# Patient Record
Sex: FEMALE | Race: WHITE | Employment: OTHER
[De-identification: names, ages, dates, MRNs, and addresses within clinical notes are randomized per-mention and may not be internally consistent; named-entity substitution may affect disease eponyms.]

---

## 2017-01-05 ENCOUNTER — OFFICE VISIT (OUTPATIENT)
Dept: FAMILY MEDICINE CLINIC | Facility: CLINIC | Age: 82
End: 2017-01-05

## 2017-01-05 VITALS
SYSTOLIC BLOOD PRESSURE: 133 MMHG | OXYGEN SATURATION: 99 % | DIASTOLIC BLOOD PRESSURE: 86 MMHG | HEART RATE: 74 BPM | HEIGHT: 63 IN | TEMPERATURE: 98.2 F

## 2017-01-05 DIAGNOSIS — K21.9 GASTROESOPHAGEAL REFLUX DISEASE WITHOUT ESOPHAGITIS: ICD-10-CM

## 2017-01-05 DIAGNOSIS — I48.0 PAROXYSMAL ATRIAL FIBRILLATION (HCC): Primary | ICD-10-CM

## 2017-01-05 PROCEDURE — 99213 OFFICE O/P EST LOW 20 MIN: CPT | Performed by: FAMILY MEDICINE

## 2017-01-05 RX ORDER — PANTOPRAZOLE SODIUM 40 MG/1
TABLET, DELAYED RELEASE ORAL
COMMUNITY
Start: 2016-12-07 | End: 2017-12-26 | Stop reason: SDUPTHER

## 2017-01-05 ASSESSMENT — ENCOUNTER SYMPTOMS
APNEA: 0
SHORTNESS OF BREATH: 0
CHEST TIGHTNESS: 0

## 2017-02-14 RX ORDER — FUROSEMIDE 20 MG/1
20 TABLET ORAL DAILY
Qty: 90 TABLET | Refills: 2 | Status: SHIPPED | OUTPATIENT
Start: 2017-02-14 | End: 2018-02-06 | Stop reason: SDUPTHER

## 2017-02-22 ENCOUNTER — TELEPHONE (OUTPATIENT)
Dept: FAMILY MEDICINE CLINIC | Facility: CLINIC | Age: 82
End: 2017-02-22

## 2017-03-03 ENCOUNTER — CARE COORDINATOR VISIT (OUTPATIENT)
Dept: CARE COORDINATION | Facility: CLINIC | Age: 82
End: 2017-03-03

## 2017-03-03 ENCOUNTER — TELEPHONE (OUTPATIENT)
Dept: FAMILY MEDICINE CLINIC | Facility: CLINIC | Age: 82
End: 2017-03-03

## 2017-03-03 ENCOUNTER — OFFICE VISIT (OUTPATIENT)
Dept: FAMILY MEDICINE CLINIC | Facility: CLINIC | Age: 82
End: 2017-03-03

## 2017-03-03 VITALS
HEIGHT: 62 IN | OXYGEN SATURATION: 96 % | SYSTOLIC BLOOD PRESSURE: 132 MMHG | HEART RATE: 58 BPM | DIASTOLIC BLOOD PRESSURE: 88 MMHG

## 2017-03-03 DIAGNOSIS — K21.9 GASTROESOPHAGEAL REFLUX DISEASE WITHOUT ESOPHAGITIS: Primary | ICD-10-CM

## 2017-03-03 DIAGNOSIS — J44.9 CHRONIC OBSTRUCTIVE PULMONARY DISEASE, UNSPECIFIED COPD TYPE (HCC): ICD-10-CM

## 2017-03-03 DIAGNOSIS — N89.8 VAGINAL ITCHING: ICD-10-CM

## 2017-03-03 PROCEDURE — 1090F PRES/ABSN URINE INCON ASSESS: CPT | Performed by: PHYSICIAN ASSISTANT

## 2017-03-03 PROCEDURE — G8427 DOCREV CUR MEDS BY ELIG CLIN: HCPCS | Performed by: PHYSICIAN ASSISTANT

## 2017-03-03 PROCEDURE — G8420 CALC BMI NORM PARAMETERS: HCPCS | Performed by: PHYSICIAN ASSISTANT

## 2017-03-03 PROCEDURE — G8484 FLU IMMUNIZE NO ADMIN: HCPCS | Performed by: PHYSICIAN ASSISTANT

## 2017-03-03 PROCEDURE — 1036F TOBACCO NON-USER: CPT | Performed by: PHYSICIAN ASSISTANT

## 2017-03-03 PROCEDURE — G8926 SPIRO NO PERF OR DOC: HCPCS | Performed by: PHYSICIAN ASSISTANT

## 2017-03-03 PROCEDURE — 99214 OFFICE O/P EST MOD 30 MIN: CPT | Performed by: PHYSICIAN ASSISTANT

## 2017-03-03 PROCEDURE — 3023F SPIROM DOC REV: CPT | Performed by: PHYSICIAN ASSISTANT

## 2017-03-03 PROCEDURE — 4040F PNEUMOC VAC/ADMIN/RCVD: CPT | Performed by: PHYSICIAN ASSISTANT

## 2017-03-03 PROCEDURE — 1123F ACP DISCUSS/DSCN MKR DOCD: CPT | Performed by: PHYSICIAN ASSISTANT

## 2017-03-03 RX ORDER — RANITIDINE 150 MG/1
150 TABLET ORAL 2 TIMES DAILY
Qty: 60 TABLET | Refills: 1 | Status: SHIPPED | OUTPATIENT
Start: 2017-03-03 | End: 2017-03-28 | Stop reason: ALTCHOICE

## 2017-03-03 ASSESSMENT — ENCOUNTER SYMPTOMS
DIARRHEA: 0
BLOOD IN STOOL: 0
NAUSEA: 0
COUGH: 0
SORE THROAT: 0
HOARSE VOICE: 0
SHORTNESS OF BREATH: 0
HEARTBURN: 1
CONSTIPATION: 0
BELCHING: 1
ABDOMINAL PAIN: 1
COLOR CHANGE: 0
WHEEZING: 0
VOMITING: 0

## 2017-03-14 RX ORDER — TRAMADOL HYDROCHLORIDE 50 MG/1
TABLET ORAL
Qty: 60 TABLET | Refills: 1 | OUTPATIENT
Start: 2017-03-14 | End: 2017-09-26 | Stop reason: SDUPTHER

## 2017-03-27 ENCOUNTER — CARE COORDINATION (OUTPATIENT)
Dept: CARE COORDINATION | Age: 82
End: 2017-03-27

## 2017-03-28 ENCOUNTER — OFFICE VISIT (OUTPATIENT)
Dept: FAMILY MEDICINE CLINIC | Age: 82
End: 2017-03-28
Payer: MEDICARE

## 2017-03-28 VITALS — HEART RATE: 82 BPM | HEIGHT: 62 IN | TEMPERATURE: 98.9 F | OXYGEN SATURATION: 92 %

## 2017-03-28 DIAGNOSIS — K58.0 IRRITABLE BOWEL SYNDROME WITH DIARRHEA: ICD-10-CM

## 2017-03-28 DIAGNOSIS — L30.9 DERMATITIS: Primary | ICD-10-CM

## 2017-03-28 DIAGNOSIS — E11.9 TYPE 2 DIABETES MELLITUS WITHOUT COMPLICATION, WITHOUT LONG-TERM CURRENT USE OF INSULIN (HCC): ICD-10-CM

## 2017-03-28 PROCEDURE — G8484 FLU IMMUNIZE NO ADMIN: HCPCS | Performed by: NURSE PRACTITIONER

## 2017-03-28 PROCEDURE — G8420 CALC BMI NORM PARAMETERS: HCPCS | Performed by: NURSE PRACTITIONER

## 2017-03-28 PROCEDURE — 1036F TOBACCO NON-USER: CPT | Performed by: NURSE PRACTITIONER

## 2017-03-28 PROCEDURE — 1090F PRES/ABSN URINE INCON ASSESS: CPT | Performed by: NURSE PRACTITIONER

## 2017-03-28 PROCEDURE — G8427 DOCREV CUR MEDS BY ELIG CLIN: HCPCS | Performed by: NURSE PRACTITIONER

## 2017-03-28 PROCEDURE — 1123F ACP DISCUSS/DSCN MKR DOCD: CPT | Performed by: NURSE PRACTITIONER

## 2017-03-28 PROCEDURE — 4040F PNEUMOC VAC/ADMIN/RCVD: CPT | Performed by: NURSE PRACTITIONER

## 2017-03-28 PROCEDURE — 99213 OFFICE O/P EST LOW 20 MIN: CPT | Performed by: NURSE PRACTITIONER

## 2017-03-28 RX ORDER — CLOTRIMAZOLE AND BETAMETHASONE DIPROPIONATE 10; .64 MG/G; MG/G
CREAM TOPICAL
Qty: 45 G | Refills: 1 | Status: SHIPPED | OUTPATIENT
Start: 2017-03-28 | End: 2017-05-19

## 2017-03-28 ASSESSMENT — ENCOUNTER SYMPTOMS
COLOR CHANGE: 0
EYE PAIN: 0
DIARRHEA: 0
NAUSEA: 0
SHORTNESS OF BREATH: 0
COUGH: 0
CHEST TIGHTNESS: 0
VOMITING: 0

## 2017-03-30 RX ORDER — DICYCLOMINE HYDROCHLORIDE 10 MG/1
10 CAPSULE ORAL
Qty: 120 CAPSULE | Refills: 5 | Status: SHIPPED | OUTPATIENT
Start: 2017-03-30 | End: 2017-05-19

## 2017-03-31 RX ORDER — LEVOTHYROXINE SODIUM 0.12 MG/1
125 TABLET ORAL DAILY
Qty: 90 TABLET | Refills: 1 | Status: SHIPPED | OUTPATIENT
Start: 2017-03-31 | End: 2017-11-08 | Stop reason: ALTCHOICE

## 2017-04-14 ENCOUNTER — OFFICE VISIT (OUTPATIENT)
Dept: FAMILY MEDICINE CLINIC | Age: 82
End: 2017-04-14
Payer: MEDICARE

## 2017-04-14 VITALS — TEMPERATURE: 97.9 F | HEART RATE: 78 BPM | SYSTOLIC BLOOD PRESSURE: 110 MMHG | DIASTOLIC BLOOD PRESSURE: 73 MMHG

## 2017-04-14 DIAGNOSIS — J01.10 ACUTE NON-RECURRENT FRONTAL SINUSITIS: Primary | ICD-10-CM

## 2017-04-14 PROCEDURE — 1036F TOBACCO NON-USER: CPT | Performed by: FAMILY MEDICINE

## 2017-04-14 PROCEDURE — G8420 CALC BMI NORM PARAMETERS: HCPCS | Performed by: FAMILY MEDICINE

## 2017-04-14 PROCEDURE — 4040F PNEUMOC VAC/ADMIN/RCVD: CPT | Performed by: FAMILY MEDICINE

## 2017-04-14 PROCEDURE — G8427 DOCREV CUR MEDS BY ELIG CLIN: HCPCS | Performed by: FAMILY MEDICINE

## 2017-04-14 PROCEDURE — 1090F PRES/ABSN URINE INCON ASSESS: CPT | Performed by: FAMILY MEDICINE

## 2017-04-14 PROCEDURE — 1123F ACP DISCUSS/DSCN MKR DOCD: CPT | Performed by: FAMILY MEDICINE

## 2017-04-14 PROCEDURE — 99214 OFFICE O/P EST MOD 30 MIN: CPT | Performed by: FAMILY MEDICINE

## 2017-04-14 RX ORDER — DOXYCYCLINE HYCLATE 100 MG/1
100 CAPSULE ORAL 2 TIMES DAILY
Qty: 20 CAPSULE | Refills: 0 | Status: SHIPPED | OUTPATIENT
Start: 2017-04-14 | End: 2017-04-24

## 2017-04-14 RX ORDER — FLUTICASONE PROPIONATE 50 MCG
1 SPRAY, SUSPENSION (ML) NASAL 2 TIMES DAILY
Qty: 1 BOTTLE | Refills: 2 | Status: SHIPPED | OUTPATIENT
Start: 2017-04-14 | End: 2017-05-19

## 2017-04-14 ASSESSMENT — ENCOUNTER SYMPTOMS
GASTROINTESTINAL NEGATIVE: 1
EYES NEGATIVE: 1
SINUS PRESSURE: 1
RESPIRATORY NEGATIVE: 1
ALLERGIC/IMMUNOLOGIC NEGATIVE: 1

## 2017-04-19 ENCOUNTER — CARE COORDINATION (OUTPATIENT)
Dept: CARE COORDINATION | Age: 82
End: 2017-04-19

## 2017-04-24 ENCOUNTER — HOSPITAL ENCOUNTER (OUTPATIENT)
Age: 82
Setting detail: SPECIMEN
Discharge: HOME | End: 2017-04-24
Payer: MEDICARE

## 2017-04-24 ENCOUNTER — OFFICE VISIT (OUTPATIENT)
Dept: OBGYN CLINIC | Age: 82
End: 2017-04-24
Payer: MEDICARE

## 2017-04-24 VITALS
WEIGHT: 164.6 LBS | HEIGHT: 62 IN | SYSTOLIC BLOOD PRESSURE: 126 MMHG | BODY MASS INDEX: 30.29 KG/M2 | DIASTOLIC BLOOD PRESSURE: 80 MMHG

## 2017-04-24 DIAGNOSIS — N76.4 LABIAL ABSCESS: Primary | ICD-10-CM

## 2017-04-24 DIAGNOSIS — R10.2 VAGINAL PAIN: ICD-10-CM

## 2017-04-24 DIAGNOSIS — Z76.89 ENCOUNTER FOR INCISION AND DRAINAGE PROCEDURE: ICD-10-CM

## 2017-04-24 DIAGNOSIS — N89.8 VAGINAL LESION: ICD-10-CM

## 2017-04-24 LAB
-: NORMAL
AMORPHOUS: NORMAL
BACTERIA: NORMAL
BILIRUBIN URINE: NEGATIVE
CASTS UA: NORMAL /LPF (ref 0–8)
COLOR: YELLOW
COMMENT UA: ABNORMAL
CRYSTALS, UA: NORMAL /HPF
DIRECT EXAM: NORMAL
EPITHELIAL CELLS UA: NORMAL /HPF (ref 0–5)
GLUCOSE URINE: NEGATIVE
KETONES, URINE: NEGATIVE
LEUKOCYTE ESTERASE, URINE: ABNORMAL
Lab: NORMAL
MUCUS: NORMAL
NITRITE, URINE: NEGATIVE
OTHER OBSERVATIONS UA: NORMAL
PH UA: 5.5 (ref 5–8)
PROTEIN UA: ABNORMAL
RBC UA: NORMAL /HPF (ref 0–4)
RENAL EPITHELIAL, UA: NORMAL /HPF
SPECIFIC GRAVITY UA: 1.02 (ref 1–1.03)
SPECIMEN DESCRIPTION: NORMAL
STATUS: NORMAL
TRICHOMONAS: NORMAL
TURBIDITY: ABNORMAL
URINE HGB: ABNORMAL
UROBILINOGEN, URINE: NORMAL
WBC UA: NORMAL /HPF (ref 0–5)
YEAST: NORMAL

## 2017-04-24 PROCEDURE — 1036F TOBACCO NON-USER: CPT | Performed by: NURSE PRACTITIONER

## 2017-04-24 PROCEDURE — 56405 I&D VULVA/PERINEAL ABSCESS: CPT | Performed by: NURSE PRACTITIONER

## 2017-04-24 RX ORDER — NITROFURANTOIN 25; 75 MG/1; MG/1
100 CAPSULE ORAL 2 TIMES DAILY
Qty: 20 CAPSULE | Refills: 0 | Status: SHIPPED | OUTPATIENT
Start: 2017-04-24 | End: 2017-05-04

## 2017-04-24 RX ORDER — RANITIDINE 150 MG/1
150 TABLET ORAL 2 TIMES DAILY
COMMUNITY
End: 2017-05-05 | Stop reason: SDUPTHER

## 2017-04-24 RX ORDER — NYSTATIN 100000 [USP'U]/G
POWDER TOPICAL
Qty: 1 BOTTLE | Refills: 1 | Status: SHIPPED | OUTPATIENT
Start: 2017-04-24 | End: 2017-05-19

## 2017-04-25 LAB
CULTURE: NORMAL
CULTURE: NORMAL
Lab: NORMAL
SPECIMEN DESCRIPTION: NORMAL
STATUS: NORMAL

## 2017-05-05 RX ORDER — RANITIDINE 150 MG/1
TABLET ORAL
Qty: 60 TABLET | Refills: 0 | Status: SHIPPED | OUTPATIENT
Start: 2017-05-05 | End: 2017-05-19 | Stop reason: ALTCHOICE

## 2017-05-12 ENCOUNTER — HOSPITAL ENCOUNTER (OUTPATIENT)
Age: 82
Setting detail: SPECIMEN
Discharge: HOME | End: 2017-05-12
Payer: MEDICARE

## 2017-05-12 ENCOUNTER — CARE COORDINATION (OUTPATIENT)
Dept: CARE COORDINATION | Age: 82
End: 2017-05-12

## 2017-05-12 ENCOUNTER — OFFICE VISIT (OUTPATIENT)
Dept: FAMILY MEDICINE CLINIC | Age: 82
End: 2017-05-12
Payer: MEDICARE

## 2017-05-12 VITALS — HEART RATE: 78 BPM | SYSTOLIC BLOOD PRESSURE: 112 MMHG | TEMPERATURE: 97.5 F | DIASTOLIC BLOOD PRESSURE: 55 MMHG

## 2017-05-12 DIAGNOSIS — R06.00 PND (PAROXYSMAL NOCTURNAL DYSPNEA): ICD-10-CM

## 2017-05-12 DIAGNOSIS — M25.571 ACUTE RIGHT ANKLE PAIN: ICD-10-CM

## 2017-05-12 DIAGNOSIS — I50.9 CONGESTIVE HEART FAILURE, UNSPECIFIED CONGESTIVE HEART FAILURE CHRONICITY, UNSPECIFIED CONGESTIVE HEART FAILURE TYPE: Primary | ICD-10-CM

## 2017-05-12 PROBLEM — R07.9 CHEST PAIN: Status: ACTIVE | Noted: 2017-05-12

## 2017-05-12 PROBLEM — R60.9 EDEMA: Status: ACTIVE | Noted: 2017-05-12

## 2017-05-12 LAB
ABSOLUTE EOS #: 0.06 K/UL (ref 0–0.4)
ABSOLUTE LYMPH #: 1.16 K/UL (ref 1–4.8)
ABSOLUTE MONO #: 0.37 K/UL (ref 0.1–0.8)
ALBUMIN SERPL-MCNC: 4 G/DL (ref 3.5–5.2)
ALBUMIN/GLOBULIN RATIO: 1.4 (ref 1–2.5)
ALP BLD-CCNC: 60 U/L (ref 35–104)
ALT SERPL-CCNC: 7 U/L (ref 5–33)
ANION GAP SERPL CALCULATED.3IONS-SCNC: 15 MMOL/L (ref 9–17)
AST SERPL-CCNC: 11 U/L
BASOPHILS # BLD: 0 %
BASOPHILS ABSOLUTE: 0 K/UL (ref 0–0.2)
BILIRUB SERPL-MCNC: 0.43 MG/DL (ref 0.3–1.2)
BNP INTERPRETATION: NORMAL
BUN BLDV-MCNC: 19 MG/DL (ref 8–23)
BUN/CREAT BLD: ABNORMAL (ref 9–20)
CALCIUM SERPL-MCNC: 8.7 MG/DL (ref 8.6–10.4)
CHLORIDE BLD-SCNC: 104 MMOL/L (ref 98–107)
CO2: 22 MMOL/L (ref 20–31)
CREAT SERPL-MCNC: 1.01 MG/DL (ref 0.5–0.9)
D-DIMER QUANTITATIVE: 0.5 MG/L FEU
DIFFERENTIAL TYPE: ABNORMAL
EOSINOPHILS RELATIVE PERCENT: 1 %
GFR AFRICAN AMERICAN: >60 ML/MIN
GFR NON-AFRICAN AMERICAN: 51 ML/MIN
GFR SERPL CREATININE-BSD FRML MDRD: ABNORMAL ML/MIN/{1.73_M2}
GFR SERPL CREATININE-BSD FRML MDRD: ABNORMAL ML/MIN/{1.73_M2}
GLUCOSE BLD-MCNC: 127 MG/DL (ref 70–99)
HCT VFR BLD CALC: 25.4 % (ref 36–46)
HEMOGLOBIN: 7.9 G/DL (ref 12–16)
LYMPHOCYTES # BLD: 19 %
MCH RBC QN AUTO: 22.4 PG (ref 26–34)
MCHC RBC AUTO-ENTMCNC: 31 G/DL (ref 31–37)
MCV RBC AUTO: 72.1 FL (ref 80–100)
MONOCYTES # BLD: 6 %
MORPHOLOGY: ABNORMAL
PDW BLD-RTO: 19.1 % (ref 12.5–15.4)
PLATELET # BLD: 265 K/UL (ref 140–450)
PLATELET ESTIMATE: ABNORMAL
PMV BLD AUTO: 7.9 FL (ref 6–12)
POTASSIUM SERPL-SCNC: 3.9 MMOL/L (ref 3.7–5.3)
PRO-BNP: 155 PG/ML
RBC # BLD: 3.53 M/UL (ref 4–5.2)
RBC # BLD: ABNORMAL 10*6/UL
SEG NEUTROPHILS: 74 %
SEGMENTED NEUTROPHILS ABSOLUTE COUNT: 4.51 K/UL (ref 1.8–7.7)
SODIUM BLD-SCNC: 141 MMOL/L (ref 135–144)
TOTAL PROTEIN: 6.9 G/DL (ref 6.4–8.3)
TROPONIN INTERP: NORMAL
TROPONIN T: <0.03 NG/ML
WBC # BLD: 6.1 K/UL (ref 3.5–11)
WBC # BLD: ABNORMAL 10*3/UL

## 2017-05-12 PROCEDURE — 1036F TOBACCO NON-USER: CPT | Performed by: PHYSICIAN ASSISTANT

## 2017-05-12 PROCEDURE — 99212 OFFICE O/P EST SF 10 MIN: CPT | Performed by: PHYSICIAN ASSISTANT

## 2017-05-12 PROCEDURE — 4040F PNEUMOC VAC/ADMIN/RCVD: CPT | Performed by: PHYSICIAN ASSISTANT

## 2017-05-12 PROCEDURE — 1123F ACP DISCUSS/DSCN MKR DOCD: CPT | Performed by: PHYSICIAN ASSISTANT

## 2017-05-12 PROCEDURE — G8417 CALC BMI ABV UP PARAM F/U: HCPCS | Performed by: PHYSICIAN ASSISTANT

## 2017-05-12 PROCEDURE — 1090F PRES/ABSN URINE INCON ASSESS: CPT | Performed by: PHYSICIAN ASSISTANT

## 2017-05-12 PROCEDURE — G8427 DOCREV CUR MEDS BY ELIG CLIN: HCPCS | Performed by: PHYSICIAN ASSISTANT

## 2017-05-12 PROCEDURE — 93000 ELECTROCARDIOGRAM COMPLETE: CPT | Performed by: PHYSICIAN ASSISTANT

## 2017-05-15 DIAGNOSIS — D64.9 ANEMIA, UNSPECIFIED TYPE: Primary | ICD-10-CM

## 2017-05-16 ENCOUNTER — TELEPHONE (OUTPATIENT)
Dept: FAMILY MEDICINE CLINIC | Age: 82
End: 2017-05-16

## 2017-05-16 ENCOUNTER — OFFICE VISIT (OUTPATIENT)
Dept: GASTROENTEROLOGY | Age: 82
End: 2017-05-16
Payer: MEDICARE

## 2017-05-16 VITALS
OXYGEN SATURATION: 94 % | RESPIRATION RATE: 14 BRPM | DIASTOLIC BLOOD PRESSURE: 69 MMHG | HEART RATE: 74 BPM | HEIGHT: 63 IN | BODY MASS INDEX: 31.89 KG/M2 | WEIGHT: 180 LBS | SYSTOLIC BLOOD PRESSURE: 123 MMHG | TEMPERATURE: 98 F

## 2017-05-16 DIAGNOSIS — K21.9 GASTROESOPHAGEAL REFLUX DISEASE WITHOUT ESOPHAGITIS: ICD-10-CM

## 2017-05-16 DIAGNOSIS — R10.13 ABDOMINAL PAIN, EPIGASTRIC: Primary | ICD-10-CM

## 2017-05-16 DIAGNOSIS — R10.32 LEFT LOWER QUADRANT PAIN: ICD-10-CM

## 2017-05-16 DIAGNOSIS — I71.40 ABDOMINAL AORTIC ANEURYSM (AAA) WITHOUT RUPTURE: ICD-10-CM

## 2017-05-16 DIAGNOSIS — K59.00 CONSTIPATION, UNSPECIFIED CONSTIPATION TYPE: ICD-10-CM

## 2017-05-16 DIAGNOSIS — D64.9 ANEMIA, UNSPECIFIED TYPE: ICD-10-CM

## 2017-05-16 PROCEDURE — G8417 CALC BMI ABV UP PARAM F/U: HCPCS | Performed by: INTERNAL MEDICINE

## 2017-05-16 PROCEDURE — 1036F TOBACCO NON-USER: CPT | Performed by: INTERNAL MEDICINE

## 2017-05-16 PROCEDURE — 99214 OFFICE O/P EST MOD 30 MIN: CPT | Performed by: INTERNAL MEDICINE

## 2017-05-16 PROCEDURE — 1090F PRES/ABSN URINE INCON ASSESS: CPT | Performed by: INTERNAL MEDICINE

## 2017-05-16 PROCEDURE — G8427 DOCREV CUR MEDS BY ELIG CLIN: HCPCS | Performed by: INTERNAL MEDICINE

## 2017-05-16 PROCEDURE — 4040F PNEUMOC VAC/ADMIN/RCVD: CPT | Performed by: INTERNAL MEDICINE

## 2017-05-16 PROCEDURE — 1123F ACP DISCUSS/DSCN MKR DOCD: CPT | Performed by: INTERNAL MEDICINE

## 2017-05-16 RX ORDER — DOCUSATE SODIUM 100 MG/1
100 CAPSULE, LIQUID FILLED ORAL DAILY
Qty: 30 CAPSULE | Refills: 5 | Status: SHIPPED | OUTPATIENT
Start: 2017-05-16 | End: 2017-05-19 | Stop reason: ALTCHOICE

## 2017-05-16 ASSESSMENT — ENCOUNTER SYMPTOMS
DIARRHEA: 0
ABDOMINAL PAIN: 1
ABDOMINAL DISTENTION: 1
BACK PAIN: 1
SHORTNESS OF BREATH: 1
VOMITING: 0
NAUSEA: 1
RECTAL PAIN: 0
BLOOD IN STOOL: 0
CONSTIPATION: 0
ANAL BLEEDING: 0

## 2017-05-17 ENCOUNTER — TELEPHONE (OUTPATIENT)
Dept: FAMILY MEDICINE CLINIC | Age: 82
End: 2017-05-17

## 2017-05-19 ENCOUNTER — OFFICE VISIT (OUTPATIENT)
Dept: FAMILY MEDICINE CLINIC | Age: 82
End: 2017-05-19
Payer: MEDICARE

## 2017-05-19 ENCOUNTER — HOSPITAL ENCOUNTER (OUTPATIENT)
Age: 82
Setting detail: SPECIMEN
Discharge: HOME | End: 2017-05-19
Payer: MEDICARE

## 2017-05-19 ENCOUNTER — TELEPHONE (OUTPATIENT)
Dept: FAMILY MEDICINE CLINIC | Age: 82
End: 2017-05-19

## 2017-05-19 VITALS
SYSTOLIC BLOOD PRESSURE: 124 MMHG | OXYGEN SATURATION: 98 % | DIASTOLIC BLOOD PRESSURE: 70 MMHG | HEIGHT: 63 IN | WEIGHT: 179.9 LBS | BODY MASS INDEX: 31.88 KG/M2 | HEART RATE: 58 BPM

## 2017-05-19 DIAGNOSIS — D64.9 ANEMIA, UNSPECIFIED TYPE: ICD-10-CM

## 2017-05-19 DIAGNOSIS — I71.40 ABDOMINAL AORTIC ANEURYSM (AAA) WITHOUT RUPTURE: Primary | ICD-10-CM

## 2017-05-19 LAB
FERRITIN: 6 UG/L (ref 13–150)
IRON SATURATION: 5 % (ref 20–55)
IRON: 19 UG/DL (ref 37–145)
TOTAL IRON BINDING CAPACITY: 383 UG/DL (ref 250–450)
UNSATURATED IRON BINDING CAPACITY: 364 UG/DL (ref 112–347)
VITAMIN B-12: 264 PG/ML (ref 211–946)

## 2017-05-19 PROCEDURE — 1090F PRES/ABSN URINE INCON ASSESS: CPT | Performed by: PHYSICIAN ASSISTANT

## 2017-05-19 PROCEDURE — G8417 CALC BMI ABV UP PARAM F/U: HCPCS | Performed by: PHYSICIAN ASSISTANT

## 2017-05-19 PROCEDURE — 1123F ACP DISCUSS/DSCN MKR DOCD: CPT | Performed by: PHYSICIAN ASSISTANT

## 2017-05-19 PROCEDURE — G8427 DOCREV CUR MEDS BY ELIG CLIN: HCPCS | Performed by: PHYSICIAN ASSISTANT

## 2017-05-19 PROCEDURE — 4040F PNEUMOC VAC/ADMIN/RCVD: CPT | Performed by: PHYSICIAN ASSISTANT

## 2017-05-19 PROCEDURE — 1036F TOBACCO NON-USER: CPT | Performed by: PHYSICIAN ASSISTANT

## 2017-05-19 PROCEDURE — 99213 OFFICE O/P EST LOW 20 MIN: CPT | Performed by: PHYSICIAN ASSISTANT

## 2017-05-19 RX ORDER — OMEPRAZOLE 20 MG/1
20 CAPSULE, DELAYED RELEASE ORAL DAILY
COMMUNITY
End: 2017-05-19 | Stop reason: SDUPTHER

## 2017-05-19 RX ORDER — RANITIDINE 150 MG/1
150 CAPSULE ORAL 2 TIMES DAILY
COMMUNITY
End: 2017-06-28 | Stop reason: SDUPTHER

## 2017-05-19 RX ORDER — OMEPRAZOLE 20 MG/1
20 CAPSULE, DELAYED RELEASE ORAL DAILY
Qty: 30 CAPSULE | Refills: 3 | Status: SHIPPED | OUTPATIENT
Start: 2017-05-19 | End: 2017-05-19 | Stop reason: CLARIF

## 2017-05-19 ASSESSMENT — ENCOUNTER SYMPTOMS
BLOOD IN STOOL: 0
WHEEZING: 0
COLOR CHANGE: 0
NAUSEA: 0
SHORTNESS OF BREATH: 0
ABDOMINAL PAIN: 0
DIARRHEA: 0
CHANGE IN BOWEL HABIT: 0
VOMITING: 0
CONSTIPATION: 0
COUGH: 0
ANAL BLEEDING: 0

## 2017-05-23 RX ORDER — LANOLIN ALCOHOL/MO/W.PET/CERES
325 CREAM (GRAM) TOPICAL
Qty: 30 TABLET | Refills: 3 | Status: SHIPPED | OUTPATIENT
Start: 2017-05-23 | End: 2019-11-26

## 2017-05-25 DIAGNOSIS — D64.9 ANEMIA, UNSPECIFIED TYPE: ICD-10-CM

## 2017-05-25 LAB
CONTROL: POSITIVE
HEMOCCULT STL QL: POSITIVE

## 2017-05-25 PROCEDURE — 82274 ASSAY TEST FOR BLOOD FECAL: CPT | Performed by: PHYSICIAN ASSISTANT

## 2017-06-01 RX ORDER — POTASSIUM CHLORIDE 750 MG/1
TABLET, FILM COATED, EXTENDED RELEASE ORAL
Qty: 60 TABLET | Refills: 3 | Status: SHIPPED | OUTPATIENT
Start: 2017-06-01 | End: 2017-06-12 | Stop reason: SDUPTHER

## 2017-06-02 ENCOUNTER — OFFICE VISIT (OUTPATIENT)
Dept: FAMILY MEDICINE CLINIC | Age: 82
End: 2017-06-02
Payer: MEDICARE

## 2017-06-02 ENCOUNTER — HOSPITAL ENCOUNTER (OUTPATIENT)
Age: 82
Setting detail: SPECIMEN
Discharge: HOME | End: 2017-06-02
Payer: MEDICARE

## 2017-06-02 VITALS
HEIGHT: 63 IN | HEART RATE: 78 BPM | BODY MASS INDEX: 31.88 KG/M2 | WEIGHT: 179.9 LBS | DIASTOLIC BLOOD PRESSURE: 72 MMHG | SYSTOLIC BLOOD PRESSURE: 110 MMHG | OXYGEN SATURATION: 96 %

## 2017-06-02 DIAGNOSIS — R19.5 GUAIAC POSITIVE STOOLS: ICD-10-CM

## 2017-06-02 DIAGNOSIS — D50.9 IRON DEFICIENCY ANEMIA, UNSPECIFIED IRON DEFICIENCY ANEMIA TYPE: Primary | ICD-10-CM

## 2017-06-02 LAB
ALBUMIN SERPL-MCNC: 3.8 G/DL (ref 3.5–5.2)
ALBUMIN/GLOBULIN RATIO: 1.4 (ref 1–2.5)
ALP BLD-CCNC: 52 U/L (ref 35–104)
ALT SERPL-CCNC: 7 U/L (ref 5–33)
ANION GAP SERPL CALCULATED.3IONS-SCNC: 16 MMOL/L (ref 9–17)
AST SERPL-CCNC: 13 U/L
BILIRUB SERPL-MCNC: 0.63 MG/DL (ref 0.3–1.2)
BNP INTERPRETATION: NORMAL
BUN BLDV-MCNC: 15 MG/DL (ref 8–23)
BUN/CREAT BLD: ABNORMAL (ref 9–20)
CALCIUM SERPL-MCNC: 8.6 MG/DL (ref 8.6–10.4)
CHLORIDE BLD-SCNC: 105 MMOL/L (ref 98–107)
CO2: 19 MMOL/L (ref 20–31)
CREAT SERPL-MCNC: 0.9 MG/DL (ref 0.5–0.9)
GFR AFRICAN AMERICAN: >60 ML/MIN
GFR NON-AFRICAN AMERICAN: 59 ML/MIN
GFR SERPL CREATININE-BSD FRML MDRD: ABNORMAL ML/MIN/{1.73_M2}
GFR SERPL CREATININE-BSD FRML MDRD: ABNORMAL ML/MIN/{1.73_M2}
GLUCOSE BLD-MCNC: 117 MG/DL (ref 70–99)
HCT VFR BLD CALC: 27.4 % (ref 36–46)
HEMOGLOBIN: 8.1 G/DL (ref 12–16)
MCH RBC QN AUTO: 22.2 PG (ref 26–34)
MCHC RBC AUTO-ENTMCNC: 29.7 G/DL (ref 31–37)
MCV RBC AUTO: 74.9 FL (ref 80–100)
PDW BLD-RTO: 25.3 % (ref 12.5–15.4)
PLATELET # BLD: 235 K/UL (ref 140–450)
PMV BLD AUTO: 7.9 FL (ref 6–12)
POTASSIUM SERPL-SCNC: 4.3 MMOL/L (ref 3.7–5.3)
PRO-BNP: 150 PG/ML
RBC # BLD: 3.66 M/UL (ref 4–5.2)
SODIUM BLD-SCNC: 140 MMOL/L (ref 135–144)
TOTAL PROTEIN: 6.5 G/DL (ref 6.4–8.3)
WBC # BLD: 5.9 K/UL (ref 3.5–11)

## 2017-06-02 PROCEDURE — G8417 CALC BMI ABV UP PARAM F/U: HCPCS | Performed by: PHYSICIAN ASSISTANT

## 2017-06-02 PROCEDURE — 1123F ACP DISCUSS/DSCN MKR DOCD: CPT | Performed by: PHYSICIAN ASSISTANT

## 2017-06-02 PROCEDURE — 1036F TOBACCO NON-USER: CPT | Performed by: PHYSICIAN ASSISTANT

## 2017-06-02 PROCEDURE — 1090F PRES/ABSN URINE INCON ASSESS: CPT | Performed by: PHYSICIAN ASSISTANT

## 2017-06-02 PROCEDURE — G8427 DOCREV CUR MEDS BY ELIG CLIN: HCPCS | Performed by: PHYSICIAN ASSISTANT

## 2017-06-02 PROCEDURE — 99213 OFFICE O/P EST LOW 20 MIN: CPT | Performed by: PHYSICIAN ASSISTANT

## 2017-06-02 PROCEDURE — 4040F PNEUMOC VAC/ADMIN/RCVD: CPT | Performed by: PHYSICIAN ASSISTANT

## 2017-06-02 ASSESSMENT — ENCOUNTER SYMPTOMS
ABDOMINAL PAIN: 0
BLOOD IN STOOL: 0
NAUSEA: 0
WHEEZING: 0
VOMITING: 0
COLOR CHANGE: 0
SHORTNESS OF BREATH: 0
COUGH: 0
CONSTIPATION: 0
DIARRHEA: 0

## 2017-06-12 RX ORDER — POTASSIUM CHLORIDE 750 MG/1
TABLET, FILM COATED, EXTENDED RELEASE ORAL
Qty: 60 TABLET | Refills: 3 | Status: SHIPPED | OUTPATIENT
Start: 2017-06-12 | End: 2017-10-05 | Stop reason: SDUPTHER

## 2017-06-20 ENCOUNTER — HOSPITAL ENCOUNTER (OUTPATIENT)
Age: 82
Setting detail: OUTPATIENT SURGERY
Discharge: HOME | End: 2017-06-20
Attending: INTERNAL MEDICINE | Admitting: INTERNAL MEDICINE
Payer: MEDICARE

## 2017-06-20 VITALS
WEIGHT: 174 LBS | RESPIRATION RATE: 16 BRPM | OXYGEN SATURATION: 98 % | DIASTOLIC BLOOD PRESSURE: 58 MMHG | BODY MASS INDEX: 30.83 KG/M2 | SYSTOLIC BLOOD PRESSURE: 137 MMHG | HEIGHT: 63 IN | HEART RATE: 55 BPM | TEMPERATURE: 97.9 F

## 2017-06-20 PROCEDURE — 7100000011 HC PHASE II RECOVERY - ADDTL 15 MIN: Performed by: INTERNAL MEDICINE

## 2017-06-20 PROCEDURE — 2580000003 HC RX 258: Performed by: INTERNAL MEDICINE

## 2017-06-20 PROCEDURE — 6360000002 HC RX W HCPCS: Performed by: INTERNAL MEDICINE

## 2017-06-20 PROCEDURE — 99152 MOD SED SAME PHYS/QHP 5/>YRS: CPT | Performed by: INTERNAL MEDICINE

## 2017-06-20 PROCEDURE — 6370000000 HC RX 637 (ALT 250 FOR IP): Performed by: INTERNAL MEDICINE

## 2017-06-20 PROCEDURE — 43239 EGD BIOPSY SINGLE/MULTIPLE: CPT | Performed by: INTERNAL MEDICINE

## 2017-06-20 PROCEDURE — 7100000010 HC PHASE II RECOVERY - FIRST 15 MIN: Performed by: INTERNAL MEDICINE

## 2017-06-20 PROCEDURE — 3609012400 HC EGD TRANSORAL BIOPSY SINGLE/MULTIPLE: Performed by: INTERNAL MEDICINE

## 2017-06-20 PROCEDURE — 88305 TISSUE EXAM BY PATHOLOGIST: CPT

## 2017-06-20 RX ORDER — MIDAZOLAM HYDROCHLORIDE 1 MG/ML
INJECTION INTRAMUSCULAR; INTRAVENOUS PRN
Status: DISCONTINUED | OUTPATIENT
Start: 2017-06-20 | End: 2017-06-20 | Stop reason: HOSPADM

## 2017-06-20 RX ORDER — MEPERIDINE HYDROCHLORIDE 50 MG/ML
INJECTION INTRAMUSCULAR; INTRAVENOUS; SUBCUTANEOUS PRN
Status: DISCONTINUED | OUTPATIENT
Start: 2017-06-20 | End: 2017-06-20 | Stop reason: HOSPADM

## 2017-06-20 RX ORDER — SODIUM CHLORIDE, SODIUM LACTATE, POTASSIUM CHLORIDE, CALCIUM CHLORIDE 600; 310; 30; 20 MG/100ML; MG/100ML; MG/100ML; MG/100ML
INJECTION, SOLUTION INTRAVENOUS CONTINUOUS
Status: DISCONTINUED | OUTPATIENT
Start: 2017-06-20 | End: 2017-06-20 | Stop reason: HOSPADM

## 2017-06-20 RX ADMIN — SODIUM CHLORIDE, POTASSIUM CHLORIDE, SODIUM LACTATE AND CALCIUM CHLORIDE: 600; 310; 30; 20 INJECTION, SOLUTION INTRAVENOUS at 09:48

## 2017-06-20 ASSESSMENT — PAIN - FUNCTIONAL ASSESSMENT: PAIN_FUNCTIONAL_ASSESSMENT: 0-10

## 2017-06-20 ASSESSMENT — PAIN SCALES - GENERAL
PAINLEVEL_OUTOF10: 0

## 2017-06-21 LAB — SURGICAL PATHOLOGY REPORT: NORMAL

## 2017-06-23 RX ORDER — LISINOPRIL 20 MG/1
20 TABLET ORAL DAILY
Qty: 90 TABLET | Refills: 2 | Status: SHIPPED | OUTPATIENT
Start: 2017-06-23 | End: 2021-03-08

## 2017-06-27 ENCOUNTER — OFFICE VISIT (OUTPATIENT)
Dept: GASTROENTEROLOGY | Age: 82
End: 2017-06-27
Payer: MEDICARE

## 2017-06-27 VITALS
TEMPERATURE: 97.8 F | RESPIRATION RATE: 14 BRPM | SYSTOLIC BLOOD PRESSURE: 125 MMHG | DIASTOLIC BLOOD PRESSURE: 58 MMHG | OXYGEN SATURATION: 95 % | BODY MASS INDEX: 31.54 KG/M2 | HEIGHT: 63 IN | WEIGHT: 178 LBS | HEART RATE: 72 BPM

## 2017-06-27 DIAGNOSIS — R10.13 ABDOMINAL PAIN, EPIGASTRIC: Primary | ICD-10-CM

## 2017-06-27 DIAGNOSIS — K21.9 GASTROESOPHAGEAL REFLUX DISEASE WITHOUT ESOPHAGITIS: ICD-10-CM

## 2017-06-27 DIAGNOSIS — D64.9 ANEMIA, UNSPECIFIED TYPE: ICD-10-CM

## 2017-06-27 PROCEDURE — 99214 OFFICE O/P EST MOD 30 MIN: CPT | Performed by: INTERNAL MEDICINE

## 2017-06-27 PROCEDURE — 4040F PNEUMOC VAC/ADMIN/RCVD: CPT | Performed by: INTERNAL MEDICINE

## 2017-06-27 PROCEDURE — 1036F TOBACCO NON-USER: CPT | Performed by: INTERNAL MEDICINE

## 2017-06-27 PROCEDURE — 1090F PRES/ABSN URINE INCON ASSESS: CPT | Performed by: INTERNAL MEDICINE

## 2017-06-27 PROCEDURE — G8417 CALC BMI ABV UP PARAM F/U: HCPCS | Performed by: INTERNAL MEDICINE

## 2017-06-27 PROCEDURE — G8428 CUR MEDS NOT DOCUMENT: HCPCS | Performed by: INTERNAL MEDICINE

## 2017-06-27 PROCEDURE — 1123F ACP DISCUSS/DSCN MKR DOCD: CPT | Performed by: INTERNAL MEDICINE

## 2017-06-27 ASSESSMENT — ENCOUNTER SYMPTOMS
SHORTNESS OF BREATH: 1
BLOOD IN STOOL: 0
ABDOMINAL DISTENTION: 1
RECTAL PAIN: 0
DIARRHEA: 0
ANAL BLEEDING: 0
VOMITING: 0
ABDOMINAL PAIN: 1
NAUSEA: 0
BACK PAIN: 1
CONSTIPATION: 0

## 2017-06-28 RX ORDER — RANITIDINE 150 MG/1
TABLET ORAL
Qty: 60 TABLET | Refills: 0 | Status: SHIPPED | OUTPATIENT
Start: 2017-06-28 | End: 2017-06-29 | Stop reason: SDUPTHER

## 2017-06-29 ENCOUNTER — HOSPITAL ENCOUNTER (OUTPATIENT)
Age: 82
Setting detail: SPECIMEN
Discharge: HOME | End: 2017-06-29
Payer: MEDICARE

## 2017-06-29 ENCOUNTER — OFFICE VISIT (OUTPATIENT)
Dept: FAMILY MEDICINE CLINIC | Age: 82
End: 2017-06-29
Payer: MEDICARE

## 2017-06-29 VITALS
BODY MASS INDEX: 31.52 KG/M2 | HEART RATE: 67 BPM | OXYGEN SATURATION: 90 % | WEIGHT: 177.91 LBS | SYSTOLIC BLOOD PRESSURE: 130 MMHG | DIASTOLIC BLOOD PRESSURE: 74 MMHG | HEIGHT: 63 IN

## 2017-06-29 DIAGNOSIS — D50.9 IRON DEFICIENCY ANEMIA, UNSPECIFIED IRON DEFICIENCY ANEMIA TYPE: ICD-10-CM

## 2017-06-29 DIAGNOSIS — K21.9 GASTROESOPHAGEAL REFLUX DISEASE WITHOUT ESOPHAGITIS: ICD-10-CM

## 2017-06-29 DIAGNOSIS — Z23 NEED FOR PNEUMOCOCCAL VACCINATION: ICD-10-CM

## 2017-06-29 DIAGNOSIS — D50.9 IRON DEFICIENCY ANEMIA, UNSPECIFIED IRON DEFICIENCY ANEMIA TYPE: Primary | ICD-10-CM

## 2017-06-29 DIAGNOSIS — D64.9 ANEMIA, UNSPECIFIED TYPE: ICD-10-CM

## 2017-06-29 DIAGNOSIS — R10.13 ABDOMINAL PAIN, EPIGASTRIC: ICD-10-CM

## 2017-06-29 LAB
ABSOLUTE EOS #: 0.08 K/UL (ref 0–0.4)
ABSOLUTE LYMPH #: 1.82 K/UL (ref 1–4.8)
ABSOLUTE MONO #: 0.08 K/UL (ref 0.1–0.8)
BASOPHILS # BLD: 0 %
BASOPHILS ABSOLUTE: 0 K/UL (ref 0–0.2)
DIFFERENTIAL TYPE: ABNORMAL
EOSINOPHILS RELATIVE PERCENT: 1 %
HCT VFR BLD CALC: 30.3 % (ref 36–46)
HEMOGLOBIN: 9 G/DL (ref 12–16)
LYMPHOCYTES # BLD: 23 %
MCH RBC QN AUTO: 22.1 PG (ref 26–34)
MCHC RBC AUTO-ENTMCNC: 29.8 G/DL (ref 31–37)
MCV RBC AUTO: 74.1 FL (ref 80–100)
MONOCYTES # BLD: 1 %
MORPHOLOGY: ABNORMAL
PDW BLD-RTO: 25.7 % (ref 12.5–15.4)
PLATELET # BLD: 238 K/UL (ref 140–450)
PLATELET ESTIMATE: ABNORMAL
PMV BLD AUTO: 7.9 FL (ref 6–12)
RBC # BLD: 4.08 M/UL (ref 4–5.2)
RBC # BLD: ABNORMAL 10*6/UL
SEG NEUTROPHILS: 75 %
SEGMENTED NEUTROPHILS ABSOLUTE COUNT: 5.92 K/UL (ref 1.8–7.7)
WBC # BLD: 7.9 K/UL (ref 3.5–11)
WBC # BLD: ABNORMAL 10*3/UL

## 2017-06-29 PROCEDURE — 1123F ACP DISCUSS/DSCN MKR DOCD: CPT | Performed by: PHYSICIAN ASSISTANT

## 2017-06-29 PROCEDURE — G8427 DOCREV CUR MEDS BY ELIG CLIN: HCPCS | Performed by: PHYSICIAN ASSISTANT

## 2017-06-29 PROCEDURE — 99213 OFFICE O/P EST LOW 20 MIN: CPT | Performed by: PHYSICIAN ASSISTANT

## 2017-06-29 PROCEDURE — 4040F PNEUMOC VAC/ADMIN/RCVD: CPT | Performed by: PHYSICIAN ASSISTANT

## 2017-06-29 PROCEDURE — 90670 PCV13 VACCINE IM: CPT | Performed by: PHYSICIAN ASSISTANT

## 2017-06-29 PROCEDURE — G8417 CALC BMI ABV UP PARAM F/U: HCPCS | Performed by: PHYSICIAN ASSISTANT

## 2017-06-29 PROCEDURE — 1090F PRES/ABSN URINE INCON ASSESS: CPT | Performed by: PHYSICIAN ASSISTANT

## 2017-06-29 PROCEDURE — G0009 ADMIN PNEUMOCOCCAL VACCINE: HCPCS | Performed by: PHYSICIAN ASSISTANT

## 2017-06-29 PROCEDURE — 1036F TOBACCO NON-USER: CPT | Performed by: PHYSICIAN ASSISTANT

## 2017-06-29 RX ORDER — POTASSIUM CHLORIDE 750 MG/1
TABLET, EXTENDED RELEASE ORAL
COMMUNITY
Start: 2017-06-01 | End: 2017-09-12

## 2017-06-29 RX ORDER — RANITIDINE 150 MG/1
150 TABLET ORAL 2 TIMES DAILY
Qty: 60 TABLET | Refills: 6 | Status: SHIPPED | OUTPATIENT
Start: 2017-06-29 | End: 2018-07-02 | Stop reason: SDUPTHER

## 2017-06-29 RX ORDER — RANITIDINE 150 MG/1
TABLET ORAL
Qty: 60 TABLET | Refills: 6 | Status: SHIPPED | OUTPATIENT
Start: 2017-06-29 | End: 2017-06-29 | Stop reason: CLARIF

## 2017-06-29 ASSESSMENT — ENCOUNTER SYMPTOMS
COLOR CHANGE: 0
COUGH: 0
NAUSEA: 0
CONSTIPATION: 0
VOMITING: 0
SHORTNESS OF BREATH: 0
ABDOMINAL PAIN: 1
DIARRHEA: 0
WHEEZING: 0
BELCHING: 0

## 2017-06-30 LAB
GLIADIN DEAMINIDATED PEPTIDE AB IGA: 1 U/ML
GLIADIN DEAMINIDATED PEPTIDE AB IGG: <0.4 U/ML
IGA: 370 MG/DL (ref 70–400)
TISSUE TRANSGLUTAMINASE IGA: 0.8 U/ML

## 2017-07-21 ENCOUNTER — HOSPITAL ENCOUNTER (EMERGENCY)
Age: 82
Discharge: HOME | End: 2017-07-21
Attending: EMERGENCY MEDICINE
Payer: MEDICARE

## 2017-07-21 ENCOUNTER — APPOINTMENT (OUTPATIENT)
Dept: GENERAL RADIOLOGY | Age: 82
End: 2017-07-21
Payer: MEDICARE

## 2017-07-21 VITALS
RESPIRATION RATE: 20 BRPM | WEIGHT: 174 LBS | HEIGHT: 63 IN | BODY MASS INDEX: 30.83 KG/M2 | HEART RATE: 60 BPM | OXYGEN SATURATION: 100 % | TEMPERATURE: 98.2 F | DIASTOLIC BLOOD PRESSURE: 54 MMHG | SYSTOLIC BLOOD PRESSURE: 132 MMHG

## 2017-07-21 DIAGNOSIS — R07.9 CHEST PAIN, UNSPECIFIED TYPE: Primary | ICD-10-CM

## 2017-07-21 DIAGNOSIS — N39.0 URINARY TRACT INFECTION WITHOUT HEMATURIA, SITE UNSPECIFIED: ICD-10-CM

## 2017-07-21 LAB
-: ABNORMAL
ABSOLUTE EOS #: 0 K/UL (ref 0–0.4)
ABSOLUTE LYMPH #: 1.74 K/UL (ref 1–4.8)
ABSOLUTE MONO #: 0.11 K/UL (ref 0.2–0.8)
AMORPHOUS: ABNORMAL
ANION GAP SERPL CALCULATED.3IONS-SCNC: 14 MMOL/L (ref 9–17)
BACTERIA: ABNORMAL
BASOPHILS # BLD: 0 %
BASOPHILS ABSOLUTE: 0 K/UL (ref 0–0.2)
BILIRUBIN URINE: NEGATIVE
BNP INTERPRETATION: NORMAL
BUN BLDV-MCNC: 17 MG/DL (ref 8–23)
BUN/CREAT BLD: 20 (ref 9–20)
CALCIUM SERPL-MCNC: 9 MG/DL (ref 8.6–10.4)
CASTS UA: ABNORMAL /LPF
CHLORIDE BLD-SCNC: 106 MMOL/L (ref 98–107)
CO2: 21 MMOL/L (ref 20–31)
COLOR: YELLOW
COMMENT UA: ABNORMAL
CREAT SERPL-MCNC: 0.84 MG/DL (ref 0.5–0.9)
CRYSTALS, UA: ABNORMAL /HPF
DIFFERENTIAL TYPE: ABNORMAL
EOSINOPHILS RELATIVE PERCENT: 0 %
EPITHELIAL CELLS UA: ABNORMAL /HPF
GFR AFRICAN AMERICAN: >60 ML/MIN
GFR NON-AFRICAN AMERICAN: >60 ML/MIN
GFR SERPL CREATININE-BSD FRML MDRD: ABNORMAL ML/MIN/{1.73_M2}
GFR SERPL CREATININE-BSD FRML MDRD: ABNORMAL ML/MIN/{1.73_M2}
GLUCOSE BLD-MCNC: 146 MG/DL (ref 70–99)
GLUCOSE URINE: NEGATIVE
HCT VFR BLD CALC: 30.4 % (ref 36–46)
HEMOGLOBIN: 9.6 G/DL (ref 12–16)
KETONES, URINE: NEGATIVE
LEUKOCYTE ESTERASE, URINE: ABNORMAL
LYMPHOCYTES # BLD: 31 %
MCH RBC QN AUTO: 23.2 PG (ref 26–34)
MCHC RBC AUTO-ENTMCNC: 31.4 G/DL (ref 31–37)
MCV RBC AUTO: 73.8 FL (ref 80–100)
MONOCYTES # BLD: 2 %
MORPHOLOGY: ABNORMAL
MUCUS: ABNORMAL
MYOGLOBIN: 46 NG/ML (ref 25–58)
NITRITE, URINE: POSITIVE
OTHER OBSERVATIONS UA: ABNORMAL
PDW BLD-RTO: 25.1 % (ref 11.5–14.5)
PH UA: 5.5 (ref 5–8)
PLATELET # BLD: 210 K/UL (ref 130–400)
PLATELET ESTIMATE: ABNORMAL
PMV BLD AUTO: 7.5 FL (ref 6–12)
POTASSIUM SERPL-SCNC: 3.7 MMOL/L (ref 3.7–5.3)
PRO-BNP: 86 PG/ML
PROTEIN UA: NEGATIVE
RBC # BLD: 4.12 M/UL (ref 4–5.2)
RBC # BLD: ABNORMAL 10*6/UL
RBC UA: ABNORMAL /HPF (ref 0–2)
RENAL EPITHELIAL, UA: ABNORMAL /HPF
SEG NEUTROPHILS: 67 %
SEGMENTED NEUTROPHILS ABSOLUTE COUNT: 3.75 K/UL (ref 1.8–7.7)
SODIUM BLD-SCNC: 141 MMOL/L (ref 135–144)
SPECIFIC GRAVITY UA: 1.02 (ref 1–1.03)
TRICHOMONAS: ABNORMAL
TROPONIN INTERP: NORMAL
TROPONIN T: <0.03 NG/ML
TURBIDITY: ABNORMAL
URINE HGB: ABNORMAL
UROBILINOGEN, URINE: NORMAL
WBC # BLD: 5.6 K/UL (ref 3.5–11)
WBC # BLD: ABNORMAL 10*3/UL
WBC UA: ABNORMAL /HPF (ref 0–5)
YEAST: ABNORMAL

## 2017-07-21 PROCEDURE — 71010 XR CHEST PORTABLE: CPT

## 2017-07-21 PROCEDURE — 83874 ASSAY OF MYOGLOBIN: CPT

## 2017-07-21 PROCEDURE — 81001 URINALYSIS AUTO W/SCOPE: CPT

## 2017-07-21 PROCEDURE — 87088 URINE BACTERIA CULTURE: CPT

## 2017-07-21 PROCEDURE — 84484 ASSAY OF TROPONIN QUANT: CPT

## 2017-07-21 PROCEDURE — 99285 EMERGENCY DEPT VISIT HI MDM: CPT

## 2017-07-21 PROCEDURE — 87086 URINE CULTURE/COLONY COUNT: CPT

## 2017-07-21 PROCEDURE — 85025 COMPLETE CBC W/AUTO DIFF WBC: CPT

## 2017-07-21 PROCEDURE — 87186 SC STD MICRODIL/AGAR DIL: CPT

## 2017-07-21 PROCEDURE — 83880 ASSAY OF NATRIURETIC PEPTIDE: CPT

## 2017-07-21 PROCEDURE — 80048 BASIC METABOLIC PNL TOTAL CA: CPT

## 2017-07-21 PROCEDURE — 93005 ELECTROCARDIOGRAM TRACING: CPT

## 2017-07-21 RX ORDER — CEPHALEXIN 500 MG/1
500 CAPSULE ORAL 2 TIMES DAILY
Qty: 20 CAPSULE | Refills: 0 | Status: SHIPPED | OUTPATIENT
Start: 2017-07-21 | End: 2017-08-16

## 2017-07-21 ASSESSMENT — PAIN SCALES - GENERAL: PAINLEVEL_OUTOF10: 5

## 2017-07-21 ASSESSMENT — PAIN DESCRIPTION - LOCATION: LOCATION: CHEST

## 2017-07-22 ASSESSMENT — ENCOUNTER SYMPTOMS
COLOR CHANGE: 0
SHORTNESS OF BREATH: 0
DIARRHEA: 0
SINUS PRESSURE: 0
ABDOMINAL PAIN: 0
VOMITING: 0
SORE THROAT: 0
COUGH: 0
CONSTIPATION: 0
NAUSEA: 0
RHINORRHEA: 0
WHEEZING: 0

## 2017-07-23 LAB
CULTURE: ABNORMAL
CULTURE: ABNORMAL
Lab: ABNORMAL
ORGANISM: ABNORMAL
SPECIMEN DESCRIPTION: ABNORMAL
SPECIMEN DESCRIPTION: ABNORMAL
STATUS: ABNORMAL

## 2017-07-24 ENCOUNTER — TELEPHONE (OUTPATIENT)
Dept: FAMILY MEDICINE CLINIC | Age: 82
End: 2017-07-24

## 2017-07-24 LAB
EKG ATRIAL RATE: 84 BPM
EKG P AXIS: 71 DEGREES
EKG P-R INTERVAL: 234 MS
EKG Q-T INTERVAL: 418 MS
EKG QRS DURATION: 74 MS
EKG QTC CALCULATION (BAZETT): 493 MS
EKG R AXIS: -34 DEGREES
EKG T AXIS: 67 DEGREES
EKG VENTRICULAR RATE: 84 BPM

## 2017-07-25 ENCOUNTER — CARE COORDINATION (OUTPATIENT)
Dept: FAMILY MEDICINE CLINIC | Age: 82
End: 2017-07-25

## 2017-07-26 ENCOUNTER — HOSPITAL ENCOUNTER (OUTPATIENT)
Facility: MEDICAL CENTER | Age: 82
End: 2017-07-26
Payer: MEDICARE

## 2017-07-27 ENCOUNTER — HOSPITAL ENCOUNTER (OUTPATIENT)
Facility: MEDICAL CENTER | Age: 82
Discharge: HOME | End: 2017-07-27
Payer: MEDICARE

## 2017-07-27 ENCOUNTER — TELEPHONE (OUTPATIENT)
Dept: ONCOLOGY | Age: 82
End: 2017-07-27

## 2017-07-27 ENCOUNTER — INITIAL CONSULT (OUTPATIENT)
Dept: ONCOLOGY | Age: 82
End: 2017-07-27
Payer: MEDICARE

## 2017-07-27 VITALS
BODY MASS INDEX: 30.05 KG/M2 | SYSTOLIC BLOOD PRESSURE: 149 MMHG | RESPIRATION RATE: 20 BRPM | TEMPERATURE: 98.4 F | DIASTOLIC BLOOD PRESSURE: 75 MMHG | WEIGHT: 187 LBS | HEIGHT: 66 IN | HEART RATE: 83 BPM

## 2017-07-27 DIAGNOSIS — D50.9 IRON DEFICIENCY ANEMIA, UNSPECIFIED IRON DEFICIENCY ANEMIA TYPE: Primary | ICD-10-CM

## 2017-07-27 PROCEDURE — 1036F TOBACCO NON-USER: CPT | Performed by: INTERNAL MEDICINE

## 2017-07-27 PROCEDURE — 99204 OFFICE O/P NEW MOD 45 MIN: CPT | Performed by: INTERNAL MEDICINE

## 2017-07-27 PROCEDURE — 99212 OFFICE O/P EST SF 10 MIN: CPT

## 2017-07-27 PROCEDURE — 4040F PNEUMOC VAC/ADMIN/RCVD: CPT | Performed by: INTERNAL MEDICINE

## 2017-07-27 PROCEDURE — G8417 CALC BMI ABV UP PARAM F/U: HCPCS | Performed by: INTERNAL MEDICINE

## 2017-07-27 PROCEDURE — G8427 DOCREV CUR MEDS BY ELIG CLIN: HCPCS | Performed by: INTERNAL MEDICINE

## 2017-07-27 PROCEDURE — 1090F PRES/ABSN URINE INCON ASSESS: CPT | Performed by: INTERNAL MEDICINE

## 2017-08-08 RX ORDER — PROMETHAZINE HYDROCHLORIDE 25 MG/1
12.5 TABLET ORAL EVERY 6 HOURS PRN
Qty: 30 TABLET | Refills: 0 | Status: SHIPPED | OUTPATIENT
Start: 2017-08-08 | End: 2017-08-15

## 2017-08-16 ENCOUNTER — OFFICE VISIT (OUTPATIENT)
Dept: FAMILY MEDICINE CLINIC | Age: 82
End: 2017-08-16
Payer: MEDICARE

## 2017-08-16 ENCOUNTER — HOSPITAL ENCOUNTER (OUTPATIENT)
Age: 82
Setting detail: SPECIMEN
Discharge: HOME | End: 2017-08-16
Payer: MEDICARE

## 2017-08-16 VITALS
HEIGHT: 66 IN | RESPIRATION RATE: 17 BRPM | DIASTOLIC BLOOD PRESSURE: 62 MMHG | WEIGHT: 186.95 LBS | SYSTOLIC BLOOD PRESSURE: 120 MMHG | BODY MASS INDEX: 30.05 KG/M2 | OXYGEN SATURATION: 96 % | HEART RATE: 70 BPM | TEMPERATURE: 97.9 F

## 2017-08-16 DIAGNOSIS — R30.0 DYSURIA: Primary | ICD-10-CM

## 2017-08-16 DIAGNOSIS — K21.9 GASTROESOPHAGEAL REFLUX DISEASE WITHOUT ESOPHAGITIS: ICD-10-CM

## 2017-08-16 DIAGNOSIS — N39.0 URINARY TRACT INFECTION WITHOUT HEMATURIA, SITE UNSPECIFIED: ICD-10-CM

## 2017-08-16 DIAGNOSIS — D50.9 IRON DEFICIENCY ANEMIA, UNSPECIFIED IRON DEFICIENCY ANEMIA TYPE: ICD-10-CM

## 2017-08-16 LAB
BILIRUBIN, POC: ABNORMAL
BLOOD URINE, POC: ABNORMAL
CLARITY, POC: ABNORMAL
COLOR, POC: YELLOW
GLUCOSE URINE, POC: ABNORMAL
KETONES, POC: ABNORMAL
LEUKOCYTE EST, POC: ABNORMAL
NITRITE, POC: POSITIVE
PH, POC: 6
PROTEIN, POC: ABNORMAL
SPECIFIC GRAVITY, POC: 1.01
UROBILINOGEN, POC: ABNORMAL

## 2017-08-16 PROCEDURE — 1123F ACP DISCUSS/DSCN MKR DOCD: CPT | Performed by: PHYSICIAN ASSISTANT

## 2017-08-16 PROCEDURE — G8427 DOCREV CUR MEDS BY ELIG CLIN: HCPCS | Performed by: PHYSICIAN ASSISTANT

## 2017-08-16 PROCEDURE — G8417 CALC BMI ABV UP PARAM F/U: HCPCS | Performed by: PHYSICIAN ASSISTANT

## 2017-08-16 PROCEDURE — 1036F TOBACCO NON-USER: CPT | Performed by: PHYSICIAN ASSISTANT

## 2017-08-16 PROCEDURE — 4040F PNEUMOC VAC/ADMIN/RCVD: CPT | Performed by: PHYSICIAN ASSISTANT

## 2017-08-16 PROCEDURE — 1090F PRES/ABSN URINE INCON ASSESS: CPT | Performed by: PHYSICIAN ASSISTANT

## 2017-08-16 PROCEDURE — 99213 OFFICE O/P EST LOW 20 MIN: CPT | Performed by: PHYSICIAN ASSISTANT

## 2017-08-16 PROCEDURE — 81003 URINALYSIS AUTO W/O SCOPE: CPT | Performed by: PHYSICIAN ASSISTANT

## 2017-08-16 RX ORDER — NITROFURANTOIN 25; 75 MG/1; MG/1
100 CAPSULE ORAL 2 TIMES DAILY
Qty: 14 CAPSULE | Refills: 0 | Status: SHIPPED | OUTPATIENT
Start: 2017-08-16 | End: 2017-08-23

## 2017-08-16 ASSESSMENT — PATIENT HEALTH QUESTIONNAIRE - PHQ9
SUM OF ALL RESPONSES TO PHQ QUESTIONS 1-9: 0
2. FEELING DOWN, DEPRESSED OR HOPELESS: 0
1. LITTLE INTEREST OR PLEASURE IN DOING THINGS: 0
SUM OF ALL RESPONSES TO PHQ9 QUESTIONS 1 & 2: 0

## 2017-08-16 ASSESSMENT — ENCOUNTER SYMPTOMS
DIARRHEA: 0
NAUSEA: 0
WHEEZING: 0
SHORTNESS OF BREATH: 0
COLOR CHANGE: 0
COUGH: 0
CONSTIPATION: 0
ABDOMINAL PAIN: 0
VOMITING: 0

## 2017-08-23 LAB
CULTURE: ABNORMAL
CULTURE: ABNORMAL
Lab: ABNORMAL
ORGANISM: ABNORMAL
SPECIMEN DESCRIPTION: ABNORMAL
STATUS: ABNORMAL

## 2017-08-28 ENCOUNTER — HOSPITAL ENCOUNTER (OUTPATIENT)
Age: 82
Setting detail: SPECIMEN
Discharge: HOME | End: 2017-08-28
Payer: MEDICARE

## 2017-08-28 ENCOUNTER — OFFICE VISIT (OUTPATIENT)
Dept: FAMILY MEDICINE CLINIC | Age: 82
End: 2017-08-28
Payer: MEDICARE

## 2017-08-28 VITALS
OXYGEN SATURATION: 96 % | HEART RATE: 64 BPM | WEIGHT: 186.95 LBS | HEIGHT: 66 IN | SYSTOLIC BLOOD PRESSURE: 130 MMHG | TEMPERATURE: 98.8 F | BODY MASS INDEX: 30.05 KG/M2 | DIASTOLIC BLOOD PRESSURE: 78 MMHG | RESPIRATION RATE: 18 BRPM

## 2017-08-28 DIAGNOSIS — R31.29 HEMATURIA, MICROSCOPIC: Primary | ICD-10-CM

## 2017-08-28 DIAGNOSIS — R31.9 BLOOD IN URINE: ICD-10-CM

## 2017-08-28 LAB
BILIRUBIN URINE: NEGATIVE
BILIRUBIN, POC: NORMAL
BLOOD URINE, POC: NORMAL
CLARITY, POC: CLEAR
COLOR, POC: NORMAL
COLOR: YELLOW
COMMENT UA: NORMAL
GLUCOSE URINE, POC: NORMAL
GLUCOSE URINE: NEGATIVE
KETONES, POC: NORMAL
KETONES, URINE: NEGATIVE
LEUKOCYTE EST, POC: NORMAL
LEUKOCYTE ESTERASE, URINE: NEGATIVE
NITRITE, POC: NORMAL
NITRITE, URINE: NEGATIVE
PH UA: 5.5 (ref 5–8)
PH, POC: 5.5
PROTEIN UA: NEGATIVE
PROTEIN, POC: NORMAL
SPECIFIC GRAVITY UA: 1.02 (ref 1–1.03)
SPECIFIC GRAVITY, POC: 1.02
TURBIDITY: CLEAR
URINE HGB: NEGATIVE
UROBILINOGEN, POC: 0.2
UROBILINOGEN, URINE: NORMAL

## 2017-08-28 PROCEDURE — G8427 DOCREV CUR MEDS BY ELIG CLIN: HCPCS | Performed by: NURSE PRACTITIONER

## 2017-08-28 PROCEDURE — 99213 OFFICE O/P EST LOW 20 MIN: CPT | Performed by: NURSE PRACTITIONER

## 2017-08-28 PROCEDURE — 1036F TOBACCO NON-USER: CPT | Performed by: NURSE PRACTITIONER

## 2017-08-28 PROCEDURE — 4040F PNEUMOC VAC/ADMIN/RCVD: CPT | Performed by: NURSE PRACTITIONER

## 2017-08-28 PROCEDURE — 1123F ACP DISCUSS/DSCN MKR DOCD: CPT | Performed by: NURSE PRACTITIONER

## 2017-08-28 PROCEDURE — G8417 CALC BMI ABV UP PARAM F/U: HCPCS | Performed by: NURSE PRACTITIONER

## 2017-08-28 PROCEDURE — 1090F PRES/ABSN URINE INCON ASSESS: CPT | Performed by: NURSE PRACTITIONER

## 2017-08-28 ASSESSMENT — ENCOUNTER SYMPTOMS
SHORTNESS OF BREATH: 0
WHEEZING: 0
CONSTIPATION: 0
NAUSEA: 0
ABDOMINAL DISTENTION: 0
COUGH: 0
VOMITING: 0
RESPIRATORY NEGATIVE: 1
RECTAL PAIN: 0
ABDOMINAL PAIN: 1
CHEST TIGHTNESS: 0
DIARRHEA: 0

## 2017-08-30 ENCOUNTER — TELEPHONE (OUTPATIENT)
Dept: FAMILY MEDICINE CLINIC | Age: 82
End: 2017-08-30

## 2017-08-30 DIAGNOSIS — N39.0 URINARY TRACT INFECTION WITHOUT HEMATURIA, SITE UNSPECIFIED: Primary | ICD-10-CM

## 2017-08-30 RX ORDER — SULFAMETHOXAZOLE AND TRIMETHOPRIM 800; 160 MG/1; MG/1
1 TABLET ORAL 2 TIMES DAILY
Qty: 20 TABLET | Refills: 0 | Status: CANCELLED | OUTPATIENT
Start: 2017-08-30 | End: 2017-09-09

## 2017-08-30 RX ORDER — SULFAMETHOXAZOLE AND TRIMETHOPRIM 800; 160 MG/1; MG/1
1 TABLET ORAL 2 TIMES DAILY
Qty: 20 TABLET | Refills: 0 | Status: SHIPPED | OUTPATIENT
Start: 2017-08-30 | End: 2017-09-09

## 2017-09-05 ENCOUNTER — TELEPHONE (OUTPATIENT)
Dept: GASTROENTEROLOGY | Age: 82
End: 2017-09-05

## 2017-09-05 ENCOUNTER — OFFICE VISIT (OUTPATIENT)
Dept: FAMILY MEDICINE CLINIC | Age: 82
End: 2017-09-05
Payer: MEDICARE

## 2017-09-05 VITALS
BODY MASS INDEX: 30.05 KG/M2 | DIASTOLIC BLOOD PRESSURE: 70 MMHG | HEIGHT: 66 IN | HEART RATE: 65 BPM | SYSTOLIC BLOOD PRESSURE: 124 MMHG | OXYGEN SATURATION: 96 % | WEIGHT: 186.95 LBS

## 2017-09-05 DIAGNOSIS — N39.0 URINARY TRACT INFECTION WITHOUT HEMATURIA, SITE UNSPECIFIED: Primary | ICD-10-CM

## 2017-09-05 DIAGNOSIS — Z23 FLU VACCINE NEED: ICD-10-CM

## 2017-09-05 DIAGNOSIS — R30.0 DYSURIA: ICD-10-CM

## 2017-09-05 LAB
BILIRUBIN, POC: NEGATIVE
BLOOD URINE, POC: NORMAL
CLARITY, POC: CLEAR
COLOR, POC: YELLOW
GLUCOSE URINE, POC: NEGATIVE
KETONES, POC: NEGATIVE
LEUKOCYTE EST, POC: NEGATIVE
NITRITE, POC: NEGATIVE
PH, POC: 6
PROTEIN, POC: NEGATIVE
SPECIFIC GRAVITY, POC: 1.02
UROBILINOGEN, POC: NORMAL

## 2017-09-05 PROCEDURE — 99213 OFFICE O/P EST LOW 20 MIN: CPT | Performed by: PHYSICIAN ASSISTANT

## 2017-09-05 PROCEDURE — 81003 URINALYSIS AUTO W/O SCOPE: CPT | Performed by: PHYSICIAN ASSISTANT

## 2017-09-05 PROCEDURE — 90662 IIV NO PRSV INCREASED AG IM: CPT | Performed by: PHYSICIAN ASSISTANT

## 2017-09-05 PROCEDURE — 4040F PNEUMOC VAC/ADMIN/RCVD: CPT | Performed by: PHYSICIAN ASSISTANT

## 2017-09-05 PROCEDURE — G0008 ADMIN INFLUENZA VIRUS VAC: HCPCS | Performed by: PHYSICIAN ASSISTANT

## 2017-09-05 PROCEDURE — 1036F TOBACCO NON-USER: CPT | Performed by: PHYSICIAN ASSISTANT

## 2017-09-05 PROCEDURE — G8427 DOCREV CUR MEDS BY ELIG CLIN: HCPCS | Performed by: PHYSICIAN ASSISTANT

## 2017-09-05 PROCEDURE — 1090F PRES/ABSN URINE INCON ASSESS: CPT | Performed by: PHYSICIAN ASSISTANT

## 2017-09-05 PROCEDURE — G8417 CALC BMI ABV UP PARAM F/U: HCPCS | Performed by: PHYSICIAN ASSISTANT

## 2017-09-05 PROCEDURE — 1123F ACP DISCUSS/DSCN MKR DOCD: CPT | Performed by: PHYSICIAN ASSISTANT

## 2017-09-05 ASSESSMENT — ENCOUNTER SYMPTOMS
CONSTIPATION: 0
COLOR CHANGE: 0
VOMITING: 0
NAUSEA: 0
DIARRHEA: 0
COUGH: 0
WHEEZING: 0
ABDOMINAL PAIN: 0
SHORTNESS OF BREATH: 0

## 2017-09-12 ENCOUNTER — OFFICE VISIT (OUTPATIENT)
Dept: GASTROENTEROLOGY | Age: 82
End: 2017-09-12
Payer: MEDICARE

## 2017-09-12 VITALS
SYSTOLIC BLOOD PRESSURE: 138 MMHG | BODY MASS INDEX: 32.07 KG/M2 | HEIGHT: 63 IN | WEIGHT: 181 LBS | DIASTOLIC BLOOD PRESSURE: 78 MMHG | OXYGEN SATURATION: 90 % | RESPIRATION RATE: 14 BRPM | TEMPERATURE: 97.8 F | HEART RATE: 76 BPM

## 2017-09-12 DIAGNOSIS — D50.9 IRON DEFICIENCY ANEMIA, UNSPECIFIED IRON DEFICIENCY ANEMIA TYPE: Primary | ICD-10-CM

## 2017-09-12 DIAGNOSIS — K21.9 GASTROESOPHAGEAL REFLUX DISEASE WITHOUT ESOPHAGITIS: ICD-10-CM

## 2017-09-12 DIAGNOSIS — R10.13 ABDOMINAL PAIN, EPIGASTRIC: ICD-10-CM

## 2017-09-12 PROCEDURE — 1090F PRES/ABSN URINE INCON ASSESS: CPT | Performed by: INTERNAL MEDICINE

## 2017-09-12 PROCEDURE — 99214 OFFICE O/P EST MOD 30 MIN: CPT | Performed by: INTERNAL MEDICINE

## 2017-09-12 PROCEDURE — G8417 CALC BMI ABV UP PARAM F/U: HCPCS | Performed by: INTERNAL MEDICINE

## 2017-09-12 PROCEDURE — 1036F TOBACCO NON-USER: CPT | Performed by: INTERNAL MEDICINE

## 2017-09-12 PROCEDURE — 4040F PNEUMOC VAC/ADMIN/RCVD: CPT | Performed by: INTERNAL MEDICINE

## 2017-09-12 PROCEDURE — G8427 DOCREV CUR MEDS BY ELIG CLIN: HCPCS | Performed by: INTERNAL MEDICINE

## 2017-09-12 PROCEDURE — 1123F ACP DISCUSS/DSCN MKR DOCD: CPT | Performed by: INTERNAL MEDICINE

## 2017-09-12 ASSESSMENT — ENCOUNTER SYMPTOMS
VOMITING: 0
NAUSEA: 0
SHORTNESS OF BREATH: 0
RECTAL PAIN: 0
CONSTIPATION: 0
ABDOMINAL DISTENTION: 1
ABDOMINAL PAIN: 1
RESPIRATORY NEGATIVE: 1
BACK PAIN: 1
DIARRHEA: 0
ANAL BLEEDING: 0
BLOOD IN STOOL: 0

## 2017-09-13 ENCOUNTER — HOSPITAL ENCOUNTER (OUTPATIENT)
Age: 82
Setting detail: SPECIMEN
Discharge: HOME | End: 2017-09-13
Payer: MEDICARE

## 2017-09-13 LAB
ALBUMIN SERPL-MCNC: 4.1 G/DL (ref 3.5–5.2)
ALBUMIN/GLOBULIN RATIO: 1.6 (ref 1–2.5)
ALP BLD-CCNC: 60 U/L (ref 35–104)
ALT SERPL-CCNC: 7 U/L (ref 5–33)
ANION GAP SERPL CALCULATED.3IONS-SCNC: 15 MMOL/L (ref 9–17)
AST SERPL-CCNC: 14 U/L
BILIRUB SERPL-MCNC: 0.72 MG/DL (ref 0.3–1.2)
BNP INTERPRETATION: NORMAL
BUN BLDV-MCNC: 20 MG/DL (ref 8–23)
BUN/CREAT BLD: ABNORMAL (ref 9–20)
CALCIUM SERPL-MCNC: 9.1 MG/DL (ref 8.6–10.4)
CHLORIDE BLD-SCNC: 108 MMOL/L (ref 98–107)
CHOLESTEROL/HDL RATIO: 2.2
CHOLESTEROL: 141 MG/DL
CO2: 21 MMOL/L (ref 20–31)
CREAT SERPL-MCNC: 0.95 MG/DL (ref 0.5–0.9)
GFR AFRICAN AMERICAN: >60 ML/MIN
GFR NON-AFRICAN AMERICAN: 55 ML/MIN
GFR SERPL CREATININE-BSD FRML MDRD: ABNORMAL ML/MIN/{1.73_M2}
GFR SERPL CREATININE-BSD FRML MDRD: ABNORMAL ML/MIN/{1.73_M2}
GLUCOSE BLD-MCNC: 128 MG/DL (ref 70–99)
HCT VFR BLD CALC: 39 % (ref 36–46)
HDLC SERPL-MCNC: 65 MG/DL
HEMOGLOBIN: 12.7 G/DL (ref 12–16)
IRON SATURATION: 16 % (ref 20–55)
IRON: 50 UG/DL (ref 37–145)
LDL CHOLESTEROL: 62 MG/DL (ref 0–130)
MCH RBC QN AUTO: 27.3 PG (ref 26–34)
MCHC RBC AUTO-ENTMCNC: 32.4 G/DL (ref 31–37)
MCV RBC AUTO: 84.2 FL (ref 80–100)
PDW BLD-RTO: 25 % (ref 12.5–15.4)
PLATELET # BLD: 204 K/UL (ref 140–450)
PMV BLD AUTO: 7.9 FL (ref 6–12)
POTASSIUM SERPL-SCNC: 4.5 MMOL/L (ref 3.7–5.3)
PRO-BNP: 56 PG/ML
RBC # BLD: 4.63 M/UL (ref 4–5.2)
SODIUM BLD-SCNC: 144 MMOL/L (ref 135–144)
THYROXINE, FREE: 1.06 NG/DL (ref 0.93–1.7)
TOTAL IRON BINDING CAPACITY: 306 UG/DL (ref 250–450)
TOTAL PROTEIN: 6.7 G/DL (ref 6.4–8.3)
TRIGL SERPL-MCNC: 68 MG/DL
TSH SERPL DL<=0.05 MIU/L-ACNC: 8.18 MIU/L (ref 0.3–5)
UNSATURATED IRON BINDING CAPACITY: 256 UG/DL (ref 112–347)
VITAMIN B-12: <30 PG/ML (ref 211–946)
VITAMIN D 25-HYDROXY: 10.9 NG/ML (ref 30–100)
VLDLC SERPL CALC-MCNC: NORMAL MG/DL (ref 1–30)
WBC # BLD: 5.4 K/UL (ref 3.5–11)

## 2017-09-25 ENCOUNTER — HOSPITAL ENCOUNTER (OUTPATIENT)
Age: 82
Setting detail: SPECIMEN
Discharge: HOME | End: 2017-09-25
Payer: MEDICARE

## 2017-09-25 DIAGNOSIS — D50.9 IRON DEFICIENCY ANEMIA, UNSPECIFIED IRON DEFICIENCY ANEMIA TYPE: ICD-10-CM

## 2017-09-25 LAB
ABSOLUTE EOS #: 0.14 K/UL (ref 0–0.4)
ABSOLUTE LYMPH #: 1.54 K/UL (ref 1–4.8)
ABSOLUTE MONO #: 0.34 K/UL (ref 0.1–0.8)
BASOPHILS # BLD: 0 %
BASOPHILS ABSOLUTE: 0 K/UL (ref 0–0.2)
DIFFERENTIAL TYPE: ABNORMAL
EOSINOPHILS RELATIVE PERCENT: 3 %
HCT VFR BLD CALC: 39.6 % (ref 36–46)
HEMOGLOBIN: 12.6 G/DL (ref 12–16)
LYMPHOCYTES # BLD: 32 %
MCH RBC QN AUTO: 28.3 PG (ref 26–34)
MCHC RBC AUTO-ENTMCNC: 31.9 G/DL (ref 31–37)
MCV RBC AUTO: 88.8 FL (ref 80–100)
MONOCYTES # BLD: 7 %
MORPHOLOGY: ABNORMAL
PDW BLD-RTO: 24.4 % (ref 12.5–15.4)
PLATELET # BLD: 160 K/UL (ref 140–450)
PLATELET ESTIMATE: ABNORMAL
PMV BLD AUTO: 7.7 FL (ref 6–12)
RBC # BLD: 4.47 M/UL (ref 4–5.2)
RBC # BLD: ABNORMAL 10*6/UL
SEG NEUTROPHILS: 58 %
SEGMENTED NEUTROPHILS ABSOLUTE COUNT: 2.78 K/UL (ref 1.8–7.7)
WBC # BLD: 4.8 K/UL (ref 3.5–11)
WBC # BLD: ABNORMAL 10*3/UL

## 2017-09-26 LAB
FERRITIN: 40 UG/L (ref 13–150)
FOLATE: 10.2 NG/ML
IRON SATURATION: 26 % (ref 20–55)
IRON: 74 UG/DL (ref 37–145)
TOTAL IRON BINDING CAPACITY: 287 UG/DL (ref 250–450)
UNSATURATED IRON BINDING CAPACITY: 213 UG/DL (ref 112–347)
VITAMIN B-12: 1540 PG/ML (ref 211–946)

## 2017-09-27 ENCOUNTER — HOSPITAL ENCOUNTER (OUTPATIENT)
Facility: MEDICAL CENTER | Age: 82
End: 2017-09-27
Payer: MEDICARE

## 2017-09-27 RX ORDER — TRAMADOL HYDROCHLORIDE 50 MG/1
TABLET ORAL
Qty: 60 TABLET | Refills: 0 | Status: SHIPPED | OUTPATIENT
Start: 2017-09-27 | End: 2018-01-18 | Stop reason: SDUPTHER

## 2017-09-28 ENCOUNTER — OFFICE VISIT (OUTPATIENT)
Dept: ONCOLOGY | Age: 82
End: 2017-09-28
Payer: MEDICARE

## 2017-09-28 ENCOUNTER — TELEPHONE (OUTPATIENT)
Dept: ONCOLOGY | Age: 82
End: 2017-09-28

## 2017-09-28 VITALS
HEART RATE: 76 BPM | RESPIRATION RATE: 20 BRPM | TEMPERATURE: 98.3 F | BODY MASS INDEX: 32.81 KG/M2 | WEIGHT: 185.2 LBS | DIASTOLIC BLOOD PRESSURE: 89 MMHG | SYSTOLIC BLOOD PRESSURE: 153 MMHG

## 2017-09-28 DIAGNOSIS — D50.9 IRON DEFICIENCY ANEMIA, UNSPECIFIED IRON DEFICIENCY ANEMIA TYPE: Primary | ICD-10-CM

## 2017-09-28 PROCEDURE — 99211 OFF/OP EST MAY X REQ PHY/QHP: CPT

## 2017-09-28 PROCEDURE — 99214 OFFICE O/P EST MOD 30 MIN: CPT | Performed by: INTERNAL MEDICINE

## 2017-09-28 PROCEDURE — 1036F TOBACCO NON-USER: CPT | Performed by: INTERNAL MEDICINE

## 2017-09-28 PROCEDURE — G8427 DOCREV CUR MEDS BY ELIG CLIN: HCPCS | Performed by: INTERNAL MEDICINE

## 2017-09-28 PROCEDURE — 1090F PRES/ABSN URINE INCON ASSESS: CPT | Performed by: INTERNAL MEDICINE

## 2017-09-28 PROCEDURE — G8417 CALC BMI ABV UP PARAM F/U: HCPCS | Performed by: INTERNAL MEDICINE

## 2017-09-28 PROCEDURE — 1123F ACP DISCUSS/DSCN MKR DOCD: CPT | Performed by: INTERNAL MEDICINE

## 2017-09-28 PROCEDURE — 4040F PNEUMOC VAC/ADMIN/RCVD: CPT | Performed by: INTERNAL MEDICINE

## 2017-09-29 ENCOUNTER — TELEPHONE (OUTPATIENT)
Dept: GASTROENTEROLOGY | Age: 82
End: 2017-09-29

## 2017-10-05 RX ORDER — POTASSIUM CHLORIDE 750 MG/1
TABLET, FILM COATED, EXTENDED RELEASE ORAL
Qty: 60 TABLET | Refills: 3 | Status: SHIPPED | OUTPATIENT
Start: 2017-10-05 | End: 2018-01-24 | Stop reason: SDUPTHER

## 2017-10-24 ENCOUNTER — OFFICE VISIT (OUTPATIENT)
Dept: FAMILY MEDICINE CLINIC | Age: 82
End: 2017-10-24
Payer: MEDICARE

## 2017-10-24 ENCOUNTER — HOSPITAL ENCOUNTER (OUTPATIENT)
Age: 82
Setting detail: SPECIMEN
Discharge: HOME | End: 2017-10-24
Payer: MEDICARE

## 2017-10-24 VITALS
DIASTOLIC BLOOD PRESSURE: 82 MMHG | TEMPERATURE: 98.7 F | OXYGEN SATURATION: 94 % | BODY MASS INDEX: 31.71 KG/M2 | HEART RATE: 74 BPM | SYSTOLIC BLOOD PRESSURE: 122 MMHG | WEIGHT: 179 LBS | HEIGHT: 63 IN

## 2017-10-24 DIAGNOSIS — R82.998 AMBER-COLORED URINE: ICD-10-CM

## 2017-10-24 DIAGNOSIS — N30.00 ACUTE CYSTITIS WITHOUT HEMATURIA: Primary | ICD-10-CM

## 2017-10-24 DIAGNOSIS — D17.1 LIPOMA OF TORSO: ICD-10-CM

## 2017-10-24 LAB
BILIRUBIN, POC: NORMAL
BLOOD URINE, POC: NORMAL
CLARITY, POC: NORMAL
COLOR, POC: NORMAL
GLUCOSE URINE, POC: NEGATIVE
KETONES, POC: NEGATIVE
LEUKOCYTE EST, POC: NORMAL
NITRITE, POC: POSITIVE
PH, POC: 5
PROTEIN, POC: NEGATIVE
SPECIFIC GRAVITY, POC: 1.02
UROBILINOGEN, POC: 0.2

## 2017-10-24 PROCEDURE — G8484 FLU IMMUNIZE NO ADMIN: HCPCS | Performed by: PHYSICIAN ASSISTANT

## 2017-10-24 PROCEDURE — 1090F PRES/ABSN URINE INCON ASSESS: CPT | Performed by: PHYSICIAN ASSISTANT

## 2017-10-24 PROCEDURE — 4040F PNEUMOC VAC/ADMIN/RCVD: CPT | Performed by: PHYSICIAN ASSISTANT

## 2017-10-24 PROCEDURE — G8417 CALC BMI ABV UP PARAM F/U: HCPCS | Performed by: PHYSICIAN ASSISTANT

## 2017-10-24 PROCEDURE — 81003 URINALYSIS AUTO W/O SCOPE: CPT | Performed by: PHYSICIAN ASSISTANT

## 2017-10-24 PROCEDURE — 99213 OFFICE O/P EST LOW 20 MIN: CPT | Performed by: PHYSICIAN ASSISTANT

## 2017-10-24 PROCEDURE — 1036F TOBACCO NON-USER: CPT | Performed by: PHYSICIAN ASSISTANT

## 2017-10-24 PROCEDURE — G8427 DOCREV CUR MEDS BY ELIG CLIN: HCPCS | Performed by: PHYSICIAN ASSISTANT

## 2017-10-24 PROCEDURE — 1123F ACP DISCUSS/DSCN MKR DOCD: CPT | Performed by: PHYSICIAN ASSISTANT

## 2017-10-24 ASSESSMENT — ENCOUNTER SYMPTOMS
WHEEZING: 0
NAUSEA: 0
DIARRHEA: 0
ABDOMINAL PAIN: 0
CONSTIPATION: 0
SHORTNESS OF BREATH: 0
VOMITING: 0
COLOR CHANGE: 0
COUGH: 0

## 2017-10-24 NOTE — PROGRESS NOTES
lungs Uses at night and prn       No current facility-administered medications for this visit. Allergies   Allergen Reactions    Penicillins      Has no recollection of what the reaction was       Health Maintenance   Topic Date Due    DTaP/Tdap/Td vaccine (1 - Tdap) 08/02/1944    Pneumococcal low/med risk (2 of 2 - PPSV23) 06/29/2018    Zostavax vaccine  Addressed    Flu vaccine  Completed       Subjective:      Review of Systems   Constitutional: Negative for activity change, appetite change, chills, fatigue and unexpected weight change. /82 (Site: Left Arm, Position: Sitting, Cuff Size: Medium Adult)   Pulse 74   Temp 98.7 °F (37.1 °C) (Tympanic)   Ht 5' 2.99\" (1.6 m)   Wt 179 lb (81.2 kg)   SpO2 94%   BMI 31.72 kg/m²    Respiratory: Negative for cough, shortness of breath and wheezing. Cardiovascular: Negative for chest pain and palpitations. Gastrointestinal: Negative for abdominal pain, constipation, diarrhea, nausea and vomiting. Genitourinary: Positive for dysuria and frequency. Negative for flank pain, hematuria, hesitancy and urgency. Skin: Negative for color change, pallor, rash and wound. Neurological: Negative for weakness. Hematological: Negative for adenopathy. Psychiatric/Behavioral: Negative for sleep disturbance. The patient is not nervous/anxious. Objective:     Physical Exam   Constitutional: She is oriented to person, place, and time. She appears well-developed and well-nourished. HENT:   Head: Normocephalic and atraumatic. Cardiovascular: Normal rate, regular rhythm and normal heart sounds. No murmur heard. Pulmonary/Chest: Effort normal and breath sounds normal. No respiratory distress. She has no wheezes. She has no rales. Abdominal: Soft. There is no tenderness. Neurological: She is alert and oriented to person, place, and time. Skin: Skin is warm and dry. No rash noted. No erythema. No pallor.         Psychiatric: She has a normal mood and affect. Her behavior is normal. Judgment and thought content normal.   Nursing note and vitals reviewed. /82 (Site: Left Arm, Position: Sitting, Cuff Size: Medium Adult)   Pulse 74   Temp 98.7 °F (37.1 °C) (Tympanic)   Ht 5' 2.99\" (1.6 m)   Wt 179 lb (81.2 kg)   SpO2 94%   BMI 31.72 kg/m²     Assessment:      1. Acute cystitis without hematuria  Urine Culture   2. Ernesto-colored urine  POCT Urinalysis No Micro (Auto)    Urine Culture   3. Lipoma of torso               Plan:      Return in about 3 months (around 1/24/2018) for med review. UA here appears positive  Will check culture and go from there. Patient does not want to treat until results are back. Patient will call if further symtposm    Cont with hematology as planned. Hg stable at 12.6 now! Lipoma on back/shoulder posteriorly  Patient does not want to persue any treatment , monitor    Patient agreed with treatme tplna  Health Maintenance reviewed. Orders Placed This Encounter   Procedures    Urine Culture     Order Specific Question:   Specify (ex-cath, midstream, cysto, etc)? Answer:   midstream    POCT Urinalysis No Micro (Auto)     Results for orders placed or performed in visit on 10/24/17   POCT Urinalysis No Micro (Auto)   Result Value Ref Range    Color, UA ernesto     Clarity, UA cloudy     Glucose, UA POC negative     Bilirubin, UA small     Ketones, UA negative     Spec Grav, UA 1.025     Blood, UA POC small     pH, UA 5     Protein, UA POC negative     Urobilinogen, UA 0.2     Leukocytes, UA small     Nitrite, UA positive          Patient given educational materials - see patient instructions. Discussed use, benefit, and side effects of prescribed medications. All patient questions answered. Pt voiced understanding. Reviewed health maintenance. Instructed to continue current medications, diet and exercise. Patient agreed with treatment plan. Follow up as directed.      Electronically signed by

## 2017-10-25 ENCOUNTER — TELEPHONE (OUTPATIENT)
Dept: PHARMACY | Facility: CLINIC | Age: 82
End: 2017-10-25

## 2017-10-27 RX ORDER — SULFAMETHOXAZOLE AND TRIMETHOPRIM 400; 80 MG/1; MG/1
1 TABLET ORAL DAILY
Qty: 14 TABLET | Refills: 0 | Status: SHIPPED | OUTPATIENT
Start: 2017-10-27 | End: 2017-11-03

## 2017-11-07 ENCOUNTER — OFFICE VISIT (OUTPATIENT)
Dept: FAMILY MEDICINE CLINIC | Age: 82
End: 2017-11-07
Payer: MEDICARE

## 2017-11-07 ENCOUNTER — HOSPITAL ENCOUNTER (OUTPATIENT)
Age: 82
Setting detail: SPECIMEN
Discharge: HOME | End: 2017-11-07
Payer: MEDICARE

## 2017-11-07 VITALS
HEART RATE: 75 BPM | SYSTOLIC BLOOD PRESSURE: 122 MMHG | TEMPERATURE: 98 F | OXYGEN SATURATION: 91 % | DIASTOLIC BLOOD PRESSURE: 80 MMHG

## 2017-11-07 DIAGNOSIS — R20.2 LEFT HAND PARESTHESIA: ICD-10-CM

## 2017-11-07 DIAGNOSIS — R10.12 LEFT UPPER QUADRANT PAIN: Primary | ICD-10-CM

## 2017-11-07 DIAGNOSIS — R10.12 LEFT UPPER QUADRANT PAIN: ICD-10-CM

## 2017-11-07 DIAGNOSIS — N39.0 RECURRENT UTI: ICD-10-CM

## 2017-11-07 DIAGNOSIS — K21.9 GASTROESOPHAGEAL REFLUX DISEASE WITHOUT ESOPHAGITIS: ICD-10-CM

## 2017-11-07 LAB
ABSOLUTE EOS #: 0.17 K/UL (ref 0–0.44)
ABSOLUTE IMMATURE GRANULOCYTE: <0.03 K/UL (ref 0–0.3)
ABSOLUTE LYMPH #: 2.28 K/UL (ref 1.1–3.7)
ABSOLUTE MONO #: 0.31 K/UL (ref 0.1–1.2)
ALBUMIN SERPL-MCNC: 4.1 G/DL (ref 3.5–5.2)
ALBUMIN/GLOBULIN RATIO: 1.4 (ref 1–2.5)
ALP BLD-CCNC: 60 U/L (ref 35–104)
ALT SERPL-CCNC: 8 U/L (ref 5–33)
ANION GAP SERPL CALCULATED.3IONS-SCNC: 15 MMOL/L (ref 9–17)
AST SERPL-CCNC: 14 U/L
BASOPHILS # BLD: 1 % (ref 0–2)
BASOPHILS ABSOLUTE: 0.04 K/UL (ref 0–0.2)
BILIRUB SERPL-MCNC: 0.85 MG/DL (ref 0.3–1.2)
BILIRUBIN, POC: ABNORMAL
BLOOD URINE, POC: ABNORMAL
BUN BLDV-MCNC: 18 MG/DL (ref 8–23)
BUN/CREAT BLD: ABNORMAL (ref 9–20)
CALCIUM SERPL-MCNC: 9.4 MG/DL (ref 8.6–10.4)
CHLORIDE BLD-SCNC: 108 MMOL/L (ref 98–107)
CLARITY, POC: ABNORMAL
CO2: 21 MMOL/L (ref 20–31)
COLOR, POC: YELLOW
CREAT SERPL-MCNC: 1.11 MG/DL (ref 0.5–0.9)
DIFFERENTIAL TYPE: ABNORMAL
EOSINOPHILS RELATIVE PERCENT: 3 % (ref 1–4)
GFR AFRICAN AMERICAN: 56 ML/MIN
GFR NON-AFRICAN AMERICAN: 46 ML/MIN
GFR SERPL CREATININE-BSD FRML MDRD: ABNORMAL ML/MIN/{1.73_M2}
GFR SERPL CREATININE-BSD FRML MDRD: ABNORMAL ML/MIN/{1.73_M2}
GLUCOSE BLD-MCNC: 114 MG/DL (ref 70–99)
GLUCOSE URINE, POC: ABNORMAL
HCT VFR BLD CALC: 46.5 % (ref 36.3–47.1)
HEMOGLOBIN: 14.6 G/DL (ref 11.9–15.1)
IMMATURE GRANULOCYTES: 0 %
KETONES, POC: ABNORMAL
LEUKOCYTE EST, POC: ABNORMAL
LYMPHOCYTES # BLD: 38 % (ref 24–43)
MCH RBC QN AUTO: 30.7 PG (ref 25.2–33.5)
MCHC RBC AUTO-ENTMCNC: 31.4 G/DL (ref 28.4–34.8)
MCV RBC AUTO: 97.7 FL (ref 82.6–102.9)
MONOCYTES # BLD: 5 % (ref 3–12)
NITRITE, POC: ABNORMAL
PDW BLD-RTO: 16.2 % (ref 11.8–14.4)
PH, POC: 5.5
PLATELET # BLD: 200 K/UL (ref 138–453)
PLATELET ESTIMATE: ABNORMAL
PMV BLD AUTO: 10.2 FL (ref 8.1–13.5)
POTASSIUM SERPL-SCNC: 4.3 MMOL/L (ref 3.7–5.3)
PROTEIN, POC: ABNORMAL
RBC # BLD: 4.76 M/UL (ref 3.95–5.11)
RBC # BLD: ABNORMAL 10*6/UL
SEG NEUTROPHILS: 54 % (ref 36–65)
SEGMENTED NEUTROPHILS ABSOLUTE COUNT: 3.25 K/UL (ref 1.5–8.1)
SODIUM BLD-SCNC: 144 MMOL/L (ref 135–144)
SPECIFIC GRAVITY, POC: 1.02
THYROXINE, FREE: 1.1 NG/DL (ref 0.93–1.7)
TOTAL PROTEIN: 7 G/DL (ref 6.4–8.3)
TSH SERPL DL<=0.05 MIU/L-ACNC: 9.77 MIU/L (ref 0.3–5)
UROBILINOGEN, POC: 0.2
VITAMIN B-12: >2000 PG/ML (ref 211–946)
WBC # BLD: 6.1 K/UL (ref 3.5–11.3)
WBC # BLD: ABNORMAL 10*3/UL

## 2017-11-07 PROCEDURE — G8484 FLU IMMUNIZE NO ADMIN: HCPCS | Performed by: NURSE PRACTITIONER

## 2017-11-07 PROCEDURE — 1123F ACP DISCUSS/DSCN MKR DOCD: CPT | Performed by: NURSE PRACTITIONER

## 2017-11-07 PROCEDURE — G8417 CALC BMI ABV UP PARAM F/U: HCPCS | Performed by: NURSE PRACTITIONER

## 2017-11-07 PROCEDURE — 81002 URINALYSIS NONAUTO W/O SCOPE: CPT | Performed by: NURSE PRACTITIONER

## 2017-11-07 PROCEDURE — 1090F PRES/ABSN URINE INCON ASSESS: CPT | Performed by: NURSE PRACTITIONER

## 2017-11-07 PROCEDURE — 4040F PNEUMOC VAC/ADMIN/RCVD: CPT | Performed by: NURSE PRACTITIONER

## 2017-11-07 PROCEDURE — 99214 OFFICE O/P EST MOD 30 MIN: CPT | Performed by: NURSE PRACTITIONER

## 2017-11-07 PROCEDURE — G8427 DOCREV CUR MEDS BY ELIG CLIN: HCPCS | Performed by: NURSE PRACTITIONER

## 2017-11-07 PROCEDURE — 1036F TOBACCO NON-USER: CPT | Performed by: NURSE PRACTITIONER

## 2017-11-07 PROCEDURE — 96372 THER/PROPH/DIAG INJ SC/IM: CPT | Performed by: NURSE PRACTITIONER

## 2017-11-07 RX ORDER — CEPHALEXIN 500 MG/1
500 CAPSULE ORAL 2 TIMES DAILY
Qty: 20 CAPSULE | Refills: 0 | Status: SHIPPED | OUTPATIENT
Start: 2017-11-07 | End: 2017-11-17

## 2017-11-07 RX ORDER — SUCRALFATE ORAL 1 G/10ML
1 SUSPENSION ORAL
Qty: 1200 ML | Refills: 3 | Status: SHIPPED | OUTPATIENT
Start: 2017-11-07 | End: 2018-01-24 | Stop reason: ALTCHOICE

## 2017-11-07 RX ORDER — CEFTRIAXONE SODIUM 250 MG/1
1000 INJECTION, POWDER, FOR SOLUTION INTRAMUSCULAR; INTRAVENOUS ONCE
Status: COMPLETED | OUTPATIENT
Start: 2017-11-07 | End: 2017-11-07

## 2017-11-07 RX ADMIN — CEFTRIAXONE SODIUM 1000 MG: 250 INJECTION, POWDER, FOR SOLUTION INTRAMUSCULAR; INTRAVENOUS at 12:43

## 2017-11-07 ASSESSMENT — ENCOUNTER SYMPTOMS
WHEEZING: 0
ABDOMINAL PAIN: 1
EYE REDNESS: 0
CONSTIPATION: 0
RECTAL PAIN: 0
BLOOD IN STOOL: 0
EYE DISCHARGE: 0
CHEST TIGHTNESS: 0
VOICE CHANGE: 0
VOMITING: 0
COUGH: 0
ABDOMINAL DISTENTION: 0
RESPIRATORY NEGATIVE: 1
SORE THROAT: 0
SINUS PRESSURE: 0
ANAL BLEEDING: 0
SHORTNESS OF BREATH: 0
DIARRHEA: 0
NAUSEA: 0

## 2017-11-08 LAB
CULTURE: NORMAL
CULTURE: NORMAL
Lab: NORMAL
SPECIMEN DESCRIPTION: NORMAL
STATUS: NORMAL

## 2017-11-08 RX ORDER — LEVOTHYROXINE SODIUM 137 UG/1
137 TABLET ORAL DAILY
Qty: 30 TABLET | Refills: 1 | Status: SHIPPED | OUTPATIENT
Start: 2017-11-08 | End: 2018-01-07 | Stop reason: SDUPTHER

## 2017-11-08 NOTE — PROGRESS NOTES
Subjective:      Patient ID: Eric Linder is a 80 y.o. female.     HPI    Review of Systems    Objective:   Physical Exam    Assessment:      ***      Plan:      ***

## 2017-11-22 ENCOUNTER — OFFICE VISIT (OUTPATIENT)
Dept: FAMILY MEDICINE CLINIC | Age: 82
End: 2017-11-22
Payer: MEDICARE

## 2017-11-22 VITALS
BODY MASS INDEX: 31.71 KG/M2 | TEMPERATURE: 96.9 F | HEIGHT: 63 IN | SYSTOLIC BLOOD PRESSURE: 124 MMHG | WEIGHT: 179 LBS | OXYGEN SATURATION: 97 % | DIASTOLIC BLOOD PRESSURE: 68 MMHG | HEART RATE: 70 BPM

## 2017-11-22 DIAGNOSIS — E11.9 TYPE 2 DIABETES MELLITUS WITHOUT COMPLICATION, WITHOUT LONG-TERM CURRENT USE OF INSULIN (HCC): ICD-10-CM

## 2017-11-22 DIAGNOSIS — N39.0 RECURRENT UTI: Primary | ICD-10-CM

## 2017-11-22 LAB
BILIRUBIN, POC: NEGATIVE
BLOOD URINE, POC: NORMAL
CLARITY, POC: CLEAR
COLOR, POC: YELLOW
GLUCOSE URINE, POC: NEGATIVE
HBA1C MFR BLD: 5.5 %
KETONES, POC: NEGATIVE
LEUKOCYTE EST, POC: NORMAL
NITRITE, POC: NEGATIVE
PH, POC: 5
PROTEIN, POC: NEGATIVE
SPECIFIC GRAVITY, POC: 1.02
UROBILINOGEN, POC: 0.2

## 2017-11-22 PROCEDURE — 4040F PNEUMOC VAC/ADMIN/RCVD: CPT | Performed by: PHYSICIAN ASSISTANT

## 2017-11-22 PROCEDURE — 1036F TOBACCO NON-USER: CPT | Performed by: PHYSICIAN ASSISTANT

## 2017-11-22 PROCEDURE — 1090F PRES/ABSN URINE INCON ASSESS: CPT | Performed by: PHYSICIAN ASSISTANT

## 2017-11-22 PROCEDURE — 1123F ACP DISCUSS/DSCN MKR DOCD: CPT | Performed by: PHYSICIAN ASSISTANT

## 2017-11-22 PROCEDURE — G8417 CALC BMI ABV UP PARAM F/U: HCPCS | Performed by: PHYSICIAN ASSISTANT

## 2017-11-22 PROCEDURE — 99213 OFFICE O/P EST LOW 20 MIN: CPT | Performed by: PHYSICIAN ASSISTANT

## 2017-11-22 PROCEDURE — G8484 FLU IMMUNIZE NO ADMIN: HCPCS | Performed by: PHYSICIAN ASSISTANT

## 2017-11-22 PROCEDURE — 83036 HEMOGLOBIN GLYCOSYLATED A1C: CPT | Performed by: PHYSICIAN ASSISTANT

## 2017-11-22 PROCEDURE — G8427 DOCREV CUR MEDS BY ELIG CLIN: HCPCS | Performed by: PHYSICIAN ASSISTANT

## 2017-11-22 PROCEDURE — 81003 URINALYSIS AUTO W/O SCOPE: CPT | Performed by: PHYSICIAN ASSISTANT

## 2017-11-22 ASSESSMENT — ENCOUNTER SYMPTOMS
VOMITING: 0
CONSTIPATION: 0
WHEEZING: 0
DIARRHEA: 0
COUGH: 0
ABDOMINAL PAIN: 0
SHORTNESS OF BREATH: 0
COLOR CHANGE: 0
NAUSEA: 0

## 2017-11-22 NOTE — PROGRESS NOTES
[address] [facility] [address]  Dept: [phone]  Dept Fax: [phone]    Manav Banda is a 80 y.o. female who presents today for her medical conditions/complaints as noted below. Manav Banda is c/o of   Chief Complaint   Patient presents with    Abdominal Pain     LUQ pain, follow up from 11/7/17 UC.  Urinary Tract Infection     recurrent UTI, referred to Urologist. Has not made an appt yet. Will fax over referral.         HPI:     HPI    Patient here for follow up after UC visit for abdominal pain. She was started on sucralfate and reports this has helped resolve the pain. She would like her urine tested. Feeling well but had UTI recently  She also updates she had been itching a lot lately and they found bed bugs in her room. They have fumigated her room twice and she has been washing all her clothes and treating all her linens.      Hemoglobin A1C (%)   Date Value   11/22/2017 5.5   08/24/2016 5.4   06/11/2015 5.2             ( goal A1C is < 7)   No results found for: LABMICR  LDL Cholesterol (mg/dL)   Date Value   09/13/2017 62   08/24/2016 51   06/11/2015 59       (goal LDL is <100)   AST (U/L)   Date Value   11/07/2017 14     ALT (U/L)   Date Value   11/07/2017 8     BUN (mg/dL)   Date Value   11/07/2017 18     BP Readings from Last 3 Encounters:   11/22/17 124/68   11/07/17 122/80   10/24/17 122/82          (goal 120/80)    Past Medical History:   Diagnosis Date    A-fib [facility])     AAA (abdominal aortic aneurysm) (HCC)     Abdominal cramping     Atrial fibrillation (HCC)     Back pain, chronic     C. difficile colitis     COPD (chronic obstructive pulmonary disease) (HCC)     Diabetes mellitus (HCC)     Gastritis     GERD (gastroesophageal reflux disease)     Hiatal hernia     Hypoalbuminemia 10/14/2014    IBS (irritable bowel syndrome)     Nausea vomiting and diarrhea 10/14/2014    Stomach ulcer     Thyroid disease     UTI (lower urinary tract infection)     Varicose veins       Past Surgical History:   Procedure Laterality Date    CHOLECYSTECTOMY      HERNIA REPAIR      HYSTERECTOMY      partial    [place] EGD TRANSORAL BIOPSY SINGLE/MULTIPLE N/A 6/20/2017    EGD BIOPSY performed by Paul Taylor MD at [facility]  06/20/2017    MILD CHRONIC INACTIVE GASTRITIS    VASCULAR SURGERY      varicose veins    VEIN SURGERY         Family History   Problem Relation Age of Onset    Family history unknown: Yes       Social History   Substance Use Topics    Smoking status: Former Smoker    Smokeless tobacco: Never Used    Alcohol use 1.2 oz/week     1 Glasses of wine, 1 Cans of beer per week      Current Outpatient Prescriptions   Medication Sig Dispense Refill    levothyroxine (SYNTHROID) 137 MCG tablet Take 1 tablet by mouth Daily 30 tablet 1    sucralfate (CARAFATE) 1 GM/10ML suspension Take 10 mLs by mouth 4 times daily (before meals and nightly) 1200 mL 3    potassium chloride (KLOR-CON) 10 MEQ extended release tablet TK 1 T PO BID 60 tablet 3    traMADol (ULTRAM) 50 MG tablet TAKE ONE TABLET BY MOUTH EVERY 6 HOURS AS NEEDED FOR PAIN 60 tablet 0    ranitidine (ZANTAC) 150 MG tablet Take 1 tablet by mouth 2 times daily 60 tablet 6    lisinopril (PRINIVIL;ZESTRIL) 20 MG tablet Take 1 tablet by mouth daily 90 tablet 2    ferrous sulfate (FE TABS) 325 (65 FE) MG EC tablet Take 1 tablet by mouth daily (with breakfast) 30 tablet 3    furosemide (LASIX) 20 MG tablet Take 1 tablet by mouth daily 90 tablet 2    pantoprazole (PROTONIX) 40 MG tablet       amiodarone (CORDARONE) 200 MG tablet       OXYGEN Inhale into the lungs Uses at night and prn       No current facility-administered medications for this visit.       Allergies   Allergen Reactions    Penicillins      Has no recollection of what the reaction was       Health Maintenance   Topic Date Due    DTaP/Tdap/Td vaccine (1 - Tdap) 08/02/1944    Pneumococcal low/med risk (2 of 2 - PPSV23) 06/29/2018    Zostavax vaccine  Addressed    Flu vaccine  Completed       Subjective:      Review of Systems   Constitutional: Negative for activity change, appetite change, fatigue, fever and unexpected weight change. /68 (Site: Left Arm, Position: Sitting, Cuff Size: Medium Adult)   Pulse 70   Temp 96.9 °F (36.1 °C) (Tympanic)   Ht 5' 2.99\" (1.6 m)   Wt 179 lb (81.2 kg)   SpO2 97%   BMI 31.72 kg/m²    Respiratory: Negative for cough, shortness of breath and wheezing. Cardiovascular: Negative for chest pain and palpitations. Gastrointestinal: Negative for abdominal pain, constipation, diarrhea, nausea and vomiting. Genitourinary: Negative for dysuria, frequency and urgency. Skin: Negative for color change, pallor, rash and wound. Neurological: Negative for weakness. Hematological: Negative for adenopathy. Psychiatric/Behavioral: The patient is not nervous/anxious. Objective:     Physical Exam   Constitutional: She is oriented to person, place, and time. She appears well-developed and well-nourished. HENT:   Head: Normocephalic and atraumatic. Neurological: She is alert and oriented to person, place, and time. Skin: Skin is warm and dry. No rash noted. No erythema. No pallor. Psychiatric: She has a normal mood and affect. Her behavior is normal. Judgment and thought content normal.   Nursing note and vitals reviewed. /68 (Site: Left Arm, Position: Sitting, Cuff Size: Medium Adult)   Pulse 70   Temp 96.9 °F (36.1 °C) (Tympanic)   Ht 5' 2.99\" (1.6 m)   Wt 179 lb (81.2 kg)   SpO2 97%   BMI 31.72 kg/m²     Assessment:      1. Recurrent UTI  POCT Urinalysis No Micro (Auto)   2. Type 2 diabetes mellitus without complication, without long-term current use of insulin (HCC)  POCT glycosylated hemoglobin (Hb A1C)             Plan:      Return if symptoms worsen or fail to improve.     Patient

## 2017-11-22 NOTE — PROGRESS NOTES
Visit Information    Have you changed or started any medications since your last visit including any over-the-counter medicines, vitamins, or herbal medicines? no   Have you stopped taking any of your medications? Is so, why? -  no  Are you having any side effects from any of your medications? - no    Have you seen any other physician or provider since your last visit?  no   Have you had any other diagnostic tests since your last visit?  no   Have you been seen in the emergency room and/or had an admission in a hospital since we last saw you?  no   Have you had your routine dental cleaning in the past 6 months?  no     Do you have an active MyChart account? If no, what is the barrier?   No    Patient Care Team:  Liban Walsh PA-C as PCP - General (Family Medicine)  Liban Walsh PA-C as PCP - [facility] Attributed Provider  Mary Hernandez MD as Consulting Physician (Gastroenterology)    Medical History Review  Past Medical, Family, and Social History reviewed and does not contribute to the patient presenting condition    Health Maintenance   Topic Date Due    DTaP/Tdap/Td vaccine (1 - Tdap) 08/02/1944    Pneumococcal low/med risk (2 of 2 - PPSV23) 06/29/2018    Zostavax vaccine  Addressed    Flu vaccine  Completed

## 2017-12-27 ENCOUNTER — OFFICE VISIT (OUTPATIENT)
Dept: FAMILY MEDICINE CLINIC | Age: 82
End: 2017-12-27
Payer: MEDICARE

## 2017-12-27 VITALS
SYSTOLIC BLOOD PRESSURE: 126 MMHG | WEIGHT: 179.01 LBS | OXYGEN SATURATION: 88 % | DIASTOLIC BLOOD PRESSURE: 64 MMHG | HEIGHT: 63 IN | TEMPERATURE: 98.3 F | RESPIRATION RATE: 18 BRPM | BODY MASS INDEX: 31.72 KG/M2 | HEART RATE: 71 BPM

## 2017-12-27 DIAGNOSIS — L30.9 DERMATITIS: ICD-10-CM

## 2017-12-27 DIAGNOSIS — Z87.39: Primary | ICD-10-CM

## 2017-12-27 DIAGNOSIS — E11.59 TYPE 2 DIABETES MELLITUS WITH OTHER CIRCULATORY COMPLICATION, UNSPECIFIED LONG TERM INSULIN USE STATUS: ICD-10-CM

## 2017-12-27 DIAGNOSIS — I50.31 ACUTE DIASTOLIC CONGESTIVE HEART FAILURE (HCC): ICD-10-CM

## 2017-12-27 PROCEDURE — G8484 FLU IMMUNIZE NO ADMIN: HCPCS | Performed by: INTERNAL MEDICINE

## 2017-12-27 PROCEDURE — 1090F PRES/ABSN URINE INCON ASSESS: CPT | Performed by: INTERNAL MEDICINE

## 2017-12-27 PROCEDURE — 1123F ACP DISCUSS/DSCN MKR DOCD: CPT | Performed by: INTERNAL MEDICINE

## 2017-12-27 PROCEDURE — G8417 CALC BMI ABV UP PARAM F/U: HCPCS | Performed by: INTERNAL MEDICINE

## 2017-12-27 PROCEDURE — 4040F PNEUMOC VAC/ADMIN/RCVD: CPT | Performed by: INTERNAL MEDICINE

## 2017-12-27 PROCEDURE — 99213 OFFICE O/P EST LOW 20 MIN: CPT | Performed by: INTERNAL MEDICINE

## 2017-12-27 PROCEDURE — G8427 DOCREV CUR MEDS BY ELIG CLIN: HCPCS | Performed by: INTERNAL MEDICINE

## 2017-12-27 PROCEDURE — 1036F TOBACCO NON-USER: CPT | Performed by: INTERNAL MEDICINE

## 2017-12-27 RX ORDER — PANTOPRAZOLE SODIUM 40 MG/1
40 TABLET, DELAYED RELEASE ORAL DAILY
Qty: 30 TABLET | Refills: 1 | Status: SHIPPED | OUTPATIENT
Start: 2017-12-27 | End: 2018-02-23 | Stop reason: SDUPTHER

## 2017-12-27 RX ORDER — CLOTRIMAZOLE AND BETAMETHASONE DIPROPIONATE 10; .64 MG/G; MG/G
CREAM TOPICAL
Qty: 45 G | Refills: 1 | Status: SHIPPED | OUTPATIENT
Start: 2017-12-27 | End: 2019-11-26

## 2017-12-27 ASSESSMENT — ENCOUNTER SYMPTOMS
DIARRHEA: 0
ABDOMINAL PAIN: 0
CONSTIPATION: 0

## 2017-12-27 ASSESSMENT — PATIENT HEALTH QUESTIONNAIRE - PHQ9
1. LITTLE INTEREST OR PLEASURE IN DOING THINGS: 0
2. FEELING DOWN, DEPRESSED OR HOPELESS: 0
SUM OF ALL RESPONSES TO PHQ9 QUESTIONS 1 & 2: 0
SUM OF ALL RESPONSES TO PHQ QUESTIONS 1-9: 0

## 2017-12-27 NOTE — PROGRESS NOTES
[address] [address]  Dept: [phone]  Dept Fax: [phone]    Lila Kulkarni is a 80 y.o. female who presents today for her medical conditions/complaints as noted below. Lila Kulkarni is c/o of   Chief Complaint   Patient presents with    Hand Pain     Red hands, burning x 1 today         HPI:     Patient woke up  yesterday with warm hands. States they are slightly uncomfortable and feel warm . She states they are much more red than normal . No other sxs. No numbness or tingling. No loss of sensation. No decreased movement or dropping anything. No chest pain or trouble breathing . No injury  ,  She recently started an otc iron supplement tonce a day and believes it may be related to this   No recent change to her cardiac medications. Does not see [facility] doctor for 3 months. No specific rash just redness to palms and not anywhere else . Not itchy really. Recently had bed bugs so her apartment was treated for that but otherwise no new detergents or chemicals.          Hemoglobin A1C (%)   Date Value   11/22/2017 5.5   08/24/2016 5.4   06/11/2015 5.2             ( goal A1C is < 7)   No results found for: LABMICR  LDL Cholesterol (mg/dL)   Date Value   09/13/2017 62   08/24/2016 51   06/11/2015 59       (goal LDL is <100)   AST (U/L)   Date Value   11/07/2017 14     ALT (U/L)   Date Value   11/07/2017 8     BUN (mg/dL)   Date Value   11/07/2017 18     BP Readings from Last 3 Encounters:   12/27/17 126/64   11/22/17 124/68   11/07/17 122/80          (goal 120/80)    Past Medical History:   Diagnosis Date    [facility] ([facility] [address]     AAA (abdominal aortic aneurysm) (HCC)     Abdominal cramping     Atrial fibrillation (HCC)     Back pain, chronic     C. difficile colitis     COPD (chronic obstructive pulmonary disease) (HCC)     Diabetes mellitus (HCC)     Gastritis     GERD (gastroesophageal reflux disease)     Hiatal

## 2017-12-27 NOTE — PROGRESS NOTES
Visit Information    Have you changed or started any medications since your last visit including any over-the-counter medicines, vitamins, or herbal medicines? no   Are you having any side effects from any of your medications? -  no  Have you stopped taking any of your medications? Is so, why? -  no    Have you seen any other physician or provider since your last visit? No  Have you had any other diagnostic tests since your last visit? No  Have you been seen in the emergency room and/or had an admission to a hospital since we last saw you? No  Have you had your routine dental cleaning in the past 6 months? no    Have you activated your [employer] account? If not, what are your barriers?  No: Declined      Patient Care Team:  Jennyfer Rossi PA-C as PCP - General (Family Medicine)  Jennyfer Rossi PA-C as PCP - S Attributed Provider  Kal Murray MD as Consulting Physician (Gastroenterology)    Medical History Review  Past Medical, Family, and Social History reviewed and does contribute to the patient presenting condition    Health Maintenance   Topic Date Due    DTaP/Tdap/Td vaccine (1 - Tdap) 08/02/1944    Pneumococcal low/med risk (2 of 2 - PPSV23) 06/29/2018    Zostavax vaccine  Addressed    Flu vaccine  Completed

## 2017-12-28 ASSESSMENT — ENCOUNTER SYMPTOMS
COLOR CHANGE: 1
SHORTNESS OF BREATH: 0
BACK PAIN: 0

## 2018-01-18 RX ORDER — NYSTATIN 100000 [USP'U]/G
POWDER TOPICAL
Qty: 1 BOTTLE | Refills: 2 | Status: SHIPPED | OUTPATIENT
Start: 2018-01-18 | End: 2018-01-24 | Stop reason: ALTCHOICE

## 2018-01-19 RX ORDER — TRAMADOL HYDROCHLORIDE 50 MG/1
TABLET ORAL
Qty: 60 TABLET | Refills: 0 | Status: SHIPPED | OUTPATIENT
Start: 2018-01-19 | End: 2018-01-24 | Stop reason: ALTCHOICE

## 2018-01-24 ENCOUNTER — HOSPITAL ENCOUNTER (OUTPATIENT)
Age: 83
Setting detail: SPECIMEN
Discharge: HOME | End: 2018-01-24
Payer: MEDICARE

## 2018-01-24 ENCOUNTER — OFFICE VISIT (OUTPATIENT)
Dept: FAMILY MEDICINE CLINIC | Age: 83
End: 2018-01-24
Payer: MEDICARE

## 2018-01-24 VITALS
TEMPERATURE: 97.6 F | DIASTOLIC BLOOD PRESSURE: 82 MMHG | HEART RATE: 64 BPM | OXYGEN SATURATION: 94 % | HEIGHT: 63 IN | BODY MASS INDEX: 31.71 KG/M2 | SYSTOLIC BLOOD PRESSURE: 140 MMHG | WEIGHT: 179 LBS

## 2018-01-24 DIAGNOSIS — J44.9 CHRONIC OBSTRUCTIVE PULMONARY DISEASE, UNSPECIFIED COPD TYPE (HCC): ICD-10-CM

## 2018-01-24 DIAGNOSIS — I10 ESSENTIAL HYPERTENSION: Primary | ICD-10-CM

## 2018-01-24 DIAGNOSIS — E11.9 TYPE 2 DIABETES MELLITUS WITHOUT COMPLICATION, WITHOUT LONG-TERM CURRENT USE OF INSULIN (HCC): ICD-10-CM

## 2018-01-24 DIAGNOSIS — I48.91 ATRIAL FIBRILLATION, UNSPECIFIED TYPE (HCC): ICD-10-CM

## 2018-01-24 DIAGNOSIS — E03.9 HYPOTHYROIDISM, UNSPECIFIED TYPE: ICD-10-CM

## 2018-01-24 LAB
THYROXINE, FREE: 2.16 NG/DL (ref 0.93–1.7)
TSH SERPL DL<=0.05 MIU/L-ACNC: 0.06 MIU/L (ref 0.3–5)

## 2018-01-24 PROCEDURE — G8926 SPIRO NO PERF OR DOC: HCPCS | Performed by: PHYSICIAN ASSISTANT

## 2018-01-24 PROCEDURE — 1123F ACP DISCUSS/DSCN MKR DOCD: CPT | Performed by: PHYSICIAN ASSISTANT

## 2018-01-24 PROCEDURE — 99213 OFFICE O/P EST LOW 20 MIN: CPT | Performed by: PHYSICIAN ASSISTANT

## 2018-01-24 PROCEDURE — G8427 DOCREV CUR MEDS BY ELIG CLIN: HCPCS | Performed by: PHYSICIAN ASSISTANT

## 2018-01-24 PROCEDURE — 1036F TOBACCO NON-USER: CPT | Performed by: PHYSICIAN ASSISTANT

## 2018-01-24 PROCEDURE — 4040F PNEUMOC VAC/ADMIN/RCVD: CPT | Performed by: PHYSICIAN ASSISTANT

## 2018-01-24 PROCEDURE — 1090F PRES/ABSN URINE INCON ASSESS: CPT | Performed by: PHYSICIAN ASSISTANT

## 2018-01-24 PROCEDURE — G8484 FLU IMMUNIZE NO ADMIN: HCPCS | Performed by: PHYSICIAN ASSISTANT

## 2018-01-24 PROCEDURE — G8417 CALC BMI ABV UP PARAM F/U: HCPCS | Performed by: PHYSICIAN ASSISTANT

## 2018-01-24 PROCEDURE — 3023F SPIROM DOC REV: CPT | Performed by: PHYSICIAN ASSISTANT

## 2018-01-24 RX ORDER — TORSEMIDE 20 MG/1
40 TABLET ORAL DAILY
COMMUNITY
End: 2018-05-03 | Stop reason: HOSPADM

## 2018-01-24 RX ORDER — POTASSIUM CHLORIDE 750 MG/1
TABLET, FILM COATED, EXTENDED RELEASE ORAL
Qty: 60 TABLET | Refills: 3 | Status: SHIPPED | OUTPATIENT
Start: 2018-01-24 | End: 2018-06-09 | Stop reason: SDUPTHER

## 2018-01-24 ASSESSMENT — ENCOUNTER SYMPTOMS
COLOR CHANGE: 0
NAUSEA: 0
SHORTNESS OF BREATH: 0
DIARRHEA: 0
CONSTIPATION: 0
VOMITING: 0
ABDOMINAL PAIN: 0
COUGH: 0
WHEEZING: 0

## 2018-01-24 ASSESSMENT — PATIENT HEALTH QUESTIONNAIRE - PHQ9
2. FEELING DOWN, DEPRESSED OR HOPELESS: 0
SUM OF ALL RESPONSES TO PHQ QUESTIONS 1-9: 0
SUM OF ALL RESPONSES TO PHQ9 QUESTIONS 1 & 2: 0
1. LITTLE INTEREST OR PLEASURE IN DOING THINGS: 0

## 2018-01-24 NOTE — PROGRESS NOTES
[address] [facility] [address]  Dept: [phone]  Dept Fax: [phone]    Justo Pineda is a 80 y.o. female who presents today for her medical conditions/complaints as noted below. Justo Pineda is c/o of   Chief Complaint   Patient presents with    Hypertension     Patient here for follow up on HTN         HPI:     Hypertension   This is a chronic problem. The current episode started more than 1 year ago. The problem is unchanged. The problem is controlled. Pertinent negatives include no anxiety, chest pain, headaches, malaise/fatigue, palpitations, peripheral edema or shortness of breath. Risk factors for coronary artery disease include post-menopausal state and family history. Past treatments include ACE inhibitors. The current treatment provides moderate improvement. patient reporting recent stress due to moving out of her apartment and living now with her granddaughter. She states it has been stressful to move. There are a lot of kids in the home as well.    Breathing well, O2 at night only  She continues to follow with Dr. Jennyfer Baltazar for her heart    Hemoglobin A1C (%)   Date Value   11/22/2017 5.5   08/24/2016 5.4   06/11/2015 5.2             ( goal A1C is < 7)   No results found for: LABMICR  LDL Cholesterol (mg/dL)   Date Value   09/13/2017 62   08/24/2016 51   06/11/2015 59       (goal LDL is <100)   AST (U/L)   Date Value   11/07/2017 14     ALT (U/L)   Date Value   11/07/2017 8     BUN (mg/dL)   Date Value   11/07/2017 18     BP Readings from Last 3 Encounters:   01/24/18 (!) 140/82   12/27/17 126/64   11/22/17 124/68          (goal 120/80)    Past Medical History:   Diagnosis Date    A-fib ([address]     AAA (abdominal aortic aneurysm) (HCC)     Abdominal cramping     Atrial fibrillation (HCC)     Back pain, chronic     C. difficile colitis     COPD (chronic obstructive pulmonary disease) (HCC)     [facility]     Gastritis     GERD (gastroesophageal reflux disease)     Hiatal hernia     Hypoalbuminemia 10/14/2014    IBS (irritable bowel syndrome)     Nausea vomiting and diarrhea 10/14/2014    Stomach ulcer     Thyroid disease     UTI (lower urinary tract infection)     Varicose veins       Past Surgical History:   Procedure Laterality Date    CHOLECYSTECTOMY      HERNIA REPAIR      HYSTERECTOMY      partial    [place] EGD TRANSORAL BIOPSY SINGLE/MULTIPLE N/A 6/20/2017    EGD BIOPSY performed by Jude Martins MD at [address] [facility] [address]  06/20/2017    MILD CHRONIC INACTIVE GASTRITIS    VASCULAR SURGERY      varicose veins    VEIN SURGERY         Family History   Problem Relation Age of Onset    Family history unknown: Yes       Social History   Substance Use Topics    Smoking status: Former Smoker    Smokeless tobacco: Never Used    Alcohol use 1.2 oz/week     1 Glasses of wine, 1 Cans of beer per week      Current Outpatient Prescriptions   Medication Sig Dispense Refill    torsemide (DEMADEX) 20 MG tablet Take 40 mg by mouth daily      potassium chloride (KLOR-CON) 10 MEQ extended release tablet TK 1 T PO BID 60 tablet 3    levothyroxine (SYNTHROID) 137 MCG tablet TAKE ONE TABLET BY MOUTH DAILY 30 tablet 0    pantoprazole (PROTONIX) 40 MG tablet Take 1 tablet by mouth daily 30 tablet 1    clotrimazole-betamethasone (LOTRISONE) 1-0.05 % cream Apply topically 2 times daily.  45 g 1    ranitidine (ZANTAC) 150 MG tablet Take 1 tablet by mouth 2 times daily 60 tablet 6    lisinopril (PRINIVIL;ZESTRIL) 20 MG tablet Take 1 tablet by mouth daily 90 tablet 2    ferrous sulfate (FE TABS) 325 (65 FE) MG EC tablet Take 1 tablet by mouth daily (with breakfast) 30 tablet 3    furosemide (LASIX) 20 MG tablet Take 1 tablet by mouth daily 90 tablet 2    amiodarone (CORDARONE) 200 MG tablet       OXYGEN Inhale into the lungs Uses at night and prn       No current facility-administered

## 2018-01-25 RX ORDER — LEVOTHYROXINE SODIUM 0.12 MG/1
125 TABLET ORAL DAILY
Qty: 30 TABLET | Refills: 3 | Status: SHIPPED | OUTPATIENT
Start: 2018-01-25 | End: 2018-06-15 | Stop reason: SDUPTHER

## 2018-02-06 RX ORDER — FUROSEMIDE 20 MG/1
20 TABLET ORAL DAILY
Qty: 90 TABLET | Refills: 2 | Status: SHIPPED | OUTPATIENT
Start: 2018-02-06 | End: 2018-05-03 | Stop reason: SDUPTHER

## 2018-02-23 RX ORDER — PANTOPRAZOLE SODIUM 40 MG/1
TABLET, DELAYED RELEASE ORAL
Qty: 30 TABLET | Refills: 0 | Status: SHIPPED | OUTPATIENT
Start: 2018-02-23 | End: 2018-03-26 | Stop reason: SDUPTHER

## 2018-03-05 ENCOUNTER — OFFICE VISIT (OUTPATIENT)
Dept: FAMILY MEDICINE CLINIC | Age: 83
End: 2018-03-05
Payer: MEDICARE

## 2018-03-05 VITALS
HEART RATE: 76 BPM | WEIGHT: 179 LBS | TEMPERATURE: 97.8 F | DIASTOLIC BLOOD PRESSURE: 76 MMHG | RESPIRATION RATE: 12 BRPM | SYSTOLIC BLOOD PRESSURE: 122 MMHG | OXYGEN SATURATION: 90 % | HEIGHT: 63 IN | BODY MASS INDEX: 31.71 KG/M2

## 2018-03-05 DIAGNOSIS — M25.552 PAIN OF LEFT HIP JOINT: ICD-10-CM

## 2018-03-05 DIAGNOSIS — K21.9 GASTROESOPHAGEAL REFLUX DISEASE WITHOUT ESOPHAGITIS: Primary | ICD-10-CM

## 2018-03-05 PROCEDURE — G8417 CALC BMI ABV UP PARAM F/U: HCPCS | Performed by: PHYSICIAN ASSISTANT

## 2018-03-05 PROCEDURE — 1090F PRES/ABSN URINE INCON ASSESS: CPT | Performed by: PHYSICIAN ASSISTANT

## 2018-03-05 PROCEDURE — 1036F TOBACCO NON-USER: CPT | Performed by: PHYSICIAN ASSISTANT

## 2018-03-05 PROCEDURE — 99213 OFFICE O/P EST LOW 20 MIN: CPT | Performed by: PHYSICIAN ASSISTANT

## 2018-03-05 PROCEDURE — 4040F PNEUMOC VAC/ADMIN/RCVD: CPT | Performed by: PHYSICIAN ASSISTANT

## 2018-03-05 PROCEDURE — G8427 DOCREV CUR MEDS BY ELIG CLIN: HCPCS | Performed by: PHYSICIAN ASSISTANT

## 2018-03-05 PROCEDURE — G8484 FLU IMMUNIZE NO ADMIN: HCPCS | Performed by: PHYSICIAN ASSISTANT

## 2018-03-05 PROCEDURE — 1123F ACP DISCUSS/DSCN MKR DOCD: CPT | Performed by: PHYSICIAN ASSISTANT

## 2018-03-05 ASSESSMENT — ENCOUNTER SYMPTOMS
NAUSEA: 0
VOMITING: 0
COUGH: 0
SHORTNESS OF BREATH: 0
CONSTIPATION: 0
BACK PAIN: 0
COLOR CHANGE: 0
ABDOMINAL PAIN: 1
DIARRHEA: 0
WHEEZING: 0

## 2018-03-05 NOTE — PROGRESS NOTES
Negative for constipation, diarrhea, nausea and vomiting. Genitourinary: Negative for dysuria, frequency and hematuria. Musculoskeletal: Positive for arthralgias. Negative for back pain, gait problem (walks with a walker), joint swelling and myalgias. Skin: Negative for color change, pallor, rash and wound. Neurological: Positive for numbness. Negative for tingling and weakness. Hematological: Negative for adenopathy. Psychiatric/Behavioral: The patient is not nervous/anxious. Objective:     Physical Exam   Constitutional: She is oriented to person, place, and time. She appears well-developed and well-nourished. HENT:   Head: Normocephalic and atraumatic. Cardiovascular: Normal rate, regular rhythm and normal heart sounds. No murmur heard. Pulmonary/Chest: Effort normal and breath sounds normal. No respiratory distress. She has no wheezes. She has no rales. Abdominal: Soft. Bowel sounds are normal. She exhibits no distension and no mass. There is no tenderness. There is no rebound. Musculoskeletal: She exhibits tenderness (lateral left hip tenderness, walking with a walker). She exhibits no edema. Neurological: She is alert and oriented to person, place, and time. Skin: Skin is warm and dry. No rash noted. No erythema. No pallor. Psychiatric: She has a normal mood and affect. Her behavior is normal. Judgment and thought content normal.   Nursing note and vitals reviewed. /76   Pulse 76   Temp 97.8 °F (36.6 °C) (Oral)   Resp 12   Ht 5' 2.99\" (1.6 m)   Wt 179 lb (81.2 kg)   SpO2 90%   BMI 31.72 kg/m²     Assessment:      1. Gastroesophageal reflux disease without esophagitis     2. Pain of left hip joint  XR HIP LEFT (2-3 VIEWS)             Plan:      Return if symptoms worsen or fail to improve.     OA on imaging, await final rad report  Patient declined further treatment at present  Using some OTC tylenol  Discussed referral to ortho if persisting/worsening  Cont with walker for support, patient uses at all times    Patient does not feel the zantac is helping  Hx of gerd and hiatal hernia  Does best on the protonix, cont  Ok to add probiotic as well  Pt does use occasional prn tums also    Patient has follow up in April, will plan recheck sooner if any concerns  Patient agreed with treatment plan  Health Maintenance reviewed. Orders Placed This Encounter   Procedures    XR HIP LEFT (2-3 VIEWS)     Order Specific Question:   Reason for exam:     Answer:   pain         Patient given educational materials - see patient instructions. Discussed use, benefit, and side effects of prescribed medications. All patient questions answered. Pt voiced understanding. Reviewed health maintenance. Instructed to continue current medications, diet and exercise. Patient agreed with treatment plan. Follow up as directed.      Electronically signed by Michel Gonzalez PA-C on 3/5/2018 at 1:08 PM

## 2018-03-05 NOTE — PROGRESS NOTES
Visit Information    Have you changed or started any medications since your last visit including any over-the-counter medicines, vitamins, or herbal medicines? no   Are you having any side effects from any of your medications? -  no  Have you stopped taking any of your medications? Is so, why? -  no    Have you seen any other physician or provider since your last visit? No  Have you had any other diagnostic tests since your last visit? No  Have you been seen in the emergency room and/or had an admission to a hospital since we last saw you? No  Have you had your routine dental cleaning in the past 6 months? yes -     Have you activated your [employer] account? If not, what are your barriers?  No: Patient refused      Patient Care Team:  Sonya Valencia PA-C as PCP - General (Family Medicine)  Sonya Valencia PA-C as PCP - S Attributed Provider  Maggy Byers MD as Consulting Physician (Gastroenterology)    Medical History Review  Past Medical, Family, and Social History reviewed and does contribute to the patient presenting condition    Health Maintenance   Topic Date Due    DTaP/Tdap/Td vaccine (1 - Tdap) 08/02/1944    Shingles Vaccine (1 of 2 - 2 Dose Series) 08/02/1975    Pneumococcal low/med risk (2 of 2 - PPSV23) 06/29/2018    Potassium monitoring  11/07/2018    Creatinine monitoring  11/07/2018    TSH testing  01/24/2019    Flu vaccine  Completed

## 2018-03-07 ENCOUNTER — HOSPITAL ENCOUNTER (OUTPATIENT)
Age: 83
Setting detail: SPECIMEN
Discharge: HOME | End: 2018-03-07
Payer: MEDICARE

## 2018-03-07 LAB
ALBUMIN SERPL-MCNC: 3.7 G/DL (ref 3.5–5.2)
ALBUMIN/GLOBULIN RATIO: 1.3 (ref 1–2.5)
ALP BLD-CCNC: 73 U/L (ref 35–104)
ALT SERPL-CCNC: 6 U/L (ref 5–33)
ANION GAP SERPL CALCULATED.3IONS-SCNC: 13 MMOL/L (ref 9–17)
AST SERPL-CCNC: 13 U/L
BILIRUB SERPL-MCNC: 0.96 MG/DL (ref 0.3–1.2)
BNP INTERPRETATION: NORMAL
BUN BLDV-MCNC: 20 MG/DL (ref 8–23)
BUN/CREAT BLD: ABNORMAL (ref 9–20)
CALCIUM SERPL-MCNC: 9.2 MG/DL (ref 8.6–10.4)
CHLORIDE BLD-SCNC: 105 MMOL/L (ref 98–107)
CO2: 24 MMOL/L (ref 20–31)
CREAT SERPL-MCNC: 0.72 MG/DL (ref 0.5–0.9)
GFR AFRICAN AMERICAN: >60 ML/MIN
GFR NON-AFRICAN AMERICAN: >60 ML/MIN
GFR SERPL CREATININE-BSD FRML MDRD: ABNORMAL ML/MIN/{1.73_M2}
GFR SERPL CREATININE-BSD FRML MDRD: ABNORMAL ML/MIN/{1.73_M2}
GLUCOSE BLD-MCNC: 129 MG/DL (ref 70–99)
HCT VFR BLD CALC: 42.8 % (ref 36.3–47.1)
HEMOGLOBIN: 14 G/DL (ref 11.9–15.1)
IRON SATURATION: 36 % (ref 20–55)
IRON: 96 UG/DL (ref 37–145)
MCH RBC QN AUTO: 32.7 PG (ref 25.2–33.5)
MCHC RBC AUTO-ENTMCNC: 32.7 G/DL (ref 28.4–34.8)
MCV RBC AUTO: 100 FL (ref 82.6–102.9)
NRBC AUTOMATED: 0 PER 100 WBC
PDW BLD-RTO: 13.2 % (ref 11.8–14.4)
PLATELET # BLD: 180 K/UL (ref 138–453)
PMV BLD AUTO: 9.8 FL (ref 8.1–13.5)
POTASSIUM SERPL-SCNC: 4.3 MMOL/L (ref 3.7–5.3)
PRO-BNP: 103 PG/ML
RBC # BLD: 4.28 M/UL (ref 3.95–5.11)
SODIUM BLD-SCNC: 142 MMOL/L (ref 135–144)
THYROXINE, FREE: 1.91 NG/DL (ref 0.93–1.7)
TOTAL IRON BINDING CAPACITY: 268 UG/DL (ref 250–450)
TOTAL PROTEIN: 6.6 G/DL (ref 6.4–8.3)
TSH SERPL DL<=0.05 MIU/L-ACNC: 0.68 MIU/L (ref 0.3–5)
UNSATURATED IRON BINDING CAPACITY: 172 UG/DL (ref 112–347)
VITAMIN B-12: 1934 PG/ML (ref 232–1245)
WBC # BLD: 5.2 K/UL (ref 3.5–11.3)

## 2018-03-23 ENCOUNTER — HOSPITAL ENCOUNTER (OUTPATIENT)
Age: 83
Setting detail: SPECIMEN
Discharge: HOME | End: 2018-03-23
Payer: MEDICARE

## 2018-03-23 DIAGNOSIS — D50.9 IRON DEFICIENCY ANEMIA, UNSPECIFIED IRON DEFICIENCY ANEMIA TYPE: ICD-10-CM

## 2018-03-23 LAB
ABSOLUTE EOS #: 0.19 K/UL (ref 0–0.44)
ABSOLUTE IMMATURE GRANULOCYTE: <0.03 K/UL (ref 0–0.3)
ABSOLUTE LYMPH #: 1.46 K/UL (ref 1.1–3.7)
ABSOLUTE MONO #: 0.29 K/UL (ref 0.1–1.2)
BASOPHILS # BLD: 1 % (ref 0–2)
BASOPHILS ABSOLUTE: 0.03 K/UL (ref 0–0.2)
DIFFERENTIAL TYPE: NORMAL
EOSINOPHILS RELATIVE PERCENT: 4 % (ref 1–4)
FERRITIN: 60 UG/L (ref 13–150)
FOLATE: 7.1 NG/ML
HCT VFR BLD CALC: 41.6 % (ref 36.3–47.1)
HEMOGLOBIN: 13.5 G/DL (ref 11.9–15.1)
IMMATURE GRANULOCYTES: 0 %
IRON SATURATION: 40 % (ref 20–55)
IRON: 98 UG/DL (ref 37–145)
LYMPHOCYTES # BLD: 29 % (ref 24–43)
MCH RBC QN AUTO: 31.8 PG (ref 25.2–33.5)
MCHC RBC AUTO-ENTMCNC: 32.5 G/DL (ref 28.4–34.8)
MCV RBC AUTO: 98.1 FL (ref 82.6–102.9)
MONOCYTES # BLD: 6 % (ref 3–12)
NRBC AUTOMATED: 0 PER 100 WBC
PDW BLD-RTO: 13.4 % (ref 11.8–14.4)
PLATELET # BLD: 161 K/UL (ref 138–453)
PLATELET ESTIMATE: NORMAL
PMV BLD AUTO: 10 FL (ref 8.1–13.5)
RBC # BLD: 4.24 M/UL (ref 3.95–5.11)
RBC # BLD: NORMAL 10*6/UL
SEG NEUTROPHILS: 60 % (ref 36–65)
SEGMENTED NEUTROPHILS ABSOLUTE COUNT: 3.02 K/UL (ref 1.5–8.1)
TOTAL IRON BINDING CAPACITY: 243 UG/DL (ref 250–450)
UNSATURATED IRON BINDING CAPACITY: 145 UG/DL (ref 112–347)
VITAMIN B-12: 1785 PG/ML (ref 232–1245)
WBC # BLD: 5 K/UL (ref 3.5–11.3)
WBC # BLD: NORMAL 10*3/UL

## 2018-03-26 RX ORDER — PANTOPRAZOLE SODIUM 40 MG/1
TABLET, DELAYED RELEASE ORAL
Qty: 30 TABLET | Refills: 0 | Status: SHIPPED | OUTPATIENT
Start: 2018-03-26 | End: 2018-04-18 | Stop reason: SDUPTHER

## 2018-03-28 ENCOUNTER — HOSPITAL ENCOUNTER (OUTPATIENT)
Facility: MEDICAL CENTER | Age: 83
End: 2018-03-28
Payer: MEDICARE

## 2018-03-29 ENCOUNTER — TELEPHONE (OUTPATIENT)
Dept: ONCOLOGY | Age: 83
End: 2018-03-29

## 2018-03-29 ENCOUNTER — OFFICE VISIT (OUTPATIENT)
Dept: ONCOLOGY | Age: 83
End: 2018-03-29
Payer: MEDICARE

## 2018-03-29 VITALS
HEART RATE: 70 BPM | DIASTOLIC BLOOD PRESSURE: 77 MMHG | WEIGHT: 180.4 LBS | RESPIRATION RATE: 20 BRPM | BODY MASS INDEX: 31.97 KG/M2 | TEMPERATURE: 97.4 F | SYSTOLIC BLOOD PRESSURE: 129 MMHG

## 2018-03-29 DIAGNOSIS — D50.9 IRON DEFICIENCY ANEMIA, UNSPECIFIED IRON DEFICIENCY ANEMIA TYPE: Primary | ICD-10-CM

## 2018-03-29 DIAGNOSIS — K29.50 OTHER CHRONIC GASTRITIS WITHOUT HEMORRHAGE: ICD-10-CM

## 2018-03-29 PROBLEM — K29.70 GASTRITIS: Status: ACTIVE | Noted: 2018-03-29

## 2018-03-29 PROCEDURE — 1090F PRES/ABSN URINE INCON ASSESS: CPT | Performed by: INTERNAL MEDICINE

## 2018-03-29 PROCEDURE — 1123F ACP DISCUSS/DSCN MKR DOCD: CPT | Performed by: INTERNAL MEDICINE

## 2018-03-29 PROCEDURE — G8427 DOCREV CUR MEDS BY ELIG CLIN: HCPCS | Performed by: INTERNAL MEDICINE

## 2018-03-29 PROCEDURE — 4040F PNEUMOC VAC/ADMIN/RCVD: CPT | Performed by: INTERNAL MEDICINE

## 2018-03-29 PROCEDURE — G8417 CALC BMI ABV UP PARAM F/U: HCPCS | Performed by: INTERNAL MEDICINE

## 2018-03-29 PROCEDURE — 99211 OFF/OP EST MAY X REQ PHY/QHP: CPT

## 2018-03-29 PROCEDURE — G8482 FLU IMMUNIZE ORDER/ADMIN: HCPCS | Performed by: INTERNAL MEDICINE

## 2018-03-29 PROCEDURE — 99214 OFFICE O/P EST MOD 30 MIN: CPT | Performed by: INTERNAL MEDICINE

## 2018-03-29 PROCEDURE — 1036F TOBACCO NON-USER: CPT | Performed by: INTERNAL MEDICINE

## 2018-03-29 NOTE — TELEPHONE ENCOUNTER
PATIENT ARRIVED TO Karen Session MD VISIT. DR. Esha Batista IN TO SEE PATIENT. ORDERS RECEIVED. D/C IRON  RV 6 MONTHS WITH LABS BEFORE RV. AVS PRINTED AND GIVEN TO PATIENT WITH INSTRUCTIONS. PATIENT STATES SHE GOES TO [employer] [facility] IN [address] [place] [address],[facility]. PATIENT DISCHARGED FROM CLINIC WITH [facility].

## 2018-03-29 NOTE — PROGRESS NOTES
_  Chief Complaint   Patient presents with    Follow-up           DIAGNOSIS:       Microcytic anemia, Corrected  Iron deficiency anemia, resolved  Possible GI blood loss, currently stable  Multiple comorbidities as listed        CURRENT THERAPY:         Vitamin B12 replacement  Oral iron replacement. This continued March 2018    BRIEF CASE HISTORY:      Ms. Karen Rose is a very pleasant 80 y.o. female with history of multiple comorbidities as listed. Advanced age but still maintaining fairly good performance status. She had limited movement due to arthritis and problems with bleeding. Patient's blood work showed microcytic anemia with evidence of iron deficiency. There was significant change compared to labs done in 2016. Patient denies any active bleeding. No melena or hematochezia. No hematemesis. The patient had the colonoscopy 2 years ago which was negative except for polyps. She had EGD few weeks ago and that showed gastritis. No active bleeding. She is maintained on PPIs. Patient has mild generalized weakness and fatigue. She has shortness of breath on exertion. No other complaints. She quit smoking 40 years ago. She drinks wine on and off. INTERIM HISTORY:   The patient is seen today for follow-up anemia. She had previously evidence of iron deficiency and she was using oral iron. There was significant improvement over the last 6 months. Actually her hemoglobin now is normal.  Patient has no symptoms related to anemia. No weakness or fatigue. No dizziness. No shortness of breath. No palpitation. She denies any active bleeding. No abdominal pain. She has some GI symptoms related to iron. No other complaints. PAST MEDICAL HISTORY: has a past medical history of A-fib [facility]); AAA (abdominal aortic aneurysm) ([facility] [address] [facility] Abdominal cramping; Atrial fibrillation ([facility] [facility]  Back pain, chronic; C. difficile colitis; COPD (chronic obstructive pulmonary disease) ([facility] [address] [facility] Gastritis; GERD (gastroesophageal reflux disease); Hiatal hernia; Hypoalbuminemia; IBS (irritable bowel syndrome); Nausea vomiting and diarrhea; Stomach ulcer; Thyroid disease; Type 2 diabetes mellitus without complication, without long-term current use of insulin ([facility] [address] UTI (lower urinary tract infection); and Varicose veins. PAST SURGICAL HISTORY: has a past surgical history that includes Cholecystectomy; vascular surgery; Vein Surgery; Hysterectomy; hernia repair; Upper gastrointestinal endoscopy (06/20/2017); and pr egd transoral biopsy single/multiple (N/A, 6/20/2017). CURRENT MEDICATIONS:  has a current medication list which includes the following prescription(s): pantoprazole, profe, furosemide, levothyroxine, torsemide, potassium chloride, clotrimazole-betamethasone, ranitidine, lisinopril, ferrous sulfate, amiodarone, and OXYGEN. ALLERGIES:  is allergic to penicillins. FAMILY HISTORY: Negative for any hematological or oncological conditions. SOCIAL HISTORY:  reports that she has quit smoking. She has never used smokeless tobacco. She reports that she drinks about 1.2 oz of alcohol per week . She reports that she does not use drugs. REVIEW OF SYSTEMS:     · General: She has no weakness or fatigue. No unanticipated weight loss or decreased appetite. No fever or chills. · Eyes: No blurred vision, eye pain or double vision. · Ears: No hearing problems or drainage. No tinnitus. · Throat: No sore throat, problems with swallowing or dysphagia. · Respiratory: No cough, sputum or hemoptysis. Mild shortness of breath on exertion. No pleuritic chest pain. · Cardiovascular: No chest pain, orthopnea or PND. No lower extremity edema. No palpitation. · Gastrointestinal: No problems with swallowing. No abdominal pain or bloating. No nausea or vomiting. No diarrhea or constipation. No GI bleeding. · Genitourinary: No dysuria, hematuria, frequency or urgency. · Musculoskeletal: No muscle aches or pains. No limitation of movement. No back pain. No gait disturbance, No joint complaints. · Dermatologic: No skin rashes or pruritus. No skin lesions or discolorations. · Psychiatric: No depression, anxiety, or stress or signs of schizophrenia. No change in mood or affect. · Hematologic: No history of bleeding tendency. No bruises or ecchymosis. No history of clotting problems. · Infectious disease: No fever, chills or frequent infections. · Endocrine: No problems with opacity. No polydipsia or polyuria. No temperature intolerance. · Neurologic: No headaches or dizziness. No weakness or numbness of the extremities. No changes in balance, coordination,  memory, mentation, behavior. · Allergic/Immunologic: No nasal congestion or hives. No repeated infections. PHYSICAL EXAM:  The patient is not in acute distress. Elderly lady who is using oxygen and needed assistance of movement. Vital signs: Blood pressure 129/77, pulse 70, temperature 97.4 °F (36.3 °C), temperature source Oral, resp. rate 20, weight 180 lb 6.4 oz (81.8 kg), currently breastfeeding. HEENT:  Eyes are normal. Ears, nose and throat are normal.  Neck: Supple. No lymph node enlargement. No thyroid enlargement. Trachea is centrally located. Chest:  Clear to auscultation. No wheezes or crepitations. Heart: Regular sinus rhythm. Abdomen: Soft, nontender. No hepatosplenomegaly. No masses. Extremities:  With no edema. Lymph Nodes:  No cervical, axillary or inguinal lymph node enlargement. Neurologic:  Conscious and oriented. No focal neurological deficits. Psychosocial: No depression, anxiety or stress. Skin: No rashes, bruises or ecchymoses.       Review of Diagnostic data:   Lab Results   Component Value Date    WBC 5.0 03/23/2018    HGB 13.5 03/23/2018    HCT 41.6 03/23/2018    MCV 98.1 03/23/2018     03/23/2018

## 2018-04-03 ENCOUNTER — OFFICE VISIT (OUTPATIENT)
Dept: FAMILY MEDICINE CLINIC | Age: 83
End: 2018-04-03
Payer: MEDICARE

## 2018-04-03 VITALS
HEART RATE: 99 BPM | WEIGHT: 180.34 LBS | DIASTOLIC BLOOD PRESSURE: 72 MMHG | RESPIRATION RATE: 16 BRPM | SYSTOLIC BLOOD PRESSURE: 120 MMHG | HEIGHT: 63 IN | BODY MASS INDEX: 31.95 KG/M2 | OXYGEN SATURATION: 92 % | TEMPERATURE: 98.6 F

## 2018-04-03 DIAGNOSIS — K57.32 DIVERTICULITIS OF LARGE INTESTINE WITHOUT BLEEDING, UNSPECIFIED COMPLICATION STATUS: Primary | ICD-10-CM

## 2018-04-03 PROCEDURE — 1036F TOBACCO NON-USER: CPT | Performed by: INTERNAL MEDICINE

## 2018-04-03 PROCEDURE — 99213 OFFICE O/P EST LOW 20 MIN: CPT | Performed by: INTERNAL MEDICINE

## 2018-04-03 PROCEDURE — G8427 DOCREV CUR MEDS BY ELIG CLIN: HCPCS | Performed by: INTERNAL MEDICINE

## 2018-04-03 PROCEDURE — 4040F PNEUMOC VAC/ADMIN/RCVD: CPT | Performed by: INTERNAL MEDICINE

## 2018-04-03 PROCEDURE — 1123F ACP DISCUSS/DSCN MKR DOCD: CPT | Performed by: INTERNAL MEDICINE

## 2018-04-03 PROCEDURE — G8417 CALC BMI ABV UP PARAM F/U: HCPCS | Performed by: INTERNAL MEDICINE

## 2018-04-03 PROCEDURE — 1090F PRES/ABSN URINE INCON ASSESS: CPT | Performed by: INTERNAL MEDICINE

## 2018-04-03 RX ORDER — METRONIDAZOLE 500 MG/1
500 TABLET ORAL 3 TIMES DAILY
Qty: 30 TABLET | Refills: 0 | Status: SHIPPED | OUTPATIENT
Start: 2018-04-03 | End: 2018-04-13

## 2018-04-03 RX ORDER — NYSTATIN 100000 [USP'U]/G
POWDER TOPICAL
COMMUNITY
Start: 2018-04-03 | End: 2019-11-26

## 2018-04-03 ASSESSMENT — ENCOUNTER SYMPTOMS
ANAL BLEEDING: 0
DIARRHEA: 1
VOMITING: 0
BELCHING: 1
CONSTIPATION: 0
BLOOD IN STOOL: 0
STRIDOR: 0
COUGH: 0
ABDOMINAL PAIN: 1
HEMATOCHEZIA: 0
BACK PAIN: 0
RECTAL PAIN: 0
ABDOMINAL DISTENTION: 1
NAUSEA: 0
SHORTNESS OF BREATH: 0
WHEEZING: 0
FLATUS: 1
BLOATING: 1

## 2018-04-04 ENCOUNTER — NURSE ONLY (OUTPATIENT)
Dept: FAMILY MEDICINE CLINIC | Age: 83
End: 2018-04-04

## 2018-04-04 DIAGNOSIS — R19.7 DIARRHEA, UNSPECIFIED TYPE: ICD-10-CM

## 2018-04-04 DIAGNOSIS — R10.9 ABDOMINAL PAIN, UNSPECIFIED ABDOMINAL LOCATION: Primary | ICD-10-CM

## 2018-04-09 ENCOUNTER — OFFICE VISIT (OUTPATIENT)
Dept: FAMILY MEDICINE CLINIC | Age: 83
End: 2018-04-09
Payer: MEDICARE

## 2018-04-09 VITALS
SYSTOLIC BLOOD PRESSURE: 130 MMHG | DIASTOLIC BLOOD PRESSURE: 70 MMHG | OXYGEN SATURATION: 95 % | TEMPERATURE: 97.1 F | HEART RATE: 68 BPM

## 2018-04-09 DIAGNOSIS — L29.9 PRURITUS: ICD-10-CM

## 2018-04-09 DIAGNOSIS — B02.9 HERPES ZOSTER WITHOUT COMPLICATION: Primary | ICD-10-CM

## 2018-04-09 PROCEDURE — 99213 OFFICE O/P EST LOW 20 MIN: CPT | Performed by: NURSE PRACTITIONER

## 2018-04-09 RX ORDER — CLOBETASOL PROPIONATE 0.5 MG/G
AEROSOL, FOAM TOPICAL
Qty: 50 G | Refills: 0 | Status: SHIPPED | OUTPATIENT
Start: 2018-04-09 | End: 2019-11-26

## 2018-04-09 RX ORDER — VALACYCLOVIR HYDROCHLORIDE 1 G/1
1000 TABLET, FILM COATED ORAL 3 TIMES DAILY
Qty: 21 TABLET | Refills: 0 | Status: SHIPPED | OUTPATIENT
Start: 2018-04-09 | End: 2018-04-16

## 2018-04-18 ENCOUNTER — OFFICE VISIT (OUTPATIENT)
Dept: FAMILY MEDICINE CLINIC | Age: 83
End: 2018-04-18
Payer: MEDICARE

## 2018-04-18 VITALS — HEART RATE: 96 BPM | SYSTOLIC BLOOD PRESSURE: 120 MMHG | DIASTOLIC BLOOD PRESSURE: 76 MMHG | RESPIRATION RATE: 18 BRPM

## 2018-04-18 DIAGNOSIS — R26.81 GAIT INSTABILITY: ICD-10-CM

## 2018-04-18 DIAGNOSIS — K21.9 GASTROESOPHAGEAL REFLUX DISEASE WITHOUT ESOPHAGITIS: Primary | ICD-10-CM

## 2018-04-18 DIAGNOSIS — D50.9 IRON DEFICIENCY ANEMIA, UNSPECIFIED IRON DEFICIENCY ANEMIA TYPE: ICD-10-CM

## 2018-04-18 PROCEDURE — 99213 OFFICE O/P EST LOW 20 MIN: CPT | Performed by: PHYSICIAN ASSISTANT

## 2018-04-18 PROCEDURE — G8427 DOCREV CUR MEDS BY ELIG CLIN: HCPCS | Performed by: PHYSICIAN ASSISTANT

## 2018-04-18 PROCEDURE — 1090F PRES/ABSN URINE INCON ASSESS: CPT | Performed by: PHYSICIAN ASSISTANT

## 2018-04-18 PROCEDURE — 4040F PNEUMOC VAC/ADMIN/RCVD: CPT | Performed by: PHYSICIAN ASSISTANT

## 2018-04-18 PROCEDURE — 1036F TOBACCO NON-USER: CPT | Performed by: PHYSICIAN ASSISTANT

## 2018-04-18 PROCEDURE — 1123F ACP DISCUSS/DSCN MKR DOCD: CPT | Performed by: PHYSICIAN ASSISTANT

## 2018-04-18 PROCEDURE — G8417 CALC BMI ABV UP PARAM F/U: HCPCS | Performed by: PHYSICIAN ASSISTANT

## 2018-04-18 RX ORDER — PANTOPRAZOLE SODIUM 40 MG/1
TABLET, DELAYED RELEASE ORAL
Qty: 30 TABLET | Refills: 6 | Status: ON HOLD | OUTPATIENT
Start: 2018-04-18 | End: 2022-04-14 | Stop reason: SDUPTHER

## 2018-04-18 ASSESSMENT — ENCOUNTER SYMPTOMS
ABDOMINAL PAIN: 0
CONSTIPATION: 0
BLOOD IN STOOL: 0
WHEEZING: 0
COLOR CHANGE: 0
COUGH: 0
VOMITING: 0
SHORTNESS OF BREATH: 0
NAUSEA: 0
DIARRHEA: 0

## 2018-04-20 ENCOUNTER — TELEPHONE (OUTPATIENT)
Dept: GASTROENTEROLOGY | Age: 83
End: 2018-04-20

## 2018-05-03 ENCOUNTER — OFFICE VISIT (OUTPATIENT)
Dept: FAMILY MEDICINE CLINIC | Age: 83
End: 2018-05-03
Payer: MEDICARE

## 2018-05-03 VITALS
HEIGHT: 63 IN | SYSTOLIC BLOOD PRESSURE: 110 MMHG | HEART RATE: 64 BPM | OXYGEN SATURATION: 94 % | DIASTOLIC BLOOD PRESSURE: 62 MMHG | WEIGHT: 172 LBS | TEMPERATURE: 97.3 F | BODY MASS INDEX: 30.48 KG/M2

## 2018-05-03 DIAGNOSIS — R60.9 EDEMA, UNSPECIFIED TYPE: Primary | ICD-10-CM

## 2018-05-03 DIAGNOSIS — F41.9 ANXIETY: ICD-10-CM

## 2018-05-03 PROCEDURE — 99213 OFFICE O/P EST LOW 20 MIN: CPT | Performed by: PHYSICIAN ASSISTANT

## 2018-05-03 PROCEDURE — 1123F ACP DISCUSS/DSCN MKR DOCD: CPT | Performed by: PHYSICIAN ASSISTANT

## 2018-05-03 PROCEDURE — 4040F PNEUMOC VAC/ADMIN/RCVD: CPT | Performed by: PHYSICIAN ASSISTANT

## 2018-05-03 PROCEDURE — 1090F PRES/ABSN URINE INCON ASSESS: CPT | Performed by: PHYSICIAN ASSISTANT

## 2018-05-03 PROCEDURE — G8417 CALC BMI ABV UP PARAM F/U: HCPCS | Performed by: PHYSICIAN ASSISTANT

## 2018-05-03 PROCEDURE — 1036F TOBACCO NON-USER: CPT | Performed by: PHYSICIAN ASSISTANT

## 2018-05-03 PROCEDURE — G8427 DOCREV CUR MEDS BY ELIG CLIN: HCPCS | Performed by: PHYSICIAN ASSISTANT

## 2018-05-03 RX ORDER — PROMETHAZINE HYDROCHLORIDE 25 MG/1
25 TABLET ORAL EVERY 6 HOURS PRN
Qty: 30 TABLET | Refills: 0 | Status: SHIPPED | OUTPATIENT
Start: 2018-05-03 | End: 2018-06-02

## 2018-05-03 RX ORDER — FUROSEMIDE 20 MG/1
20 TABLET ORAL DAILY
Qty: 90 TABLET | Refills: 2 | Status: ON HOLD | OUTPATIENT
Start: 2018-05-03 | End: 2020-01-21 | Stop reason: SDUPTHER

## 2018-05-03 ASSESSMENT — ENCOUNTER SYMPTOMS
VOMITING: 0
NAUSEA: 0
WHEEZING: 0
COLOR CHANGE: 0
SHORTNESS OF BREATH: 0
DIARRHEA: 0
COUGH: 0
ABDOMINAL PAIN: 0
CONSTIPATION: 0

## 2018-05-04 ENCOUNTER — HOSPITAL ENCOUNTER (OUTPATIENT)
Age: 83
Setting detail: SPECIMEN
Discharge: HOME | End: 2018-05-04
Payer: MEDICARE

## 2018-05-04 DIAGNOSIS — R60.9 EDEMA, UNSPECIFIED TYPE: ICD-10-CM

## 2018-05-04 LAB
ANION GAP SERPL CALCULATED.3IONS-SCNC: 13 MMOL/L (ref 9–17)
BUN BLDV-MCNC: 24 MG/DL (ref 8–23)
BUN/CREAT BLD: ABNORMAL (ref 9–20)
CALCIUM SERPL-MCNC: 9.2 MG/DL (ref 8.6–10.4)
CHLORIDE BLD-SCNC: 108 MMOL/L (ref 98–107)
CO2: 21 MMOL/L (ref 20–31)
CREAT SERPL-MCNC: 0.98 MG/DL (ref 0.5–0.9)
GFR AFRICAN AMERICAN: >60 ML/MIN
GFR NON-AFRICAN AMERICAN: 53 ML/MIN
GFR SERPL CREATININE-BSD FRML MDRD: ABNORMAL ML/MIN/{1.73_M2}
GFR SERPL CREATININE-BSD FRML MDRD: ABNORMAL ML/MIN/{1.73_M2}
GLUCOSE BLD-MCNC: 134 MG/DL (ref 70–99)
POTASSIUM SERPL-SCNC: 4.9 MMOL/L (ref 3.7–5.3)
SODIUM BLD-SCNC: 142 MMOL/L (ref 135–144)

## 2018-05-10 ENCOUNTER — OFFICE VISIT (OUTPATIENT)
Dept: GASTROENTEROLOGY | Age: 83
End: 2018-05-10
Payer: MEDICARE

## 2018-05-10 VITALS
DIASTOLIC BLOOD PRESSURE: 67 MMHG | BODY MASS INDEX: 29.41 KG/M2 | SYSTOLIC BLOOD PRESSURE: 134 MMHG | HEIGHT: 63 IN | HEART RATE: 59 BPM | RESPIRATION RATE: 14 BRPM | OXYGEN SATURATION: 92 % | WEIGHT: 166 LBS

## 2018-05-10 DIAGNOSIS — D64.9 ANEMIA, UNSPECIFIED TYPE: Primary | ICD-10-CM

## 2018-05-10 DIAGNOSIS — R10.32 ABDOMINAL PAIN, LEFT LOWER QUADRANT: ICD-10-CM

## 2018-05-10 DIAGNOSIS — K58.2 IRRITABLE BOWEL SYNDROME WITH BOTH CONSTIPATION AND DIARRHEA: ICD-10-CM

## 2018-05-10 DIAGNOSIS — K21.9 GASTROESOPHAGEAL REFLUX DISEASE WITHOUT ESOPHAGITIS: ICD-10-CM

## 2018-05-10 PROCEDURE — 99214 OFFICE O/P EST MOD 30 MIN: CPT | Performed by: INTERNAL MEDICINE

## 2018-05-10 PROCEDURE — 1036F TOBACCO NON-USER: CPT | Performed by: INTERNAL MEDICINE

## 2018-05-10 PROCEDURE — G8417 CALC BMI ABV UP PARAM F/U: HCPCS | Performed by: INTERNAL MEDICINE

## 2018-05-10 PROCEDURE — 4040F PNEUMOC VAC/ADMIN/RCVD: CPT | Performed by: INTERNAL MEDICINE

## 2018-05-10 PROCEDURE — 1123F ACP DISCUSS/DSCN MKR DOCD: CPT | Performed by: INTERNAL MEDICINE

## 2018-05-10 PROCEDURE — G8427 DOCREV CUR MEDS BY ELIG CLIN: HCPCS | Performed by: INTERNAL MEDICINE

## 2018-05-10 PROCEDURE — 1090F PRES/ABSN URINE INCON ASSESS: CPT | Performed by: INTERNAL MEDICINE

## 2018-05-10 ASSESSMENT — ENCOUNTER SYMPTOMS
BLOOD IN STOOL: 0
NAUSEA: 0
DIARRHEA: 1
ABDOMINAL DISTENTION: 0
SHORTNESS OF BREATH: 1
BACK PAIN: 1
CONSTIPATION: 1
RECTAL PAIN: 0
VOMITING: 0
ANAL BLEEDING: 0
ABDOMINAL PAIN: 1

## 2018-05-17 ENCOUNTER — TELEPHONE (OUTPATIENT)
Dept: FAMILY MEDICINE CLINIC | Age: 83
End: 2018-05-17

## 2018-05-23 ENCOUNTER — HOSPITAL ENCOUNTER (OUTPATIENT)
Dept: CT IMAGING | Age: 83
Discharge: HOME | End: 2018-05-25
Payer: MEDICARE

## 2018-05-23 DIAGNOSIS — R10.32 ABDOMINAL PAIN, LEFT LOWER QUADRANT: ICD-10-CM

## 2018-05-23 PROCEDURE — 2580000003 HC RX 258: Performed by: INTERNAL MEDICINE

## 2018-05-23 PROCEDURE — 6360000004 HC RX CONTRAST MEDICATION: Performed by: INTERNAL MEDICINE

## 2018-05-23 PROCEDURE — 74177 CT ABD & PELVIS W/CONTRAST: CPT

## 2018-05-23 RX ORDER — 0.9 % SODIUM CHLORIDE 0.9 %
50 INTRAVENOUS SOLUTION INTRAVENOUS ONCE
Status: COMPLETED | OUTPATIENT
Start: 2018-05-23 | End: 2018-05-23

## 2018-05-23 RX ORDER — SODIUM CHLORIDE 0.9 % (FLUSH) 0.9 %
10 SYRINGE (ML) INJECTION
Status: COMPLETED | OUTPATIENT
Start: 2018-05-23 | End: 2018-05-23

## 2018-05-23 RX ADMIN — Medication 10 ML: at 15:12

## 2018-05-23 RX ADMIN — SODIUM CHLORIDE 50 ML: 9 INJECTION, SOLUTION INTRAVENOUS at 15:12

## 2018-05-23 RX ADMIN — IOHEXOL 50 ML: 240 INJECTION, SOLUTION INTRATHECAL; INTRAVASCULAR; INTRAVENOUS; ORAL at 15:12

## 2018-05-23 RX ADMIN — IOPAMIDOL 75 ML: 755 INJECTION, SOLUTION INTRAVENOUS at 15:12

## 2018-05-29 DIAGNOSIS — M54.42 CHRONIC MIDLINE LOW BACK PAIN WITH LEFT-SIDED SCIATICA: Primary | ICD-10-CM

## 2018-05-29 DIAGNOSIS — G89.29 CHRONIC MIDLINE LOW BACK PAIN WITH LEFT-SIDED SCIATICA: Primary | ICD-10-CM

## 2018-05-30 RX ORDER — TRAMADOL HYDROCHLORIDE 50 MG/1
TABLET ORAL
Qty: 60 TABLET | Refills: 0 | Status: SHIPPED | OUTPATIENT
Start: 2018-05-30 | End: 2018-06-29

## 2018-06-10 RX ORDER — POTASSIUM CHLORIDE 750 MG/1
TABLET, FILM COATED, EXTENDED RELEASE ORAL
Qty: 60 TABLET | Refills: 2 | Status: SHIPPED | OUTPATIENT
Start: 2018-06-10 | End: 2018-09-27 | Stop reason: SDUPTHER

## 2018-07-02 DIAGNOSIS — D50.9 IRON DEFICIENCY ANEMIA, UNSPECIFIED IRON DEFICIENCY ANEMIA TYPE: ICD-10-CM

## 2018-07-02 DIAGNOSIS — K21.9 GASTROESOPHAGEAL REFLUX DISEASE WITHOUT ESOPHAGITIS: ICD-10-CM

## 2018-07-03 RX ORDER — RANITIDINE 150 MG/1
TABLET ORAL
Qty: 60 TABLET | Refills: 5 | Status: SHIPPED | OUTPATIENT
Start: 2018-07-03 | End: 2018-11-26

## 2018-07-24 ENCOUNTER — HOSPITAL ENCOUNTER (EMERGENCY)
Age: 83
Discharge: HOME | End: 2018-07-24
Attending: EMERGENCY MEDICINE
Payer: MEDICARE

## 2018-07-24 VITALS
SYSTOLIC BLOOD PRESSURE: 170 MMHG | DIASTOLIC BLOOD PRESSURE: 69 MMHG | BODY MASS INDEX: 31.89 KG/M2 | OXYGEN SATURATION: 97 % | TEMPERATURE: 97.7 F | HEIGHT: 63 IN | RESPIRATION RATE: 20 BRPM | HEART RATE: 71 BPM | WEIGHT: 180 LBS

## 2018-07-24 DIAGNOSIS — K52.9 GASTROENTERITIS: Primary | ICD-10-CM

## 2018-07-24 LAB
-: ABNORMAL
ABSOLUTE EOS #: 0.2 K/UL (ref 0–0.4)
ABSOLUTE IMMATURE GRANULOCYTE: NORMAL K/UL (ref 0–0.3)
ABSOLUTE LYMPH #: 1.4 K/UL (ref 1–4.8)
ABSOLUTE MONO #: 0.3 K/UL (ref 0.2–0.8)
AMORPHOUS: ABNORMAL
ANION GAP SERPL CALCULATED.3IONS-SCNC: 12 MMOL/L (ref 9–17)
BACTERIA: ABNORMAL
BASOPHILS # BLD: 0 % (ref 0–2)
BASOPHILS ABSOLUTE: 0 K/UL (ref 0–0.2)
BILIRUBIN URINE: NEGATIVE
BUN BLDV-MCNC: 14 MG/DL (ref 8–23)
BUN/CREAT BLD: 17 (ref 9–20)
CALCIUM SERPL-MCNC: 9.1 MG/DL (ref 8.6–10.4)
CASTS UA: ABNORMAL /LPF
CHLORIDE BLD-SCNC: 108 MMOL/L (ref 98–107)
CO2: 23 MMOL/L (ref 20–31)
COLOR: YELLOW
COMMENT UA: ABNORMAL
CREAT SERPL-MCNC: 0.81 MG/DL (ref 0.5–0.9)
CRYSTALS, UA: ABNORMAL /HPF
DIFFERENTIAL TYPE: NORMAL
EOSINOPHILS RELATIVE PERCENT: 3 % (ref 1–4)
EPITHELIAL CELLS UA: ABNORMAL /HPF (ref 0–5)
GFR AFRICAN AMERICAN: >60 ML/MIN
GFR NON-AFRICAN AMERICAN: >60 ML/MIN
GFR SERPL CREATININE-BSD FRML MDRD: ABNORMAL ML/MIN/{1.73_M2}
GFR SERPL CREATININE-BSD FRML MDRD: ABNORMAL ML/MIN/{1.73_M2}
GLUCOSE BLD-MCNC: 118 MG/DL (ref 70–99)
GLUCOSE URINE: NEGATIVE
HCT VFR BLD CALC: 42.6 % (ref 36–46)
HEMOGLOBIN: 14.2 G/DL (ref 12–16)
IMMATURE GRANULOCYTES: NORMAL %
KETONES, URINE: NEGATIVE
LEUKOCYTE ESTERASE, URINE: NEGATIVE
LYMPHOCYTES # BLD: 30 % (ref 24–44)
MAGNESIUM: 1.9 MG/DL (ref 1.6–2.6)
MCH RBC QN AUTO: 33.3 PG (ref 26–34)
MCHC RBC AUTO-ENTMCNC: 33.4 G/DL (ref 31–37)
MCV RBC AUTO: 99.7 FL (ref 80–100)
MONOCYTES # BLD: 5 % (ref 1–7)
MUCUS: ABNORMAL
NITRITE, URINE: NEGATIVE
NRBC AUTOMATED: NORMAL PER 100 WBC
OTHER OBSERVATIONS UA: ABNORMAL
PDW BLD-RTO: 14.5 % (ref 11.5–14.5)
PH UA: 5 (ref 5–8)
PLATELET # BLD: 164 K/UL (ref 130–400)
PLATELET ESTIMATE: NORMAL
PMV BLD AUTO: 7.4 FL (ref 6–12)
POTASSIUM SERPL-SCNC: 4.1 MMOL/L (ref 3.7–5.3)
PROTEIN UA: NEGATIVE
RBC # BLD: 4.27 M/UL (ref 4–5.2)
RBC # BLD: NORMAL 10*6/UL
RBC UA: ABNORMAL /HPF (ref 0–2)
RENAL EPITHELIAL, UA: ABNORMAL /HPF
SEG NEUTROPHILS: 62 % (ref 36–66)
SEGMENTED NEUTROPHILS ABSOLUTE COUNT: 2.8 K/UL (ref 1.8–7.7)
SODIUM BLD-SCNC: 143 MMOL/L (ref 135–144)
SPECIFIC GRAVITY UA: 1.02 (ref 1–1.03)
TRICHOMONAS: ABNORMAL
TURBIDITY: ABNORMAL
URINE HGB: ABNORMAL
UROBILINOGEN, URINE: NORMAL
WBC # BLD: 4.7 K/UL (ref 3.5–11)
WBC # BLD: NORMAL 10*3/UL
WBC UA: ABNORMAL /HPF (ref 0–5)
YEAST: ABNORMAL

## 2018-07-24 PROCEDURE — 87086 URINE CULTURE/COLONY COUNT: CPT

## 2018-07-24 PROCEDURE — 99284 EMERGENCY DEPT VISIT MOD MDM: CPT

## 2018-07-24 PROCEDURE — 83735 ASSAY OF MAGNESIUM: CPT

## 2018-07-24 PROCEDURE — 85025 COMPLETE CBC W/AUTO DIFF WBC: CPT

## 2018-07-24 PROCEDURE — 81001 URINALYSIS AUTO W/SCOPE: CPT

## 2018-07-24 PROCEDURE — 80048 BASIC METABOLIC PNL TOTAL CA: CPT

## 2018-07-24 RX ORDER — ONDANSETRON 4 MG/1
4 TABLET, ORALLY DISINTEGRATING ORAL EVERY 8 HOURS PRN
Qty: 15 TABLET | Refills: 0 | Status: ON HOLD | OUTPATIENT
Start: 2018-07-24 | End: 2020-01-21 | Stop reason: ALTCHOICE

## 2018-07-24 ASSESSMENT — ENCOUNTER SYMPTOMS
CONSTIPATION: 0
ABDOMINAL PAIN: 0
COUGH: 0
VOMITING: 1
SHORTNESS OF BREATH: 0
COLOR CHANGE: 0
NAUSEA: 1
DIARRHEA: 1
SORE THROAT: 0
RHINORRHEA: 0
SINUS PRESSURE: 0
WHEEZING: 0

## 2018-07-24 NOTE — ED PROVIDER NOTES
DAWHistorical Med      clotrimazole-betamethasone (LOTRISONE) 1-0.05 % cream Apply topically 2 times daily. , Disp-45 g, R-1, Normal      lisinopril (PRINIVIL;ZESTRIL) 20 MG tablet Take 1 tablet by mouth daily, Disp-90 tablet, R-2Normal      ferrous sulfate (FE TABS) 325 (65 FE) MG EC tablet Take 1 tablet by mouth daily (with breakfast), Disp-30 tablet, R-3Normal      amiodarone (CORDARONE) 200 MG tablet Historical Med      OXYGEN Inhale into the lungs Uses at night and prn             PAST MEDICAL HISTORY         Diagnosis Date    A-fib [facility])     AAA (abdominal aortic aneurysm) (HCC)     Abdominal cramping     Atrial fibrillation (HCC)     Back pain, chronic     C. difficile colitis     COPD (chronic obstructive pulmonary disease) (HCC)     Diabetes mellitus (HCC)     Gastritis     GERD (gastroesophageal reflux disease)     Hiatal hernia     Hypoalbuminemia 10/14/2014    IBS (irritable bowel syndrome)     Nausea vomiting and diarrhea 10/14/2014    Stomach ulcer     Thyroid disease     Type 2 diabetes mellitus without complication, without long-term current use of insulin ([facility] [facility] 1/24/2018    UTI (lower urinary tract infection)     Varicose veins        SURGICAL HISTORY           Procedure Laterality Date    CHOLECYSTECTOMY      HERNIA REPAIR      HYSTERECTOMY      partial    VT EGD TRANSORAL BIOPSY SINGLE/MULTIPLE N/A 6/20/2017    EGD BIOPSY performed by Pablo Christine MD at [address] [facility]  06/20/2017    MILD CHRONIC INACTIVE GASTRITIS    VASCULAR SURGERY      varicose veins    VEIN SURGERY           FAMILY HISTORY       Family History   Problem Relation Age of Onset    Family history unknown: Yes     Family Status   Relation Status    Mother Alive    Father Alive        SOCIAL HISTORY      reports that she has quit smoking. She has never used smokeless tobacco. She reports that she drinks about 1.2 oz of alcohol per week .  She reports that she does not use

## 2018-07-25 LAB
CULTURE: NORMAL
Lab: NORMAL
SPECIMEN DESCRIPTION: NORMAL
STATUS: NORMAL

## 2018-09-26 ENCOUNTER — HOSPITAL ENCOUNTER (OUTPATIENT)
Facility: MEDICAL CENTER | Age: 83
End: 2018-09-26
Payer: MEDICARE

## 2018-09-26 ENCOUNTER — HOSPITAL ENCOUNTER (OUTPATIENT)
Facility: MEDICAL CENTER | Age: 83
Discharge: HOME | End: 2018-09-26
Payer: MEDICARE

## 2018-09-26 DIAGNOSIS — D50.9 IRON DEFICIENCY ANEMIA, UNSPECIFIED IRON DEFICIENCY ANEMIA TYPE: ICD-10-CM

## 2018-09-26 LAB
ABSOLUTE EOS #: 0.1 K/UL (ref 0–0.4)
ABSOLUTE IMMATURE GRANULOCYTE: NORMAL K/UL (ref 0–0.3)
ABSOLUTE LYMPH #: 1.8 K/UL (ref 1–4.8)
ABSOLUTE MONO #: 0.4 K/UL (ref 0.2–0.8)
BASOPHILS # BLD: 0 % (ref 0–2)
BASOPHILS ABSOLUTE: 0 K/UL (ref 0–0.2)
DIFFERENTIAL TYPE: NORMAL
EOSINOPHILS RELATIVE PERCENT: 2 % (ref 1–4)
FERRITIN: 48 UG/L (ref 13–150)
FOLATE: 9.5 NG/ML
HCT VFR BLD CALC: 41.3 % (ref 36–46)
HEMOGLOBIN: 14.2 G/DL (ref 12–16)
IMMATURE GRANULOCYTES: NORMAL %
IRON SATURATION: 36 % (ref 20–55)
IRON: 94 UG/DL (ref 37–145)
LYMPHOCYTES # BLD: 28 % (ref 24–44)
MCH RBC QN AUTO: 33.5 PG (ref 26–34)
MCHC RBC AUTO-ENTMCNC: 34.4 G/DL (ref 31–37)
MCV RBC AUTO: 97.2 FL (ref 80–100)
MONOCYTES # BLD: 7 % (ref 1–7)
NRBC AUTOMATED: NORMAL PER 100 WBC
PDW BLD-RTO: 14.2 % (ref 11.5–14.5)
PLATELET # BLD: 194 K/UL (ref 130–400)
PLATELET ESTIMATE: NORMAL
PMV BLD AUTO: 7 FL (ref 6–12)
RBC # BLD: 4.25 M/UL (ref 4–5.2)
RBC # BLD: NORMAL 10*6/UL
SEG NEUTROPHILS: 63 % (ref 36–66)
SEGMENTED NEUTROPHILS ABSOLUTE COUNT: 4 K/UL (ref 1.8–7.7)
TOTAL IRON BINDING CAPACITY: 263 UG/DL (ref 250–450)
UNSATURATED IRON BINDING CAPACITY: 169 UG/DL (ref 112–347)
VITAMIN B-12: >2000 PG/ML (ref 232–1245)
WBC # BLD: 6.3 K/UL (ref 3.5–11)
WBC # BLD: NORMAL 10*3/UL

## 2018-09-26 PROCEDURE — 82607 VITAMIN B-12: CPT

## 2018-09-26 PROCEDURE — 83540 ASSAY OF IRON: CPT

## 2018-09-26 PROCEDURE — 82746 ASSAY OF FOLIC ACID SERUM: CPT

## 2018-09-26 PROCEDURE — 83550 IRON BINDING TEST: CPT

## 2018-09-26 PROCEDURE — 85025 COMPLETE CBC W/AUTO DIFF WBC: CPT

## 2018-09-26 PROCEDURE — 82728 ASSAY OF FERRITIN: CPT

## 2018-09-26 PROCEDURE — 36415 COLL VENOUS BLD VENIPUNCTURE: CPT

## 2018-09-28 RX ORDER — POTASSIUM CHLORIDE 750 MG/1
TABLET, FILM COATED, EXTENDED RELEASE ORAL
Qty: 60 TABLET | Refills: 1 | Status: ON HOLD | OUTPATIENT
Start: 2018-09-28 | End: 2022-04-14 | Stop reason: ALTCHOICE

## 2018-10-02 ENCOUNTER — TELEPHONE (OUTPATIENT)
Dept: ONCOLOGY | Age: 83
End: 2018-10-02

## 2018-10-02 ENCOUNTER — OFFICE VISIT (OUTPATIENT)
Dept: ONCOLOGY | Age: 83
End: 2018-10-02
Payer: MEDICARE

## 2018-10-02 VITALS
DIASTOLIC BLOOD PRESSURE: 62 MMHG | HEART RATE: 56 BPM | RESPIRATION RATE: 20 BRPM | WEIGHT: 169.2 LBS | BODY MASS INDEX: 29.97 KG/M2 | TEMPERATURE: 97.8 F | SYSTOLIC BLOOD PRESSURE: 131 MMHG

## 2018-10-02 DIAGNOSIS — K29.50 OTHER CHRONIC GASTRITIS WITHOUT HEMORRHAGE: ICD-10-CM

## 2018-10-02 DIAGNOSIS — D50.9 IRON DEFICIENCY ANEMIA, UNSPECIFIED IRON DEFICIENCY ANEMIA TYPE: Primary | ICD-10-CM

## 2018-10-02 PROCEDURE — 99211 OFF/OP EST MAY X REQ PHY/QHP: CPT | Performed by: INTERNAL MEDICINE

## 2018-10-02 PROCEDURE — 99214 OFFICE O/P EST MOD 30 MIN: CPT | Performed by: INTERNAL MEDICINE

## 2018-10-02 PROCEDURE — G8427 DOCREV CUR MEDS BY ELIG CLIN: HCPCS | Performed by: INTERNAL MEDICINE

## 2018-10-02 PROCEDURE — 1036F TOBACCO NON-USER: CPT | Performed by: INTERNAL MEDICINE

## 2018-10-02 PROCEDURE — G8484 FLU IMMUNIZE NO ADMIN: HCPCS | Performed by: INTERNAL MEDICINE

## 2018-10-02 PROCEDURE — G8417 CALC BMI ABV UP PARAM F/U: HCPCS | Performed by: INTERNAL MEDICINE

## 2018-10-02 PROCEDURE — 1123F ACP DISCUSS/DSCN MKR DOCD: CPT | Performed by: INTERNAL MEDICINE

## 2018-10-02 PROCEDURE — 4040F PNEUMOC VAC/ADMIN/RCVD: CPT | Performed by: INTERNAL MEDICINE

## 2018-10-02 PROCEDURE — 1090F PRES/ABSN URINE INCON ASSESS: CPT | Performed by: INTERNAL MEDICINE

## 2018-10-02 PROCEDURE — 1101F PT FALLS ASSESS-DOCD LE1/YR: CPT | Performed by: INTERNAL MEDICINE

## 2018-10-02 RX ORDER — LISINOPRIL 20 MG/1
20 TABLET ORAL DAILY
Qty: 30 TABLET | Refills: 3 | Status: SHIPPED | OUTPATIENT
Start: 2018-10-02 | End: 2018-11-26

## 2018-10-02 NOTE — PROGRESS NOTES
fibrillation ([facility] [facility] Back pain, chronic; C. difficile colitis; COPD (chronic obstructive pulmonary disease) ([facility] [address] [facility] Diabetes mellitus ([facility] [address] Gastritis; GERD (gastroesophageal reflux disease); Hiatal hernia; Hypoalbuminemia; IBS (irritable bowel syndrome); Nausea vomiting and diarrhea; Stomach ulcer; Thyroid disease; Type 2 diabetes mellitus without complication, without long-term current use of insulin ([facility] [facility] UTI (lower urinary tract infection); and Varicose veins. PAST SURGICAL HISTORY: has a past surgical history that includes Cholecystectomy; vascular surgery; Vein Surgery; Hysterectomy; hernia repair; Upper gastrointestinal endoscopy (06/20/2017); and pr egd transoral biopsy single/multiple (N/A, 6/20/2017). CURRENT MEDICATIONS:  has a current medication list which includes the following prescription(s): potassium chloride, ondansetron, ranitidine, levothyroxine, furosemide, pantoprazole, clobetasol, nyamyc, clotrimazole-betamethasone, lisinopril, ferrous sulfate, amiodarone, and OXYGEN. ALLERGIES:  is allergic to penicillins. FAMILY HISTORY: Negative for any hematological or oncological conditions. SOCIAL HISTORY:  reports that she has quit smoking. She has never used smokeless tobacco. She reports that she drinks about 1.2 oz of alcohol per week . She reports that she does not use drugs. REVIEW OF SYSTEMS:     · General: She has no weakness or fatigue. No unanticipated weight loss or decreased appetite. No fever or chills. · Eyes: No blurred vision, eye pain or double vision. · Ears: No hearing problems or drainage. No tinnitus. · Throat: No sore throat, problems with swallowing or dysphagia. · Respiratory: No cough, sputum or hemoptysis. Mild shortness of breath on exertion. No pleuritic chest pain. · Cardiovascular: No chest pain, orthopnea or PND. No lower extremity edema. No palpitation. · Gastrointestinal: No problems with swallowing. No abdominal pain or bloating.  No nausea or vomiting. No diarrhea or constipation. No GI bleeding. · Genitourinary: No dysuria, hematuria, frequency or urgency. · Musculoskeletal: No muscle aches or pains. No limitation of movement. No back pain. No gait disturbance, No joint complaints. · Dermatologic: No skin rashes or pruritus. No skin lesions or discolorations. · Psychiatric: No depression, anxiety, or stress or signs of schizophrenia. No change in mood or affect. · Hematologic: No history of bleeding tendency. No bruises or ecchymosis. No history of clotting problems. · Infectious disease: No fever, chills or frequent infections. · Endocrine: No problems with opacity. No polydipsia or polyuria. No temperature intolerance. · Neurologic: No headaches or dizziness. No weakness or numbness of the extremities. No changes in balance, coordination,  memory, mentation, behavior. · Allergic/Immunologic: No nasal congestion or hives. No repeated infections. PHYSICAL EXAM:  The patient is not in acute distress. Elderly lady who is using oxygen and needed assistance of movement. Vital signs: Blood pressure 131/62, pulse 56, temperature 97.8 °F (36.6 °C), temperature source Oral, resp. rate 20, weight 169 lb 3.2 oz (76.7 kg), currently breastfeeding. HEENT:  Eyes are normal. Ears, nose and throat are normal.  Neck: Supple. No lymph node enlargement. No thyroid enlargement. Trachea is centrally located. Chest:  Clear to auscultation. No wheezes or crepitations. Heart: Regular sinus rhythm. Abdomen: Soft, nontender. No hepatosplenomegaly. No masses. Extremities:  With no edema. Lymph Nodes:  No cervical, axillary or inguinal lymph node enlargement. Neurologic:  Conscious and oriented. No focal neurological deficits. Psychosocial: No depression, anxiety or stress. Skin: No rashes, bruises or ecchymoses.       Review of Diagnostic data:   Lab Results   Component Value Date    WBC 6.3 09/26/2018    HGB 14.2 09/26/2018    HCT

## 2018-11-13 ENCOUNTER — HOSPITAL ENCOUNTER (OUTPATIENT)
Age: 83
Setting detail: SPECIMEN
Discharge: HOME | End: 2018-11-13
Payer: MEDICARE

## 2018-11-13 LAB
ALBUMIN SERPL-MCNC: 4 G/DL (ref 3.5–5.2)
ALBUMIN/GLOBULIN RATIO: 1.4 (ref 1–2.5)
ALP BLD-CCNC: 77 U/L (ref 35–104)
ALT SERPL-CCNC: 9 U/L (ref 5–33)
ANION GAP SERPL CALCULATED.3IONS-SCNC: 12 MMOL/L (ref 9–17)
AST SERPL-CCNC: 15 U/L
BILIRUB SERPL-MCNC: 0.85 MG/DL (ref 0.3–1.2)
BNP INTERPRETATION: NORMAL
BUN BLDV-MCNC: 24 MG/DL (ref 8–23)
BUN/CREAT BLD: ABNORMAL (ref 9–20)
CALCIUM SERPL-MCNC: 9.3 MG/DL (ref 8.6–10.4)
CHLORIDE BLD-SCNC: 105 MMOL/L (ref 98–107)
CO2: 25 MMOL/L (ref 20–31)
CREAT SERPL-MCNC: 0.94 MG/DL (ref 0.5–0.9)
GFR AFRICAN AMERICAN: >60 ML/MIN
GFR NON-AFRICAN AMERICAN: 56 ML/MIN
GFR SERPL CREATININE-BSD FRML MDRD: ABNORMAL ML/MIN/{1.73_M2}
GFR SERPL CREATININE-BSD FRML MDRD: ABNORMAL ML/MIN/{1.73_M2}
GLUCOSE BLD-MCNC: 132 MG/DL (ref 70–99)
HCT VFR BLD CALC: 45.8 % (ref 36.3–47.1)
HEMOGLOBIN: 14.7 G/DL (ref 11.9–15.1)
IRON SATURATION: 37 % (ref 20–55)
IRON: 107 UG/DL (ref 37–145)
MCH RBC QN AUTO: 32.7 PG (ref 25.2–33.5)
MCHC RBC AUTO-ENTMCNC: 32.1 G/DL (ref 28.4–34.8)
MCV RBC AUTO: 101.8 FL (ref 82.6–102.9)
NRBC AUTOMATED: 0 PER 100 WBC
PDW BLD-RTO: 13.2 % (ref 11.8–14.4)
PLATELET # BLD: 190 K/UL (ref 138–453)
PMV BLD AUTO: 10.7 FL (ref 8.1–13.5)
POTASSIUM SERPL-SCNC: 4.8 MMOL/L (ref 3.7–5.3)
PRO-BNP: 105 PG/ML
RBC # BLD: 4.5 M/UL (ref 3.95–5.11)
SODIUM BLD-SCNC: 142 MMOL/L (ref 135–144)
THYROXINE, FREE: 1.53 NG/DL (ref 0.93–1.7)
TOTAL IRON BINDING CAPACITY: 291 UG/DL (ref 250–450)
TOTAL PROTEIN: 6.8 G/DL (ref 6.4–8.3)
TSH SERPL DL<=0.05 MIU/L-ACNC: 2.67 MIU/L (ref 0.3–5)
UNSATURATED IRON BINDING CAPACITY: 184 UG/DL (ref 112–347)
VITAMIN B-12: 1012 PG/ML (ref 232–1245)
WBC # BLD: 6.4 K/UL (ref 3.5–11.3)

## 2018-11-17 ENCOUNTER — HOSPITAL ENCOUNTER (EMERGENCY)
Age: 83
Discharge: HOME | End: 2018-11-17
Attending: EMERGENCY MEDICINE
Payer: MEDICARE

## 2018-11-17 ENCOUNTER — APPOINTMENT (OUTPATIENT)
Dept: GENERAL RADIOLOGY | Age: 83
End: 2018-11-17
Payer: MEDICARE

## 2018-11-17 VITALS
HEART RATE: 59 BPM | RESPIRATION RATE: 16 BRPM | SYSTOLIC BLOOD PRESSURE: 160 MMHG | TEMPERATURE: 97.6 F | OXYGEN SATURATION: 97 % | DIASTOLIC BLOOD PRESSURE: 65 MMHG

## 2018-11-17 DIAGNOSIS — S30.0XXA CONTUSION OF COCCYX, INITIAL ENCOUNTER: ICD-10-CM
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
## 2018-11-17 PROCEDURE — 72100 X-RAY EXAM L-S SPINE 2/3 VWS: CPT

## 2018-11-17 PROCEDURE — 99283 EMERGENCY DEPT VISIT LOW MDM: CPT

## 2018-11-17 PROCEDURE — 72220 X-RAY EXAM SACRUM TAILBONE: CPT

## 2018-11-17 PROCEDURE — 6370000000 HC RX 637 (ALT 250 FOR IP): Performed by: NURSE PRACTITIONER

## 2018-11-17 RX ORDER — ACETAMINOPHEN 325 MG/1
650 TABLET ORAL ONCE
Status: COMPLETED | OUTPATIENT
Start: 2018-11-17 | End: 2018-11-17

## 2018-11-17 RX ORDER — TRAMADOL HYDROCHLORIDE 50 MG/1
50 TABLET ORAL NIGHTLY PRN
Qty: 7 TABLET | Refills: 0 | Status: SHIPPED | OUTPATIENT
Start: 2018-11-17 | End: 2018-11-24

## 2018-11-17 RX ADMIN — ACETAMINOPHEN 650 MG: 325 TABLET ORAL at 15:21

## 2018-11-17 ASSESSMENT — PAIN SCALES - GENERAL: PAINLEVEL_OUTOF10: 8

## 2018-11-17 ASSESSMENT — ENCOUNTER SYMPTOMS
ABDOMINAL PAIN: 0
SHORTNESS OF BREATH: 0
COLOR CHANGE: 0
BACK PAIN: 1
COUGH: 0

## 2018-11-17 NOTE — ED PROVIDER NOTES
[facility] ED  eMERGENCY dEPARTMENT eNCOUnter      Pt Name: Eliu Rose  MRN: [id number]  [place] 8/2/1925  Date of evaluation: 11/17/2018  Provider: PERCY Cruz CNP    CHIEF COMPLAINT       Chief Complaint   Patient presents with    Fall     tailbone pain         HISTORY OF PRESENT ILLNESS  (Location/Symptom, Timing/Onset, Context/Setting, Quality, Duration, Modifying Factors, Severity.)   Eliu Rose is a 80 y.o. female who presents to the emergency department via EMS for pain to her lower back, coccyx area s/p fall last night. [facility] at [facility]. She fell backwards onto her buttocks. Denies hitting her head and LOC. She was helped up by staff. Rates her pain  4/10 at this time. Denies urinary symptoms, N/T to her extremities, change in bowel and/or bladder control. Nursing Notes were reviewed.     ALLERGIES     Penicillins    CURRENT MEDICATIONS       Discharge Medication List as of 11/17/2018  3:36 PM      CONTINUE these medications which have NOT CHANGED    Details   !! lisinopril (PRINIVIL;ZESTRIL) 20 MG tablet Take 1 tablet by mouth daily, Disp-30 tablet, R-3Print      potassium chloride (KLOR-CON) 10 MEQ extended release tablet TAKE ONE TABLET BY MOUTH TWICE A DAY, Disp-60 tablet, R-1Print      ondansetron (ZOFRAN ODT) 4 MG disintegrating tablet Take 1 tablet by mouth every 8 hours as needed for Nausea, Disp-15 tablet, R-0Print      ranitidine (ZANTAC) 150 MG tablet TAKE ONE TABLET BY MOUTH TWICE A DAY, Disp-60 tablet, R-5Normal      levothyroxine (SYNTHROID) 125 MCG tablet TAKE ONE TABLET BY MOUTH DAILY, Disp-30 tablet, R-6Normal      furosemide (LASIX) 20 MG tablet Take 1 tablet by mouth daily, Disp-90 tablet, R-2Normal      pantoprazole (PROTONIX) 40 MG tablet TAKE ONE TABLET BY MOUTH DAILY, Disp-30 tablet, R-6Normal      clobetasol (OLUX) 0.05 % foam Apply topically 2 times daily for 7 days if needed, Disp-50 g, R-0, Normal      NYAMYC 083111 UNIT/GM powder drinks about 1.2 oz of alcohol per week . She reports that she does not use drugs. REVIEW OF SYSTEMS    (2-9 systems for level 4, 10 or more for level 5)     Review of Systems   Constitutional: Negative for chills, diaphoresis, fatigue and fever. Respiratory: Negative for cough and shortness of breath. Cardiovascular: Negative for chest pain. Gastrointestinal: Negative for abdominal pain. Genitourinary: Negative for difficulty urinating and dysuria. Musculoskeletal: Positive for arthralgias, back pain and myalgias. Negative for neck pain. Skin: Negative for color change, rash and wound. Neurological: Negative for dizziness, syncope, facial asymmetry, speech difficulty, weakness, light-headedness, numbness and headaches. Psychiatric/Behavioral: Negative for confusion. Except as noted above the remainder of the review of systems was reviewed and negative. PHYSICAL EXAM    (up to 7 for level 4, 8 or more for level 5)     ED Triage Vitals [11/17/18 1333]   BP Temp Temp Source Pulse Resp SpO2 Height Weight   (!) 160/65 97.6 °F (36.4 °C) Oral 59 16 97 % -- --     Physical Exam   Constitutional: She is oriented to person, place, and time. She appears well-developed and well-nourished. No distress. Eyes: Pupils are equal, round, and reactive to light. Conjunctivae and EOM are normal.   Cardiovascular: Normal rate, regular rhythm and normal heart sounds. Pulmonary/Chest: Effort normal and breath sounds normal. No respiratory distress. She has no wheezes. She has no rales. Musculoskeletal:        Cervical back: She exhibits no tenderness, no bony tenderness and no pain. Thoracic back: She exhibits no tenderness, no bony tenderness and no pain. Lumbar back: She exhibits tenderness, bony tenderness and pain. She exhibits no swelling and no deformity. Neurological: She is alert and oriented to person, place, and time. She has normal strength. No cranial nerve deficit. Coordination normal. GCS eye subscore is 4. GCS verbal subscore is 5. GCS motor subscore is 6. Skin: Skin is warm and dry. No rash noted. She is not diaphoretic. Psychiatric: She has a normal mood and affect. Her behavior is normal.   Vitals reviewed. DIAGNOSTIC RESULTS     RADIOLOGY:   Non-plain film images such as CT, Ultrasound and MRI are read by the radiologist. Plain radiographic images are visualized and preliminarily interpreted by the emergency physician with the below findings:    Interpretation per the Radiologist below, if available at the time of this note:    Xr Lumbar Spine (2-3 Views)    Result Date: 11/17/2018  EXAMINATION: 3 XRAY VIEWS OF THE LUMBAR SPINE 11/17/2018 2:39 pm COMPARISON: None. HISTORY: ORDERING SYSTEM PROVIDED HISTORY: pain, fall TECHNOLOGIST PROVIDED HISTORY: pain, fall Ordering Physician Provided Reason for Exam: low back pain fell today leg swelling Acuity: Unknown Type of Exam: Unknown FINDINGS: The vertebral bodies are normal in height and alignment. There is diffuse demineralization. There is moderate spondylosis. There is vacuum disc phenomenon at L3-4. Spondylosis and degenerative disc disease     Xr Sacrum Coccyx (min 2 Views)    Result Date: 11/17/2018  EXAMINATION: 3 XRAY VIEWS OF THE SACRUM/COCCYX 11/17/2018 2:39 pm COMPARISON: None. HISTORY: ORDERING SYSTEM PROVIDED HISTORY: pain, fall TECHNOLOGIST PROVIDED HISTORY: pain, fall Ordering Physician Provided Reason for Exam: low back pain fell today Acuity: Unknown Type of Exam: Unknown FINDINGS: There is diffuse demineralization. Visualized pelvic ring and sacrum appears intact. Sensitivity is limited however. Sacroiliac joints are normal.  Hips are in anatomic alignment.      Severe demineralization decreases sensitivity for fracture otherwise negative     EMERGENCY DEPARTMENT COURSE and DIFFERENTIAL DIAGNOSIS/MDM:   Vitals:    Vitals:    11/17/18 1333   BP: (!) 160/65   Pulse: 59   Resp: 16   Temp: 97.6

## 2018-11-26 ENCOUNTER — HOSPITAL ENCOUNTER (EMERGENCY)
Age: 83
Discharge: HOME | End: 2018-11-26
Attending: EMERGENCY MEDICINE
Payer: MEDICARE

## 2018-11-26 ENCOUNTER — APPOINTMENT (OUTPATIENT)
Dept: GENERAL RADIOLOGY | Age: 83
End: 2018-11-26
Payer: MEDICARE

## 2018-11-26 VITALS
HEIGHT: 63 IN | TEMPERATURE: 98.1 F | WEIGHT: 176 LBS | DIASTOLIC BLOOD PRESSURE: 77 MMHG | SYSTOLIC BLOOD PRESSURE: 119 MMHG | BODY MASS INDEX: 31.18 KG/M2 | RESPIRATION RATE: 17 BRPM | OXYGEN SATURATION: 95 % | HEART RATE: 59 BPM

## 2018-11-26 DIAGNOSIS — R19.7 DIARRHEA, UNSPECIFIED TYPE: Primary | ICD-10-CM

## 2018-11-26 LAB
ABSOLUTE EOS #: 0.1 K/UL (ref 0–0.4)
ABSOLUTE IMMATURE GRANULOCYTE: ABNORMAL K/UL (ref 0–0.3)
ABSOLUTE LYMPH #: 1.3 K/UL (ref 1–4.8)
ABSOLUTE MONO #: 0.4 K/UL (ref 0.2–0.8)
ANION GAP SERPL CALCULATED.3IONS-SCNC: 15 MMOL/L (ref 9–17)
BASOPHILS # BLD: 0 % (ref 0–2)
BASOPHILS ABSOLUTE: 0 K/UL (ref 0–0.2)
BUN BLDV-MCNC: 21 MG/DL (ref 8–23)
BUN/CREAT BLD: 23 (ref 9–20)
CALCIUM SERPL-MCNC: 9.2 MG/DL (ref 8.6–10.4)
CHLORIDE BLD-SCNC: 103 MMOL/L (ref 98–107)
CO2: 23 MMOL/L (ref 20–31)
CREAT SERPL-MCNC: 0.93 MG/DL (ref 0.5–0.9)
DIFFERENTIAL TYPE: ABNORMAL
EKG ATRIAL RATE: 74 BPM
EKG P AXIS: 23 DEGREES
EKG P-R INTERVAL: 234 MS
EKG Q-T INTERVAL: 486 MS
EKG QRS DURATION: 76 MS
EKG QTC CALCULATION (BAZETT): 539 MS
EKG R AXIS: -45 DEGREES
EKG T AXIS: 19 DEGREES
EKG VENTRICULAR RATE: 74 BPM
EOSINOPHILS RELATIVE PERCENT: 1 % (ref 1–4)
GFR AFRICAN AMERICAN: >60 ML/MIN
GFR NON-AFRICAN AMERICAN: 56 ML/MIN
GFR SERPL CREATININE-BSD FRML MDRD: ABNORMAL ML/MIN/{1.73_M2}
GFR SERPL CREATININE-BSD FRML MDRD: ABNORMAL ML/MIN/{1.73_M2}
GLUCOSE BLD-MCNC: 135 MG/DL (ref 70–99)
HCT VFR BLD CALC: 40.9 % (ref 36–46)
HEMOGLOBIN: 14.3 G/DL (ref 12–16)
IMMATURE GRANULOCYTES: ABNORMAL %
LYMPHOCYTES # BLD: 19 % (ref 24–44)
MCH RBC QN AUTO: 33.9 PG (ref 26–34)
MCHC RBC AUTO-ENTMCNC: 34.8 G/DL (ref 31–37)
MCV RBC AUTO: 97.2 FL (ref 80–100)
MONOCYTES # BLD: 6 % (ref 1–7)
NRBC AUTOMATED: ABNORMAL PER 100 WBC
PDW BLD-RTO: 13.4 % (ref 11.5–14.5)
PLATELET # BLD: 168 K/UL (ref 130–400)
PLATELET ESTIMATE: ABNORMAL
PMV BLD AUTO: 7.4 FL (ref 6–12)
POTASSIUM SERPL-SCNC: 3.9 MMOL/L (ref 3.7–5.3)
RBC # BLD: 4.21 M/UL (ref 4–5.2)
RBC # BLD: ABNORMAL 10*6/UL
SEG NEUTROPHILS: 74 % (ref 36–66)
SEGMENTED NEUTROPHILS ABSOLUTE COUNT: 4.9 K/UL (ref 1.8–7.7)
SODIUM BLD-SCNC: 141 MMOL/L (ref 135–144)
WBC # BLD: 6.7 K/UL (ref 3.5–11)
WBC # BLD: ABNORMAL 10*3/UL

## 2018-11-26 PROCEDURE — 96361 HYDRATE IV INFUSION ADD-ON: CPT

## 2018-11-26 PROCEDURE — 99284 EMERGENCY DEPT VISIT MOD MDM: CPT

## 2018-11-26 PROCEDURE — 71045 X-RAY EXAM CHEST 1 VIEW: CPT

## 2018-11-26 PROCEDURE — 93005 ELECTROCARDIOGRAM TRACING: CPT

## 2018-11-26 PROCEDURE — 96374 THER/PROPH/DIAG INJ IV PUSH: CPT

## 2018-11-26 PROCEDURE — 6360000002 HC RX W HCPCS: Performed by: EMERGENCY MEDICINE

## 2018-11-26 PROCEDURE — 85025 COMPLETE CBC W/AUTO DIFF WBC: CPT

## 2018-11-26 PROCEDURE — 80048 BASIC METABOLIC PNL TOTAL CA: CPT

## 2018-11-26 PROCEDURE — 2580000003 HC RX 258: Performed by: EMERGENCY MEDICINE

## 2018-11-26 RX ORDER — ONDANSETRON 2 MG/ML
4 INJECTION INTRAMUSCULAR; INTRAVENOUS ONCE
Status: COMPLETED | OUTPATIENT
Start: 2018-11-26 | End: 2018-11-26

## 2018-11-26 RX ORDER — FAMOTIDINE 20 MG/1
20 TABLET, FILM COATED ORAL 2 TIMES DAILY
COMMUNITY
End: 2019-04-02 | Stop reason: ALTCHOICE

## 2018-11-26 RX ORDER — SODIUM CHLORIDE 9 MG/ML
INJECTION, SOLUTION INTRAVENOUS CONTINUOUS
Status: DISCONTINUED | OUTPATIENT
Start: 2018-11-26 | End: 2018-11-26 | Stop reason: HOSPADM

## 2018-11-26 RX ADMIN — SODIUM CHLORIDE: 9 INJECTION, SOLUTION INTRAVENOUS at 13:35

## 2018-11-26 RX ADMIN — ONDANSETRON 4 MG: 2 INJECTION INTRAMUSCULAR; INTRAVENOUS at 13:35

## 2019-03-22 DIAGNOSIS — D50.9 IRON DEFICIENCY ANEMIA, UNSPECIFIED IRON DEFICIENCY ANEMIA TYPE: Primary | ICD-10-CM

## 2019-03-26 ENCOUNTER — HOSPITAL ENCOUNTER (OUTPATIENT)
Facility: MEDICAL CENTER | Age: 84
Discharge: HOME | End: 2019-03-26
Payer: MEDICARE

## 2019-03-26 DIAGNOSIS — D50.9 IRON DEFICIENCY ANEMIA, UNSPECIFIED IRON DEFICIENCY ANEMIA TYPE: ICD-10-CM

## 2019-03-26 LAB
ABSOLUTE EOS #: 0.2 K/UL (ref 0–0.4)
ABSOLUTE IMMATURE GRANULOCYTE: NORMAL K/UL (ref 0–0.3)
ABSOLUTE LYMPH #: 2.2 K/UL (ref 1–4.8)
ABSOLUTE MONO #: 0.4 K/UL (ref 0.2–0.8)
BASOPHILS # BLD: 0 % (ref 0–2)
BASOPHILS ABSOLUTE: 0 K/UL (ref 0–0.2)
DIFFERENTIAL TYPE: NORMAL
EOSINOPHILS RELATIVE PERCENT: 4 % (ref 1–4)
FERRITIN: 28 UG/L (ref 13–150)
FOLATE: 12 NG/ML
HCT VFR BLD CALC: 42.5 % (ref 36–46)
HEMOGLOBIN: 14.5 G/DL (ref 12–16)
IMMATURE GRANULOCYTES: NORMAL %
IRON SATURATION: 34 % (ref 20–55)
IRON: 93 UG/DL (ref 37–145)
LYMPHOCYTES # BLD: 39 % (ref 24–44)
MCH RBC QN AUTO: 33.1 PG (ref 26–34)
MCHC RBC AUTO-ENTMCNC: 34 G/DL (ref 31–37)
MCV RBC AUTO: 97.3 FL (ref 80–100)
MONOCYTES # BLD: 6 % (ref 1–7)
NRBC AUTOMATED: NORMAL PER 100 WBC
PDW BLD-RTO: 13.9 % (ref 11.5–14.5)
PLATELET # BLD: 176 K/UL (ref 130–400)
PLATELET ESTIMATE: NORMAL
PMV BLD AUTO: 7 FL (ref 6–12)
RBC # BLD: 4.37 M/UL (ref 4–5.2)
RBC # BLD: NORMAL 10*6/UL
SEG NEUTROPHILS: 51 % (ref 36–66)
SEGMENTED NEUTROPHILS ABSOLUTE COUNT: 2.9 K/UL (ref 1.8–7.7)
TOTAL IRON BINDING CAPACITY: 276 UG/DL (ref 250–450)
UNSATURATED IRON BINDING CAPACITY: 183 UG/DL (ref 112–347)
VITAMIN B-12: 699 PG/ML (ref 232–1245)
WBC # BLD: 5.7 K/UL (ref 3.5–11)
WBC # BLD: NORMAL 10*3/UL

## 2019-03-26 PROCEDURE — 36415 COLL VENOUS BLD VENIPUNCTURE: CPT

## 2019-03-26 PROCEDURE — 85025 COMPLETE CBC W/AUTO DIFF WBC: CPT

## 2019-03-26 PROCEDURE — 82746 ASSAY OF FOLIC ACID SERUM: CPT

## 2019-03-26 PROCEDURE — 82728 ASSAY OF FERRITIN: CPT

## 2019-03-26 PROCEDURE — 82607 VITAMIN B-12: CPT

## 2019-03-26 PROCEDURE — 83540 ASSAY OF IRON: CPT

## 2019-03-26 PROCEDURE — 83550 IRON BINDING TEST: CPT

## 2019-03-28 ENCOUNTER — TELEPHONE (OUTPATIENT)
Dept: PHARMACY | Facility: CLINIC | Age: 84
End: 2019-03-28

## 2019-03-28 NOTE — TELEPHONE ENCOUNTER
CLINICAL PHARMACY NOTE - Medication Review    Chong Felix is a 80 y.o. female referred to a clinical pharmacy specialist given their history of COPD and DM. Attempt made to reach patient by telephone for medication review. Left voice message for patient to return clinician's phone call to [phone] option 7. Will continue to attempt to contact patient by telephone as appropriate.     [facility]

## 2019-03-28 NOTE — LETTER
[address]  [address], [place] [address]  Phone: [phone]  Fax: [phone] [facility] [address] [facility] [place] [address]       04/05/19     Dear Angel Woodard,  Our records indicate that you are eligible for a complete medication therapy review performed by a [facility] licensed clinical pharmacist. This review helps ensure that you are getting the most benefit from the medications you receive and includes the following:  ? Review of your medications, including over-the-counter and herbal medications. ? Answering questions about your medications and how to get the most benefit from them. ? Identifying and helping to prevent potential drug interactions or side effects. ? Possibly identifying alternatives that are equally effective. Under this program, [employer] will work with you and your doctor to manage your drug therapy. Please contact the [address] [place] [address] office to set up a time for your medication review with one of our clinical pharmacists. To contact us call [phone] or [phone], and select Option 7. This will be a phone consult and therefore will not require a trip to the medical office. If you wish to discontinue participation in this program, please call us to let us know. Please note: This is an OPTIONAL program.  It is a FREE service provided under your medical plan to help ensure that your medicines are safe, necessary, and effective. Your participation is encouraged, but not required.     Sincerely,  Lev [address]  Phone: [phone], option 7

## 2019-04-01 ENCOUNTER — HOSPITAL ENCOUNTER (OUTPATIENT)
Facility: MEDICAL CENTER | Age: 84
End: 2019-04-01
Payer: MEDICARE

## 2019-04-02 ENCOUNTER — TELEPHONE (OUTPATIENT)
Dept: ONCOLOGY | Age: 84
End: 2019-04-02

## 2019-04-02 ENCOUNTER — APPOINTMENT (OUTPATIENT)
Dept: CT IMAGING | Age: 84
End: 2019-04-02
Payer: MEDICARE

## 2019-04-02 ENCOUNTER — HOSPITAL ENCOUNTER (EMERGENCY)
Age: 84
Discharge: HOME | End: 2019-04-02
Attending: EMERGENCY MEDICINE | Admitting: INTERNAL MEDICINE
Payer: MEDICARE

## 2019-04-02 ENCOUNTER — OFFICE VISIT (OUTPATIENT)
Dept: ONCOLOGY | Age: 84
End: 2019-04-02
Payer: MEDICARE

## 2019-04-02 ENCOUNTER — APPOINTMENT (OUTPATIENT)
Dept: GENERAL RADIOLOGY | Age: 84
End: 2019-04-02
Payer: MEDICARE

## 2019-04-02 VITALS
DIASTOLIC BLOOD PRESSURE: 77 MMHG | WEIGHT: 180.4 LBS | SYSTOLIC BLOOD PRESSURE: 133 MMHG | RESPIRATION RATE: 18 BRPM | HEART RATE: 69 BPM | TEMPERATURE: 97.9 F | BODY MASS INDEX: 31.96 KG/M2

## 2019-04-02 VITALS
SYSTOLIC BLOOD PRESSURE: 146 MMHG | TEMPERATURE: 98.1 F | DIASTOLIC BLOOD PRESSURE: 65 MMHG | OXYGEN SATURATION: 96 % | HEART RATE: 61 BPM | RESPIRATION RATE: 16 BRPM

## 2019-04-02 DIAGNOSIS — R10.10 PAIN OF UPPER ABDOMEN: Primary | ICD-10-CM

## 2019-04-02 DIAGNOSIS — R10.13 ABDOMINAL PAIN, EPIGASTRIC: ICD-10-CM

## 2019-04-02 DIAGNOSIS — D50.9 IRON DEFICIENCY ANEMIA, UNSPECIFIED IRON DEFICIENCY ANEMIA TYPE: Primary | ICD-10-CM

## 2019-04-02 PROBLEM — J18.9 PNEUMONIA: Status: ACTIVE | Noted: 2019-04-02

## 2019-04-02 LAB
-: NORMAL
ABSOLUTE EOS #: 0.1 K/UL (ref 0–0.4)
ABSOLUTE IMMATURE GRANULOCYTE: NORMAL K/UL (ref 0–0.3)
ABSOLUTE LYMPH #: 1.8 K/UL (ref 1–4.8)
ABSOLUTE MONO #: 0.3 K/UL (ref 0.2–0.8)
ALBUMIN SERPL-MCNC: 3.8 G/DL (ref 3.5–5.2)
ALBUMIN/GLOBULIN RATIO: NORMAL (ref 1–2.5)
ALP BLD-CCNC: 64 U/L (ref 35–104)
ALT SERPL-CCNC: 7 U/L (ref 5–33)
AMORPHOUS: NORMAL
AMYLASE: 44 U/L (ref 28–100)
ANION GAP SERPL CALCULATED.3IONS-SCNC: 13 MMOL/L (ref 9–17)
AST SERPL-CCNC: 12 U/L
BACTERIA: NORMAL
BASOPHILS # BLD: 1 % (ref 0–2)
BASOPHILS ABSOLUTE: 0 K/UL (ref 0–0.2)
BILIRUB SERPL-MCNC: 0.73 MG/DL (ref 0.3–1.2)
BILIRUBIN DIRECT: 0.18 MG/DL
BILIRUBIN URINE: NEGATIVE
BILIRUBIN, INDIRECT: 0.55 MG/DL (ref 0–1)
BNP INTERPRETATION: NORMAL
BUN BLDV-MCNC: 9 MG/DL (ref 8–23)
BUN/CREAT BLD: 13 (ref 9–20)
CALCIUM SERPL-MCNC: 8.8 MG/DL (ref 8.6–10.4)
CASTS UA: NORMAL /LPF
CHLORIDE BLD-SCNC: 108 MMOL/L (ref 98–107)
CO2: 21 MMOL/L (ref 20–31)
COLOR: YELLOW
COMMENT UA: ABNORMAL
CREAT SERPL-MCNC: 0.71 MG/DL (ref 0.5–0.9)
CRYSTALS, UA: NORMAL /HPF
DIFFERENTIAL TYPE: NORMAL
EOSINOPHILS RELATIVE PERCENT: 2 % (ref 1–4)
EPITHELIAL CELLS UA: NORMAL /HPF (ref 0–5)
GFR AFRICAN AMERICAN: >60 ML/MIN
GFR NON-AFRICAN AMERICAN: >60 ML/MIN
GFR SERPL CREATININE-BSD FRML MDRD: ABNORMAL ML/MIN/{1.73_M2}
GFR SERPL CREATININE-BSD FRML MDRD: ABNORMAL ML/MIN/{1.73_M2}
GLOBULIN: NORMAL G/DL (ref 1.5–3.8)
GLUCOSE BLD-MCNC: 128 MG/DL (ref 70–99)
GLUCOSE URINE: NEGATIVE
HCT VFR BLD CALC: 41 % (ref 36–46)
HEMOGLOBIN: 14 G/DL (ref 12–16)
IMMATURE GRANULOCYTES: NORMAL %
KETONES, URINE: NEGATIVE
LEUKOCYTE ESTERASE, URINE: ABNORMAL
LIPASE: 10 U/L (ref 13–60)
LYMPHOCYTES # BLD: 37 % (ref 24–44)
MCH RBC QN AUTO: 32.7 PG (ref 26–34)
MCHC RBC AUTO-ENTMCNC: 34.1 G/DL (ref 31–37)
MCV RBC AUTO: 95.8 FL (ref 80–100)
MONOCYTES # BLD: 6 % (ref 1–7)
MUCUS: NORMAL
NITRITE, URINE: NEGATIVE
NRBC AUTOMATED: NORMAL PER 100 WBC
OTHER OBSERVATIONS UA: NORMAL
PDW BLD-RTO: 14 % (ref 11.5–14.5)
PH UA: 5.5 (ref 5–8)
PLATELET # BLD: 154 K/UL (ref 130–400)
PLATELET ESTIMATE: NORMAL
PMV BLD AUTO: 7.3 FL (ref 6–12)
POTASSIUM SERPL-SCNC: 3.6 MMOL/L (ref 3.7–5.3)
PRO-BNP: 254 PG/ML
PROTEIN UA: NEGATIVE
RBC # BLD: 4.29 M/UL (ref 4–5.2)
RBC # BLD: NORMAL 10*6/UL
RBC UA: NORMAL /HPF (ref 0–2)
RENAL EPITHELIAL, UA: NORMAL /HPF
SEG NEUTROPHILS: 54 % (ref 36–66)
SEGMENTED NEUTROPHILS ABSOLUTE COUNT: 2.6 K/UL (ref 1.8–7.7)
SODIUM BLD-SCNC: 142 MMOL/L (ref 135–144)
SPECIFIC GRAVITY UA: 1.01 (ref 1–1.03)
TOTAL PROTEIN: 6.4 G/DL (ref 6.4–8.3)
TRICHOMONAS: NORMAL
TROPONIN INTERP: ABNORMAL
TROPONIN T: ABNORMAL NG/ML
TROPONIN, HIGH SENSITIVITY: 27 NG/L (ref 0–14)
TURBIDITY: CLEAR
URINE HGB: ABNORMAL
UROBILINOGEN, URINE: NORMAL
WBC # BLD: 4.9 K/UL (ref 3.5–11)
WBC # BLD: NORMAL 10*3/UL
WBC UA: NORMAL /HPF (ref 0–5)
YEAST: NORMAL

## 2019-04-02 PROCEDURE — 85025 COMPLETE CBC W/AUTO DIFF WBC: CPT

## 2019-04-02 PROCEDURE — 82150 ASSAY OF AMYLASE: CPT

## 2019-04-02 PROCEDURE — 84484 ASSAY OF TROPONIN QUANT: CPT

## 2019-04-02 PROCEDURE — 6360000004 HC RX CONTRAST MEDICATION: Performed by: EMERGENCY MEDICINE

## 2019-04-02 PROCEDURE — 83690 ASSAY OF LIPASE: CPT

## 2019-04-02 PROCEDURE — 99285 EMERGENCY DEPT VISIT HI MDM: CPT

## 2019-04-02 PROCEDURE — 99211 OFF/OP EST MAY X REQ PHY/QHP: CPT

## 2019-04-02 PROCEDURE — 83880 ASSAY OF NATRIURETIC PEPTIDE: CPT

## 2019-04-02 PROCEDURE — 81001 URINALYSIS AUTO W/SCOPE: CPT

## 2019-04-02 PROCEDURE — 80076 HEPATIC FUNCTION PANEL: CPT

## 2019-04-02 PROCEDURE — 99214 OFFICE O/P EST MOD 30 MIN: CPT | Performed by: INTERNAL MEDICINE

## 2019-04-02 PROCEDURE — 74177 CT ABD & PELVIS W/CONTRAST: CPT

## 2019-04-02 PROCEDURE — 71045 X-RAY EXAM CHEST 1 VIEW: CPT

## 2019-04-02 PROCEDURE — 2580000003 HC RX 258: Performed by: EMERGENCY MEDICINE

## 2019-04-02 PROCEDURE — 93005 ELECTROCARDIOGRAM TRACING: CPT

## 2019-04-02 PROCEDURE — 80048 BASIC METABOLIC PNL TOTAL CA: CPT

## 2019-04-02 RX ORDER — SODIUM CHLORIDE 0.9 % (FLUSH) 0.9 %
10 SYRINGE (ML) INJECTION PRN
Status: DISCONTINUED | OUTPATIENT
Start: 2019-04-02 | End: 2019-04-02 | Stop reason: HOSPADM

## 2019-04-02 RX ORDER — SODIUM CHLORIDE 9 MG/ML
INJECTION, SOLUTION INTRAVENOUS CONTINUOUS
Status: DISCONTINUED | OUTPATIENT
Start: 2019-04-02 | End: 2019-04-02 | Stop reason: HOSPADM

## 2019-04-02 RX ORDER — 0.9 % SODIUM CHLORIDE 0.9 %
80 INTRAVENOUS SOLUTION INTRAVENOUS ONCE
Status: COMPLETED | OUTPATIENT
Start: 2019-04-02 | End: 2019-04-02

## 2019-04-02 RX ADMIN — Medication 10 ML: at 16:41

## 2019-04-02 RX ADMIN — SODIUM CHLORIDE 80 ML: 9 INJECTION, SOLUTION INTRAVENOUS at 16:41

## 2019-04-02 RX ADMIN — IOPAMIDOL 75 ML: 755 INJECTION, SOLUTION INTRAVENOUS at 16:41

## 2019-04-02 RX ADMIN — SODIUM CHLORIDE 50 ML/HR: 9 INJECTION, SOLUTION INTRAVENOUS at 15:00

## 2019-04-02 ASSESSMENT — ENCOUNTER SYMPTOMS
ALLERGIC/IMMUNOLOGIC NEGATIVE: 1
EYES NEGATIVE: 1
RESPIRATORY NEGATIVE: 1
NAUSEA: 1
ABDOMINAL PAIN: 1

## 2019-04-02 NOTE — TELEPHONE ENCOUNTER
[address] VISIT  DR Danyelle Yates IN TO SEE PATIENT  ORDERS RECEIVED  PATIENT TAKEN TO ER FOR ABD PAIN.  POSSIBLE ACUTE PANCREATITIS  AVS PRINTED AND GIVEN TO PATIENT WITH INSTRUCTIONS  PATIENT DISCHARGED VIA WHEELCHAIR

## 2019-04-02 NOTE — ED NOTES
Pt to restroom via stretcher. Pt tolerated fairly well. Safety maintained.       Cam Emery RN  04/02/19 0651

## 2019-04-02 NOTE — ED PROVIDER NOTES
[address] [place] [address] ED  eMERGENCY dEPARTMENT eNCOUnter      Pt Name: Lennie Cai  MRN: [id number]  [place] 8/2/1925  Date of evaluation: 4/2/2019  Provider: Oscar Acharya MD    CHIEF COMPLAINT       Chief Complaint   Patient presents with    Abdominal Pain         HISTORY OF PRESENT ILLNESS  (Location/Symptom, Timing/Onset, Context/Setting, Quality, Duration, Modifying Factors, Severity.)   Lennie Cai is a 80 y.o. female who presents to the emergency department  Complaining of upper abdominal pain going back with nausea for months   denies any diarrhea constipation, pt has seen gastroenterologist  Seen by Praful Nash because of anemia  [facility]  PAST MEDICAL HISTORY     Past Medical History:   Diagnosis Date    A-fib [facility])     AAA (abdominal aortic aneurysm) ([address]     Abdominal cramping     Atrial fibrillation ([address]     Back pain, chronic     C. difficile colitis     COPD (chronic obstructive pulmonary disease) ([address]     Diabetes mellitus ([address]     Gastritis     GERD (gastroesophageal reflux disease)     Hiatal hernia     Hypoalbuminemia 10/14/2014    IBS (irritable bowel syndrome)     Nausea vomiting and diarrhea 10/14/2014    Stomach ulcer     Thyroid disease     Type 2 diabetes mellitus without complication, without long-term current use of insulin ([address] 1/24/2018    UTI (lower urinary tract infection)     Varicose veins          SURGICAL HISTORY       Past Surgical History:   Procedure Laterality Date    CHOLECYSTECTOMY      HERNIA REPAIR      HYSTERECTOMY      partial    ME EGD TRANSORAL BIOPSY SINGLE/MULTIPLE N/A 6/20/2017    EGD BIOPSY performed by Zeinab Young MD at [facility]  06/20/2017    MILD CHRONIC INACTIVE GASTRITIS    VASCULAR SURGERY      varicose veins    VEIN SURGERY           CURRENT MEDICATIONS       Previous Medications    AMIODARONE (CORDARONE) 200 MG TABLET    Take 200 mg by mouth three times a week CLOBETASOL (OLUX) 0.05 % FOAM    Apply topically 2 times daily for 7 days if needed    CLOTRIMAZOLE-BETAMETHASONE (LOTRISONE) 1-0.05 % CREAM    Apply topically 2 times daily. FERROUS SULFATE (FE TABS) 325 (65 FE) MG EC TABLET    Take 1 tablet by mouth daily (with breakfast)    FUROSEMIDE (LASIX) 20 MG TABLET    Take 1 tablet by mouth daily    LEVOTHYROXINE (SYNTHROID) 125 MCG TABLET    TAKE ONE TABLET BY MOUTH DAILY    LISINOPRIL (PRINIVIL;ZESTRIL) 20 MG TABLET    Take 1 tablet by mouth daily    NYAMYC [id number] UNIT/GM POWDER        ONDANSETRON (ZOFRAN ODT) 4 MG DISINTEGRATING TABLET    Take 1 tablet by mouth every 8 hours as needed for Nausea    OXYGEN    Inhale into the lungs Uses at night and prn    PANTOPRAZOLE (PROTONIX) 40 MG TABLET    TAKE ONE TABLET BY MOUTH DAILY    POTASSIUM CHLORIDE (KLOR-CON) 10 MEQ EXTENDED RELEASE TABLET    TAKE ONE TABLET BY MOUTH TWICE A DAY       ALLERGIES     Penicillins    FAMILY HISTORY       Family History   Family history unknown: Yes          SOCIALHISTORY       Social History     Socioeconomic History    Marital status:      Spouse name: Not on file    Number of children: 2    Years of education: 12    Highest education level: Not on file   Occupational History    Not on file   Social Needs    Financial resource strain: Not on file    Food insecurity:     Worry: Not on file     Inability: Not on file    Transportation needs:     Medical: Not on file     Non-medical: Not on file   Tobacco Use    Smoking status: Former Smoker    Smokeless tobacco: Never Used   Substance and Sexual Activity    Alcohol use:  Yes     Alcohol/week: 1.2 oz     Types: 1 Glasses of wine, 1 Cans of beer per week    Drug use: No    Sexual activity: Never     Birth control/protection: Surgical     Comment: hyst   Lifestyle    Physical activity:     Days per week: Not on file     Minutes per session: Not on file    Stress: Not on file   Relationships    Social connections: Normal range of motion. She exhibits no edema or tenderness. Neurological: She is alert and oriented to person, place, and time. Skin: Skin is warm. DIAGNOSTIC RESULTS     EKG: All EKG's are interpreted by the Emergency Department Physician who either signs or Co-signsthis chart in the absence of a cardiologist.    Normal sinus rhythm    RADIOLOGY:   Non-plain filmimages such as CT, Ultrasound and MRI are read by the radiologist. Kaidengutierrez Byrne radiographic images are visualized and preliminarily interpreted by the emergency physician with the below findings:    Ct Abdomen Pelvis W Iv Contrast    Result Date: 4/2/2019  EXAMINATION: CT OF THE ABDOMEN AND PELVIS WITH CONTRAST 4/2/2019 4:32 pm TECHNIQUE: CT of the abdomen and pelvis was performed with the administration of intravenous contrast. Multiplanar reformatted images are provided for review. Dose modulation, iterative reconstruction, and/or weight based adjustment of the mA/kV was utilized to reduce the radiation dose to as low as reasonably achievable. COMPARISON: 05/23/2018 HISTORY: ORDERING SYSTEM PROVIDED HISTORY: abd pain TECHNOLOGIST PROVIDED HISTORY: IV contrast only FINDINGS: Lower Chest: There is some scarring and bronchiectasis. No acute process. Organs: Cholecystectomy with intrahepatic biliary ductal dilatation. No focal liver lesion. The spleen, pancreas, adrenal glands show no acute process. Exophytic 4 cm cyst upper pole right kidney. Larger 5.5 cm cyst along the posterior aspect of the midpole may also be renal in origin. Unchanged. GI/Bowel: There is limited evaluation due to absence of oral contrast. The stomach shows no focal lesions. Small bowel loops normal in caliber showing no focal abnormalities. No evidence for acute appendicitis. Evaluation of the colon shows no acute process. Pelvis: Urinary bladder unremarkable. Hysterectomy noted. Unchanged left adnexal 4 cm soft tissue density with calcifications dating back to 2014. Prominent left pelvic varices appearing to communicate with internal iliac vein in inferior mesenteric vein. Unchanged. Peritoneum/Retroperitoneum: No ascites or significant lymphadenopathy. Bones/Soft Tissues: Diffuse degenerative changes with underlying osteopenia. 1. No acute infective or inflammatory process. 2. No bowel obstruction. 3. Several stable benign findings as detailed above. Xr Chest Portable    Result Date: 4/2/2019  EXAMINATION: SINGLE XRAY VIEW OF THE CHEST 4/2/2019 3:12 pm COMPARISON: November 26, 2018 HISTORY: ORDERING SYSTEM PROVIDED HISTORY: upper abd pain TECHNOLOGIST PROVIDED HISTORY: upper abd pain Ordering Physician Provided Reason for Exam: pain Acuity: Unknown Type of Exam: Unknown Relevant Medical/Surgical History: upper abd pain FINDINGS: Enlarged heart. Chronic pulmonary changes with pleuroparenchymal scarring in the lung bases. Hyperinflation. No focal airspace consolidation. Calcified granuloma in the left midlung. 1. No acute cardiopulmonary disease. 2. Chronic pulmonary changes.          Interpretation per the Radiologist below, if available at the time ofthis note:    Discussed with Dr. Rick Walsh      ED BEDSIDE ULTRASOUND:   Performed by ED Physician - none    LABS:  Labs Reviewed   BASIC METABOLIC PANEL - Abnormal; Notable for the following components:       Result Value    Glucose 128 (*)     Potassium 3.6 (*)     Chloride 108 (*)     All other components within normal limits   LIPASE - Abnormal; Notable for the following components:    Lipase 10 (*)     All other components within normal limits   URINALYSIS - Abnormal; Notable for the following components:    Urine Hgb TRACE (*)     Leukocyte Esterase, Urine TRACE (*)     All other components within normal limits   TROPONIN - Abnormal; Notable for the following components:    Troponin, High Sensitivity 27 (*)     All other components within normal limits   AMYLASE   BRAIN NATRIURETIC PEPTIDE   CBC

## 2019-04-02 NOTE — PROGRESS NOTES
_  Chief Complaint   Patient presents with    Follow-up     review status of disease    Other     shakey    Fatigue     tired and very weak    Anorexia     doesn't eat much anymore    Pain     abdomen. worse when lays down. when into chest and back last night           DIAGNOSIS:       Microcytic anemia, Corrected  Iron deficiency anemia, resolved  Possible GI blood loss, currently stable  Multiple comorbidities as listed        CURRENT THERAPY:         Vitamin B12 replacement  Oral iron replacement. This continued March 2018    BRIEF CASE HISTORY:      Ms. Dennise Lei is a very pleasant 80 y.o. female with history of multiple comorbidities as listed. Advanced age but still maintaining fairly good performance status. She had limited movement due to arthritis and problems with bleeding. Patient's blood work showed microcytic anemia with evidence of iron deficiency. There was significant change compared to labs done in 2016. Patient denies any active bleeding. No melena or hematochezia. No hematemesis. The patient had the colonoscopy 2 years ago which was negative except for polyps. She had EGD few weeks ago and that showed gastritis. No active bleeding. She is maintained on PPIs. Patient has mild generalized weakness and fatigue. She has shortness of breath on exertion. No other complaints. She quit smoking 40 years ago. She drinks wine on and off. INTERIM HISTORY:   The patient is seen today for follow-up anemia. She had previously evidence of iron deficiency and she was using oral iron. There was significant improvement . Actually her hemoglobin now is normal.  Patient has no symptoms related to anemia. No weakness or fatigue. No dizziness. No shortness of breath. No palpitation. She denies any active bleeding. This is complaining of upper abdominal pain for the last 2 weeks.  It was consistent moderate to severe pain and recently radiating to the back. PAST MEDICAL HISTORY: has a past medical history of A-fib ([facility] AAA (abdominal aortic aneurysm) ([facility] Abdominal cramping, Atrial fibrillation ([address] Back pain, chronic, C. difficile colitis, COPD (chronic obstructive pulmonary disease) ([facility] Diabetes mellitus ([facility] Gastritis, GERD (gastroesophageal reflux disease), Hiatal hernia, Hypoalbuminemia, IBS (irritable bowel syndrome), Nausea vomiting and diarrhea, Stomach ulcer, Thyroid disease, Type 2 diabetes mellitus without complication, without long-term current use of insulin ([facility] UTI (lower urinary tract infection), and Varicose veins. PAST SURGICAL HISTORY: has a past surgical history that includes Cholecystectomy; vascular surgery; Vein Surgery; Hysterectomy; hernia repair; Upper gastrointestinal endoscopy (06/20/2017); and pr egd transoral biopsy single/multiple (N/A, 6/20/2017). CURRENT MEDICATIONS:  has a current medication list which includes the following prescription(s): potassium chloride, ondansetron, levothyroxine, furosemide, pantoprazole, clobetasol, nyamyc, clotrimazole-betamethasone, lisinopril, ferrous sulfate, amiodarone, and OXYGEN, and the following Facility-Administered Medications: sodium chloride and sodium chloride flush. ALLERGIES:  is allergic to penicillins. FAMILY HISTORY: Negative for any hematological or oncological conditions. SOCIAL HISTORY:  reports that she has quit smoking. She has never used smokeless tobacco. She reports that she drinks about 1.2 oz of alcohol per week. She reports that she does not use drugs. REVIEW OF SYSTEMS:     · General: She has no weakness or fatigue. No unanticipated weight loss or decreased appetite. No fever or chills. · Eyes: No blurred vision, eye pain or double vision. · Ears: No hearing problems or drainage. No tinnitus. · Throat: No sore throat, problems with swallowing or dysphagia.    · Respiratory: No cough, sputum or hemoptysis. Mild shortness of breath on exertion. No pleuritic chest pain. · Cardiovascular: No chest pain, orthopnea or PND. No lower extremity edema. No palpitation. · Gastrointestinal: No problems with swallowing. Positive for abdominal pain. No nausea or vomiting. No diarrhea or constipation. No GI bleeding. · Genitourinary: No dysuria, hematuria, frequency or urgency. · Musculoskeletal: No muscle aches or pains. No limitation of movement. No back pain. No gait disturbance, No joint complaints. · Dermatologic: No skin rashes or pruritus. No skin lesions or discolorations. · Psychiatric: No depression, anxiety, or stress or signs of schizophrenia. No change in mood or affect. · Hematologic: No history of bleeding tendency. No bruises or ecchymosis. No history of clotting problems. · Infectious disease: No fever, chills or frequent infections. · Endocrine: No problems with opacity. No polydipsia or polyuria. No temperature intolerance. · Neurologic: No headaches or dizziness. No weakness or numbness of the extremities. No changes in balance, coordination,  memory, mentation, behavior. · Allergic/Immunologic: No nasal congestion or hives. No repeated infections. PHYSICAL EXAM:  The patient is not in acute distress. Elderly lady who is using oxygen and needed assistance of movement. Vital signs: Blood pressure 133/77, pulse 69, temperature 97.9 °F (36.6 °C), temperature source Oral, resp. rate 18, weight 180 lb 6.4 oz (81.8 kg), currently breastfeeding. HEENT:  Eyes are normal. Ears, nose and throat are normal.  Neck: Supple. No lymph node enlargement. No thyroid enlargement. Trachea is centrally located. Chest:  Clear to auscultation. No wheezes or crepitations. Heart: Regular sinus rhythm. Abdomen: Soft, tenderness over the epigastric area. No hepatosplenomegaly. No masses. Extremities:  With no edema.   Lymph Nodes:  No cervical, axillary or inguinal lymph node enlargement. Neurologic:  Conscious and oriented. No focal neurological deficits. Psychosocial: No depression, anxiety or stress. Skin: No rashes, bruises or ecchymoses. Review of Diagnostic data:   Lab Results   Component Value Date    WBC 4.9 04/02/2019    HGB 14.0 04/02/2019    HCT 41.0 04/02/2019    MCV 95.8 04/02/2019     04/02/2019     Lab Results   Component Value Date    IRON 93 03/26/2019    TIBC 276 03/26/2019    FERRITIN 28 03/26/2019       IMPRESSION:   Microcytic anemia, Corrected  Iron deficiency anemia, resolved  Possible GI blood loss, currently stable  Multiple comorbidities as listed  Upper abdominal pain for 2 weeks    PLAN: I reviewed the labs as above and discussed with the patient. I explained to the patient the nature of this hematologic problem. I explained the significance of these abnormalities in layman language. Previous labs showed evidence of iron deficiency which has been corrected since then. At the present time she has normal hemoglobin and normal iron studies and she has elevated vitamin B12 level. I recommended continued observation. No need for Iron or Vitamin B12. Since she has no gross GI bleeding and since her hemoglobin and iron studies are now normal and considering her age simple observation may be reasonable and considering GI workup only if she develops any active bleeding or if we see any significant drop in her hemoglobin or iron level. I'm concerned about the patient's symptoms of epigastric pain for the last 2 weeks. She had tenderness in that same area. I will send the emergency room for further evaluation and possible imaging. Patient's questions were answered to the best of her satisfaction and she verbalized full understanding and agreement.

## 2019-04-03 LAB
EKG ATRIAL RATE: 84 BPM
EKG P AXIS: 61 DEGREES
EKG P-R INTERVAL: 224 MS
EKG Q-T INTERVAL: 508 MS
EKG QRS DURATION: 78 MS
EKG QTC CALCULATION (BAZETT): 544 MS
EKG R AXIS: -27 DEGREES
EKG T AXIS: 34 DEGREES
EKG VENTRICULAR RATE: 69 BPM

## 2019-04-16 ENCOUNTER — TELEPHONE (OUTPATIENT)
Dept: OTHER | Age: 84
End: 2019-04-16

## 2019-05-14 ENCOUNTER — OFFICE VISIT (OUTPATIENT)
Dept: GASTROENTEROLOGY | Age: 84
End: 2019-05-14
Payer: MEDICARE

## 2019-05-14 VITALS
SYSTOLIC BLOOD PRESSURE: 138 MMHG | WEIGHT: 175 LBS | HEART RATE: 66 BPM | DIASTOLIC BLOOD PRESSURE: 82 MMHG | BODY MASS INDEX: 31 KG/M2

## 2019-05-14 DIAGNOSIS — R10.32 ABDOMINAL PAIN, LEFT LOWER QUADRANT: ICD-10-CM

## 2019-05-14 DIAGNOSIS — K21.9 GASTROESOPHAGEAL REFLUX DISEASE WITHOUT ESOPHAGITIS: ICD-10-CM

## 2019-05-14 DIAGNOSIS — R10.13 DYSPEPSIA: Primary | ICD-10-CM

## 2019-05-14 DIAGNOSIS — K29.00 OTHER ACUTE GASTRITIS WITHOUT HEMORRHAGE: ICD-10-CM

## 2019-05-14 PROCEDURE — 99214 OFFICE O/P EST MOD 30 MIN: CPT | Performed by: INTERNAL MEDICINE

## 2019-05-14 RX ORDER — SUCRALFATE 1 G/1
1 TABLET ORAL 4 TIMES DAILY
Qty: 120 TABLET | Refills: 3 | Status: ON HOLD | OUTPATIENT
Start: 2019-05-14 | End: 2020-01-21

## 2019-05-14 ASSESSMENT — ENCOUNTER SYMPTOMS
DIARRHEA: 0
NAUSEA: 0
ABDOMINAL DISTENTION: 0
RECTAL PAIN: 0
BLOOD IN STOOL: 0
ANAL BLEEDING: 0
VOMITING: 0
COUGH: 0
WHEEZING: 0
CHOKING: 0
ABDOMINAL PAIN: 1
TROUBLE SWALLOWING: 0
CONSTIPATION: 0

## 2019-05-14 NOTE — PROGRESS NOTES
GI CLINIC FOLLOW UP    INTERVAL HISTORY:   No referring provider defined for this encounter. Chief Complaint   Patient presents with    Anemia     Patient is here today for 1 yr f/u. She states her anemia is resolved.  Gastroesophageal Reflux     Patient has hx of GERD. She states she has been having a lot of heartburn    Abdominal Pain     Patient states she has been having epigastric pain for about 5 months with bloating. She also states she has been havin LLQ pain for about 2 months. HISTORY OF PRESENT ILLNESS: Ms.Joyce Claudeen Quinones Wynonia Dunks is a 80 y.o. female , referred for evaluation of abd pain , constipation . Was seen here in the past for reflux, anemia, abdominal pain, she been refusing to have endoscopy because there is no ride her 2 daughters are in [place] and she doesn't have anybody whatsoever to drive her here, we have offered her endoscopy multiple times and she consistently refuses. Is of the persistent pain last visit I ordered a CAT scan on her abdomen she did not come for follow-up after that  Ct scan negative for a cause    she is taking stool softener OTC and managing to have one BM daily    no wt loss   on Protonix  EGD : gastritis,bx negative  Has AAA , saw vascular DR [facility].   s/p khadra   LFTs are ion;l   Anemic , but hgb stable , no sign of active bleeding     Labs 4/2019 : cbc/lfts are normal hgb 14    Results of the CT scan is as below       1. No acute infective or inflammatory process. 2. No bowel obstruction.    3. Several stable benign findings as detailed above.           Main complaint today is token of pain one in the epigastric area, and one in the left lower quadrant area mainly over the hip area on the bone actually us when she walks or moves it  Denied any black stool or blood in the stool  Denied any weight loss nausea vomiting or fever or chills    Past Medical,Family, and Social History reviewed and does contribute to the patient presentingcondition. Patient's PMH/PSH,SH,PSYCH Hx, MEDs, ALLERGIES, and ROS were all reviewed and updated in the appropriate sections.     PAST MEDICAL HISTORY:  Past Medical History:   Diagnosis Date    A-fib [facility])     AAA (abdominal aortic aneurysm) (HCC)     Abdominal cramping     Atrial fibrillation (HCC)     Back pain, chronic     C. difficile colitis     COPD (chronic obstructive pulmonary disease) (HCC)     Diabetes mellitus (HCC)     Gastritis     GERD (gastroesophageal reflux disease)     Hiatal hernia     Hypoalbuminemia 10/14/2014    IBS (irritable bowel syndrome)     Nausea vomiting and diarrhea 10/14/2014    Stomach ulcer     Thyroid disease     Type 2 diabetes mellitus without complication, without long-term current [facility] 1/24/2018    UTI (lower urinary tract infection)     Varicose veins        Past Surgical History:   Procedure Laterality Date    CHOLECYSTECTOMY      HERNIA REPAIR      HYSTERECTOMY      partial    DC EGD TRANSORAL BIOPSY SINGLE/MULTIPLE N/A 6/20/2017    EGD BIOPSY performed by Marcus Estrella MD at [facility].  06/20/2017    MILD CHRONIC INACTIVE GASTRITIS    VASCULAR SURGERY      varicose veins    VEIN SURGERY         CURRENT MEDICATIONS:    Current Outpatient Medications:     sucralfate (CARAFATE) 1 GM tablet, Take 1 tablet by mouth 4 times daily, Disp: 120 tablet, Rfl: 3    potassium chloride (KLOR-CON) 10 MEQ extended release tablet, TAKE ONE TABLET BY MOUTH TWICE A DAY, Disp: 60 tablet, Rfl: 1    ondansetron (ZOFRAN ODT) 4 MG disintegrating tablet, Take 1 tablet by mouth every 8 hours as needed for Nausea, Disp: 15 tablet, Rfl: 0    levothyroxine (SYNTHROID) 125 MCG tablet, TAKE ONE TABLET BY MOUTH DAILY, Disp: 30 tablet, Rfl: 6    pantoprazole (PROTONIX) 40 MG tablet, TAKE ONE TABLET BY MOUTH DAILY, Disp: 30 tablet, Rfl: 6    clobetasol (OLUX) 0.05 % foam, Apply topically 2 times daily for 7 days if needed, Disp: 50 g, Rfl: 0    amiodarone (CORDARONE) 200 MG tablet, Take 200 mg by mouth three times a week , Disp: , Rfl:     OXYGEN, Inhale into the lungs Uses at night and prn, Disp: , Rfl:     furosemide (LASIX) 20 MG tablet, Take 1 tablet by mouth daily, Disp: 90 tablet, Rfl: 2    NYAMYC 098429 UNIT/GM powder, , Disp: , Rfl:     clotrimazole-betamethasone (LOTRISONE) 1-0.05 % cream, Apply topically 2 times daily. , Disp: 45 g, Rfl: 1    lisinopril (PRINIVIL;ZESTRIL) 20 MG tablet, Take 1 tablet by mouth daily, Disp: 90 tablet, Rfl: 2    ferrous sulfate (FE TABS) 325 (65 FE) MG EC tablet, Take 1 tablet by mouth daily (with breakfast), Disp: 30 tablet, Rfl: 3    ALLERGIES:   Allergies   Allergen Reactions    Penicillins      Has no recollection of what the reaction was       FAMILY HISTORY:       Family history unknown: Yes         SOCIAL HISTORY:   Social History     Socioeconomic History    Marital status:      Spouse name: Not on file    Number of children: 2    Years of education: 12    Highest education level: Not on file   Occupational History    Not on file   Social Needs    Financial resource strain: Not on file    Food insecurity:     Worry: Not on file     Inability: Not on file    Transportation needs:     Medical: Not on file     Non-medical: Not on file   Tobacco Use    Smoking status: Former Smoker    Smokeless tobacco: Never Used   Substance and Sexual Activity    Alcohol use:  Yes     Alcohol/week: 1.2 oz     Types: 1 Glasses of wine, 1 Cans of beer per week    Drug use: No    Sexual activity: Never     Birth control/protection: Surgical     Comment: hyst   Lifestyle    Physical activity:     Days per week: Not on file     Minutes per session: Not on file    Stress: Not on file   Relationships    Social connections:     Talks on phone: Not on file     Gets together: Not on file     Attends Orthodoxy service: Not on file     Active member of club or organization: Not on file     Attends meetings of clubs or organizations: Not on file     Relationship status: Not on file    Intimate partner violence:     Fear of current or ex partner: Not on file     Emotionally abused: Not on file     Physically abused: Not on file     Forced sexual activity: Not on file   Other Topics Concern    Not on file   Social History Narrative    Not on file       REVIEW OF SYSTEMS: A 12-point review of systemswas obtained and pertinent positives and negatives were enumerated above in the history of present illness. All other reviewed systems / symptoms were negative. Review of Systems   Constitutional: Negative for appetite change, fatigue and unexpected weight change. HENT: Negative for trouble swallowing. Respiratory: Negative for cough, choking and wheezing. Cardiovascular: Negative for chest pain, palpitations and leg swelling. Gastrointestinal: Positive for abdominal pain (epigastric, LLQ). Negative for abdominal distention, anal bleeding, blood in stool, constipation, diarrhea, nausea, rectal pain and vomiting. Genitourinary: Negative for difficulty urinating. Allergic/Immunologic: Negative for environmental allergies and food allergies. Neurological: Negative for dizziness, weakness, light-headedness, numbness and headaches. Hematological: Bruises/bleeds easily. Psychiatric/Behavioral: Negative for sleep disturbance. The patient is nervous/anxious.             LABORATORY DATA: Reviewed  Lab Results   Component Value Date    WBC 4.9 04/02/2019    HGB 14.0 04/02/2019    HCT 41.0 04/02/2019    MCV 95.8 04/02/2019     04/02/2019     04/02/2019    K 3.6 (L) 04/02/2019     (H) 04/02/2019    CO2 21 04/02/2019    BUN 9 04/02/2019    CREATININE 0.71 04/02/2019    LABALBU 3.8 04/02/2019    BILITOT 0.73 04/02/2019    ALKPHOS 64 04/02/2019    AST 12 04/02/2019    ALT 7 04/02/2019    INR 1.1 08/19/2016         Lab Results   Component Value Date    RBC 4.29 04/02/2019 HGB 14.0 04/02/2019    MCV 95.8 04/02/2019    MCH 32.7 04/02/2019    MCHC 34.1 04/02/2019    RDW 14.0 04/02/2019    MPV 7.3 04/02/2019    BASOPCT 1 04/02/2019    LYMPHSABS 1.80 04/02/2019    MONOSABS 0.30 04/02/2019    NEUTROABS 2.60 04/02/2019    EOSABS 0.10 04/02/2019    BASOSABS 0.00 04/02/2019         DIAGNOSTIC TESTING:     No results found. PHYSICAL EXAMINATION: Vital signs reviewed per the nursing documentation. /82   Pulse 66   Wt 175 lb (79.4 kg)   BMI 31.00 kg/m²   Body mass index is 31 kg/m². Physical Exam   Constitutional: She appears well-developed and well-nourished. Eyes: Pupils are equal, round, and reactive to light. No scleral icterus. Neck: Neck supple. No JVD present. Cardiovascular: Normal rate, regular rhythm and normal heart sounds. No murmur heard. Pulmonary/Chest: Effort normal and breath sounds normal. No respiratory distress. Abdominal:   No ascites   Musculoskeletal:   In wc    Nursing note and vitals reviewed. IMPRESSION: Ms. Ju Roe is a 80 y.o. female with    Diagnosis Orders   1. Dyspepsia     2. Other acute gastritis without hemorrhage     3. Gastroesophageal reflux disease without esophagitis     4. Abdominal pain, left lower quadrant       Explained to the patient how difficult to treat her without being able to scope her I told her she would need an EGD and colonoscopy to evaluate dose pain she's been having for long time she still insisted that she cannot do them because she does not have a ride. She is on PPI and sometimes takes H2 blocker on top of it.     She denied any diarrhea or any bleeding and had a CAT scan of the abdomen is negative, once again I suggested colonoscopy to evaluate this left lower quadrant pain although that could be coming from her musculoskeletal and hip area I told her to seek consultation with Dr. Low Holm her primary    Diet/life style/natural hx /complication of the dx were all explained in details   Past medical, past surgical, social history, psychiatric history, medications or allergies, all reviewed and  updated      Thank you for allowing me to participate in the care of Ms. Mickey Alejandra. For any further questions please do not hesitate to contact me. I have reviewed and agree with the ROS entered by the MA/LPN. Note is dictated utilizing voice recognition software. Unfortunately this leads to occasional typographical errors. Please contact our office if you have any questions.       Diane Do MD  [facility]  O: #[phone]

## 2019-08-09 ENCOUNTER — HOSPITAL ENCOUNTER (OUTPATIENT)
Dept: GENERAL RADIOLOGY | Age: 84
Discharge: HOME | End: 2019-08-11
Payer: MEDICARE

## 2019-08-09 ENCOUNTER — HOSPITAL ENCOUNTER (OUTPATIENT)
Age: 84
Discharge: HOME | End: 2019-08-11
Payer: MEDICARE

## 2019-08-09 DIAGNOSIS — R22.41 LOCALIZED SWELLING, MASS, OR LUMP OF RIGHT LOWER EXTREMITY: ICD-10-CM

## 2019-08-09 PROCEDURE — 73610 X-RAY EXAM OF ANKLE: CPT

## 2019-08-12 ENCOUNTER — HOSPITAL ENCOUNTER (OUTPATIENT)
Age: 84
Setting detail: SPECIMEN
Discharge: HOME | End: 2019-08-12
Payer: MEDICARE

## 2019-08-12 LAB
ALBUMIN SERPL-MCNC: 3.8 G/DL (ref 3.5–5.2)
ALBUMIN/GLOBULIN RATIO: 1.4 (ref 1–2.5)
ALP BLD-CCNC: 63 U/L (ref 35–104)
ALT SERPL-CCNC: 7 U/L (ref 5–33)
ANION GAP SERPL CALCULATED.3IONS-SCNC: 13 MMOL/L (ref 9–17)
AST SERPL-CCNC: 14 U/L
BILIRUB SERPL-MCNC: 0.88 MG/DL (ref 0.3–1.2)
BNP INTERPRETATION: NORMAL
BUN BLDV-MCNC: 16 MG/DL (ref 8–23)
BUN/CREAT BLD: ABNORMAL (ref 9–20)
CALCIUM SERPL-MCNC: 9.3 MG/DL (ref 8.6–10.4)
CHLORIDE BLD-SCNC: 108 MMOL/L (ref 98–107)
CHOLESTEROL/HDL RATIO: 2
CHOLESTEROL: 119 MG/DL
CO2: 23 MMOL/L (ref 20–31)
CREAT SERPL-MCNC: 0.75 MG/DL (ref 0.5–0.9)
GFR AFRICAN AMERICAN: >60 ML/MIN
GFR NON-AFRICAN AMERICAN: >60 ML/MIN
GFR SERPL CREATININE-BSD FRML MDRD: ABNORMAL ML/MIN/{1.73_M2}
GFR SERPL CREATININE-BSD FRML MDRD: ABNORMAL ML/MIN/{1.73_M2}
GLUCOSE BLD-MCNC: 102 MG/DL (ref 70–99)
HCT VFR BLD CALC: 43.1 % (ref 36.3–47.1)
HDLC SERPL-MCNC: 60 MG/DL
HEMOGLOBIN: 13.5 G/DL (ref 11.9–15.1)
LDL CHOLESTEROL: 45 MG/DL (ref 0–130)
MCH RBC QN AUTO: 31.8 PG (ref 25.2–33.5)
MCHC RBC AUTO-ENTMCNC: 31.3 G/DL (ref 28.4–34.8)
MCV RBC AUTO: 101.4 FL (ref 82.6–102.9)
NRBC AUTOMATED: 0 PER 100 WBC
PDW BLD-RTO: 13.3 % (ref 11.8–14.4)
PLATELET # BLD: 188 K/UL (ref 138–453)
PMV BLD AUTO: 10.5 FL (ref 8.1–13.5)
POTASSIUM SERPL-SCNC: 4.1 MMOL/L (ref 3.7–5.3)
PRO-BNP: 154 PG/ML
RBC # BLD: 4.25 M/UL (ref 3.95–5.11)
SODIUM BLD-SCNC: 144 MMOL/L (ref 135–144)
THYROXINE, FREE: 1.46 NG/DL (ref 0.93–1.7)
TOTAL CK: 74 U/L (ref 26–192)
TOTAL PROTEIN: 6.5 G/DL (ref 6.4–8.3)
TRIGL SERPL-MCNC: 70 MG/DL
TSH SERPL DL<=0.05 MIU/L-ACNC: 2.27 MIU/L (ref 0.3–5)
VLDLC SERPL CALC-MCNC: NORMAL MG/DL (ref 1–30)
WBC # BLD: 6.4 K/UL (ref 3.5–11.3)

## 2019-08-13 LAB
ESTIMATED AVERAGE GLUCOSE: 103 MG/DL
HBA1C MFR BLD: 5.2 % (ref 4–6)

## 2019-08-31 ENCOUNTER — APPOINTMENT (OUTPATIENT)
Dept: CT IMAGING | Age: 84
End: 2019-08-31
Payer: MEDICARE

## 2019-08-31 ENCOUNTER — HOSPITAL ENCOUNTER (EMERGENCY)
Age: 84
Discharge: HOME | End: 2019-08-31
Attending: EMERGENCY MEDICINE
Payer: MEDICARE

## 2019-08-31 VITALS
SYSTOLIC BLOOD PRESSURE: 115 MMHG | WEIGHT: 174 LBS | RESPIRATION RATE: 21 BRPM | HEART RATE: 68 BPM | DIASTOLIC BLOOD PRESSURE: 79 MMHG | HEIGHT: 62 IN | TEMPERATURE: 98 F | BODY MASS INDEX: 32.02 KG/M2 | OXYGEN SATURATION: 97 %

## 2019-08-31 DIAGNOSIS — K29.50 CHRONIC GASTRITIS WITHOUT BLEEDING, UNSPECIFIED GASTRITIS TYPE: ICD-10-CM

## 2019-08-31 DIAGNOSIS — K52.9 GASTROENTERITIS: Primary | ICD-10-CM

## 2019-08-31 LAB
ABSOLUTE EOS #: 0 K/UL (ref 0–0.44)
ABSOLUTE IMMATURE GRANULOCYTE: 0 K/UL (ref 0–0.3)
ABSOLUTE LYMPH #: 0.55 K/UL (ref 1.1–3.7)
ABSOLUTE MONO #: 0.24 K/UL (ref 0.1–1.2)
ALBUMIN SERPL-MCNC: 4.1 G/DL (ref 3.5–5.2)
ALBUMIN/GLOBULIN RATIO: NORMAL (ref 1–2.5)
ALP BLD-CCNC: 74 U/L (ref 35–104)
ALT SERPL-CCNC: 7 U/L (ref 5–33)
ANION GAP SERPL CALCULATED.3IONS-SCNC: 14 MMOL/L (ref 9–17)
APPEARANCE: NORMAL
AST SERPL-CCNC: 16 U/L
BASOPHILS # BLD: 0 % (ref 0–2)
BASOPHILS ABSOLUTE: 0 K/UL (ref 0–0.2)
BILIRUB SERPL-MCNC: 1.17 MG/DL (ref 0.3–1.2)
BILIRUBIN DIRECT: 0.27 MG/DL
BILIRUBIN, INDIRECT: 0.9 MG/DL (ref 0–1)
BILIRUBIN, POC: NORMAL
BLOOD URINE, POC: NORMAL
BUN BLDV-MCNC: 23 MG/DL (ref 8–23)
BUN/CREAT BLD: 31 (ref 9–20)
CALCIUM SERPL-MCNC: 9.4 MG/DL (ref 8.6–10.4)
CHLORIDE BLD-SCNC: 105 MMOL/L (ref 98–107)
CO2: 23 MMOL/L (ref 20–31)
COLOR, POC: NORMAL
CREAT SERPL-MCNC: 0.75 MG/DL (ref 0.5–0.9)
DIFFERENTIAL TYPE: ABNORMAL
EOSINOPHILS RELATIVE PERCENT: 0 % (ref 1–4)
GFR AFRICAN AMERICAN: >60 ML/MIN
GFR NON-AFRICAN AMERICAN: >60 ML/MIN
GFR SERPL CREATININE-BSD FRML MDRD: ABNORMAL ML/MIN/{1.73_M2}
GFR SERPL CREATININE-BSD FRML MDRD: ABNORMAL ML/MIN/{1.73_M2}
GLOBULIN: NORMAL G/DL (ref 1.5–3.8)
GLUCOSE BLD-MCNC: 169 MG/DL (ref 70–99)
GLUCOSE URINE, POC: NORMAL
HCT VFR BLD CALC: 43.8 % (ref 36.3–47.1)
HEMOGLOBIN: 14.5 G/DL (ref 11.9–15.1)
IMMATURE GRANULOCYTES: 0 %
KETONES, POC: NORMAL
LACTIC ACID: 2.3 MMOL/L (ref 0.5–2.2)
LEUKOCYTE EST, POC: NORMAL
LIPASE: 8 U/L (ref 13–60)
LYMPHOCYTES # BLD: 7 % (ref 24–43)
MCH RBC QN AUTO: 32.4 PG (ref 25.2–33.5)
MCHC RBC AUTO-ENTMCNC: 33.1 G/DL (ref 28.4–34.8)
MCV RBC AUTO: 98 FL (ref 82.6–102.9)
MONOCYTES # BLD: 3 % (ref 3–12)
NITRITE, POC: NORMAL
NRBC AUTOMATED: 0 PER 100 WBC
PDW BLD-RTO: 13.2 % (ref 11.8–14.4)
PH, POC: 5.5
PLATELET # BLD: 156 K/UL (ref 138–453)
PLATELET ESTIMATE: ABNORMAL
PMV BLD AUTO: 9.5 FL (ref 8.1–13.5)
POTASSIUM SERPL-SCNC: 4 MMOL/L (ref 3.7–5.3)
PROTEIN, POC: NORMAL
RBC # BLD: 4.47 M/UL (ref 3.95–5.11)
RBC # BLD: ABNORMAL 10*6/UL
SEG NEUTROPHILS: 90 % (ref 36–65)
SEGMENTED NEUTROPHILS ABSOLUTE COUNT: 7.11 K/UL (ref 1.5–8.1)
SODIUM BLD-SCNC: 142 MMOL/L (ref 135–144)
SPECIFIC GRAVITY, POC: 1.02
TOTAL PROTEIN: 6.6 G/DL (ref 6.4–8.3)
UROBILINOGEN, POC: 0.2
WBC # BLD: 7.9 K/UL (ref 3.5–11.3)
WBC # BLD: ABNORMAL 10*3/UL

## 2019-08-31 PROCEDURE — 83605 ASSAY OF LACTIC ACID: CPT

## 2019-08-31 PROCEDURE — 85025 COMPLETE CBC W/AUTO DIFF WBC: CPT

## 2019-08-31 PROCEDURE — 80048 BASIC METABOLIC PNL TOTAL CA: CPT

## 2019-08-31 PROCEDURE — 80076 HEPATIC FUNCTION PANEL: CPT

## 2019-08-31 PROCEDURE — 6360000004 HC RX CONTRAST MEDICATION: Performed by: EMERGENCY MEDICINE

## 2019-08-31 PROCEDURE — 96375 TX/PRO/DX INJ NEW DRUG ADDON: CPT

## 2019-08-31 PROCEDURE — 96374 THER/PROPH/DIAG INJ IV PUSH: CPT

## 2019-08-31 PROCEDURE — 6360000002 HC RX W HCPCS: Performed by: EMERGENCY MEDICINE

## 2019-08-31 PROCEDURE — 83690 ASSAY OF LIPASE: CPT

## 2019-08-31 PROCEDURE — 2580000003 HC RX 258: Performed by: EMERGENCY MEDICINE

## 2019-08-31 PROCEDURE — 81003 URINALYSIS AUTO W/O SCOPE: CPT

## 2019-08-31 PROCEDURE — 74177 CT ABD & PELVIS W/CONTRAST: CPT

## 2019-08-31 PROCEDURE — 99284 EMERGENCY DEPT VISIT MOD MDM: CPT

## 2019-08-31 RX ORDER — ONDANSETRON 2 MG/ML
4 INJECTION INTRAMUSCULAR; INTRAVENOUS ONCE
Status: COMPLETED | OUTPATIENT
Start: 2019-08-31 | End: 2019-08-31

## 2019-08-31 RX ORDER — 0.9 % SODIUM CHLORIDE 0.9 %
50 INTRAVENOUS SOLUTION INTRAVENOUS ONCE
Status: COMPLETED | OUTPATIENT
Start: 2019-08-31 | End: 2019-08-31

## 2019-08-31 RX ORDER — 0.9 % SODIUM CHLORIDE 0.9 %
1000 INTRAVENOUS SOLUTION INTRAVENOUS ONCE
Status: COMPLETED | OUTPATIENT
Start: 2019-08-31 | End: 2019-08-31

## 2019-08-31 RX ORDER — MORPHINE SULFATE 4 MG/ML
4 INJECTION, SOLUTION INTRAMUSCULAR; INTRAVENOUS ONCE
Status: COMPLETED | OUTPATIENT
Start: 2019-08-31 | End: 2019-08-31

## 2019-08-31 RX ORDER — SODIUM CHLORIDE 0.9 % (FLUSH) 0.9 %
10 SYRINGE (ML) INJECTION PRN
Status: DISCONTINUED | OUTPATIENT
Start: 2019-08-31 | End: 2019-08-31 | Stop reason: HOSPADM

## 2019-08-31 RX ADMIN — SODIUM CHLORIDE 50 ML: 9 INJECTION, SOLUTION INTRAVENOUS at 12:31

## 2019-08-31 RX ADMIN — IOPAMIDOL 75 ML: 755 INJECTION, SOLUTION INTRAVENOUS at 12:31

## 2019-08-31 RX ADMIN — Medication 10 ML: at 12:31

## 2019-08-31 RX ADMIN — ONDANSETRON 4 MG: 2 INJECTION INTRAMUSCULAR; INTRAVENOUS at 11:48

## 2019-08-31 RX ADMIN — SODIUM CHLORIDE 1000 ML: 9 INJECTION, SOLUTION INTRAVENOUS at 11:45

## 2019-08-31 RX ADMIN — MORPHINE SULFATE 4 MG: 4 INJECTION INTRAVENOUS at 11:48

## 2019-08-31 ASSESSMENT — PAIN DESCRIPTION - PAIN TYPE: TYPE: ACUTE PAIN

## 2019-08-31 ASSESSMENT — PAIN SCALES - GENERAL
PAINLEVEL_OUTOF10: 7
PAINLEVEL_OUTOF10: 7

## 2019-08-31 ASSESSMENT — PAIN DESCRIPTION - DESCRIPTORS: DESCRIPTORS: ACHING;CRAMPING

## 2019-08-31 ASSESSMENT — PAIN DESCRIPTION - PROGRESSION: CLINICAL_PROGRESSION: GRADUALLY WORSENING

## 2019-08-31 ASSESSMENT — PAIN DESCRIPTION - FREQUENCY: FREQUENCY: CONTINUOUS

## 2019-08-31 ASSESSMENT — PAIN DESCRIPTION - LOCATION: LOCATION: GROIN;ABDOMEN

## 2019-08-31 NOTE — ED NOTES
Patient  Offered oral fluids . waiting for further evaluation by doctor Aime Gonzales.       Luwana Cooks, RN  08/31/19 7452

## 2019-09-03 LAB
EKG ATRIAL RATE: 66 BPM
EKG P AXIS: 56 DEGREES
EKG P-R INTERVAL: 194 MS
EKG Q-T INTERVAL: 510 MS
EKG QRS DURATION: 80 MS
EKG QTC CALCULATION (BAZETT): 534 MS
EKG R AXIS: -43 DEGREES
EKG T AXIS: 28 DEGREES
EKG VENTRICULAR RATE: 66 BPM

## 2019-10-01 ENCOUNTER — HOSPITAL ENCOUNTER (EMERGENCY)
Age: 84
Discharge: HOME | End: 2019-10-01
Attending: EMERGENCY MEDICINE
Payer: MEDICARE

## 2019-10-01 VITALS
WEIGHT: 173 LBS | HEART RATE: 75 BPM | DIASTOLIC BLOOD PRESSURE: 53 MMHG | OXYGEN SATURATION: 93 % | BODY MASS INDEX: 30.65 KG/M2 | RESPIRATION RATE: 18 BRPM | SYSTOLIC BLOOD PRESSURE: 103 MMHG | HEIGHT: 63 IN

## 2019-10-01 DIAGNOSIS — Z01.30 BLOOD PRESSURE CHECK: Primary | ICD-10-CM

## 2019-10-01 PROCEDURE — 93005 ELECTROCARDIOGRAM TRACING: CPT

## 2019-10-01 PROCEDURE — 99282 EMERGENCY DEPT VISIT SF MDM: CPT

## 2019-10-01 ASSESSMENT — PAIN SCALES - GENERAL: PAINLEVEL_OUTOF10: 4

## 2019-10-02 LAB
EKG ATRIAL RATE: 75 BPM
EKG P-R INTERVAL: 208 MS
EKG Q-T INTERVAL: 454 MS
EKG QRS DURATION: 80 MS
EKG QTC CALCULATION (BAZETT): 543 MS
EKG R AXIS: -52 DEGREES
EKG T AXIS: 57 DEGREES
EKG VENTRICULAR RATE: 86 BPM

## 2019-11-12 ENCOUNTER — HOSPITAL ENCOUNTER (OUTPATIENT)
Age: 84
Setting detail: SPECIMEN
Discharge: HOME | End: 2019-11-12
Payer: MEDICARE

## 2019-11-12 LAB
ANION GAP SERPL CALCULATED.3IONS-SCNC: 13 MMOL/L (ref 9–17)
BUN BLDV-MCNC: 21 MG/DL (ref 8–23)
BUN/CREAT BLD: ABNORMAL (ref 9–20)
CALCIUM SERPL-MCNC: 9.2 MG/DL (ref 8.6–10.4)
CHLORIDE BLD-SCNC: 108 MMOL/L (ref 98–107)
CO2: 21 MMOL/L (ref 20–31)
CREAT SERPL-MCNC: 0.74 MG/DL (ref 0.5–0.9)
GFR AFRICAN AMERICAN: >60 ML/MIN
GFR NON-AFRICAN AMERICAN: >60 ML/MIN
GFR SERPL CREATININE-BSD FRML MDRD: ABNORMAL ML/MIN/{1.73_M2}
GFR SERPL CREATININE-BSD FRML MDRD: ABNORMAL ML/MIN/{1.73_M2}
GLUCOSE BLD-MCNC: 112 MG/DL (ref 70–99)
POTASSIUM SERPL-SCNC: 4.2 MMOL/L (ref 3.7–5.3)
SODIUM BLD-SCNC: 142 MMOL/L (ref 135–144)

## 2019-11-25 ENCOUNTER — TELEPHONE (OUTPATIENT)
Dept: GASTROENTEROLOGY | Age: 84
End: 2019-11-25

## 2019-11-26 ENCOUNTER — OFFICE VISIT (OUTPATIENT)
Dept: GASTROENTEROLOGY | Age: 84
End: 2019-11-26
Payer: MEDICARE

## 2019-11-26 VITALS — SYSTOLIC BLOOD PRESSURE: 119 MMHG | OXYGEN SATURATION: 91 % | HEART RATE: 68 BPM | DIASTOLIC BLOOD PRESSURE: 72 MMHG

## 2019-11-26 DIAGNOSIS — R10.9 ABDOMINAL PAIN, UNSPECIFIED ABDOMINAL LOCATION: ICD-10-CM

## 2019-11-26 DIAGNOSIS — D64.9 ANEMIA, UNSPECIFIED TYPE: Primary | ICD-10-CM

## 2019-11-26 PROCEDURE — 99214 OFFICE O/P EST MOD 30 MIN: CPT | Performed by: INTERNAL MEDICINE

## 2019-11-26 RX ORDER — DICYCLOMINE HYDROCHLORIDE 10 MG/1
10 CAPSULE ORAL 4 TIMES DAILY
Qty: 120 CAPSULE | Refills: 3 | Status: ON HOLD | OUTPATIENT
Start: 2019-11-26 | End: 2020-01-21

## 2019-11-26 ASSESSMENT — ENCOUNTER SYMPTOMS
WHEEZING: 0
VOMITING: 0
CONSTIPATION: 0
DIARRHEA: 0
ABDOMINAL PAIN: 1
ABDOMINAL DISTENTION: 0
NAUSEA: 0
BLOOD IN STOOL: 0
ANAL BLEEDING: 0
RECTAL PAIN: 0
CHOKING: 0
TROUBLE SWALLOWING: 0
COUGH: 0

## 2019-12-10 ENCOUNTER — TELEPHONE (OUTPATIENT)
Dept: GASTROENTEROLOGY | Age: 84
End: 2019-12-10

## 2019-12-19 ENCOUNTER — HOSPITAL ENCOUNTER (OUTPATIENT)
Dept: GENERAL RADIOLOGY | Age: 84
Discharge: HOME | End: 2019-12-21
Payer: MEDICARE

## 2019-12-19 ENCOUNTER — HOSPITAL ENCOUNTER (OUTPATIENT)
Age: 84
Discharge: HOME | End: 2019-12-21
Payer: MEDICARE

## 2019-12-19 DIAGNOSIS — R10.9 ABDOMINAL PAIN, UNSPECIFIED ABDOMINAL LOCATION: ICD-10-CM

## 2019-12-19 PROCEDURE — 73502 X-RAY EXAM HIP UNI 2-3 VIEWS: CPT

## 2020-01-20 ENCOUNTER — HOSPITAL ENCOUNTER (INPATIENT)
Age: 85
LOS: 2 days | Discharge: HOME HEALTH | DRG: 392 | End: 2020-01-22
Attending: EMERGENCY MEDICINE | Admitting: INTERNAL MEDICINE
Payer: MEDICARE

## 2020-01-20 ENCOUNTER — APPOINTMENT (OUTPATIENT)
Dept: CT IMAGING | Age: 85
DRG: 392 | End: 2020-01-20
Payer: MEDICARE

## 2020-01-20 PROBLEM — Z86.19 H/O CLOSTRIDIUM DIFFICILE INFECTION: Status: ACTIVE | Noted: 2020-01-20

## 2020-01-20 PROBLEM — K52.9 COLITIS: Status: ACTIVE | Noted: 2020-01-20

## 2020-01-20 PROBLEM — R19.7 DIARRHEA OF PRESUMED INFECTIOUS ORIGIN: Status: ACTIVE | Noted: 2020-01-20

## 2020-01-20 PROBLEM — N30.01 ACUTE CYSTITIS WITH HEMATURIA: Status: ACTIVE | Noted: 2020-01-20

## 2020-01-20 LAB
-: ABNORMAL
ABSOLUTE EOS #: 0.06 K/UL (ref 0–0.44)
ABSOLUTE IMMATURE GRANULOCYTE: 0.04 K/UL (ref 0–0.3)
ABSOLUTE LYMPH #: 1.06 K/UL (ref 1.1–3.7)
ABSOLUTE MONO #: 0.64 K/UL (ref 0.1–1.2)
ALBUMIN SERPL-MCNC: 3.5 G/DL (ref 3.5–5.2)
ALBUMIN/GLOBULIN RATIO: ABNORMAL (ref 1–2.5)
ALP BLD-CCNC: 140 U/L (ref 35–104)
ALT SERPL-CCNC: 7 U/L (ref 5–33)
AMORPHOUS: ABNORMAL
ANION GAP SERPL CALCULATED.3IONS-SCNC: 14 MMOL/L (ref 9–17)
AST SERPL-CCNC: 16 U/L
BACTERIA: ABNORMAL
BASOPHILS # BLD: 0 % (ref 0–2)
BASOPHILS ABSOLUTE: 0.05 K/UL (ref 0–0.2)
BILIRUB SERPL-MCNC: 1.06 MG/DL (ref 0.3–1.2)
BILIRUBIN URINE: NEGATIVE
BUN BLDV-MCNC: 24 MG/DL (ref 8–23)
BUN/CREAT BLD: 19 (ref 9–20)
CALCIUM SERPL-MCNC: 9 MG/DL (ref 8.6–10.4)
CASTS UA: ABNORMAL /LPF
CHLORIDE BLD-SCNC: 106 MMOL/L (ref 98–107)
CO2: 20 MMOL/L (ref 20–31)
COLOR: YELLOW
COMMENT UA: ABNORMAL
CREAT SERPL-MCNC: 1.24 MG/DL (ref 0.5–0.9)
CRYSTALS, UA: ABNORMAL /HPF
DIFFERENTIAL TYPE: ABNORMAL
EOSINOPHILS RELATIVE PERCENT: 1 % (ref 1–4)
EPITHELIAL CELLS UA: ABNORMAL /HPF (ref 0–5)
GFR AFRICAN AMERICAN: 49 ML/MIN
GFR NON-AFRICAN AMERICAN: 40 ML/MIN
GFR SERPL CREATININE-BSD FRML MDRD: ABNORMAL ML/MIN/{1.73_M2}
GFR SERPL CREATININE-BSD FRML MDRD: ABNORMAL ML/MIN/{1.73_M2}
GLUCOSE BLD-MCNC: 193 MG/DL (ref 70–99)
GLUCOSE URINE: NEGATIVE
HCT VFR BLD CALC: 43.9 % (ref 36.3–47.1)
HEMOGLOBIN: 14 G/DL (ref 11.9–15.1)
IMMATURE GRANULOCYTES: 0 %
KETONES, URINE: NEGATIVE
LACTIC ACID: 1.9 MMOL/L (ref 0.5–2.2)
LACTIC ACID: 3.2 MMOL/L (ref 0.5–2.2)
LEUKOCYTE ESTERASE, URINE: ABNORMAL
LIPASE: 22 U/L (ref 13–60)
LYMPHOCYTES # BLD: 9 % (ref 24–43)
MAGNESIUM: 1.8 MG/DL (ref 1.6–2.6)
MCH RBC QN AUTO: 32.3 PG (ref 25.2–33.5)
MCHC RBC AUTO-ENTMCNC: 31.9 G/DL (ref 28.4–34.8)
MCV RBC AUTO: 101.4 FL (ref 82.6–102.9)
MONOCYTES # BLD: 5 % (ref 3–12)
MUCUS: ABNORMAL
MYOGLOBIN: 69 NG/ML (ref 25–58)
MYOGLOBIN: 71 NG/ML (ref 25–58)
NITRITE, URINE: NEGATIVE
NRBC AUTOMATED: 0 PER 100 WBC
OTHER OBSERVATIONS UA: ABNORMAL
PDW BLD-RTO: 13.2 % (ref 11.8–14.4)
PH UA: 6.5 (ref 5–8)
PLATELET # BLD: 199 K/UL (ref 138–453)
PLATELET ESTIMATE: ABNORMAL
PMV BLD AUTO: 9.5 FL (ref 8.1–13.5)
POTASSIUM SERPL-SCNC: 3.6 MMOL/L (ref 3.7–5.3)
PROTEIN UA: ABNORMAL
RBC # BLD: 4.33 M/UL (ref 3.95–5.11)
RBC # BLD: ABNORMAL 10*6/UL
RBC UA: ABNORMAL /HPF (ref 0–2)
RENAL EPITHELIAL, UA: ABNORMAL /HPF
SEG NEUTROPHILS: 85 % (ref 36–65)
SEGMENTED NEUTROPHILS ABSOLUTE COUNT: 10.59 K/UL (ref 1.5–8.1)
SODIUM BLD-SCNC: 140 MMOL/L (ref 135–144)
SPECIFIC GRAVITY UA: 1.01 (ref 1–1.03)
TOTAL PROTEIN: 6.1 G/DL (ref 6.4–8.3)
TRICHOMONAS: ABNORMAL
TROPONIN INTERP: ABNORMAL
TROPONIN INTERP: ABNORMAL
TROPONIN T: ABNORMAL NG/ML
TROPONIN T: ABNORMAL NG/ML
TROPONIN, HIGH SENSITIVITY: 27 NG/L (ref 0–14)
TROPONIN, HIGH SENSITIVITY: 29 NG/L (ref 0–14)
TURBIDITY: ABNORMAL
URINE HGB: ABNORMAL
UROBILINOGEN, URINE: NORMAL
WBC # BLD: 12.4 K/UL (ref 3.5–11.3)
WBC # BLD: ABNORMAL 10*3/UL
WBC UA: ABNORMAL /HPF (ref 0–5)
YEAST: ABNORMAL

## 2020-01-20 PROCEDURE — 87506 IADNA-DNA/RNA PROBE TQ 6-11: CPT

## 2020-01-20 PROCEDURE — 83605 ASSAY OF LACTIC ACID: CPT

## 2020-01-20 PROCEDURE — 83690 ASSAY OF LIPASE: CPT

## 2020-01-20 PROCEDURE — 96361 HYDRATE IV INFUSION ADD-ON: CPT

## 2020-01-20 PROCEDURE — 84484 ASSAY OF TROPONIN QUANT: CPT

## 2020-01-20 PROCEDURE — 85025 COMPLETE CBC W/AUTO DIFF WBC: CPT

## 2020-01-20 PROCEDURE — 96360 HYDRATION IV INFUSION INIT: CPT

## 2020-01-20 PROCEDURE — 6370000000 HC RX 637 (ALT 250 FOR IP): Performed by: EMERGENCY MEDICINE

## 2020-01-20 PROCEDURE — 83735 ASSAY OF MAGNESIUM: CPT

## 2020-01-20 PROCEDURE — 99222 1ST HOSP IP/OBS MODERATE 55: CPT | Performed by: NURSE PRACTITIONER

## 2020-01-20 PROCEDURE — 82947 ASSAY GLUCOSE BLOOD QUANT: CPT

## 2020-01-20 PROCEDURE — 6370000000 HC RX 637 (ALT 250 FOR IP): Performed by: NURSE PRACTITIONER

## 2020-01-20 PROCEDURE — 74176 CT ABD & PELVIS W/O CONTRAST: CPT

## 2020-01-20 PROCEDURE — 87493 C DIFF AMPLIFIED PROBE: CPT

## 2020-01-20 PROCEDURE — 81001 URINALYSIS AUTO W/SCOPE: CPT

## 2020-01-20 PROCEDURE — 87086 URINE CULTURE/COLONY COUNT: CPT

## 2020-01-20 PROCEDURE — 83874 ASSAY OF MYOGLOBIN: CPT

## 2020-01-20 PROCEDURE — 80053 COMPREHEN METABOLIC PANEL: CPT

## 2020-01-20 PROCEDURE — 82272 OCCULT BLD FECES 1-3 TESTS: CPT

## 2020-01-20 PROCEDURE — 2580000003 HC RX 258: Performed by: EMERGENCY MEDICINE

## 2020-01-20 PROCEDURE — 2580000003 HC RX 258: Performed by: NURSE PRACTITIONER

## 2020-01-20 PROCEDURE — 6360000002 HC RX W HCPCS: Performed by: NURSE PRACTITIONER

## 2020-01-20 PROCEDURE — 87425 ROTAVIRUS AG IA: CPT

## 2020-01-20 PROCEDURE — 99285 EMERGENCY DEPT VISIT HI MDM: CPT

## 2020-01-20 PROCEDURE — 1200000000 HC SEMI PRIVATE

## 2020-01-20 PROCEDURE — 93005 ELECTROCARDIOGRAM TRACING: CPT | Performed by: NURSE PRACTITIONER

## 2020-01-20 RX ORDER — SODIUM POLYSTYRENE SULFONATE 15 G/60ML
15 SUSPENSION ORAL; RECTAL
Status: ACTIVE | OUTPATIENT
Start: 2020-01-20 | End: 2020-01-20

## 2020-01-20 RX ORDER — ONDANSETRON 4 MG/1
4 TABLET, ORALLY DISINTEGRATING ORAL EVERY 6 HOURS PRN
Status: DISCONTINUED | OUTPATIENT
Start: 2020-01-20 | End: 2020-01-22 | Stop reason: HOSPADM

## 2020-01-20 RX ORDER — 0.9 % SODIUM CHLORIDE 0.9 %
500 INTRAVENOUS SOLUTION INTRAVENOUS ONCE
Status: COMPLETED | OUTPATIENT
Start: 2020-01-20 | End: 2020-01-20

## 2020-01-20 RX ORDER — NICOTINE POLACRILEX 4 MG
15 LOZENGE BUCCAL PRN
Status: DISCONTINUED | OUTPATIENT
Start: 2020-01-20 | End: 2020-01-22 | Stop reason: HOSPADM

## 2020-01-20 RX ORDER — SODIUM POLYSTYRENE SULFONATE 15 G/60ML
30 SUSPENSION ORAL; RECTAL
Status: ACTIVE | OUTPATIENT
Start: 2020-01-20 | End: 2020-01-20

## 2020-01-20 RX ORDER — SULFAMETHOXAZOLE AND TRIMETHOPRIM 800; 160 MG/1; MG/1
1 TABLET ORAL ONCE
Status: COMPLETED | OUTPATIENT
Start: 2020-01-20 | End: 2020-01-20

## 2020-01-20 RX ORDER — DICYCLOMINE HYDROCHLORIDE 10 MG/1
10 CAPSULE ORAL 4 TIMES DAILY
Status: DISCONTINUED | OUTPATIENT
Start: 2020-01-20 | End: 2020-01-22

## 2020-01-20 RX ORDER — FENTANYL CITRATE 50 UG/ML
25 INJECTION, SOLUTION INTRAMUSCULAR; INTRAVENOUS ONCE
Status: COMPLETED | OUTPATIENT
Start: 2020-01-20 | End: 2020-01-20

## 2020-01-20 RX ORDER — ONDANSETRON 2 MG/ML
4 INJECTION INTRAMUSCULAR; INTRAVENOUS EVERY 6 HOURS PRN
Status: DISCONTINUED | OUTPATIENT
Start: 2020-01-20 | End: 2020-01-20 | Stop reason: SDUPTHER

## 2020-01-20 RX ORDER — HEPARIN SODIUM 5000 [USP'U]/ML
5000 INJECTION, SOLUTION INTRAVENOUS; SUBCUTANEOUS EVERY 8 HOURS SCHEDULED
Status: DISCONTINUED | OUTPATIENT
Start: 2020-01-20 | End: 2020-01-22 | Stop reason: HOSPADM

## 2020-01-20 RX ORDER — SUCRALFATE 1 G/1
1 TABLET ORAL 4 TIMES DAILY
Status: DISCONTINUED | OUTPATIENT
Start: 2020-01-20 | End: 2020-01-22

## 2020-01-20 RX ORDER — SODIUM CHLORIDE 9 MG/ML
INJECTION, SOLUTION INTRAVENOUS CONTINUOUS
Status: DISCONTINUED | OUTPATIENT
Start: 2020-01-20 | End: 2020-01-22

## 2020-01-20 RX ORDER — SODIUM CHLORIDE 9 MG/ML
1000 INJECTION, SOLUTION INTRAVENOUS CONTINUOUS
Status: DISCONTINUED | OUTPATIENT
Start: 2020-01-20 | End: 2020-01-20 | Stop reason: SDUPTHER

## 2020-01-20 RX ORDER — ONDANSETRON 2 MG/ML
4 INJECTION INTRAMUSCULAR; INTRAVENOUS EVERY 6 HOURS PRN
Status: DISCONTINUED | OUTPATIENT
Start: 2020-01-20 | End: 2020-01-22 | Stop reason: HOSPADM

## 2020-01-20 RX ORDER — ACETAMINOPHEN 160 MG
2000 TABLET,DISINTEGRATING ORAL EVERY OTHER DAY
COMMUNITY

## 2020-01-20 RX ORDER — AMIODARONE HYDROCHLORIDE 200 MG/1
200 TABLET ORAL
Status: DISCONTINUED | OUTPATIENT
Start: 2020-01-20 | End: 2020-01-22

## 2020-01-20 RX ORDER — LEVOTHYROXINE SODIUM 0.12 MG/1
125 TABLET ORAL DAILY
Status: DISCONTINUED | OUTPATIENT
Start: 2020-01-21 | End: 2020-01-22 | Stop reason: HOSPADM

## 2020-01-20 RX ORDER — POTASSIUM CHLORIDE 750 MG/1
10 CAPSULE, EXTENDED RELEASE ORAL 2 TIMES DAILY
Status: DISCONTINUED | OUTPATIENT
Start: 2020-01-20 | End: 2020-01-22 | Stop reason: HOSPADM

## 2020-01-20 RX ORDER — FUROSEMIDE 20 MG/1
20 TABLET ORAL DAILY
Status: ON HOLD | COMMUNITY
End: 2022-04-14 | Stop reason: ALTCHOICE

## 2020-01-20 RX ORDER — DEXTROSE MONOHYDRATE 50 MG/ML
100 INJECTION, SOLUTION INTRAVENOUS PRN
Status: DISCONTINUED | OUTPATIENT
Start: 2020-01-20 | End: 2020-01-22 | Stop reason: HOSPADM

## 2020-01-20 RX ORDER — DEXTROSE MONOHYDRATE 25 G/50ML
12.5 INJECTION, SOLUTION INTRAVENOUS PRN
Status: DISCONTINUED | OUTPATIENT
Start: 2020-01-20 | End: 2020-01-22 | Stop reason: HOSPADM

## 2020-01-20 RX ORDER — PANTOPRAZOLE SODIUM 40 MG/1
40 TABLET, DELAYED RELEASE ORAL
Status: DISCONTINUED | OUTPATIENT
Start: 2020-01-21 | End: 2020-01-22 | Stop reason: HOSPADM

## 2020-01-20 RX ADMIN — POTASSIUM CHLORIDE 10 MEQ: 750 CAPSULE, EXTENDED RELEASE ORAL at 23:59

## 2020-01-20 RX ADMIN — SODIUM CHLORIDE 1000 ML: 9 INJECTION, SOLUTION INTRAVENOUS at 21:14

## 2020-01-20 RX ADMIN — SODIUM CHLORIDE 500 ML: 9 INJECTION, SOLUTION INTRAVENOUS at 20:29

## 2020-01-20 RX ADMIN — Medication 125 MG: at 21:28

## 2020-01-20 RX ADMIN — SODIUM CHLORIDE: 9 INJECTION, SOLUTION INTRAVENOUS at 23:59

## 2020-01-20 RX ADMIN — SODIUM CHLORIDE 500 ML: 0.9 INJECTION, SOLUTION INTRAVENOUS at 18:44

## 2020-01-20 RX ADMIN — FENTANYL CITRATE 25 MCG: 50 INJECTION, SOLUTION INTRAMUSCULAR; INTRAVENOUS at 17:30

## 2020-01-20 RX ADMIN — SULFAMETHOXAZOLE AND TRIMETHOPRIM 1 TABLET: 800; 160 TABLET ORAL at 20:30

## 2020-01-20 RX ADMIN — SODIUM CHLORIDE 500 ML: 0.9 INJECTION, SOLUTION INTRAVENOUS at 17:16

## 2020-01-20 ASSESSMENT — ENCOUNTER SYMPTOMS
NAUSEA: 1
VOMITING: 1
ABDOMINAL PAIN: 1
SHORTNESS OF BREATH: 0
DIARRHEA: 1

## 2020-01-20 ASSESSMENT — PAIN SCALES - GENERAL
PAINLEVEL_OUTOF10: 9
PAINLEVEL_OUTOF10: 0
PAINLEVEL_OUTOF10: 9

## 2020-01-20 NOTE — ED PROVIDER NOTES
EMERGENCY DEPARTMENT ENCOUNTER   ATTENDING ATTESTATION     Pt Name: Dianna Menendez  MRN: [id number]  [place] 8/2/1925  Date of evaluation: 1/20/20   Dianna Menendez is a 80 y.o. female with CC: Emesis and Diarrhea    MDM:   Patient is a 42-year-old female who presented to the emergency department via EMS from nursing facility secondary to nausea vomiting and diarrhea. Patient covered and foul-smelling feces on arrival, she does have a previous history of C. difficile. Labs obtained, imaging, IV hydration, patient will likely require admission. Urinalysis consistent with UTI, patient was initially on Macrobid. Given concern for C. difficile patient initiated on vancomycin earlier she is previously received metronidazole. Patient discussed with internal medicine service who agreed to admit for further evaluation and treatment. CRITICAL CARE:       EKG: All EKG's are interpreted by the Emergency Department Physician who either signs or Co-signs this chart in the absence of a cardiologist.      RADIOLOGY:All plain film, CT, MRI, and formal ultrasound images (except ED bedside ultrasound) are read by the radiologist, see reports below, unless otherwise noted in MDM or here. CT ABDOMEN PELVIS WO CONTRAST Additional Contrast? None   Final Result   Possible nonspecific colitis. Sensitivity limited without IV and oral   contrast.  Incidental findings as noted above. LABS: All lab results were reviewed by myself, and all abnormals are listed below.   Labs Reviewed   COMPREHENSIVE METABOLIC PANEL W/ REFLEX TO MG FOR LOW K - Abnormal; Notable for the following components:       Result Value    Glucose 193 (*)     BUN 24 (*)     CREATININE 1.24 (*)     Potassium 3.6 (*)     Alkaline Phosphatase 140 (*)     Total Protein 6.1 (*)     GFR Non- 40 (*)     GFR  49 (*)     All other components within normal limits   CBC WITH AUTO DIFFERENTIAL - Abnormal; Notable for the following components:    WBC 12.4 (*)     Seg Neutrophils 85 (*)     Lymphocytes 9 (*)     Segs Absolute 10.59 (*)     Absolute Lymph # 1.06 (*)     All other components within normal limits   TROP/MYOGLOBIN - Abnormal; Notable for the following components:    Troponin, High Sensitivity 29 (*)     Myoglobin 69 (*)     All other components within normal limits   TROP/MYOGLOBIN - Abnormal; Notable for the following components:    Troponin, High Sensitivity 27 (*)     Myoglobin 71 (*)     All other components within normal limits   LACTIC ACID - Abnormal; Notable for the following components:    Lactic Acid 3.2 (*)     All other components within normal limits   URINALYSIS - Abnormal; Notable for the following components:    Turbidity UA SLIGHTLY CLOUDY (*)     Urine Hgb 3+ (*)     Protein, UA TRACE (*)     Leukocyte Esterase, Urine SMALL (*)     All other components within normal limits   MICROSCOPIC URINALYSIS - Abnormal; Notable for the following components:    Bacteria, UA MANY (*)     Amorphous, UA 2+ (*)     All other components within normal limits   CULTURE STOOL   C. DIFFICILE TOXIN MOLECULAR   CULTURE, URINE CATHETER   MAGNESIUM   LIPASE   LACTIC ACID   BLOOD OCCULT STOOL SCREEN #1   ROTAVIRUS ANTIGEN, STOOL     CONSULTS:  IP CONSULT TO INTERNAL MEDICINE  FINAL IMPRESSION      1. Diarrhea, unspecified type    2. STACEY (acute kidney injury) ([facility]    3.  Colitis            PASTMEDICAL HISTORY     Past Medical History:   Diagnosis Date    A-fib [facility])     AAA (abdominal aortic aneurysm) (HCC)     Abdominal cramping     Atrial fibrillation (HCC)     Back pain, chronic     C. difficile colitis     COPD (chronic obstructive pulmonary disease) (HCC)     Diabetes mellitus (HCC)     Gastritis     GERD (gastroesophageal reflux disease)     Hiatal hernia     Hypoalbuminemia 10/14/2014    IBS (irritable bowel syndrome)     Nausea vomiting and diarrhea 10/14/2014    Stomach ulcer     Thyroid disease     Type 2 diabetes assessment, treatment plan, and disposition of the patient as recorded by the APC.    Marcin Dave MD  Attending Emergency Physician          Marcin Dave MD  10/31/41 2051

## 2020-01-20 NOTE — ED NOTES
Bed: 22  Expected date: 1/20/20  Expected time: 4:41 PM  Means of arrival: [facility]  Comments:  [facility]     Charles Smith  01/20/20 7850

## 2020-01-20 NOTE — ED PROVIDER NOTES
[address] [place] [address] ED  EMERGENCY DEPARTMENT ENCOUNTER      Pt Name: Sepideh Arteaga  MRN: [id number]  [place] 8/2/1925  Date of evaluation: 1/20/2020  Provider: PERCY Chávez [id number]       Chief Complaint   Patient presents with    Emesis    Diarrhea         HISTORY OFPRESENT ILLNESS  (Location/Symptom, Timing/Onset, Context/Setting, Quality, Duration, Modifying Factors, Severity.)   Sepideh Arteaga is a 80 y.o. female who presents to the emergency department by ems for evaluation of abdominal pain, vomiting and diarrhea. Abdominal pain is out of 10. No chest pain or shortness of breath. Patient arrives emergency department covered in liquid tan stool. Nursing Notes were reviewed.     PASTMEDICAL HISTORY     Past Medical History:   Diagnosis Date    A-fib [facility])     AAA (abdominal aortic aneurysm) (HCC)     Abdominal cramping     Atrial fibrillation (HCC)     Back pain, chronic     C. difficile colitis     COPD (chronic obstructive pulmonary disease) (HCC)     Diabetes mellitus (HCC)     Gastritis     GERD (gastroesophageal reflux disease)     Hiatal hernia     Hypoalbuminemia 10/14/2014    IBS (irritable bowel syndrome)     Nausea vomiting and diarrhea 10/14/2014    Stomach ulcer     Thyroid disease     Type 2 diabetes mellitus without complication, without long-term current use of insulin ([facility] 1/24/2018    UTI (lower urinary tract infection)     Varicose veins          SURGICAL HISTORY       Past Surgical History:   Procedure Laterality Date    CHOLECYSTECTOMY      HERNIA REPAIR      HYSTERECTOMY      partial    DC EGD TRANSORAL BIOPSY SINGLE/MULTIPLE N/A 6/20/2017    EGD BIOPSY performed by Jessica Melvin MD at [address] [place] [address]  06/20/2017    MILD CHRONIC INACTIVE GASTRITIS    VASCULAR SURGERY      varicose veins    VEIN SURGERY           CURRENT MEDICATIONS     Previous Medications    AMIODARONE (CORDARONE) 200 MG TABLET    Take 200 mg by mouth three times a week     DICYCLOMINE (BENTYL) 10 MG CAPSULE    Take 1 capsule by mouth 4 times daily    FUROSEMIDE (LASIX) 20 MG TABLET    Take 1 tablet by mouth daily    LEVOTHYROXINE (SYNTHROID) 125 MCG TABLET    TAKE ONE TABLET BY MOUTH DAILY    LISINOPRIL (PRINIVIL;ZESTRIL) 20 MG TABLET    Take 1 tablet by mouth daily    ONDANSETRON (ZOFRAN ODT) 4 MG DISINTEGRATING TABLET    Take 1 tablet by mouth every 8 hours as needed for Nausea    OXYGEN    Inhale into the lungs Uses at night and prn    PANTOPRAZOLE (PROTONIX) 40 MG TABLET    TAKE ONE TABLET BY MOUTH DAILY    POTASSIUM CHLORIDE (KLOR-CON) 10 MEQ EXTENDED RELEASE TABLET    TAKE ONE TABLET BY MOUTH TWICE A DAY    SUCRALFATE (CARAFATE) 1 GM TABLET    Take 1 tablet by mouth 4 times daily       ALLERGIES     Penicillins    FAMILY HISTORY       Family History   Family history unknown: Yes          SOCIAL HISTORY       Social History     Socioeconomic History    Marital status:      Spouse name: None    Number of children: 2    Years of education: 12    Highest education level: None   Occupational History    None   Social Needs    Financial resource strain: None    Food insecurity:     Worry: None     Inability: None    Transportation needs:     Medical: None     Non-medical: None   Tobacco Use    Smoking status: Former Smoker    Smokeless tobacco: Never Used   Substance and Sexual Activity    Alcohol use:  Yes     Alcohol/week: 2.0 standard drinks     Types: 1 Glasses of wine, 1 Cans of beer per week    Drug use: No    Sexual activity: Never     Birth control/protection: Surgical     Comment: hyst   Lifestyle    Physical activity:     Days per week: None     Minutes per session: None    Stress: None   Relationships    Social connections:     Talks on phone: None     Gets together: None     Attends Gnosticism service: None     Active member of club or organization: None     Attends meetings of clubs or organizations: None     Relationship Mental Status: She is alert and oriented to person, place, and time. GCS: GCS eye subscore is 4. GCS verbal subscore is 5. GCS motor subscore is 6. Cranial Nerves: Cranial nerves are intact. Sensory: Sensation is intact. Motor: Motor function is intact. Coordination: Coordination is intact. Psychiatric:         Mood and Affect: Mood normal.         Behavior: Behavior normal.         Thought Content: Thought content normal.         Judgment: Judgment normal.           DIAGNOSTIC RESULTS     EKG:All EKG's are interpreted by the Emergency Department Physician who either signs or Co-signs this chart in the absence of a cardiologist.    EKG interpreted by attending physician    RADIOLOGY:   Non-plain film images such as CT, Ultrasound and MRI are read by theradiologist. Plain radiographic images are visualized and preliminarily interpreted by the emergency physician with the below findings:    Ct Abdomen Pelvis Wo Contrast Additional Contrast? None    Result Date: 1/20/2020  EXAMINATION: CT OF THE ABDOMEN AND PELVIS WITHOUT CONTRAST 1/20/2020 5:18 pm TECHNIQUE: CT of the abdomen and pelvis was performed without the administration of intravenous contrast. Multiplanar reformatted images are provided for review. Dose modulation, iterative reconstruction, and/or weight based adjustment of the mA/kV was utilized to reduce the radiation dose to as low as reasonably achievable. COMPARISON: August 31, 2019 CT abdomen and pelvis HISTORY: ORDERING SYSTEM PROVIDED HISTORY: abdominal pain, diarrhea TECHNOLOGIST PROVIDED HISTORY: abdominal pain, diarrhea FINDINGS: Lower Chest: Centrilobular paraseptal emphysema and cardiomegaly. Organs: The abdominal wall appears normal. The liver, spleen, pancreas, and adrenals appear normal.  The gallbladder is surgically absent. There are dilated intra and extrahepatic bile ducts. Right renal cystic lesion appears stable measuring 37 x 21 mm.   Left Leonor renal fluid Sensitivity 27 (*)     Myoglobin 71 (*)     All other components within normal limits   LACTIC ACID - Abnormal; Notable for the following components:    Lactic Acid 3.2 (*)     All other components within normal limits   URINALYSIS - Abnormal; Notable for the following components:    Turbidity UA SLIGHTLY CLOUDY (*)     Urine Hgb 3+ (*)     Protein, UA TRACE (*)     Leukocyte Esterase, Urine SMALL (*)     All other components within normal limits   MICROSCOPIC URINALYSIS - Abnormal; Notable for the following components:    Bacteria, UA MANY (*)     Amorphous, UA 2+ (*)     All other components within normal limits   CULTURE STOOL   C. DIFFICILE TOXIN MOLECULAR   CULTURE, URINE CATHETER   MAGNESIUM   LIPASE   BLOOD OCCULT STOOL SCREEN #1   ROTAVIRUS ANTIGEN, STOOL   LACTIC ACID       All other labs were within normal range or not returned as of this dictation. EMERGENCY DEPARTMENT COURSE andDIFFERENTIAL DIAGNOSIS/MDM:   Patient was evaluated in conjunction with attending physician. Urinalysis shows 10-20 white blood cells and many bacteria. Urine sent for culture. Labs reviewed. CT abdomen pelvis per radiologist shows possible nonspecific colitis. Patient's blood pressure was 102/52 upon arrival.  She was given 1500 mL bolus normal saline. She is also given fentanyl and was started on Bactrim for UTI. He is afebrile. Blood pressure is 124/65 after receiving IV fluids. She will be admitted for further evaluation. Vitals:    Vitals:    01/20/20 1725 01/20/20 1817 01/20/20 1847 01/20/20 1947   BP: (!) 100/50 (!) 77/50 (!) 114/56 124/65   Pulse: 61 65 63 75   Resp: 21 13 19 16   Temp:       TempSrc:       SpO2: 92% (!) 89% 93% 92%   Weight:       Height:             CONSULTS:  IP CONSULT TO INTERNAL MEDICINE    RES:  Procedures    FINAL IMPRESSION      1. Diarrhea, unspecified type    2. STACEY (acute kidney injury) ([facility]    3.  Colitis          DISPOSITION/PLAN   DISPOSITION Decision To Admit 01/20/2020

## 2020-01-21 ENCOUNTER — APPOINTMENT (OUTPATIENT)
Dept: ULTRASOUND IMAGING | Age: 85
DRG: 392 | End: 2020-01-21
Payer: MEDICARE

## 2020-01-21 PROBLEM — E11.65 TYPE 2 DIABETES MELLITUS WITH HYPERGLYCEMIA, WITHOUT LONG-TERM CURRENT USE OF INSULIN (HCC): Status: ACTIVE | Noted: 2018-01-24

## 2020-01-21 PROBLEM — E86.0 DEHYDRATION: Status: ACTIVE | Noted: 2020-01-21

## 2020-01-21 PROBLEM — E87.20 LACTIC ACID INCREASED: Status: ACTIVE | Noted: 2020-01-21

## 2020-01-21 LAB
-: ABNORMAL
ALBUMIN SERPL-MCNC: 3.2 G/DL (ref 3.5–5.2)
ALBUMIN/GLOBULIN RATIO: ABNORMAL (ref 1–2.5)
ALP BLD-CCNC: 99 U/L (ref 35–104)
ALT SERPL-CCNC: 6 U/L (ref 5–33)
AMORPHOUS: ABNORMAL
ANION GAP SERPL CALCULATED.3IONS-SCNC: 10 MMOL/L (ref 9–17)
AST SERPL-CCNC: 15 U/L
BACTERIA: ABNORMAL
BILIRUB SERPL-MCNC: 0.91 MG/DL (ref 0.3–1.2)
BILIRUBIN URINE: ABNORMAL
BUN BLDV-MCNC: 26 MG/DL (ref 8–23)
BUN/CREAT BLD: 23 (ref 9–20)
CALCIUM SERPL-MCNC: 8.5 MG/DL (ref 8.6–10.4)
CASTS UA: ABNORMAL /LPF
CHLORIDE BLD-SCNC: 110 MMOL/L (ref 98–107)
CO2: 22 MMOL/L (ref 20–31)
COLOR: YELLOW
COMMENT UA: ABNORMAL
CREAT SERPL-MCNC: 1.12 MG/DL (ref 0.5–0.9)
CRYSTALS, UA: ABNORMAL /HPF
CULTURE: NORMAL
DATE, STOOL #1: ABNORMAL
DATE, STOOL #2: ABNORMAL
DATE, STOOL #3: ABNORMAL
EKG ATRIAL RATE: 57 BPM
EKG Q-T INTERVAL: 650 MS
EKG QRS DURATION: 86 MS
EKG QTC CALCULATION (BAZETT): 632 MS
EKG R AXIS: -47 DEGREES
EKG T AXIS: 52 DEGREES
EKG VENTRICULAR RATE: 57 BPM
EPITHELIAL CELLS UA: ABNORMAL /HPF (ref 0–5)
GFR AFRICAN AMERICAN: 55 ML/MIN
GFR NON-AFRICAN AMERICAN: 45 ML/MIN
GFR SERPL CREATININE-BSD FRML MDRD: ABNORMAL ML/MIN/{1.73_M2}
GFR SERPL CREATININE-BSD FRML MDRD: ABNORMAL ML/MIN/{1.73_M2}
GLUCOSE BLD-MCNC: 120 MG/DL (ref 70–99)
GLUCOSE URINE: NEGATIVE
HCT VFR BLD CALC: 38.1 % (ref 36.3–47.1)
HEMOCCULT SP1 STL QL: POSITIVE
HEMOCCULT SP2 STL QL: ABNORMAL
HEMOCCULT SP3 STL QL: ABNORMAL
HEMOGLOBIN: 12.3 G/DL (ref 11.9–15.1)
INR BLD: 1.1
KETONES, URINE: NEGATIVE
LEUKOCYTE ESTERASE, URINE: ABNORMAL
Lab: NORMAL
MCH RBC QN AUTO: 32.3 PG (ref 25.2–33.5)
MCHC RBC AUTO-ENTMCNC: 32.3 G/DL (ref 28.4–34.8)
MCV RBC AUTO: 100 FL (ref 82.6–102.9)
MUCUS: ABNORMAL
NITRITE, URINE: NEGATIVE
NRBC AUTOMATED: 0 PER 100 WBC
OTHER OBSERVATIONS UA: ABNORMAL
PDW BLD-RTO: 13.6 % (ref 11.8–14.4)
PH UA: 5 (ref 5–8)
PLATELET # BLD: 163 K/UL (ref 138–453)
PMV BLD AUTO: 9.5 FL (ref 8.1–13.5)
POTASSIUM SERPL-SCNC: 4.2 MMOL/L (ref 3.7–5.3)
PROTEIN UA: NEGATIVE
PROTHROMBIN TIME: 11.7 SEC (ref 9.7–11.6)
RBC # BLD: 3.81 M/UL (ref 3.95–5.11)
RBC UA: ABNORMAL /HPF (ref 0–2)
RENAL EPITHELIAL, UA: ABNORMAL /HPF
SODIUM BLD-SCNC: 142 MMOL/L (ref 135–144)
SODIUM,UR: 50 MMOL/L
SPECIFIC GRAVITY UA: 1.02 (ref 1–1.03)
SPECIMEN DESCRIPTION: NORMAL
TIME, STOOL #1: 2215
TIME, STOOL #2: ABNORMAL
TIME, STOOL #3: ABNORMAL
TOTAL PROTEIN, URINE: 58 MG/DL
TOTAL PROTEIN: 5.5 G/DL (ref 6.4–8.3)
TRICHOMONAS: ABNORMAL
TURBIDITY: CLEAR
URINE HGB: ABNORMAL
UROBILINOGEN, URINE: NORMAL
WBC # BLD: 11.6 K/UL (ref 3.5–11.3)
WBC UA: ABNORMAL /HPF (ref 0–5)
YEAST: ABNORMAL

## 2020-01-21 PROCEDURE — 85027 COMPLETE CBC AUTOMATED: CPT

## 2020-01-21 PROCEDURE — 85610 PROTHROMBIN TIME: CPT

## 2020-01-21 PROCEDURE — 99232 SBSQ HOSP IP/OBS MODERATE 35: CPT | Performed by: INTERNAL MEDICINE

## 2020-01-21 PROCEDURE — 81001 URINALYSIS AUTO W/SCOPE: CPT

## 2020-01-21 PROCEDURE — 80053 COMPREHEN METABOLIC PANEL: CPT

## 2020-01-21 PROCEDURE — 6370000000 HC RX 637 (ALT 250 FOR IP): Performed by: NURSE PRACTITIONER

## 2020-01-21 PROCEDURE — 76770 US EXAM ABDO BACK WALL COMP: CPT

## 2020-01-21 PROCEDURE — 2580000003 HC RX 258: Performed by: NURSE PRACTITIONER

## 2020-01-21 PROCEDURE — 84156 ASSAY OF PROTEIN URINE: CPT

## 2020-01-21 PROCEDURE — 1200000000 HC SEMI PRIVATE

## 2020-01-21 PROCEDURE — 6370000000 HC RX 637 (ALT 250 FOR IP): Performed by: INTERNAL MEDICINE

## 2020-01-21 PROCEDURE — 84300 ASSAY OF URINE SODIUM: CPT

## 2020-01-21 PROCEDURE — 36415 COLL VENOUS BLD VENIPUNCTURE: CPT

## 2020-01-21 RX ORDER — SULFAMETHOXAZOLE AND TRIMETHOPRIM 800; 160 MG/1; MG/1
1 TABLET ORAL ONCE
Status: DISCONTINUED | OUTPATIENT
Start: 2020-01-21 | End: 2020-01-21

## 2020-01-21 RX ORDER — SUCRALFATE 1 G/1
1 TABLET ORAL 2 TIMES DAILY
COMMUNITY
End: 2020-02-27

## 2020-01-21 RX ORDER — TRAMADOL HYDROCHLORIDE 50 MG/1
50 TABLET ORAL EVERY 6 HOURS PRN
COMMUNITY

## 2020-01-21 RX ORDER — DICYCLOMINE HYDROCHLORIDE 10 MG/1
10 CAPSULE ORAL 4 TIMES DAILY PRN
COMMUNITY
End: 2021-01-29

## 2020-01-21 RX ORDER — LEVOTHYROXINE SODIUM 0.12 MG/1
125 TABLET ORAL EVERY OTHER DAY
COMMUNITY

## 2020-01-21 RX ORDER — CIPROFLOXACIN 500 MG/1
250 TABLET, FILM COATED ORAL EVERY 12 HOURS SCHEDULED
Status: DISCONTINUED | OUTPATIENT
Start: 2020-01-21 | End: 2020-01-21

## 2020-01-21 RX ORDER — LANOLIN ALCOHOL/MO/W.PET/CERES
3 CREAM (GRAM) TOPICAL NIGHTLY PRN
Status: ON HOLD | COMMUNITY
End: 2022-02-06 | Stop reason: HOSPADM

## 2020-01-21 RX ORDER — CEPHALEXIN 250 MG/1
250 CAPSULE ORAL EVERY 6 HOURS SCHEDULED
Status: DISCONTINUED | OUTPATIENT
Start: 2020-01-21 | End: 2020-01-22 | Stop reason: HOSPADM

## 2020-01-21 RX ORDER — ACETAMINOPHEN 325 MG/1
650 TABLET ORAL EVERY 4 HOURS PRN
Status: DISCONTINUED | OUTPATIENT
Start: 2020-01-21 | End: 2020-01-22 | Stop reason: HOSPADM

## 2020-01-21 RX ADMIN — DICYCLOMINE HYDROCHLORIDE 10 MG: 10 CAPSULE ORAL at 08:02

## 2020-01-21 RX ADMIN — CEPHALEXIN 250 MG: 250 CAPSULE ORAL at 12:26

## 2020-01-21 RX ADMIN — DICYCLOMINE HYDROCHLORIDE 10 MG: 10 CAPSULE ORAL at 20:33

## 2020-01-21 RX ADMIN — POTASSIUM CHLORIDE 10 MEQ: 750 CAPSULE, EXTENDED RELEASE ORAL at 08:41

## 2020-01-21 RX ADMIN — LEVOTHYROXINE SODIUM 125 MCG: 125 TABLET ORAL at 06:22

## 2020-01-21 RX ADMIN — SODIUM CHLORIDE: 9 INJECTION, SOLUTION INTRAVENOUS at 20:38

## 2020-01-21 RX ADMIN — Medication 125 MG: at 08:03

## 2020-01-21 RX ADMIN — SUCRALFATE 1 G: 1 TABLET ORAL at 20:33

## 2020-01-21 RX ADMIN — Medication 125 MG: at 15:59

## 2020-01-21 RX ADMIN — PANTOPRAZOLE SODIUM 40 MG: 40 TABLET, DELAYED RELEASE ORAL at 06:22

## 2020-01-21 RX ADMIN — SUCRALFATE 1 G: 1 TABLET ORAL at 08:02

## 2020-01-21 RX ADMIN — Medication 125 MG: at 20:33

## 2020-01-21 RX ADMIN — SODIUM CHLORIDE: 9 INJECTION, SOLUTION INTRAVENOUS at 06:54

## 2020-01-21 RX ADMIN — CEPHALEXIN 250 MG: 250 CAPSULE ORAL at 17:22

## 2020-01-21 RX ADMIN — SUCRALFATE 1 G: 1 TABLET ORAL at 17:22

## 2020-01-21 RX ADMIN — Medication 125 MG: at 03:05

## 2020-01-21 RX ADMIN — SUCRALFATE 1 G: 1 TABLET ORAL at 12:26

## 2020-01-21 RX ADMIN — POTASSIUM CHLORIDE 10 MEQ: 750 CAPSULE, EXTENDED RELEASE ORAL at 20:33

## 2020-01-21 RX ADMIN — CIPROFLOXACIN 250 MG: 500 TABLET ORAL at 06:22

## 2020-01-21 RX ADMIN — DICYCLOMINE HYDROCHLORIDE 10 MG: 10 CAPSULE ORAL at 17:22

## 2020-01-21 RX ADMIN — DICYCLOMINE HYDROCHLORIDE 10 MG: 10 CAPSULE ORAL at 12:26

## 2020-01-21 ASSESSMENT — PAIN SCALES - GENERAL
PAINLEVEL_OUTOF10: 0
PAINLEVEL_OUTOF10: 0

## 2020-01-21 NOTE — DISCHARGE INSTR - COC
Assisted  Transfer  Independent  Bathing  Assisted  Dressing  Independent  [address]  Independent  Med Delivery   whole    Wound Care Documentation and Therapy:        Elimination:  Continence:   · Bowel: Yes  · Bladder: Yes  Urinary Catheter: None   Colostomy/Ileostomy/Ileal Conduit: No       Date of Last BM: 1/22/2020    Intake/Output Summary (Last 24 hours) at 1/21/2020 1606  Last data filed at 1/21/2020 1420  Gross per 24 hour   Intake --   Output 550 ml   Net -550 ml     I/O last 3 completed shifts:  In: -   Out: 550 [Urine:550]    Safety Concerns:     History of Falls (last 30 days) and At Risk for Falls    Impairments/Disabilities:      Hearing    Nutrition Therapy:  Current Nutrition Therapy:   - Oral Diet:  General    Routes of Feeding: Oral  Liquids: No Restrictions  Daily Fluid Restriction: no  Last Modified Barium Swallow with Video (Video Swallowing Test): not done    Treatments at the Time of Hospital Discharge:   Respiratory Treatments: ***  Oxygen Therapy:  is not on home oxygen therapy.   Ventilator:    - No ventilator support    Rehab Therapies: Physical Therapy and Occupational Therapy  Weight Bearing Status/Restrictions: No weight bearing restirctions  Other Medical Equipment (for information only, NOT a DME order):  walker  Other Treatments: Skilled nursing assessment, med teaching and compliance    Patient's personal belongings (please select all that are sent with patient):  Hearing Aides bilateral    RN SIGNATURE:  Electronically signed by Tejal Weiss RN on 1/22/20 at 1:05 PM    CASE MANAGEMENT/SOCIAL WORK SECTION    Inpatient Status Date: ***    Readmission Risk Assessment Score:  Readmission Risk              Risk of Unplanned Readmission:        21           Discharging to Facility/ Agency   · Name: [facility]  · Address:  · Phone: [phone]  · Fax: [phone]          / signature: Electronically signed by Diane Haji

## 2020-01-21 NOTE — ED NOTES
Po fluids given at this time per request. No further c/o or request at this time.       Vee Carver RN  01/20/20 9622

## 2020-01-21 NOTE — H&P
for over 2 yrs. she says she cant taste anything) and fatigue (for over a month). Cardiovascular: Positive for leg swelling (1+). Gastrointestinal: Positive for abdominal pain (epigastric and left groin), diarrhea, nausea and vomiting (several times over a 2 hr period). Genitourinary: Positive for frequency and urgency. Frequent incontinence   All other systems reviewed and are negative. Physical Exam:   BP (!) 147/60   Pulse 63   Temp 97.9 °F (36.6 °C) (Oral)   Resp 14   Ht 5' 3\" (1.6 m)   Wt 170 lb (77.1 kg)   SpO2 97%   BMI 30.11 kg/m²   Temp (24hrs), Av.9 °F (36.6 °C), Min:97.9 °F (36.6 °C), Max:97.9 °F (36.6 °C)    No results for input(s): POCGLU in the last 72 hours. No intake or output data in the 24 hours ending 20 0158    Physical Exam  Vitals signs and nursing note reviewed. Constitutional:       Appearance: Normal appearance. HENT:      Mouth/Throat:      Mouth: Mucous membranes are dry. Eyes:      Extraocular Movements: Extraocular movements intact. Conjunctiva/sclera: Conjunctivae normal.      Pupils: Pupils are equal, round, and reactive to light. Neck:      Musculoskeletal: Normal range of motion. Vascular: No JVD. Cardiovascular:      Rate and Rhythm: Normal rate and regular rhythm. Pulses: Decreased pulses. Heart sounds: Normal heart sounds. Pulmonary:      Effort: Pulmonary effort is normal.      Breath sounds: Normal breath sounds. Abdominal:      General: Bowel sounds are normal.      Tenderness: There is tenderness in the epigastric area and left lower quadrant. Musculoskeletal: Normal range of motion. Right lower le+ Edema present. Left lower le+ Edema present. Skin:     General: Skin is warm and dry. Capillary Refill: Capillary refill takes less than 2 seconds. Neurological:      Mental Status: She is alert and oriented to person, place, and time. GCS: GCS eye subscore is 4.  GCS verbal failure  13. Renal carb counting diet  14. Monitor daily weight and I&O  15. Check orthostatic blood pressure   16. Monitor telemetry and vitals  17. Plan discussed with the patient and RN      Note *Right renal cystic lesion appears stable measuring 37 x 21 mm.  Left Leonor  renal fluid collection or cystic lesion appears unchanged measuring 32 x 57  mm in transverse dimensions. Consultations:   IP CONSULT TO INTERNAL MEDICINE  PHARMACY TO DOSE VANCOMYCIN    Patient is admitted as inpatient status because of co-morbidities listed above, severity of signs and symptoms as outlined, requirement for current medical therapies and most importantly because of direct risk to patient if care not provided in a hospital setting.     Meng Moreno, PERCY - CNP  1/21/2020  1:58 AM    Copy sent to Dr. Luis Boss MD

## 2020-01-21 NOTE — PROGRESS NOTES
Pt admitted to room. Oriented to room, call light and bed mechanics. Side rails up x2. Call light within reach. Orders reviewed. Telemetry on. Vitals stable. Will continue to monitor.

## 2020-01-21 NOTE — PROGRESS NOTES
[address]    Progress Note    1/21/2020    8:16 AM    Name:   Sandra Hernandez  MRN:     [id number]     Acct:      [de-identified]   Room:   2002/2002-02  IP Day:  1  Admit Date:  1/20/2020  4:48 PM    PCP:   Juan Pike MD  Code Status:  Full Code    Subjective:     C/C:   Chief Complaint   Patient presents with    Emesis    Diarrhea     Interval History Status: improved. Patient had no further diarrhea overnight, tolerating diet. Denies any chest pain, abdominal pain, nausea or vomiting, fevers or chills or other complaints this time. She reports the diarrhea is been a chronic intermittent problem for her. Brief History: This is a 77-year-old white female that presents with a complaint of nausea, vomiting and diarrhea. She been found to have possible colitis on CT scan as well as evidence of dehydration. Review of Systems:     Constitutional:  negative for chills, fevers, sweats  Respiratory:  negative for cough, dyspnea on exertion, shortness of breath, wheezing  Cardiovascular:  negative for chest pain, chest pressure/discomfort, lower extremity edema, palpitations  Gastrointestinal:  negative for abdominal pain, constipation, diarrhea, nausea, vomiting  Neurological:  negative for dizziness, headache    Medications: Allergies:     Allergies   Allergen Reactions    Penicillins      Has no recollection of what the reaction was       Current Meds:   Scheduled Meds:    ciprofloxacin  250 mg Oral 2 times per day    vancomycin  125 mg Oral 4 times per day    amiodarone  200 mg Oral Once per day on Mon Wed Fri    dicyclomine  10 mg Oral 4x Daily    levothyroxine  125 mcg Oral Daily    pantoprazole  40 mg Oral QAM AC    potassium chloride  10 mEq Oral BID    sucralfate  1 g Oral 4x Daily    heparin (porcine)  5,000 Units Subcutaneous 3 times per day    insulin lispro  0-6 Units Subcutaneous TID WC    insulin lispro  0-3 Units K 3.6*  --  4.2     --  110*   CO2 20  --  22   GLUCOSE 193*  --  120*   BUN 24*  --  26*   CREATININE 1.24*  --  1.12*   MG 1.8  --   --    ANIONGAP 14  --  10   LABGLOM 40*  --  45*   GFRAA 49*  --  55*   CALCIUM 9.0  --  8.5*   TROPHS 29* 27*  --    MYOGLOBIN 69* 71*  --      Recent Labs     01/20/20  1730 01/21/20  0626   PROT 6.1* 5.5*   LABALBU 3.5 3.2*   AST 16 15   ALT 7 6   ALKPHOS 140* 99   BILITOT 1.06 0.91   LIPASE 22  --      ABG:No results found for: POCPH, PHART, PH, POCPCO2, TCP2XYI, PCO2, POCPO2, PO2ART, PO2, POCHCO3, JVT2NLX, HCO3, NBEA, PBEA, BEART, BE, THGBART, THB, WJU2RUT, PEAF2ZMB, I2GSEXRK, O2SAT, FIO2  Lab Results   Component Value Date/Time    SPECIAL NOT REPORTED 07/24/2018 12:00 PM     Lab Results   Component Value Date/Time    CULTURE NO SIGNIFICANT GROWTH 07/24/2018 12:00 PM       Radiology:  Ct Abdomen Pelvis Wo Contrast Additional Contrast? None    Result Date: 1/20/2020  Possible nonspecific colitis. Sensitivity limited without IV and oral contrast.  Incidental findings as noted above.        Physical Examination:        General appearance:  alert, cooperative and no distress  Mental Status:  oriented to person, place and time and normal affect  Lungs:  clear to auscultation bilaterally, normal effort  Heart:  regular rate and rhythm, no murmur  Abdomen:  soft, nontender, nondistended, normal bowel sounds, no masses, hepatomegaly, splenomegaly  Extremities:  no edema, redness, tenderness in the calves  Skin:  no gross lesions, rashes, induration    Assessment:        Hospital Problems           Last Modified POA    * (Principal) Diarrhea of presumed infectious origin 1/20/2020 Yes    Atrial fibrillation ([facility] [address] 1/20/2020 Yes    COPD (chronic obstructive pulmonary disease) ([facility] [address] 1/20/2020 Yes    Nausea vomiting and diarrhea 1/20/2020 Yes    Gastroesophageal reflux disease without esophagitis 1/20/2020 Yes    Left lower quadrant pain 1/20/2020 Yes    Abdominal pain, epigastric

## 2020-01-21 NOTE — PROGRESS NOTES
Transitions of Care Pharmacy Service   Medication Review    The patient's list of current home medications has been reviewed. Source(s) of information: patient, surescriyovani, patient personal med list (made by Dr Julianne Escobedo - 8/2019)     Based on information provided by the above source(s), I have updated the patient's home med list as described below. I changed or updated the following medications on the patient's home medication list:  Discontinued Lasix 60MY - duplicate   Zofran ODT 4mg - therapy complete     Added Tramadol 50mg - 1Q6H PRN pain      Adjusted   Amiodarone 200mg - changed from three times weekly to twice weekly (Monday/Friday)  Vitamin D 2000 - changed from 1QD to 1QOD   Bentyl 10mg - added \"PRN cramping/spasms\" to sig  Carafate 1gm - changed from 1QID to 1BID      Other Notes None            Please feel free to call me with any questions about this encounter. Thank you. This note will be reviewed and co-signed by the Transitions of Care Pharmacist. The pharmacist will review inpatient orders and contact the physician about any discrepancies. Kin Dotson, pharmacy technician  Transitions of [facility] Pharmacy Service  Phone:  [phone]  Fax: [phone]      Electronically signed by Kin Dotson on 1/21/2020 at 2:22 PM     Prior to Admission medications    Medication Sig Start Date End Date Taking? Authorizing Provider   dicyclomine (BENTYL) 10 MG capsule Take 10 mg by mouth 4 times daily as needed (cramping/spasms)       levothyroxine (SYNTHROID) 125 MCG tablet Take 125 mcg by mouth every other day       sucralfate (CARAFATE) 1 GM tablet Take 1 g by mouth 2 times daily       traMADol (ULTRAM) 50 MG tablet Take 50 mg by mouth every 6 hours as needed for Pain.        Cholecalciferol (VITAMIN D3) 50 MCG (2000 UT) CAPS Take 2,000 Units by mouth every other day        furosemide (LASIX) 20 MG tablet Take 20 mg by mouth daily        potassium chloride (KLOR-CON) 10 MEQ extended

## 2020-01-21 NOTE — CARE COORDINATION
Case Management Initial Discharge Plan  ,             Met with:patient to discuss discharge plans. Information verified: address, contacts, phone number, , insurance Yes  PCP: Mitali Caro MD  Date of last visit:     Insurance Provider: [employer]    Discharge Planning    Living Arrangements:  [address] [facility]:  Children, Family Members, Friends/Neighbors, Home Care Staff    Home has 1 story apt at [address]  0 stairs to climb to get into front door - has elevator, 0 stairs to climb to reach second floor  Location of bedroom/bathroom in home  - main floor    Patient able to perform ADL's:Assisted    Current Services (outpatient & in home) Showers twice a week  DME equipment: rollator, shower chair, cane, O2 3L HS and prn, nebulizer  DME provider: N/A    Pharmacy: [facility] - delivers to apt    Potential Assistance Needed:   SN, PT/OT    Patient agreeable to home care: Yes  Freedom of choice provided:  yes    Prior SNF/Rehab Placement and Facility: No  Agreeable to SNF/Rehab: No  [place] of choice provided: n/a   Evaluation: n/a    Expected Discharge date:  20  Patient expects to be discharged to: [address] [place] [address]  Follow Up Appointment: Best Day/ Time: Friday PM    Transportation provider: family or Jd Gunter from [facility] [facility] arrangements needed for discharge: No    Readmission Risk              Risk of Unplanned Readmission:        21             Does patient have a readmission risk score greater than 14?: Yes  If yes, follow-up appointment must be made within 7 days of discharge. Goal of Care:       Discharge Plan: Met with patient at bedside. Lives at [address] [facility] [facility]. Pt is independent and uses cane in apt. Gets showers twice a week through [facility] [employer]. Admitted for diarrhea and abd pain and pt states has had episodes of diarrhea and constipation before.   Would like someone to check on her

## 2020-01-21 NOTE — PROGRESS NOTES
Drug interaction - QTc concern    Dear Provider(s):  A drug interaction exists between amiodarone, Zofran and Cipro. The interaction is a potential additive QT prolongation. Please continue to monitor your patient's cardiac rhythm as you see appropriate. General risk factors for torsades de pointes  Hospitalization  Advanced Age   Female gender (2-fold risk)  Heart disease  Long Qt interval syndrome  QTc > 500 ms (2-3-fold risk)  Renal or hepatic insufficiency  Hypokalemia  Hypomagnesemia  Hypocalcemia  Diuretic use  Bradycardia  Use of more than one QT-prolonging drug  Rapid IV administration of select medicatrions      If pharmacy can be of any assistance to review the patients medications and offer alternative suggestions, please do not hesitate to contact the pharmacy at [phone]. Thank you.   [address] [address]  Pharmacist, 2:15 AM

## 2020-01-21 NOTE — PLAN OF CARE
Problem: Falls - Risk of:  Goal: Will remain free from falls  Description  Will remain free from falls  Outcome: Ongoing     Problem: Diarrhea:  Goal: Establishment of normal bowel function will improve to within specified parameters  Description  Establishment of normal bowel function will improve to within specified parameters  Outcome: Ongoing     Problem: Fluid Volume:  Goal: Will show no signs and symptoms of electrolyte imbalance  Description  Will show no signs and symptoms of electrolyte imbalance  Outcome: Ongoing     Problem: Physical Regulation:  Goal: Diagnostic test results will improve  Description  Diagnostic test results will improve  Outcome: Ongoing  Goal: Prevent transmision of infection  Description  Prevent transmision of infection  Outcome: Ongoing  Goal: Signs and symptoms of infection will decrease  Description  Signs and symptoms of infection will decrease  Outcome: Ongoing

## 2020-01-22 VITALS
RESPIRATION RATE: 18 BRPM | SYSTOLIC BLOOD PRESSURE: 134 MMHG | HEART RATE: 95 BPM | HEIGHT: 63 IN | BODY MASS INDEX: 31.63 KG/M2 | TEMPERATURE: 97.5 F | WEIGHT: 178.5 LBS | OXYGEN SATURATION: 94 % | DIASTOLIC BLOOD PRESSURE: 67 MMHG

## 2020-01-22 LAB
CAMPYLOBACTER PCR: NORMAL
DIRECT EXAM: NORMAL
E COLI ENTEROTOXIGENIC PCR: NORMAL
GLUCOSE BLD-MCNC: 156 MG/DL (ref 65–105)
Lab: NORMAL
PLESIOMONAS SHIGELLOIDES PCR: NORMAL
SALMONELLA PCR: NORMAL
SHIGATOXIN GENE PCR: NORMAL
SHIGELLA SP PCR: NORMAL
SPECIMEN DESCRIPTION: NORMAL
SPECIMEN DESCRIPTION: NORMAL
VIBRIO PCR: NORMAL
YERSINIA ENTEROCOLITICA PCR: NORMAL

## 2020-01-22 PROCEDURE — 6370000000 HC RX 637 (ALT 250 FOR IP): Performed by: NURSE PRACTITIONER

## 2020-01-22 PROCEDURE — 99232 SBSQ HOSP IP/OBS MODERATE 35: CPT | Performed by: INTERNAL MEDICINE

## 2020-01-22 PROCEDURE — 6370000000 HC RX 637 (ALT 250 FOR IP): Performed by: INTERNAL MEDICINE

## 2020-01-22 RX ORDER — LANOLIN ALCOHOL/MO/W.PET/CERES
3 CREAM (GRAM) TOPICAL NIGHTLY PRN
Status: DISCONTINUED | OUTPATIENT
Start: 2020-01-22 | End: 2020-01-22 | Stop reason: HOSPADM

## 2020-01-22 RX ORDER — CEPHALEXIN 250 MG/1
250 CAPSULE ORAL 4 TIMES DAILY
Qty: 20 CAPSULE | Refills: 0 | Status: SHIPPED | OUTPATIENT
Start: 2020-01-22 | End: 2020-01-27

## 2020-01-22 RX ORDER — SUCRALFATE 1 G/1
1 TABLET ORAL 2 TIMES DAILY PRN
Status: DISCONTINUED | OUTPATIENT
Start: 2020-01-22 | End: 2020-01-22 | Stop reason: HOSPADM

## 2020-01-22 RX ORDER — AMIODARONE HYDROCHLORIDE 200 MG/1
200 TABLET ORAL
Status: DISCONTINUED | OUTPATIENT
Start: 2020-01-27 | End: 2020-01-22 | Stop reason: HOSPADM

## 2020-01-22 RX ORDER — DICYCLOMINE HYDROCHLORIDE 10 MG/1
10 CAPSULE ORAL 4 TIMES DAILY PRN
Status: DISCONTINUED | OUTPATIENT
Start: 2020-01-22 | End: 2020-01-22 | Stop reason: HOSPADM

## 2020-01-22 RX ADMIN — SUCRALFATE 1 G: 1 TABLET ORAL at 09:13

## 2020-01-22 RX ADMIN — MELATONIN TAB 3 MG 3 MG: 3 TAB at 00:18

## 2020-01-22 RX ADMIN — CEPHALEXIN 250 MG: 250 CAPSULE ORAL at 12:15

## 2020-01-22 RX ADMIN — Medication 125 MG: at 09:14

## 2020-01-22 RX ADMIN — AMIODARONE HYDROCHLORIDE 200 MG: 200 TABLET ORAL at 09:13

## 2020-01-22 RX ADMIN — DICYCLOMINE HYDROCHLORIDE 10 MG: 10 CAPSULE ORAL at 09:14

## 2020-01-22 RX ADMIN — LEVOTHYROXINE SODIUM 125 MCG: 125 TABLET ORAL at 06:15

## 2020-01-22 RX ADMIN — Medication 125 MG: at 02:56

## 2020-01-22 RX ADMIN — CEPHALEXIN 250 MG: 250 CAPSULE ORAL at 06:15

## 2020-01-22 RX ADMIN — CEPHALEXIN 250 MG: 250 CAPSULE ORAL at 00:18

## 2020-01-22 RX ADMIN — PANTOPRAZOLE SODIUM 40 MG: 40 TABLET, DELAYED RELEASE ORAL at 06:15

## 2020-01-22 RX ADMIN — POTASSIUM CHLORIDE 10 MEQ: 750 CAPSULE, EXTENDED RELEASE ORAL at 09:14

## 2020-01-22 ASSESSMENT — PAIN SCALES - GENERAL
PAINLEVEL_OUTOF10: 0

## 2020-01-22 NOTE — DISCHARGE SUMMARY
[facility]    Discharge Summary     Patient ID: Isabella Valentine  :  [id number]   MRN: [id number]     ACCOUNT:  [de-identified]   Patient's PCP: Gonzales De La Torre MD  Admit Date: 2020   Discharge Date: 2020     Length of Stay: 2  Code Status:  Full Code  Admitting Physician: Mellissa Felty, DO  Discharge Physician: Mellissa Felty, DO     Active Discharge Diagnoses:     Hospital Problem Lists:  Principal Problem:    Diarrhea of presumed infectious origin  Active Problems:    Atrial fibrillation (HCC)    COPD (chronic obstructive pulmonary disease) ([facility])    Nausea vomiting and diarrhea    Gastroesophageal reflux disease without esophagitis    Left lower quadrant pain    Abdominal pain, epigastric    Type 2 diabetes mellitus with hyperglycemia, without long-term current use of insulin ([facility])    Colitis    Thyroid disease    STACEY (acute kidney injury) ([facility] [address]    Acute cystitis with hematuria    H/O Clostridium difficile infection    Lactic acid increased    Dehydration  Resolved Problems:    * No resolved hospital problems. *      Admission Condition:  fair     Discharged Condition: stable    Hospital Stay:     Hospital Course:  Isabella Valentine is a 80 y.o. female who was admitted for the management of  Diarrhea of presumed infectious origin , presented to ER with Emesis and Diarrhea    This is a 66-year-old white female presents with a complaint of nausea, vomiting and diarrhea. She has a history of prior episodes of C. difficile was admitted for evaluation for possible colitis. Her diarrhea is resolved and did not recur during her hospital stay. She had negative viral stool studies, C. difficile sample was not appropriate.   She was initially treated with vancomycin orally for C. difficile, with stool consistency inconsistent with C. difficile the vancomycin was discontinued      Significant therapeutic interventions: As above    Significant Diagnostic

## 2020-01-22 NOTE — PROGRESS NOTES
Learning About the Safe Use of Antibiotics  Introduction  Antibiotics are drugs used to kill bacteria. Bacteria can cause infections. These include strep throat, ear infections, and pneumonia. These medicines can't cure everything. They don't kill viruses or help with allergies. They don't help illnesses such as the common cold, the flu, or a runny nose. And they can cause side effects. There are many types of antibiotics. Your doctor will decide which one will work best for your infection. Examples include:  · Amoxicillin. · Cephalexin (Keflex). · Ciprofloxacin (Cipro). What are the possible side effects? Side effects can include:  · Nausea. · Diarrhea. · Skin rash. · Yeast infection. · A severe allergic reaction. It may cause itching, swelling, and breathing problems. This is rare. You may have other side effects or reactions not listed here. Check the information that comes with your medicine. Should you take antibiotics just in case? Don't take antibiotics when you don't need them. If you do that, they may not work when you do need them. Each time you take antibiotics, you are more likely to have some bacteria that survive and aren't killed by the medicine. Bacteria that don't die can change and become even harder to kill. These are called antibiotic-resistant bacteria. They can cause longer and more serious infections. To treat them, you may need different, stronger antibiotics that have more side effects and may cost more. So always ask your doctor if antibiotics are the best treatment. Explain that you do not want antibiotics unless you need them. Help protect the community  Using antibiotics when they're not needed leads to the development of antibiotic-resistant bacteria. These tougher bacteria can spread to family members, children, and coworkers. People in your community will have a risk of getting an infection that is harder to cure and that costs more to treat.   How can you take antibiotics safely? Be safe with medicine. Take your antibiotics as directed. Do not stop taking them just because you feel better. You need to take the full course of medicine. This will help make sure your infection is cured. It will also help prevent the growth of antibiotic-resistant bacteria. Always take the exact amount that the label says to take. If the label says to take the medicine at a certain time, follow those directions. You might feel better after you take an antibiotic for a few days. But it is important to keep taking it for as long as prescribed. That will help you get rid of those bacteria that are a bit stronger and that survive the first few days of treatment. Where can you learn more? Go to https://Bleacher Report.Vidmind. org and sign in to your [employer] account. Enter T688 in the [employer]) box to learn more about \"Learning About the Safe Use of Antibiotics. \"     If you do not have an account, please click on the \"Sign Up Now\" link. Current as of: March 3, 2017  Content Version: 11.3  © 6779-8317 [employer]. Care instructions adapted under license by [facility] ([facility]). If you have questions about a medical condition or this instruction, always ask your healthcare professional. [place] [address] any warranty or liability for your use of this information. Antibiotics are powerful drugs that are generally safe and very helpful in fighting disease, but there are times when antibiotics can actually be harmful. Antibiotics can have side effects, including allergic reactions and a potentially deadly diarrhea caused by the bacteria Clostridium difficile (C. diff). Antibiotics can also interfere with the action of other drugs a patient may be taking for another condition. These unintended reactions to antibiotics are called adverse drug events.    When someone takes an antibiotic that they do not need, they are needlessly exposed to the side effects of the drug and do not get any benefit from it. Moreover, taking an antibiotic when it is not needed can lead to the development of antibiotic resistance. When resistance develops, antibiotics may not be able to stop future infections. Every time someone takes an antibiotic they dont need, they increase their risk of developing a resistant infection in the future. Types of Adverse Drug Events Related to Antibiotics  Allergic Reactions  Every year, there are more than 140,000 emergency department visits for reactions to antibiotics. Almost four out of five (79%) emergency department visits for antibiotic-related adverse drug events are due to an allergic reaction. These reactions can range from mild rashes and itching to serious blistering skin reactions swelling of the face and throat, and breathing problems. Minimizing unnecessary antibiotic use is the best way to reduce the risk of adverse drug events from antibiotics. Patients should tell their doctors about any past drug reactions or allergies. C. difficile  C. difficile causes diarrhea linked to at least 14,000 [place] deaths each year. When a person takes antibiotics, good bacteria that protect against infection are destroyed for several months. During this time, patients can get sick from C. difficile picked up from contaminated surfaces or spread from a healthcare providers hands. Those most at risk are people, especially older adults, who take antibiotics and also get medical care. Take antibiotics exactly and only as prescribed. Drug Interactions and Side Effects  Antibiotics can interact with other drugs patients take, making those drugs or the antibiotics less effective. Some drug combinations can worsen the side effects of the antibiotic or other drug. Common side effects of antibiotics include nausea, diarrhea, and stomach pain. Sometimes these symptoms can lead to dehydration and other problems.  Patients should ask their doctors about drug interactions and the potential side effects of antibiotics.  The doctor should be told immediately if a patient has any side effects from antibiotics  Page last updated: February 24, 2017 Content source:   Centers for Disease Control and [employer] for Emerging and Zoonotic Infectious Diseases ([place])  [address] [facility], [facility]   This was reviewed with the pt  And copy of this document given to the pt upon discahrge

## 2020-01-22 NOTE — PROGRESS NOTES
[facility]    Progress Note    1/22/2020    10:31 AM    Name:   Usman Knight  MRN:     [id number]     Acct:      [de-identified]   Room:   2002/2002-02  IP Day:  2  Admit Date:  1/20/2020  4:48 PM    PCP:   Shalom Medina MD  Code Status:  Full Code    Subjective:     C/C:   Chief Complaint   Patient presents with    Emesis    Diarrhea     Interval History Status: improved. Patient denies any further diarrhea, nausea or vomiting. Has a poor appetite. Denies any acute complaints. Mild left lower quadrant pain, slowly improving. Brief History: This is a 27-year-old white female that presents with a complaint of nausea, vomiting and diarrhea. She been found to have possible colitis on CT scan as well as evidence of dehydration. Review of Systems:     Constitutional:  negative for chills, fevers, sweats  Respiratory:  negative for cough, dyspnea on exertion, shortness of breath, wheezing  Cardiovascular:  negative for chest pain, chest pressure/discomfort, lower extremity edema, palpitations  Gastrointestinal:  negative for abdominal pain, constipation, diarrhea, nausea, vomiting  Neurological:  negative for dizziness, headache    Medications: Allergies:     Allergies   Allergen Reactions    Penicillins      Has no recollection of what the reaction was       Current Meds:   Scheduled Meds:    cephALEXin  250 mg Oral 4 times per day    vancomycin  125 mg Oral 4 times per day    amiodarone  200 mg Oral Once per day on Mon Wed Fri    dicyclomine  10 mg Oral 4x Daily    levothyroxine  125 mcg Oral Daily    pantoprazole  40 mg Oral QAM AC    potassium chloride  10 mEq Oral BID    sucralfate  1 g Oral 4x Daily    heparin (porcine)  5,000 Units Subcutaneous 3 times per day    insulin lispro  0-6 Units Subcutaneous TID     insulin lispro  0-3 Units Subcutaneous Nightly     Continuous Infusions:    dextrose       PRN Meds: melatonin, MG 1.8  --   --    ANIONGAP 14  --  10   LABGLOM 40*  --  45*   GFRAA 49*  --  55*   CALCIUM 9.0  --  8.5*   TROPHS 29* 27*  --    MYOGLOBIN 69* 71*  --      Recent Labs     01/20/20  1730 01/20/20  2356 01/21/20  0626   PROT 6.1*  --  5.5*   LABALBU 3.5  --  3.2*   AST 16  --  15   ALT 7  --  6   ALKPHOS 140*  --  99   BILITOT 1.06  --  0.91   LIPASE 22  --   --    POCGLU  --  156*  --      ABG:No results found for: POCPH, PHART, PH, POCPCO2, EKO5TYO, PCO2, POCPO2, PO2ART, PO2, POCHCO3, OEH0IXA, HCO3, NBEA, PBEA, BEART, BE, THGBART, THB, GEB5VEY, RHHZ3YEQ, J7IGHXAZ, O2SAT, FIO2  Lab Results   Component Value Date/Time    SPECIAL NOT REPORTED 01/21/2020 10:15 PM     Lab Results   Component Value Date/Time    CULTURE NO SIGNIFICANT GROWTH 01/20/2020 06:45 PM       Radiology:  Ct Abdomen Pelvis Wo Contrast Additional Contrast? None    Result Date: 1/20/2020  Possible nonspecific colitis. Sensitivity limited without IV and oral contrast.  Incidental findings as noted above. Us Renal Complete    Result Date: 1/21/2020  No acute findings. No hydronephrosis. Mild cortical loss right kidney. Similar benign-appearing left renal cystic lesions.        Physical Examination:        General appearance:  alert, cooperative and no distress  Mental Status:  oriented to person, place and time and normal affect  Lungs:  clear to auscultation bilaterally, normal effort  Heart:  regular rate and rhythm, no murmur  Abdomen:  soft, nontender, nondistended, normal bowel sounds, no masses, hepatomegaly, splenomegaly  Extremities:  no edema, redness, tenderness in the calves  Skin:  no gross lesions, rashes, induration    Assessment:        Hospital Problems           Last Modified POA    * (Principal) Diarrhea of presumed infectious origin 1/20/2020 Yes    Atrial fibrillation ([facility] 1/20/2020 Yes    COPD (chronic obstructive pulmonary disease) ([facility] 1/20/2020 Yes    Nausea vomiting and diarrhea 1/20/2020 Yes    Gastroesophageal

## 2020-01-22 NOTE — PLAN OF CARE
Problem: Falls - Risk of:  Goal: Will remain free from falls  Description  Will remain free from falls  Note:   Siderails up x 2  Hourly rounding  Call light in reach  Instructed to call for assist before attempting out of bed.   Remains free from falls and accidental injury at this time   Floor free from obstacles  Bed is locked and in lowest position  Adequate lighting provided  Bed alarm on, [place] star and Stay with Me signs posted

## 2020-01-22 NOTE — PROGRESS NOTES
Reviewed discharge AVS with the pt and her granddaughter, pt reluctant to listen and stating \"that I will do what I want most of the time\", granddaughter states she does not always take meds as she is suppose to , discharged per wheelchair with the family,

## 2020-01-22 NOTE — PROGRESS NOTES
Vahid served to [facility] and made him aware of the pt refusing the bloodwork he had ordered to be done prior to discharge

## 2020-01-28 ENCOUNTER — HOSPITAL ENCOUNTER (OUTPATIENT)
Age: 85
Setting detail: SPECIMEN
Discharge: HOME | End: 2020-01-28
Payer: MEDICARE

## 2020-01-30 LAB
CULTURE: NORMAL
Lab: NORMAL
SPECIMEN DESCRIPTION: NORMAL

## 2020-02-03 ENCOUNTER — TELEPHONE (OUTPATIENT)
Dept: GASTROENTEROLOGY | Age: 85
End: 2020-02-03

## 2020-02-03 NOTE — TELEPHONE ENCOUNTER
Christa lvstephan stating she is trying to help Friday gisselle schedule a colonoscopy and has a few questions. Returned call to oRdriguez Dao. She asked if Friday gisselle needs someone to stay at the hospital during the entire procedure or can she be dropped off and picked up. Writer informed her dropping off and picking up is ok, but hospital may require someone to be at home with her for a few hours. Christa express understanding and states they will call back to schedule if needed.

## 2020-02-20 PROBLEM — E86.0 DEHYDRATION: Status: RESOLVED | Noted: 2020-01-21 | Resolved: 2020-02-20

## 2020-02-27 RX ORDER — SUCRALFATE 1 G/1
TABLET ORAL
Qty: 120 TABLET | Refills: 2 | Status: SHIPPED | OUTPATIENT
Start: 2020-02-27 | End: 2020-03-06 | Stop reason: SDUPTHER

## 2020-03-06 RX ORDER — SUCRALFATE 1 G/1
TABLET ORAL
Qty: 360 TABLET | Refills: 2 | Status: ON HOLD | OUTPATIENT
Start: 2020-03-06 | End: 2022-02-06 | Stop reason: HOSPADM

## 2020-03-24 ENCOUNTER — TELEPHONE (OUTPATIENT)
Dept: GASTROENTEROLOGY | Age: 85
End: 2020-03-24

## 2020-03-25 PROBLEM — K29.70 GASTRITIS: Status: RESOLVED | Noted: 2020-03-25 | Resolved: 2020-03-24

## 2020-03-25 PROBLEM — K21.9 GERD (GASTROESOPHAGEAL REFLUX DISEASE): Status: RESOLVED | Noted: 2020-03-25 | Resolved: 2020-03-24

## 2020-03-26 ENCOUNTER — HOSPITAL ENCOUNTER (INPATIENT)
Age: 85
LOS: 2 days | Discharge: HOME HEALTH | DRG: 392 | End: 2020-03-29
Attending: EMERGENCY MEDICINE | Admitting: FAMILY MEDICINE
Payer: MEDICARE

## 2020-03-26 ENCOUNTER — APPOINTMENT (OUTPATIENT)
Dept: CT IMAGING | Age: 85
DRG: 392 | End: 2020-03-26
Payer: MEDICARE

## 2020-03-26 LAB
-: ABNORMAL
ABSOLUTE EOS #: 0.19 K/UL (ref 0–0.44)
ABSOLUTE IMMATURE GRANULOCYTE: 0.1 K/UL (ref 0–0.3)
ABSOLUTE LYMPH #: 2.64 K/UL (ref 1.1–3.7)
ABSOLUTE MONO #: 0.68 K/UL (ref 0.1–1.2)
ALBUMIN SERPL-MCNC: 3.6 G/DL (ref 3.5–5.2)
ALBUMIN/GLOBULIN RATIO: ABNORMAL (ref 1–2.5)
ALP BLD-CCNC: 70 U/L (ref 35–104)
ALT SERPL-CCNC: 8 U/L (ref 5–33)
AMORPHOUS: ABNORMAL
ANION GAP SERPL CALCULATED.3IONS-SCNC: 18 MMOL/L (ref 9–17)
AST SERPL-CCNC: 17 U/L
BACTERIA: ABNORMAL
BASOPHILS # BLD: 1 % (ref 0–2)
BASOPHILS ABSOLUTE: 0.06 K/UL (ref 0–0.2)
BILIRUB SERPL-MCNC: 0.75 MG/DL (ref 0.3–1.2)
BILIRUBIN DIRECT: 0.23 MG/DL
BILIRUBIN URINE: NEGATIVE
BILIRUBIN, INDIRECT: 0.52 MG/DL (ref 0–1)
BNP INTERPRETATION: NORMAL
BUN BLDV-MCNC: 17 MG/DL (ref 8–23)
BUN/CREAT BLD: 13 (ref 9–20)
C-REACTIVE PROTEIN: 1.2 MG/L (ref 0–5)
CALCIUM SERPL-MCNC: 9.4 MG/DL (ref 8.6–10.4)
CASTS UA: ABNORMAL /LPF
CHLORIDE BLD-SCNC: 103 MMOL/L (ref 98–107)
CO2: 21 MMOL/L (ref 20–31)
COLOR: YELLOW
COMMENT UA: ABNORMAL
CREAT SERPL-MCNC: 1.33 MG/DL (ref 0.5–0.9)
CRYSTALS, UA: ABNORMAL /HPF
DATE, STOOL #1: NORMAL
DATE, STOOL #2: NORMAL
DATE, STOOL #3: NORMAL
DIFFERENTIAL TYPE: ABNORMAL
EOSINOPHILS RELATIVE PERCENT: 1 % (ref 1–4)
EPITHELIAL CELLS UA: ABNORMAL /HPF (ref 0–5)
GFR AFRICAN AMERICAN: 45 ML/MIN
GFR NON-AFRICAN AMERICAN: 37 ML/MIN
GFR SERPL CREATININE-BSD FRML MDRD: ABNORMAL ML/MIN/{1.73_M2}
GFR SERPL CREATININE-BSD FRML MDRD: ABNORMAL ML/MIN/{1.73_M2}
GLOBULIN: ABNORMAL G/DL (ref 1.5–3.8)
GLUCOSE BLD-MCNC: 186 MG/DL (ref 70–99)
GLUCOSE URINE: NEGATIVE
HCT VFR BLD CALC: 44.9 % (ref 36.3–47.1)
HEMOCCULT SP1 STL QL: NEGATIVE
HEMOCCULT SP2 STL QL: NORMAL
HEMOCCULT SP3 STL QL: NORMAL
HEMOGLOBIN: 14.4 G/DL (ref 11.9–15.1)
IMMATURE GRANULOCYTES: 1 %
KETONES, URINE: NEGATIVE
LACTATE DEHYDROGENASE: 200 U/L (ref 135–214)
LACTIC ACID: 1.8 MMOL/L (ref 0.5–2.2)
LACTIC ACID: 3.6 MMOL/L (ref 0.5–2.2)
LEUKOCYTE ESTERASE, URINE: ABNORMAL
LIPASE: 25 U/L (ref 13–60)
LYMPHOCYTES # BLD: 20 % (ref 24–43)
MCH RBC QN AUTO: 32.2 PG (ref 25.2–33.5)
MCHC RBC AUTO-ENTMCNC: 32.1 G/DL (ref 28.4–34.8)
MCV RBC AUTO: 100.4 FL (ref 82.6–102.9)
MONOCYTES # BLD: 5 % (ref 3–12)
MUCUS: ABNORMAL
NITRITE, URINE: NEGATIVE
NRBC AUTOMATED: 0 PER 100 WBC
OTHER OBSERVATIONS UA: ABNORMAL
PDW BLD-RTO: 13.4 % (ref 11.8–14.4)
PH UA: 5.5 (ref 5–8)
PLATELET # BLD: 186 K/UL (ref 138–453)
PLATELET ESTIMATE: ABNORMAL
PMV BLD AUTO: 9.5 FL (ref 8.1–13.5)
POTASSIUM SERPL-SCNC: 3.8 MMOL/L (ref 3.7–5.3)
PRO-BNP: 155 PG/ML
PROCALCITONIN: 0.04 NG/ML
PROTEIN UA: NEGATIVE
RBC # BLD: 4.47 M/UL (ref 3.95–5.11)
RBC # BLD: ABNORMAL 10*6/UL
RBC UA: ABNORMAL /HPF (ref 0–2)
RENAL EPITHELIAL, UA: ABNORMAL /HPF
SEG NEUTROPHILS: 72 % (ref 36–65)
SEGMENTED NEUTROPHILS ABSOLUTE COUNT: 9.64 K/UL (ref 1.5–8.1)
SODIUM BLD-SCNC: 142 MMOL/L (ref 135–144)
SPECIFIC GRAVITY UA: 1.01 (ref 1–1.03)
TIME, STOOL #1: NORMAL
TIME, STOOL #2: NORMAL
TIME, STOOL #3: NORMAL
TOTAL PROTEIN: 6.2 G/DL (ref 6.4–8.3)
TRICHOMONAS: ABNORMAL
TURBIDITY: CLEAR
URINE HGB: NEGATIVE
UROBILINOGEN, URINE: NORMAL
WBC # BLD: 13.3 K/UL (ref 3.5–11.3)
WBC # BLD: ABNORMAL 10*3/UL
WBC UA: ABNORMAL /HPF (ref 0–5)
YEAST: ABNORMAL

## 2020-03-26 PROCEDURE — 87086 URINE CULTURE/COLONY COUNT: CPT

## 2020-03-26 PROCEDURE — 83605 ASSAY OF LACTIC ACID: CPT

## 2020-03-26 PROCEDURE — 96375 TX/PRO/DX INJ NEW DRUG ADDON: CPT

## 2020-03-26 PROCEDURE — 96374 THER/PROPH/DIAG INJ IV PUSH: CPT

## 2020-03-26 PROCEDURE — 87040 BLOOD CULTURE FOR BACTERIA: CPT

## 2020-03-26 PROCEDURE — 96361 HYDRATE IV INFUSION ADD-ON: CPT

## 2020-03-26 PROCEDURE — 93005 ELECTROCARDIOGRAM TRACING: CPT

## 2020-03-26 PROCEDURE — 2580000003 HC RX 258: Performed by: NURSE PRACTITIONER

## 2020-03-26 PROCEDURE — 81001 URINALYSIS AUTO W/SCOPE: CPT

## 2020-03-26 PROCEDURE — 80048 BASIC METABOLIC PNL TOTAL CA: CPT

## 2020-03-26 PROCEDURE — 74176 CT ABD & PELVIS W/O CONTRAST: CPT

## 2020-03-26 PROCEDURE — 80076 HEPATIC FUNCTION PANEL: CPT

## 2020-03-26 PROCEDURE — 99285 EMERGENCY DEPT VISIT HI MDM: CPT

## 2020-03-26 PROCEDURE — 83880 ASSAY OF NATRIURETIC PEPTIDE: CPT

## 2020-03-26 PROCEDURE — 82272 OCCULT BLD FECES 1-3 TESTS: CPT

## 2020-03-26 PROCEDURE — 85025 COMPLETE CBC W/AUTO DIFF WBC: CPT

## 2020-03-26 PROCEDURE — 87506 IADNA-DNA/RNA PROBE TQ 6-11: CPT

## 2020-03-26 PROCEDURE — C9113 INJ PANTOPRAZOLE SODIUM, VIA: HCPCS | Performed by: NURSE PRACTITIONER

## 2020-03-26 PROCEDURE — 51702 INSERT TEMP BLADDER CATH: CPT

## 2020-03-26 PROCEDURE — 96365 THER/PROPH/DIAG IV INF INIT: CPT

## 2020-03-26 PROCEDURE — 83615 LACTATE (LD) (LDH) ENZYME: CPT

## 2020-03-26 PROCEDURE — 83690 ASSAY OF LIPASE: CPT

## 2020-03-26 PROCEDURE — 84145 PROCALCITONIN (PCT): CPT

## 2020-03-26 PROCEDURE — 86140 C-REACTIVE PROTEIN: CPT

## 2020-03-26 PROCEDURE — 87493 C DIFF AMPLIFIED PROBE: CPT

## 2020-03-26 PROCEDURE — 6360000002 HC RX W HCPCS: Performed by: NURSE PRACTITIONER

## 2020-03-26 RX ORDER — LEVOFLOXACIN 5 MG/ML
500 INJECTION, SOLUTION INTRAVENOUS ONCE
Status: COMPLETED | OUTPATIENT
Start: 2020-03-26 | End: 2020-03-27

## 2020-03-26 RX ORDER — PANTOPRAZOLE SODIUM 40 MG/10ML
40 INJECTION, POWDER, LYOPHILIZED, FOR SOLUTION INTRAVENOUS ONCE
Status: COMPLETED | OUTPATIENT
Start: 2020-03-26 | End: 2020-03-26

## 2020-03-26 RX ORDER — SODIUM CHLORIDE 9 MG/ML
INJECTION, SOLUTION INTRAVENOUS CONTINUOUS
Status: DISCONTINUED | OUTPATIENT
Start: 2020-03-26 | End: 2020-03-29 | Stop reason: HOSPADM

## 2020-03-26 RX ORDER — ONDANSETRON 2 MG/ML
4 INJECTION INTRAMUSCULAR; INTRAVENOUS ONCE
Status: COMPLETED | OUTPATIENT
Start: 2020-03-26 | End: 2020-03-26

## 2020-03-26 RX ORDER — SODIUM CHLORIDE 9 MG/ML
10 INJECTION INTRAVENOUS ONCE
Status: COMPLETED | OUTPATIENT
Start: 2020-03-26 | End: 2020-03-26

## 2020-03-26 RX ORDER — 0.9 % SODIUM CHLORIDE 0.9 %
500 INTRAVENOUS SOLUTION INTRAVENOUS ONCE
Status: COMPLETED | OUTPATIENT
Start: 2020-03-26 | End: 2020-03-26

## 2020-03-26 RX ADMIN — SODIUM CHLORIDE 500 ML: 9 INJECTION, SOLUTION INTRAVENOUS at 20:29

## 2020-03-26 RX ADMIN — Medication 10 ML: at 20:02

## 2020-03-26 RX ADMIN — SODIUM CHLORIDE 100 ML/HR: 9 INJECTION, SOLUTION INTRAVENOUS at 20:02

## 2020-03-26 RX ADMIN — PANTOPRAZOLE SODIUM 40 MG: 40 INJECTION, POWDER, FOR SOLUTION INTRAVENOUS at 20:02

## 2020-03-26 RX ADMIN — ONDANSETRON 4 MG: 2 INJECTION INTRAMUSCULAR; INTRAVENOUS at 20:02

## 2020-03-26 RX ADMIN — LEVOFLOXACIN 500 MG: 5 INJECTION, SOLUTION INTRAVENOUS at 23:55

## 2020-03-26 ASSESSMENT — ENCOUNTER SYMPTOMS
COUGH: 0
SORE THROAT: 0
COLOR CHANGE: 0
SINUS PRESSURE: 0
RHINORRHEA: 0
SHORTNESS OF BREATH: 0
ABDOMINAL PAIN: 1
DIARRHEA: 1
NAUSEA: 1
VOMITING: 1

## 2020-03-26 ASSESSMENT — PAIN SCALES - GENERAL: PAINLEVEL_OUTOF10: 9

## 2020-03-27 ENCOUNTER — APPOINTMENT (OUTPATIENT)
Dept: GENERAL RADIOLOGY | Age: 85
DRG: 392 | End: 2020-03-27
Payer: MEDICARE

## 2020-03-27 LAB
ANION GAP SERPL CALCULATED.3IONS-SCNC: 13 MMOL/L (ref 9–17)
BUN BLDV-MCNC: 17 MG/DL (ref 8–23)
BUN/CREAT BLD: 16 (ref 9–20)
C DIFFICILE TOXINS, PCR: NORMAL
CALCIUM SERPL-MCNC: 8.3 MG/DL (ref 8.6–10.4)
CAMPYLOBACTER PCR: NORMAL
CHLORIDE BLD-SCNC: 105 MMOL/L (ref 98–107)
CO2: 21 MMOL/L (ref 20–31)
CREAT SERPL-MCNC: 1.06 MG/DL (ref 0.5–0.9)
CULTURE: NORMAL
E COLI ENTEROTOXIGENIC PCR: NORMAL
EKG ATRIAL RATE: 69 BPM
EKG P-R INTERVAL: 208 MS
EKG Q-T INTERVAL: 360 MS
EKG QRS DURATION: 76 MS
EKG QTC CALCULATION (BAZETT): 385 MS
EKG R AXIS: -47 DEGREES
EKG T AXIS: 66 DEGREES
EKG VENTRICULAR RATE: 69 BPM
GFR AFRICAN AMERICAN: 59 ML/MIN
GFR NON-AFRICAN AMERICAN: 48 ML/MIN
GFR SERPL CREATININE-BSD FRML MDRD: ABNORMAL ML/MIN/{1.73_M2}
GFR SERPL CREATININE-BSD FRML MDRD: ABNORMAL ML/MIN/{1.73_M2}
GLUCOSE BLD-MCNC: 152 MG/DL (ref 70–99)
HCT VFR BLD CALC: 40.4 % (ref 36.3–47.1)
HEMOGLOBIN: 13.2 G/DL (ref 11.9–15.1)
LACTIC ACID: 2 MMOL/L (ref 0.5–2.2)
LACTOFERRIN, QUAL: ABNORMAL
Lab: NORMAL
MCH RBC QN AUTO: 32.4 PG (ref 25.2–33.5)
MCHC RBC AUTO-ENTMCNC: 32.7 G/DL (ref 28.4–34.8)
MCV RBC AUTO: 99.3 FL (ref 82.6–102.9)
NRBC AUTOMATED: 0 PER 100 WBC
PDW BLD-RTO: 13.5 % (ref 11.8–14.4)
PLATELET # BLD: 147 K/UL (ref 138–453)
PLESIOMONAS SHIGELLOIDES PCR: NORMAL
PMV BLD AUTO: 9.7 FL (ref 8.1–13.5)
POTASSIUM SERPL-SCNC: 4 MMOL/L (ref 3.7–5.3)
RBC # BLD: 4.07 M/UL (ref 3.95–5.11)
SALMONELLA PCR: NORMAL
SHIGATOXIN GENE PCR: NORMAL
SHIGELLA SP PCR: NORMAL
SODIUM BLD-SCNC: 139 MMOL/L (ref 135–144)
SPECIMEN DESCRIPTION: NORMAL
VIBRIO PCR: NORMAL
WBC # BLD: 10 K/UL (ref 3.5–11.3)
YERSINIA ENTEROCOLITICA PCR: NORMAL

## 2020-03-27 PROCEDURE — 99222 1ST HOSP IP/OBS MODERATE 55: CPT | Performed by: INTERNAL MEDICINE

## 2020-03-27 PROCEDURE — 2500000003 HC RX 250 WO HCPCS: Performed by: FAMILY MEDICINE

## 2020-03-27 PROCEDURE — 2700000000 HC OXYGEN THERAPY PER DAY

## 2020-03-27 PROCEDURE — 6370000000 HC RX 637 (ALT 250 FOR IP): Performed by: NURSE PRACTITIONER

## 2020-03-27 PROCEDURE — 99222 1ST HOSP IP/OBS MODERATE 55: CPT | Performed by: FAMILY MEDICINE

## 2020-03-27 PROCEDURE — 83605 ASSAY OF LACTIC ACID: CPT

## 2020-03-27 PROCEDURE — 80048 BASIC METABOLIC PNL TOTAL CA: CPT

## 2020-03-27 PROCEDURE — 6360000002 HC RX W HCPCS: Performed by: NURSE PRACTITIONER

## 2020-03-27 PROCEDURE — G0378 HOSPITAL OBSERVATION PER HR: HCPCS

## 2020-03-27 PROCEDURE — 97162 PT EVAL MOD COMPLEX 30 MIN: CPT

## 2020-03-27 PROCEDURE — 85027 COMPLETE CBC AUTOMATED: CPT

## 2020-03-27 PROCEDURE — 94640 AIRWAY INHALATION TREATMENT: CPT

## 2020-03-27 PROCEDURE — 36415 COLL VENOUS BLD VENIPUNCTURE: CPT

## 2020-03-27 PROCEDURE — 96372 THER/PROPH/DIAG INJ SC/IM: CPT

## 2020-03-27 PROCEDURE — 83630 LACTOFERRIN FECAL (QUAL): CPT

## 2020-03-27 PROCEDURE — 71045 X-RAY EXAM CHEST 1 VIEW: CPT

## 2020-03-27 PROCEDURE — 96366 THER/PROPH/DIAG IV INF ADDON: CPT

## 2020-03-27 PROCEDURE — 96367 TX/PROPH/DG ADDL SEQ IV INF: CPT

## 2020-03-27 PROCEDURE — 2580000003 HC RX 258: Performed by: NURSE PRACTITIONER

## 2020-03-27 PROCEDURE — 97530 THERAPEUTIC ACTIVITIES: CPT

## 2020-03-27 PROCEDURE — 97116 GAIT TRAINING THERAPY: CPT

## 2020-03-27 PROCEDURE — 1200000000 HC SEMI PRIVATE

## 2020-03-27 PROCEDURE — 96361 HYDRATE IV INFUSION ADD-ON: CPT

## 2020-03-27 RX ORDER — ONDANSETRON 2 MG/ML
4 INJECTION INTRAMUSCULAR; INTRAVENOUS EVERY 6 HOURS PRN
Status: DISCONTINUED | OUTPATIENT
Start: 2020-03-27 | End: 2020-03-29 | Stop reason: HOSPADM

## 2020-03-27 RX ORDER — DICYCLOMINE HYDROCHLORIDE 10 MG/1
10 CAPSULE ORAL 4 TIMES DAILY PRN
Status: DISCONTINUED | OUTPATIENT
Start: 2020-03-27 | End: 2020-03-29 | Stop reason: HOSPADM

## 2020-03-27 RX ORDER — SODIUM CHLORIDE 0.9 % (FLUSH) 0.9 %
10 SYRINGE (ML) INJECTION EVERY 12 HOURS SCHEDULED
Status: DISCONTINUED | OUTPATIENT
Start: 2020-03-27 | End: 2020-03-29 | Stop reason: HOSPADM

## 2020-03-27 RX ORDER — SUCRALFATE 1 G/1
1 TABLET ORAL 4 TIMES DAILY
Status: DISCONTINUED | OUTPATIENT
Start: 2020-03-27 | End: 2020-03-29 | Stop reason: HOSPADM

## 2020-03-27 RX ORDER — SODIUM CHLORIDE 0.9 % (FLUSH) 0.9 %
10 SYRINGE (ML) INJECTION PRN
Status: DISCONTINUED | OUTPATIENT
Start: 2020-03-27 | End: 2020-03-29 | Stop reason: HOSPADM

## 2020-03-27 RX ORDER — IPRATROPIUM BROMIDE AND ALBUTEROL SULFATE 2.5; .5 MG/3ML; MG/3ML
1 SOLUTION RESPIRATORY (INHALATION)
Status: DISCONTINUED | OUTPATIENT
Start: 2020-03-27 | End: 2020-03-29 | Stop reason: HOSPADM

## 2020-03-27 RX ORDER — ACETAMINOPHEN 650 MG/1
650 SUPPOSITORY RECTAL EVERY 6 HOURS PRN
Status: DISCONTINUED | OUTPATIENT
Start: 2020-03-27 | End: 2020-03-29 | Stop reason: HOSPADM

## 2020-03-27 RX ORDER — FUROSEMIDE 20 MG/1
20 TABLET ORAL DAILY
Status: DISCONTINUED | OUTPATIENT
Start: 2020-03-27 | End: 2020-03-29 | Stop reason: HOSPADM

## 2020-03-27 RX ORDER — SODIUM CHLORIDE 9 MG/ML
INJECTION, SOLUTION INTRAVENOUS CONTINUOUS
Status: DISCONTINUED | OUTPATIENT
Start: 2020-03-27 | End: 2020-03-27

## 2020-03-27 RX ORDER — LISINOPRIL 20 MG/1
20 TABLET ORAL DAILY
Status: DISCONTINUED | OUTPATIENT
Start: 2020-03-27 | End: 2020-03-29 | Stop reason: HOSPADM

## 2020-03-27 RX ORDER — LEVOFLOXACIN 5 MG/ML
250 INJECTION, SOLUTION INTRAVENOUS EVERY 24 HOURS
Status: DISCONTINUED | OUTPATIENT
Start: 2020-03-28 | End: 2020-03-27

## 2020-03-27 RX ORDER — AMIODARONE HYDROCHLORIDE 200 MG/1
200 TABLET ORAL
Status: DISCONTINUED | OUTPATIENT
Start: 2020-03-27 | End: 2020-03-29 | Stop reason: HOSPADM

## 2020-03-27 RX ORDER — ACETAMINOPHEN 325 MG/1
650 TABLET ORAL EVERY 6 HOURS PRN
Status: DISCONTINUED | OUTPATIENT
Start: 2020-03-27 | End: 2020-03-29 | Stop reason: HOSPADM

## 2020-03-27 RX ORDER — PANTOPRAZOLE SODIUM 40 MG/1
40 TABLET, DELAYED RELEASE ORAL DAILY
Status: DISCONTINUED | OUTPATIENT
Start: 2020-03-27 | End: 2020-03-29 | Stop reason: HOSPADM

## 2020-03-27 RX ORDER — VITAMIN B COMPLEX
2000 TABLET ORAL EVERY OTHER DAY
Status: DISCONTINUED | OUTPATIENT
Start: 2020-03-27 | End: 2020-03-29 | Stop reason: HOSPADM

## 2020-03-27 RX ORDER — PROMETHAZINE HYDROCHLORIDE 12.5 MG/1
12.5 TABLET ORAL EVERY 6 HOURS PRN
Status: DISCONTINUED | OUTPATIENT
Start: 2020-03-27 | End: 2020-03-29 | Stop reason: HOSPADM

## 2020-03-27 RX ORDER — NICOTINE 21 MG/24HR
1 PATCH, TRANSDERMAL 24 HOURS TRANSDERMAL DAILY PRN
Status: DISCONTINUED | OUTPATIENT
Start: 2020-03-27 | End: 2020-03-29 | Stop reason: HOSPADM

## 2020-03-27 RX ORDER — POTASSIUM CHLORIDE 750 MG/1
10 CAPSULE, EXTENDED RELEASE ORAL 2 TIMES DAILY
Status: DISCONTINUED | OUTPATIENT
Start: 2020-03-27 | End: 2020-03-29 | Stop reason: HOSPADM

## 2020-03-27 RX ORDER — LEVOTHYROXINE SODIUM 0.12 MG/1
125 TABLET ORAL EVERY OTHER DAY
Status: DISCONTINUED | OUTPATIENT
Start: 2020-03-27 | End: 2020-03-29 | Stop reason: HOSPADM

## 2020-03-27 RX ORDER — ALBUTEROL SULFATE 2.5 MG/3ML
2.5 SOLUTION RESPIRATORY (INHALATION)
Status: DISCONTINUED | OUTPATIENT
Start: 2020-03-27 | End: 2020-03-29 | Stop reason: HOSPADM

## 2020-03-27 RX ADMIN — CEFTRIAXONE SODIUM 1 G: 1 INJECTION, POWDER, FOR SOLUTION INTRAMUSCULAR; INTRAVENOUS at 11:47

## 2020-03-27 RX ADMIN — SUCRALFATE 1 G: 1 TABLET ORAL at 17:23

## 2020-03-27 RX ADMIN — ACETAMINOPHEN 650 MG: 325 TABLET ORAL at 22:50

## 2020-03-27 RX ADMIN — SUCRALFATE 1 G: 1 TABLET ORAL at 22:42

## 2020-03-27 RX ADMIN — SUCRALFATE 1 G: 1 TABLET ORAL at 11:44

## 2020-03-27 RX ADMIN — LEVOTHYROXINE SODIUM 125 MCG: 125 TABLET ORAL at 11:44

## 2020-03-27 RX ADMIN — IPRATROPIUM BROMIDE AND ALBUTEROL SULFATE 1 AMPULE: .5; 3 SOLUTION RESPIRATORY (INHALATION) at 11:44

## 2020-03-27 RX ADMIN — IPRATROPIUM BROMIDE AND ALBUTEROL SULFATE 1 AMPULE: .5; 3 SOLUTION RESPIRATORY (INHALATION) at 08:34

## 2020-03-27 RX ADMIN — METRONIDAZOLE 500 MG: 500 INJECTION, SOLUTION INTRAVENOUS at 22:42

## 2020-03-27 RX ADMIN — IPRATROPIUM BROMIDE AND ALBUTEROL SULFATE 1 AMPULE: .5; 3 SOLUTION RESPIRATORY (INHALATION) at 20:16

## 2020-03-27 RX ADMIN — FUROSEMIDE 20 MG: 20 TABLET ORAL at 11:45

## 2020-03-27 RX ADMIN — ACETAMINOPHEN 650 MG: 325 TABLET ORAL at 08:01

## 2020-03-27 RX ADMIN — MELATONIN 2000 UNITS: at 11:45

## 2020-03-27 RX ADMIN — PANTOPRAZOLE SODIUM 40 MG: 40 TABLET, DELAYED RELEASE ORAL at 12:05

## 2020-03-27 RX ADMIN — METRONIDAZOLE 500 MG: 500 INJECTION, SOLUTION INTRAVENOUS at 14:03

## 2020-03-27 RX ADMIN — ENOXAPARIN SODIUM 30 MG: 30 INJECTION SUBCUTANEOUS at 11:46

## 2020-03-27 RX ADMIN — POTASSIUM CHLORIDE 10 MEQ: 750 CAPSULE, EXTENDED RELEASE ORAL at 22:42

## 2020-03-27 RX ADMIN — POTASSIUM CHLORIDE 10 MEQ: 750 CAPSULE, EXTENDED RELEASE ORAL at 11:44

## 2020-03-27 RX ADMIN — IPRATROPIUM BROMIDE AND ALBUTEROL SULFATE 1 AMPULE: .5; 3 SOLUTION RESPIRATORY (INHALATION) at 15:12

## 2020-03-27 ASSESSMENT — PAIN DESCRIPTION - LOCATION
LOCATION: CHEST
LOCATION: ABDOMEN;RIB CAGE

## 2020-03-27 ASSESSMENT — ENCOUNTER SYMPTOMS
SHORTNESS OF BREATH: 0
CONSTIPATION: 0
CHEST TIGHTNESS: 0
ABDOMINAL PAIN: 1
BLOOD IN STOOL: 0
DIARRHEA: 1
VOMITING: 0
RHINORRHEA: 0
NAUSEA: 0
COUGH: 0
WHEEZING: 0

## 2020-03-27 ASSESSMENT — PAIN DESCRIPTION - DESCRIPTORS
DESCRIPTORS: SORE
DESCRIPTORS: DISCOMFORT;SORE

## 2020-03-27 ASSESSMENT — PAIN SCALES - GENERAL
PAINLEVEL_OUTOF10: 0
PAINLEVEL_OUTOF10: 2
PAINLEVEL_OUTOF10: 8
PAINLEVEL_OUTOF10: 3

## 2020-03-27 ASSESSMENT — PAIN DESCRIPTION - ORIENTATION
ORIENTATION: LEFT
ORIENTATION: LEFT

## 2020-03-27 ASSESSMENT — PAIN DESCRIPTION - PAIN TYPE
TYPE: ACUTE PAIN
TYPE: ACUTE PAIN

## 2020-03-27 NOTE — DISCHARGE INSTR - COC
No       Date of Last BM: 3/29/20    Intake/Output Summary (Last 24 hours) at 3/27/2020 1130  Last data filed at 3/27/2020 0535  Gross per 24 hour   Intake 1169 ml   Output 400 ml   Net 769 ml     I/O last 3 completed shifts: In: 9751 [I.V.:1169]  Out: 400 [Urine:400]    Safety Concerns: At Risk for Falls    Impairments/Disabilities:      Hearing    Nutrition Therapy:  Current Nutrition Therapy:   - Oral Diet:  General    Routes of Feeding: Oral  Liquids: No Restrictions  Daily Fluid Restriction: no  Last Modified Barium Swallow with Video (Video Swallowing Test): not done    Treatments at the Time of Hospital Discharge:   Respiratory Treatments: yes  Oxygen Therapy:  is on oxygen at 2 L/min per nasal cannula. at night and as needed   Ventilator:    - No ventilator support    Rehab Therapies: Physical Therapy and Occupational Therapy  Weight Bearing Status/Restrictions: No weight bearing restirctions  Other Medical Equipment (for information only, NOT a DME order):  walker  Other Treatments: ***    Patient's personal belongings (please select all that are sent with patient):  Hearing Aides bilateral    RN SIGNATURE:  Electronically signed by Ez Yung RN on 3/28/20 at 6:40 PM EDT    CASE MANAGEMENT/SOCIAL WORK SECTION    Inpatient Status Date: ***    Readmission Risk Assessment Score:  Readmission Risk              Risk of Unplanned Readmission:        21           Discharging to Facility/ Agency   · Name: [facility]  · Address:  · Phone:[phone]  · Fax:[phone]    Dialysis Facility (if applicable)   · Name:  · Address:  · Dialysis Schedule:  · Phone:  · Fax:    / signature: Electronically signed by OLGA LIDIA Cao on 3/27/20 at 11:32 AM EDT    PHYSICIAN SECTION    Prognosis: Fair    Condition at Discharge: Stable    Rehab Potential (if transferring to Rehab): Fair    Recommended Labs or Other Treatments After Discharge: medication as advised.    Follow up with

## 2020-03-27 NOTE — CONSULTS
excluded. IMPRESSION: Ms. Brady Scherer is a 80 y.o. female with diarrhea. Colitis by CT scan. Continue Flagyl. Stool studies. May need inpatient colonoscopy. Thank you for allowing me to participate in the care of Ms. Brady Scherer. For any further questions please do not hesitate to contact me.        MD Winnie Olivas

## 2020-03-27 NOTE — ED NOTES
Pt presents to the er per ems for c/o diarrhea and hypotension per ems the patients blood pressure was in the 45H systolic upon their arrival pt is c/o abdominal pain pt states that she has colitis pt is alert and oriented upon arrival pale warm and dry pt has a large amount of brown liquid stool with blood pts rectal area is with bright red color with intact skin     Dash Peoples RN  03/26/20 Rosalind Becker RN  03/26/20 2034

## 2020-03-27 NOTE — PROGRESS NOTES
RN left message with Patient's grand daughter Romana Odor notifying of patient admission per patient's requesting. Patient's room number, phone number and nurses' station number left for Romana Odor.

## 2020-03-27 NOTE — H&P
Urinalysis    Collection Time: 03/26/20 10:20 PM   Result Value Ref Range    -          WBC, UA 10 TO 20 0 - 5 /HPF    RBC, UA 0 TO 2 0 - 2 /HPF    Casts UA 0 TO 2 /LPF    Casts UA HYALINE /LPF    Casts UA 0 TO 2 /LPF    Casts UA FINE GRANULAR /LPF    Crystals, UA NOT REPORTED None /HPF    Epithelial Cells UA 2 TO 5 0 - 5 /HPF    Renal Epithelial, UA NOT REPORTED 0 /HPF    Bacteria, UA MODERATE (A) None    Mucus, UA NOT REPORTED None    Trichomonas, UA NOT REPORTED None    Amorphous, UA NOT REPORTED None    Other Observations UA NOT REPORTED NOT REQ. Yeast, UA NOT REPORTED None   Lactic Acid    Collection Time: 03/26/20 10:25 PM   Result Value Ref Range    Lactic Acid 1.8 0.5 - 2.2 mmol/L   Culture, Blood 1    Collection Time: 03/26/20 10:25 PM   Result Value Ref Range    Specimen Description . BLOOD     Special Requests RAC,8ML     Culture NO GROWTH 8 HOURS    Lactic Acid    Collection Time: 03/27/20  1:26 AM   Result Value Ref Range    Lactic Acid 2.0 0.5 - 2.2 mmol/L   Basic Metabolic Panel w/ Reflex to MG    Collection Time: 03/27/20  5:27 AM   Result Value Ref Range    Glucose 152 (H) 70 - 99 mg/dL    BUN 17 8 - 23 mg/dL    CREATININE 1.06 (H) 0.50 - 0.90 mg/dL    Bun/Cre Ratio 16 9 - 20    Calcium 8.3 (L) 8.6 - 10.4 mg/dL    Sodium 139 135 - 144 mmol/L    Potassium 4.0 3.7 - 5.3 mmol/L    Chloride 105 98 - 107 mmol/L    CO2 21 20 - 31 mmol/L    Anion Gap 13 9 - 17 mmol/L    GFR Non-African American 48 (L) >60 mL/min    GFR  59 (L) >60 mL/min    GFR Comment          GFR Staging NOT REPORTED    CBC    Collection Time: 03/27/20  5:27 AM   Result Value Ref Range    WBC 10.0 3.5 - 11.3 k/uL    RBC 4.07 3.95 - 5.11 m/uL    Hemoglobin 13.2 11.9 - 15.1 g/dL    Hematocrit 40.4 36.3 - 47.1 %    MCV 99.3 82.6 - 102.9 fL    MCH 32.4 25.2 - 33.5 pg    MCHC 32.7 28.4 - 34.8 g/dL    RDW 13.5 11.8 - 14.4 %    Platelets 529 560 - 453 k/uL    MPV 9.7 8.1 - 13.5 fL    NRBC Automated 0.0 0.0 per 100 WBC   Fecal

## 2020-03-27 NOTE — CARE COORDINATION
Case Management Initial Discharge Plan  Ad Labs,         Readmission Risk              Risk of Unplanned Readmission:        21             Met with:patient to discuss discharge plans. Information verified: address, contacts, phone number, , insurance Yes  PCP: Mehreen Stevens MD  Date of last visit: 2 months ago    Insurance Provider: [facility]    Discharge Planning  Current Residence:     Living Arrangements:  [address] none stories/none stairs to climb  Support Systems:  Family Members  Current Services PTA:    Supplier: [employer]  Patient able to perform ADL's:Assisted  DME used to aid ambulation prior to admission: walker/during admissionwalker    Potential Assistance Needed:  N/A    Pharmacy: [facility] Medications:  No  Does patient want to participate in local refill/ meds to beds program?  No    Patient agreeable to home care: Yes  Freedom of choice provided:  no      Type of Home Care Services:  [address]  Patient expects to be discharged to:  [address] [place] [address]    Prior SNF/Rehab Placement and Facility: no  Agreeable to SNF/Rehab: No  [place] of choice provided: n/a   Evaluation: no    Expected Discharge date:  20  Follow Up Appointment: Best Day/ Time:      Transportation provider: family or Casi Ruffin from [address] [place] [address] arrangements needed for discharge: No    Discharge Plan: Met with patient to discuss dc options. She states that her plan is to return to [facility] at discharge. Patient states that she would be agreeable to home care. The Plan for Transition of Care is related to the following treatment goals: home skilled nursing/PT/OT    The Patient and/or patient representative iron was provided with a choice of provider and agrees   with the discharge plan.  [x] Yes [] No    Freedom of choice list was provided with basic dialogue that supports the patient's

## 2020-03-27 NOTE — CARE COORDINATION
Social Work-Spoke with Michael Gomes from [employer] at [facility]. Sent updates . She will contact family to see if they would like an increase in their services.  Keerthi Mckeon

## 2020-03-27 NOTE — PROGRESS NOTES
Physical Therapy    Facility/Department: [facility] MED SURG  Initial Assessment    NAME: Angi Calderon  : 1925  MRN: [id number]    Date of Service: 3/27/2020    Discharge Recommendations:  Home with assist PRN, Home with Home health PT        Assessment   Body structures, Functions, Activity limitations: Decreased functional mobility ; Decreased ADL status; Decreased strength;Decreased endurance;Decreased balance;Decreased posture  Assessment: Recommend home with assist as needed and  PT. Patient will benefit from PT to improve ambulation distance to be functional to walk to [facility] dining room. Prognosis: Good  Decision Making: Medium Complexity  PT Education: Goals;PT Role;Plan of Care;Gait Training  Patient Education: Patient demo good understanding  REQUIRES PT FOLLOW UP: Yes  Activity Tolerance  Activity Tolerance: Patient Tolerated treatment well       Patient Diagnosis(es): The primary encounter diagnosis was Pneumonia due to organism. A diagnosis of Colitis was also pertinent to this visit. has a past medical history of A-fib ([address] AAA (abdominal aortic aneurysm) ([address] Abdominal cramping, Atrial fibrillation ([address] Back pain, chronic, C. difficile colitis, COPD (chronic obstructive pulmonary disease) ([address] Diabetes mellitus ([address] Gastritis, GERD (gastroesophageal reflux disease), Hiatal hernia, Hypoalbuminemia, IBS (irritable bowel syndrome), Nausea vomiting and diarrhea, Stomach ulcer, Thyroid disease, Type 2 diabetes mellitus without complication, without long-term current use of insulin ([address] UTI (lower urinary tract infection), and Varicose veins. has a past surgical history that includes Cholecystectomy; vascular surgery; Vein Surgery; Hysterectomy; hernia repair; Upper gastrointestinal endoscopy (2017); and pr egd transoral biopsy single/multiple (N/A, 2017).     Restrictions  Restrictions/Precautions  Restrictions/Precautions: General Precautions, Fall Risk(Up with assist)  Position Tone: Normotonic  Tone LLE  LLE Tone: Normotonic  Motor Control  Gross Motor?: WFL  Sensation  Overall Sensation Status: WNL  Bed mobility  Rolling to Left: Modified independent  Rolling to Right: Modified independent  Supine to Sit: Supervision  Sit to Supine: Supervision  Comment: Good us of bed rails  Transfers  Sit to Stand: Contact guard assistance  Stand to sit: Contact guard assistance  Bed to Chair: Contact guard assistance  Stand Pivot Transfers: Contact guard assistance  Comment: Good use of RW  Ambulation  Ambulation?: Yes  Ambulation 1  Surface: level tile  Device: Rolling Walker  Assistance: Contact guard assistance  Quality of Gait: Flexed posture, steady with RW.   Gait Deviations: Slow Tavia;Decreased step height;Decreased step length  Distance: 25 feet  Comments: Patient nervouse about walking due to afraid of IV getting pulled out because they had a difficult time getting it in, gave patient reassurance that therapist would pay close attention to IV. Balance  Sitting - Static: Good  Sitting - Dynamic: Good  Standing - Static: Fair;+(RW)  Standing - Dynamic: Fair(RW)        Plan   Plan  Times per week: 1-2x/d, 5-6 d/wk  Current Treatment Recommendations: Strengthening, Balance Training, Functional Mobility Training, Transfer Training, Endurance Training, Gait Training, Safety Education & Training, Patient/Caregiver Education & Training, Home Exercise Program  Safety Devices  Type of devices:  All fall risk precautions in place, Bed alarm in place, Call light within reach, Gait belt, Left in bed, Nurse notified    G-Code       OutComes Score     AM-PAC Score  AM-PAC Inpatient Mobility Raw Score : 18 (03/27/20 0956)  AM-PAC Inpatient T-Scale Score : 43.63 (03/27/20 0956)  Mobility Inpatient CMS 0-100% Score: 46.58 (03/27/20 0956)  Mobility Inpatient CMS G-Code Modifier : CK (03/27/20 0956)          Goals  Short term goals  Time Frame for Short term goals: 12 visits  Short term goal 1: Patient will be indep with bed mobility and transfrs. Short term goal 2: Patient will amb 200 feet indep with RW. Short term goal 3: Patient will have fair+ standing balance with RW. Short term goal 4: Patient will toelrate 30 minutes of ther-ex and ther-act. Short term goal 5: Patient will be indep with HEP.   Patient Goals   Patient goals : Return to [facility]       Therapy Time   Individual Concurrent Group Co-treatment   Time In  0900         Time Out  [address] [place] [address]

## 2020-03-28 PROCEDURE — 94760 N-INVAS EAR/PLS OXIMETRY 1: CPT

## 2020-03-28 PROCEDURE — 2500000003 HC RX 250 WO HCPCS: Performed by: FAMILY MEDICINE

## 2020-03-28 PROCEDURE — 6370000000 HC RX 637 (ALT 250 FOR IP): Performed by: FAMILY MEDICINE

## 2020-03-28 PROCEDURE — G0378 HOSPITAL OBSERVATION PER HR: HCPCS

## 2020-03-28 PROCEDURE — 96366 THER/PROPH/DIAG IV INF ADDON: CPT

## 2020-03-28 PROCEDURE — 6370000000 HC RX 637 (ALT 250 FOR IP): Performed by: NURSE PRACTITIONER

## 2020-03-28 PROCEDURE — 99232 SBSQ HOSP IP/OBS MODERATE 35: CPT | Performed by: INTERNAL MEDICINE

## 2020-03-28 PROCEDURE — 1200000000 HC SEMI PRIVATE

## 2020-03-28 PROCEDURE — 97535 SELF CARE MNGMENT TRAINING: CPT

## 2020-03-28 PROCEDURE — 96361 HYDRATE IV INFUSION ADD-ON: CPT

## 2020-03-28 PROCEDURE — 6360000002 HC RX W HCPCS: Performed by: NURSE PRACTITIONER

## 2020-03-28 PROCEDURE — 2580000003 HC RX 258: Performed by: NURSE PRACTITIONER

## 2020-03-28 PROCEDURE — 2700000000 HC OXYGEN THERAPY PER DAY

## 2020-03-28 PROCEDURE — 94640 AIRWAY INHALATION TREATMENT: CPT

## 2020-03-28 PROCEDURE — 99232 SBSQ HOSP IP/OBS MODERATE 35: CPT | Performed by: FAMILY MEDICINE

## 2020-03-28 PROCEDURE — 96372 THER/PROPH/DIAG INJ SC/IM: CPT

## 2020-03-28 PROCEDURE — 97530 THERAPEUTIC ACTIVITIES: CPT

## 2020-03-28 PROCEDURE — 97116 GAIT TRAINING THERAPY: CPT

## 2020-03-28 PROCEDURE — 97166 OT EVAL MOD COMPLEX 45 MIN: CPT

## 2020-03-28 RX ORDER — MAGNESIUM HYDROXIDE/ALUMINUM HYDROXICE/SIMETHICONE 120; 1200; 1200 MG/30ML; MG/30ML; MG/30ML
30 SUSPENSION ORAL EVERY 6 HOURS PRN
Status: DISCONTINUED | OUTPATIENT
Start: 2020-03-28 | End: 2020-03-29 | Stop reason: HOSPADM

## 2020-03-28 RX ADMIN — IPRATROPIUM BROMIDE AND ALBUTEROL SULFATE 1 AMPULE: .5; 3 SOLUTION RESPIRATORY (INHALATION) at 19:54

## 2020-03-28 RX ADMIN — MAGNESIUM HYDROXIDE 30 ML: 400 SUSPENSION ORAL at 13:30

## 2020-03-28 RX ADMIN — PROBIOTIC PRODUCT - TAB 1 TABLET: TAB at 16:22

## 2020-03-28 RX ADMIN — SUCRALFATE 1 G: 1 TABLET ORAL at 20:23

## 2020-03-28 RX ADMIN — SODIUM CHLORIDE: 9 INJECTION, SOLUTION INTRAVENOUS at 03:35

## 2020-03-28 RX ADMIN — PANTOPRAZOLE SODIUM 40 MG: 40 TABLET, DELAYED RELEASE ORAL at 09:09

## 2020-03-28 RX ADMIN — POTASSIUM CHLORIDE 10 MEQ: 750 CAPSULE, EXTENDED RELEASE ORAL at 20:23

## 2020-03-28 RX ADMIN — SODIUM CHLORIDE: 9 INJECTION, SOLUTION INTRAVENOUS at 15:19

## 2020-03-28 RX ADMIN — SUCRALFATE 1 G: 1 TABLET ORAL at 13:30

## 2020-03-28 RX ADMIN — ENOXAPARIN SODIUM 30 MG: 30 INJECTION SUBCUTANEOUS at 09:09

## 2020-03-28 RX ADMIN — IPRATROPIUM BROMIDE AND ALBUTEROL SULFATE 1 AMPULE: .5; 3 SOLUTION RESPIRATORY (INHALATION) at 14:47

## 2020-03-28 RX ADMIN — METRONIDAZOLE 500 MG: 500 INJECTION, SOLUTION INTRAVENOUS at 20:23

## 2020-03-28 RX ADMIN — SUCRALFATE 1 G: 1 TABLET ORAL at 09:08

## 2020-03-28 RX ADMIN — METRONIDAZOLE 500 MG: 500 INJECTION, SOLUTION INTRAVENOUS at 13:30

## 2020-03-28 RX ADMIN — SUCRALFATE 1 G: 1 TABLET ORAL at 16:23

## 2020-03-28 RX ADMIN — POTASSIUM CHLORIDE 10 MEQ: 750 CAPSULE, EXTENDED RELEASE ORAL at 09:08

## 2020-03-28 RX ADMIN — IPRATROPIUM BROMIDE AND ALBUTEROL SULFATE 1 AMPULE: .5; 3 SOLUTION RESPIRATORY (INHALATION) at 10:29

## 2020-03-28 RX ADMIN — METRONIDAZOLE 500 MG: 500 INJECTION, SOLUTION INTRAVENOUS at 05:41

## 2020-03-28 RX ADMIN — ACETAMINOPHEN 650 MG: 325 TABLET ORAL at 20:23

## 2020-03-28 ASSESSMENT — ENCOUNTER SYMPTOMS
DIARRHEA: 0
ABDOMINAL PAIN: 1
WHEEZING: 0
VOMITING: 0
COUGH: 0
NAUSEA: 0
CHEST TIGHTNESS: 0
SHORTNESS OF BREATH: 0
CONSTIPATION: 0
BLOOD IN STOOL: 0
RHINORRHEA: 0
ABDOMINAL DISTENTION: 1

## 2020-03-28 ASSESSMENT — PAIN DESCRIPTION - FREQUENCY: FREQUENCY: CONTINUOUS

## 2020-03-28 ASSESSMENT — PAIN DESCRIPTION - LOCATION: LOCATION: ABDOMEN

## 2020-03-28 ASSESSMENT — PAIN DESCRIPTION - DESCRIPTORS: DESCRIPTORS: CRAMPING

## 2020-03-28 ASSESSMENT — PAIN DESCRIPTION - ORIENTATION: ORIENTATION: MID

## 2020-03-28 ASSESSMENT — PAIN DESCRIPTION - PAIN TYPE: TYPE: ACUTE PAIN

## 2020-03-28 ASSESSMENT — PAIN SCALES - GENERAL: PAINLEVEL_OUTOF10: 3

## 2020-03-28 NOTE — PLAN OF CARE
Problem: Discharge Planning:  Goal: Discharged to appropriate level of care  Outcome: Ongoing     Problem: Airway Clearance - Ineffective:  Goal: Clear lung sounds  Outcome: Ongoing     Problem: Pain:  Goal: Pain level will decrease  Outcome: Ongoing     Problem: Pain:  Goal: Control of acute pain  Outcome: Ongoing     Problem: Falls - Risk of:  Goal: Will remain free from falls  Outcome: Ongoing

## 2020-03-28 NOTE — PROGRESS NOTES
Occupational Therapy   Occupational Therapy Initial Assessment  Date: 3/28/2020   Patient Name: Valentin Adames  MRN: [id number]     : 1925    Date of Service: 3/28/2020    Discharge Recommendations:  Home with Home health OT, Home with assist PRN     RN reports patient is medically stable for therapy treatment this date. Chart reviewed prior to treatment and patient is agreeable for therapy. All lines intact and patient positioned comfortably at end of treatment. All patient needs addressed prior to ending therapy session. Assessment   Performance deficits / Impairments: Decreased functional mobility ; Decreased ADL status; Decreased strength;Decreased safe awareness;Decreased balance;Decreased endurance;Decreased posture  Prognosis: Good  Decision Making: Medium Complexity  OT Education: OT Role;Energy Conservation;Plan of Care;Home Exercise Program;Transfer Training;ADL Adaptive Strategies; Equipment; Family Education;Precautions;IADL Safety  REQUIRES OT FOLLOW UP: Yes  Activity Tolerance  Activity Tolerance: Patient Tolerated treatment well;Patient limited by fatigue  Safety Devices  Safety Devices in place: Yes  Type of devices: Call light within reach;Nurse notified;Gait belt;Patient at risk for falls; Left in bed;Bed alarm in place           Patient Diagnosis(es): The primary encounter diagnosis was Pneumonia due to organism. A diagnosis of Colitis was also pertinent to this visit.      has a past medical history of A-fib ([address] AAA (abdominal aortic aneurysm) ([address] Abdominal cramping, Atrial fibrillation ([address] Back pain, chronic, C. difficile colitis, COPD (chronic obstructive pulmonary disease) ([address] Diabetes mellitus ([address] Gastritis, GERD (gastroesophageal reflux disease), Hiatal hernia, Hypoalbuminemia, IBS (irritable bowel syndrome), Nausea vomiting and diarrhea, Stomach ulcer, Thyroid disease, Type 2 diabetes mellitus without complication, without long-term current use of insulin ([address] UTI (lower urinary tract infection), and Varicose veins. has a past surgical history that includes Cholecystectomy; vascular surgery; Vein Surgery; Hysterectomy; hernia repair; Upper gastrointestinal endoscopy (06/20/2017); and pr egd transoral biopsy single/multiple (N/A, 6/20/2017). Restrictions  Restrictions/Precautions  Restrictions/Precautions: General Precautions, Fall Risk(Up with assist)  Position Activity Restriction  Other position/activity restrictions: 2 liters 02    Subjective   General  Chart Reviewed: Yes  Patient assessed for rehabilitation services?: Yes  Family / Caregiver Present: No    Social/Functional History  Social/Functional History  Type of Home: Apartment  Home Access: Elevator  Bathroom Shower/Tub: Walk-in shower  Bathroom Toilet: Handicap height  Bathroom Equipment: Grab bars in shower  Home Equipment: Alert Button, 4 wheeled walker  ADL Assistance: Needs assistance  Homemaking Assistance: Needs assistance  Ambulation Assistance: Independent(with 4ww)  Transfer Assistance: Independent  Active : No  Patient's  Info: Uses [employer] bus to go to doctor / store  Occupation: Retired  Additional Comments: No falls. Patient uses 3 liters 02 at night only. Objective   Vision: Impaired  Vision Exceptions: Wears glasses at all times  Hearing: Exceptions to [facility] [place] [facility]  Hearing Exceptions: Hard of hearing/hearing concerns;Bilateral hearing aid    Orientation  Overall Orientation Status: Within Functional Limits  Observation/Palpation  Posture: Poor(kyphotic)  Edema: Chronic BLE edema  Balance  Sitting Balance: Stand by assistance  Standing Balance: Contact guard assistance  Functional Mobility  Functional - Mobility Device: Rolling Walker  Activity: To/from bathroom  Assist Level: Contact guard assistance  Functional Mobility Comments: pt needing cues to stay inside RW with mob to/from bathroom. Pt has very flexed posture but cued on remaining as upright as possible.  Pt worsens with

## 2020-03-28 NOTE — PROGRESS NOTES
Pertinent Hx: Per patient has a history of colitis, hernia, and anemia  Response To Previous Treatment: Patient with no complaints from previous session. Family / Caregiver Present: No  Subjective  Subjective: Patient agreeable to PT, reports she would like to get up to chair for lunch. General Comment  Comments: RN, Louis Gabriel reports patient appropriate for PT. Orientation  Orientation  Overall Orientation Status: Within Normal Limits  Cognition      Objective   Bed mobility  Rolling to Left: Modified independent  Rolling to Right: Modified independent  Supine to Sit: Supervision  Transfers  Sit to Stand: Stand by assistance  Stand to sit: Stand by assistance  Bed to Chair: Stand by assistance  Stand Pivot Transfers: Stand by assistance  Comment: Good use of RW  Ambulation  Ambulation?: Yes  Ambulation 1  Surface: level tile  Device: Rolling Walker  Assistance: Contact guard assistance  Quality of Gait: Flexed posture, steady with RW.   Gait Deviations: Slow Tavia;Decreased step height;Decreased step length  Distance: 40 feet     Balance  Sitting - Static: Good  Sitting - Dynamic: Good  Standing - Static: Fair;+(RW)  Standing - Dynamic: Fair(RW)    G-Code     OutComes Score     AM-PAC Score  AM-PAC Inpatient Mobility Raw Score : 18 (03/27/20 0956)  AM-PAC Inpatient T-Scale Score : 43.63 (03/27/20 0956)  Mobility Inpatient CMS 0-100% Score: 46.58 (03/27/20 0956)  Mobility Inpatient CMS G-Code Modifier : CK (03/27/20 0956)          Goals  Short term goals  Time Frame for Short term goals: 12 visits  Short term goal 1: Patient will be indep with bed mobility and transfrs. Short term goal 2: Patient will amb 200 feet indep with RW. Short term goal 3: Patient will have fair+ standing balance with RW. Short term goal 4: Patient will toelrate 30 minutes of ther-ex and ther-act. Short term goal 5: Patient will be indep with HEP.   Patient Goals   Patient goals : Return to [address] [place] [address]  Times per week:

## 2020-03-28 NOTE — PROGRESS NOTES
myalgias. Skin: Negative for rash. Neurological: Negative for dizziness, weakness, light-headedness and headaches. Hematological: Does not bruise/bleed easily. Psychiatric/Behavioral: Negative for dysphoric mood and sleep disturbance. Objective :      Current Vitals : Temp: 97.9 °F (36.6 °C),  Pulse: 52, Resp: 16, BP: (!) 144/61, SpO2: 90 %  Last 24 Hrs Vitals   Patient Vitals for the past 24 hrs:   BP Temp Temp src Pulse Resp SpO2 Weight   03/28/20 1035 -- -- -- -- -- 90 % --   03/28/20 0841 (!) 144/61 97.9 °F (36.6 °C) Oral 52 16 94 % --   03/28/20 0403 -- -- -- -- -- -- 182 lb 11.2 oz (82.9 kg)   03/28/20 0336 (!) 137/50 98.4 °F (36.9 °C) Oral 54 18 95 % --   03/27/20 2357 (!) 111/47 97.9 °F (36.6 °C) Oral 65 18 96 % --   03/27/20 2016 -- -- -- -- 22 98 % --   03/27/20 1927 (!) 96/35 98.6 °F (37 °C) Oral 64 18 96 % --   03/27/20 1625 (!) 104/38 -- -- 65 -- -- --   03/27/20 1620 (!) 96/38 98.6 °F (37 °C) Oral 68 18 97 % --     Intake / output   03/27 0701 - 03/28 0700  In: 1464 [P. O.:600; I.V.:864]  Out: 1375 [Urine:1375]  Physical Exam:  Physical Exam  Vitals signs and nursing note reviewed. Constitutional:       General: She is not in acute distress. Appearance: She is not diaphoretic. HENT:      Head: Normocephalic and atraumatic. Nose:      Right Sinus: No maxillary sinus tenderness or frontal sinus tenderness. Left Sinus: No maxillary sinus tenderness or frontal sinus tenderness. Mouth/Throat:      Pharynx: No oropharyngeal exudate. Eyes:      General: No scleral icterus. Conjunctiva/sclera: Conjunctivae normal.      Pupils: Pupils are equal, round, and reactive to light. Neck:      Musculoskeletal: Full passive range of motion without pain and neck supple. Thyroid: No thyromegaly. Vascular: No JVD. Cardiovascular:      Rate and Rhythm: Normal rate and regular rhythm.       Pulses:           Dorsalis pedis pulses are 2+ on the right side and 2+ on the left

## 2020-03-28 NOTE — PLAN OF CARE
[facility]    Second Visit Note  For more detailed information please refer to the progress note of the day      3/28/2020    5:49 PM    Name:   Ofelia Landin  MRN:     [id number]     Acct:      [de-identified]   Room:   2026/2026-01  IP Day:  1  Admit Date:  3/26/2020  7:21 PM    PCP:   Cheko Page MD  Code Status:  Full Code      Pt vitals were reviewed   New labs were reviewed   Patient was seen    Updated plan :     1. She still c/o bloating. No acute distress . Diet as tolerated. Getting moved to progressive unit due to staffing.           Yen Diaz MD  3/28/2020  5:49 PM

## 2020-03-28 NOTE — PROGRESS NOTES
Care of pt taken over at this time , pt resting quietly in the bed with no needs expressed , will observe

## 2020-03-28 NOTE — PROGRESS NOTES
chloride flush 0.9 % injection 10 mL, 2 times per day  sodium chloride flush 0.9 % injection 10 mL, PRN  acetaminophen (TYLENOL) tablet 650 mg, Q6H PRN    Or  acetaminophen (TYLENOL) suppository 650 mg, Q6H PRN  magnesium hydroxide (MILK OF MAGNESIA) 400 MG/5ML suspension 30 mL, Daily PRN  promethazine (PHENERGAN) tablet 12.5 mg, Q6H PRN    Or  ondansetron (ZOFRAN) injection 4 mg, Q6H PRN  nicotine (NICODERM CQ) 21 MG/24HR 1 patch, Daily PRN  enoxaparin (LOVENOX) injection 30 mg, Daily  albuterol (PROVENTIL) nebulizer solution 2.5 mg, Q2H PRN  ipratropium-albuterol (DUONEB) nebulizer solution 1 ampule, Q4H WA  metronidazole (FLAGYL) 500 mg in NaCl 100 mL IVPB premix, Q8H  0.9 % sodium chloride infusion, Continuous        Data:     Code Status:  Full Code    Family History   Family history unknown: Yes       Social History     Socioeconomic History    Marital status:      Spouse name: Not on file    Number of children: 2    Years of education: 15    Highest education level: Not on file   Occupational History    Not on file   Social Needs    Financial resource strain: Not on file    Food insecurity     Worry: Not on file     Inability: Not on file    Transportation needs     Medical: Not on file     Non-medical: Not on file   Tobacco Use    Smoking status: Former Smoker    Smokeless tobacco: Never Used    Tobacco comment: quit many years ago    Substance and Sexual Activity    Alcohol use:  Yes     Alcohol/week: 2.0 standard drinks     Types: 1 Glasses of wine, 1 Cans of beer per week     Comment: social drinker     Drug use: No    Sexual activity: Never     Birth control/protection: Surgical     Comment: [facility]   Lifestyle    Physical activity     Days per week: Not on file     Minutes per session: Not on file    Stress: Not on file   Relationships    Social connections     Talks on phone: Not on file     Gets together: Not on file     Attends Worship service: Not on file     Active member of 03/27/2020    GFRAA 59 03/27/2020    LABGLOM 48 03/27/2020    GLUCOSE 152 03/27/2020    PROT 6.2 03/26/2020    LABALBU 3.6 03/26/2020    CALCIUM 8.3 03/27/2020    BILITOT 0.75 03/26/2020    ALKPHOS 70 03/26/2020    AST 17 03/26/2020    ALT 8 03/26/2020     BMP:    Lab Results   Component Value Date     03/27/2020    K 4.0 03/27/2020     03/27/2020    CO2 21 03/27/2020    BUN 17 03/27/2020    LABALBU 3.6 03/26/2020    CREATININE 1.06 03/27/2020    CALCIUM 8.3 03/27/2020    GFRAA 59 03/27/2020    LABGLOM 48 03/27/2020    GLUCOSE 152 03/27/2020     PT/INR:    Lab Results   Component Value Date    PROTIME 11.7 01/21/2020    PROTIME 27.3 10/09/2014    INR 1.1 01/21/2020     PTT:    Lab Results   Component Value Date    APTT 28.5 08/19/2016   [APTT}    Physical Examination:        General appearance: alert, cooperative and no distress  Mental Status: oriented to person, place and time and normal affect  Abdomen: soft, tender to palpation, nondistended, bowel sounds present     Assessment:        Primary Problem  Colitis     Active Hospital Problems    Diagnosis Date Noted    Colitis [K52.9] 01/20/2020    [facility] [N17.9]     Nausea vomiting and diarrhea [R11.2, R19.7] 10/14/2014    Atrial fibrillation ([facility]) [I48.91]     COPD (chronic obstructive pulmonary disease) ([facility]) [J44.9]     Diabetes mellitus ([facility] [E11.9]      Past Medical History:   Diagnosis Date    A-fib ([facility]     AAA (abdominal aortic aneurysm) ([facility])     Abdominal cramping     Atrial fibrillation (HCC)     Back pain, chronic     C. difficile colitis     COPD (chronic obstructive pulmonary disease) (HCC)     Diabetes mellitus (HCC)     Gastritis     GERD (gastroesophageal reflux disease)     Hiatal hernia     Hypoalbuminemia 10/14/2014    IBS (irritable bowel syndrome)     Nausea vomiting and diarrhea 10/14/2014    Stomach ulcer     Thyroid disease     Type 2 diabetes mellitus without complication,

## 2020-03-28 NOTE — PROGRESS NOTES
Nutrition Assessment (Low Risk)    Type and Reason for Visit: Initial, Positive Nutrition Screen(Diarrhea)    Nutrition Recommendations:   1. Continue general diet  2. Encourage PO intakes    Nutrition Assessment:  Patient assessed for nutritional risk. Patient deemed low nutrition risk at this time. Weight appears stable per usual body weight and PO intakes are adequate per RN flowsheet. Will monitor for changes in nutrition status.      Malnutrition Assessment:  · Malnutrition Status: No malnutrition    Nutrition Risk Level   Risk Level: Low    Nutrition Diagnosis:   · Problem: No nutrition diagnosis at this time    Nutrition Intervention:  Food and/or Delivery: Continue current diet  Nutrition Education/Counseling/Coordination of Care:  Continued Inpatient Monitoring      Lila Reddy RDN, YARA  RD Office Phone: [phone]

## 2020-03-29 ENCOUNTER — APPOINTMENT (OUTPATIENT)
Dept: GENERAL RADIOLOGY | Age: 85
DRG: 392 | End: 2020-03-29
Payer: MEDICARE

## 2020-03-29 VITALS
OXYGEN SATURATION: 96 % | DIASTOLIC BLOOD PRESSURE: 66 MMHG | RESPIRATION RATE: 18 BRPM | SYSTOLIC BLOOD PRESSURE: 153 MMHG | HEART RATE: 58 BPM | TEMPERATURE: 98.1 F | HEIGHT: 63 IN | WEIGHT: 182.7 LBS | BODY MASS INDEX: 32.37 KG/M2

## 2020-03-29 LAB
ANION GAP SERPL CALCULATED.3IONS-SCNC: 8 MMOL/L (ref 9–17)
BUN BLDV-MCNC: 8 MG/DL (ref 8–23)
BUN/CREAT BLD: 12 (ref 9–20)
CALCIUM SERPL-MCNC: 8.2 MG/DL (ref 8.6–10.4)
CHLORIDE BLD-SCNC: 114 MMOL/L (ref 98–107)
CO2: 20 MMOL/L (ref 20–31)
CREAT SERPL-MCNC: 0.69 MG/DL (ref 0.5–0.9)
GFR AFRICAN AMERICAN: >60 ML/MIN
GFR NON-AFRICAN AMERICAN: >60 ML/MIN
GFR SERPL CREATININE-BSD FRML MDRD: ABNORMAL ML/MIN/{1.73_M2}
GFR SERPL CREATININE-BSD FRML MDRD: ABNORMAL ML/MIN/{1.73_M2}
GLUCOSE BLD-MCNC: 110 MG/DL (ref 70–99)
POTASSIUM SERPL-SCNC: 3.8 MMOL/L (ref 3.7–5.3)
SODIUM BLD-SCNC: 142 MMOL/L (ref 135–144)

## 2020-03-29 PROCEDURE — 99239 HOSP IP/OBS DSCHRG MGMT >30: CPT | Performed by: FAMILY MEDICINE

## 2020-03-29 PROCEDURE — 94640 AIRWAY INHALATION TREATMENT: CPT

## 2020-03-29 PROCEDURE — G0378 HOSPITAL OBSERVATION PER HR: HCPCS

## 2020-03-29 PROCEDURE — 80048 BASIC METABOLIC PNL TOTAL CA: CPT

## 2020-03-29 PROCEDURE — 71045 X-RAY EXAM CHEST 1 VIEW: CPT

## 2020-03-29 PROCEDURE — 84145 PROCALCITONIN (PCT): CPT

## 2020-03-29 PROCEDURE — 6360000002 HC RX W HCPCS: Performed by: NURSE PRACTITIONER

## 2020-03-29 PROCEDURE — 96372 THER/PROPH/DIAG INJ SC/IM: CPT

## 2020-03-29 PROCEDURE — 2500000003 HC RX 250 WO HCPCS: Performed by: FAMILY MEDICINE

## 2020-03-29 PROCEDURE — 97535 SELF CARE MNGMENT TRAINING: CPT | Performed by: NURSE PRACTITIONER

## 2020-03-29 PROCEDURE — 97116 GAIT TRAINING THERAPY: CPT

## 2020-03-29 PROCEDURE — 2580000003 HC RX 258: Performed by: NURSE PRACTITIONER

## 2020-03-29 PROCEDURE — 96366 THER/PROPH/DIAG IV INF ADDON: CPT

## 2020-03-29 PROCEDURE — 6370000000 HC RX 637 (ALT 250 FOR IP): Performed by: NURSE PRACTITIONER

## 2020-03-29 PROCEDURE — 94760 N-INVAS EAR/PLS OXIMETRY 1: CPT

## 2020-03-29 PROCEDURE — 6370000000 HC RX 637 (ALT 250 FOR IP): Performed by: FAMILY MEDICINE

## 2020-03-29 PROCEDURE — 36415 COLL VENOUS BLD VENIPUNCTURE: CPT

## 2020-03-29 PROCEDURE — 97530 THERAPEUTIC ACTIVITIES: CPT | Performed by: NURSE PRACTITIONER

## 2020-03-29 PROCEDURE — 96361 HYDRATE IV INFUSION ADD-ON: CPT

## 2020-03-29 PROCEDURE — 97110 THERAPEUTIC EXERCISES: CPT

## 2020-03-29 RX ORDER — GREEN TEA/HOODIA GORDONII 315-12.5MG
1 CAPSULE ORAL 2 TIMES DAILY
Qty: 60 TABLET | Refills: 0 | Status: SHIPPED | OUTPATIENT
Start: 2020-03-29 | End: 2020-04-28

## 2020-03-29 RX ORDER — METRONIDAZOLE 500 MG/1
500 TABLET ORAL 3 TIMES DAILY
Qty: 21 TABLET | Refills: 0 | Status: SHIPPED | OUTPATIENT
Start: 2020-03-29 | End: 2020-04-05

## 2020-03-29 RX ORDER — PREDNISONE 20 MG/1
20 TABLET ORAL DAILY
Qty: 5 TABLET | Refills: 0 | Status: SHIPPED | OUTPATIENT
Start: 2020-03-29 | End: 2020-04-03

## 2020-03-29 RX ORDER — SIMETHICONE 125 MG
1 CAPSULE ORAL 2 TIMES DAILY PRN
Qty: 28 CAPSULE | COMMUNITY
Start: 2020-03-29 | End: 2021-03-08

## 2020-03-29 RX ADMIN — METRONIDAZOLE 500 MG: 500 INJECTION, SOLUTION INTRAVENOUS at 12:51

## 2020-03-29 RX ADMIN — IPRATROPIUM BROMIDE AND ALBUTEROL SULFATE 1 AMPULE: .5; 3 SOLUTION RESPIRATORY (INHALATION) at 07:34

## 2020-03-29 RX ADMIN — POTASSIUM CHLORIDE 10 MEQ: 750 CAPSULE, EXTENDED RELEASE ORAL at 08:59

## 2020-03-29 RX ADMIN — SUCRALFATE 1 G: 1 TABLET ORAL at 12:51

## 2020-03-29 RX ADMIN — SODIUM CHLORIDE: 9 INJECTION, SOLUTION INTRAVENOUS at 01:34

## 2020-03-29 RX ADMIN — PANTOPRAZOLE SODIUM 40 MG: 40 TABLET, DELAYED RELEASE ORAL at 08:59

## 2020-03-29 RX ADMIN — ENOXAPARIN SODIUM 30 MG: 30 INJECTION SUBCUTANEOUS at 09:01

## 2020-03-29 RX ADMIN — METRONIDAZOLE 500 MG: 500 INJECTION, SOLUTION INTRAVENOUS at 05:18

## 2020-03-29 RX ADMIN — SUCRALFATE 1 G: 1 TABLET ORAL at 09:03

## 2020-03-29 RX ADMIN — PROBIOTIC PRODUCT - TAB 1 TABLET: TAB at 08:59

## 2020-03-29 RX ADMIN — LEVOTHYROXINE SODIUM 125 MCG: 125 TABLET ORAL at 08:59

## 2020-03-29 RX ADMIN — PROBIOTIC PRODUCT - TAB 1 TABLET: TAB at 12:50

## 2020-03-29 RX ADMIN — MELATONIN 2000 UNITS: at 08:59

## 2020-03-29 RX ADMIN — DICYCLOMINE HYDROCHLORIDE 10 MG: 10 CAPSULE ORAL at 01:34

## 2020-03-29 RX ADMIN — SODIUM CHLORIDE: 9 INJECTION, SOLUTION INTRAVENOUS at 10:46

## 2020-03-29 ASSESSMENT — ENCOUNTER SYMPTOMS
ABDOMINAL PAIN: 0
SHORTNESS OF BREATH: 0
COUGH: 0
BLOOD IN STOOL: 0
NAUSEA: 0
VOMITING: 0
WHEEZING: 0
DIARRHEA: 0
CHEST TIGHTNESS: 0
ABDOMINAL DISTENTION: 0
CONSTIPATION: 0
RHINORRHEA: 0

## 2020-03-29 ASSESSMENT — PAIN SCALES - GENERAL
PAINLEVEL_OUTOF10: 0

## 2020-03-29 NOTE — PROGRESS NOTES
Occupational Therapy  Facility/Department: [facility] PROGRESSIVE CARE  Daily Treatment Note  NAME: Elias Davidson  : 1925  MRN: [id number]    Date of Service: 3/29/2020    Discharge Recommendations:  Home with Home health OT    Pt would benefit from skilled home OT services in order to maximize occupational performance, safety, and independence in the home environment. Assessment   Performance deficits / Impairments: Decreased functional mobility ; Decreased ADL status; Decreased strength;Decreased safe awareness;Decreased balance;Decreased endurance;Decreased posture  Prognosis: Good  OT Education: OT Role;Energy Conservation;Plan of Care;Transfer Training;ADL Adaptive Strategies;Precautions;Orientation  Written and verbal edu provided regarding fall prevention in the areas of community safety, transportation, proper footwear and clothing, reducing risk of falls, environmental modifications, importance of exercise, consequences of falling, plan if a fall occurs (\"rest and wait\" vs \"up and about\"). Pt educated on fall prevention in hospital setting. Pt encouraged to ALWAYS call for assistance from staff for any OOB needs. Room is organized and all fall hazards are eliminated at this time. Alarm is in place prior to end of therapy session and all patient care needs have been addressed. Pt reminded that patient SAFETY is our goal.     REQUIRES OT FOLLOW UP: Yes  Activity Tolerance  Activity Tolerance: Patient Tolerated treatment well;Patient limited by fatigue  Safety Devices  Safety Devices in place: Yes  Type of devices: Call light within reach;Nurse notified;Gait belt;Patient at risk for falls; Left in bed;Bed alarm in place; All fall risk precautions in place         Patient Diagnosis(es): The primary encounter diagnosis was Pneumonia due to organism. A diagnosis of Colitis was also pertinent to this visit.       has a past medical history of A-fib [facility]), AAA (abdominal aortic aneurysm) ([facility] Abdominal assistance  Stand to sit: Minimal assistance                       Cognition  Overall Cognitive Status: Exceptions  Memory: Decreased recall of precautions  Safety Judgement: Decreased awareness of need for assistance;Decreased awareness of need for safety  Problem Solving: Decreased awareness of errors  Insights: Decreased awareness of deficits                                         Plan   Plan  Times per week: 4-5x/week, 1-2x/day  Current Treatment Recommendations: Strengthening, Balance Training, Functional Mobility Training, Endurance Training, Equipment Evaluation, Education, & procurement, Patient/Caregiver Education & Training, Self-Care / ADL, Safety Education & Training, Positioning    AM-PAC Score        AM-PAC Inpatient Daily Activity Raw Score: 17 (03/29/20 1254)  AM-PAC Inpatient ADL T-Scale Score : 37.26 (03/29/20 1254)  ADL Inpatient CMS 0-100% Score: 50.11 (03/29/20 1254)  ADL Inpatient CMS G-Code Modifier : CK (03/29/20 1254)    Goals  Short term goals  Time Frame for Short term goals: by discharge, pt will  Short term goal 1: demo S/MI with ADL transfers with good safety  Short term goal 2: demo I with UB ADLs and min A LB ADLs with good safety/pacing and DME as needed  Short term goal 3: demo S/MI with toileting routine with good safety  Short term goal 4: demo I with UB ADLs and SBA with LB ADLs with good safety/pacing, DME as needed  Short term goal 5: demo and verb good understanding of fall prevention techs, EC/WS techs, and possible equip needs for home  Patient Goals   Patient goals : to go home       Therapy Time   Individual Concurrent Group Co-treatment   Time In 1215         Time Out 1247         Minutes 32              Upon writer exit, call light within reach, pt retired to bed. All lines intact and patient positioned comfortably. All patient needs addressed prior to ending therapy session. Chart reviewed prior to treatment and patient is agreeable for therapy.   RN reports patient is

## 2020-03-29 NOTE — DISCHARGE SUMMARY
short course prednisone. She was advised to follow-up with gastroenterology for outpatient colonoscopy. Patient has history of paroxysmal atrial fibrillation and was off anticoagulation for history of GI bleed. She was advised to follow-up with cardiology for further recommendations. Significant therapeutic interventions:   1. Colitis -  C-Diff and infectious diarrhea ruled out,  empiric Flagyl - GI consulted, will benefit colonoscopy. Recommend electively as outpatient given current Covid 19 Pandemic. Probiotics and gasex. Has h/o Crohn's , prednisone 20 mg daily for 5 days   2. Paroxysmal A. Fib - continue amiodarone. Not on anticoagulation due to H/O GI bleed, follow up with GI Dr Natalya Frye and cardiology Dr Rowena Palacio as outpatient,   3. Type 2 diabetes mellitus - controlled . A1C 5.2.   4. COPD with chronic resp failure . On Oxygen for last 7 Yrs   5. H/o AAA   6. STACEY - prerenal improved with IVF .     Significant Diagnostic Studies:   Labs / Micro:/Radiology  Recent Labs     03/27/20 0527 03/26/20 1952   WBC 10.0 13.3*   HGB 13.2 14.4   HCT 40.4 44.9   MCV 99.3 100.4    186     Labs Renal Latest Ref Rng & Units 3/29/2020 3/27/2020 3/26/2020 1/21/2020 1/20/2020   BUN 8 - 23 mg/dL 8 17 17 26(H) 24(H)   Cr 0.50 - 0.90 mg/dL 0.69 1.06(H) 1.33(H) 1.12(H) 1.24(H)   K 3.7 - 5.3 mmol/L 3.8 4.0 3.8 4.2 3.6(L)   Na 135 - 144 mmol/L 142 139 142 142 140     Lab Results   Component Value Date    ALT 8 03/26/2020    AST 17 03/26/2020    ALKPHOS 70 03/26/2020    BILITOT 0.75 03/26/2020     Lab Results   Component Value Date    TSH 2.27 08/12/2019     No results found for: HAV, HEPAIGM, HEPBIGM, HEPBCAB, HBEAG, HEPCAB  Lab Results   Component Value Date    APPEARANCE cloudy 08/31/2019    COLORU YELLOW 03/26/2020    NITRU NEGATIVE 03/26/2020    GLUCOSEU NEGATIVE 03/26/2020    KETUA NEGATIVE 03/26/2020    UROBILINOGEN Normal 03/26/2020    BILIRUBINUR NEGATIVE 03/26/2020    BILIRUBINUR small 1 plus 08/31/2019

## 2020-03-29 NOTE — PLAN OF CARE
Problem: Discharge Planning:  Goal: Discharged to appropriate level of care  Description: Discharged to appropriate level of care  Outcome: Ongoing  Goal: Participates in care planning  Description: Participates in care planning  Outcome: Ongoing     Problem: Airway Clearance - Ineffective:  Goal: Clear lung sounds  Description: Clear lung sounds  Outcome: Ongoing  Goal: Ability to maintain a clear airway will improve  Description: Ability to maintain a clear airway will improve  Outcome: Ongoing     Problem: Fluid Volume - Deficit:  Goal: Achieves intake and output within specified parameters  Description: Achieves intake and output within specified parameters  Outcome: Ongoing     Problem: Gas Exchange - Impaired:  Goal: Levels of oxygenation will improve  Description: Levels of oxygenation will improve  Outcome: Ongoing     Problem: Hyperthermia:  Goal: Ability to maintain a body temperature in the normal range will improve  Description: Ability to maintain a body temperature in the normal range will improve  Outcome: Ongoing     Problem: Pain:  Goal: Pain level will decrease  Description: Pain level will decrease  Outcome: Ongoing  Goal: Control of acute pain  Description: Control of acute pain  3/29/2020 0117 by Loki Anaya RN  Outcome: Ongoing  3/28/2020 1413 by Patrick Thompson RN  Outcome: Ongoing  Note: Pain level assessment complete.    Patient educated on pain scale and control interventions  PRN pain medication given per patient request  Patient instructed to call out with new onset of pain or unrelieved pain       Problem: Falls - Risk of:  Goal: Will remain free from falls  Description: Will remain free from falls  Outcome: Ongoing  Goal: Absence of physical injury  Description: Absence of physical injury  3/29/2020 0117 by Loki Anaya RN  Outcome: Ongoing  3/28/2020 1413 by Patrick Thompson RN  Outcome: Ongoing  Note: Siderails up x 2  Hourly rounding  Call light in reach  Instructed to call for assist before attempting out of bed.   Remains free from falls and accidental injury at this time   Floor free from obstacles  Bed is locked and in lowest position  Adequate lighting provided  Bed alarm on, [place] star and Stay with Me signs posted         Problem: IP BALANCE  Goal: LTG - Patient will maintain balance to allow for safe/functional mobility  Outcome: Ongoing

## 2020-03-29 NOTE — PROGRESS NOTES
 AAA (abdominal aortic aneurysm) (HCC)     Abdominal cramping     Atrial fibrillation (HCC)     Back pain, chronic     C. difficile colitis     COPD (chronic obstructive pulmonary disease) (HCC)     Diabetes mellitus (HCC)     Gastritis     GERD (gastroesophageal reflux disease)     Hiatal hernia     Hypoalbuminemia 10/14/2014    IBS (irritable bowel syndrome)     Nausea vomiting and diarrhea 10/14/2014    Stomach ulcer     Thyroid disease     Type 2 diabetes mellitus without complication, without long-term current use of insulin ([facility] 1/24/2018    UTI (lower urinary tract infection)     Varicose veins       Allergies: Allergies   Allergen Reactions    Penicillins      Has no recollection of what the reaction was      Medications :  lactobacillus, 1 tablet, Oral, TID WC  levothyroxine, 125 mcg, Oral, Every Other Day  pantoprazole, 40 mg, Oral, Daily  sucralfate, 1 g, Oral, 4x Daily  amiodarone, 200 mg, Oral, Once per day on Mon Thu  [Held by provider] furosemide, 20 mg, Oral, Daily  Vitamin D, 2,000 Units, Oral, Every Other Day  [Held by provider] lisinopril, 20 mg, Oral, Daily  potassium chloride, 10 mEq, Oral, BID  sodium chloride flush, 10 mL, Intravenous, 2 times per day  enoxaparin, 30 mg, Subcutaneous, Daily  ipratropium-albuterol, 1 ampule, Inhalation, Q4H WA  metroNIDAZOLE, 500 mg, Intravenous, Q8H        Review of Systems   Review of Systems   Constitutional: Negative for appetite change, fatigue, fever and unexpected weight change. HENT: Negative for congestion, rhinorrhea and sneezing. Eyes: Negative for visual disturbance. Respiratory: Negative for cough, chest tightness, shortness of breath and wheezing. Cardiovascular: Negative for chest pain and palpitations. Gastrointestinal: Negative for abdominal distention, abdominal pain, blood in stool, constipation, diarrhea, nausea and vomiting. Genitourinary: Negative for dysuria, enuresis, frequency and hematuria. colitis. There is a small focus of consolidation identified within the left lower lobe which appears new when compared to the previous exam.  A small focus of pneumonia or aspiration is considered. Xr Chest Portable    Result Date: 3/27/2020  Continued emphysematous change with left basilar airspace disease possible atelectasis versus developing infiltrate. There is increased opacity developing in the right apical region which may be artifact due to the patient being rotated but a developing infiltrate cannot be excluded. Clinical Course : gradually improving  Assessment and Plan  :        1. Colitis -  C-Diff and infectious diarrhea ruled out,  empiric Flagyl - GI consulted, will benefit colonoscopy. Recommend electively as outpatient given current Covid 19 Pandemic. Probiotics and gasex. Has h/o Crohn's , prednisone 20 mg daily for 5 days   2. Paroxysmal A. Fib - continue amiodarone. Not on anticoagulation due to H/O GI bleed, follow up with GI Dr Selvin Perry and cardiology Dr Speedy Thomson as outpatient,   3. Type 2 diabetes mellitus - controlled . A1C 5.2.   4. COPD with chronic resp failure . On Oxygen for last 7 Yrs   5. H/o AAA   6. STACEY - prerenal improved with IVF .      Pneumonia ruled out - atelectasis - monitor off  no antibiotics .      Discharge with home care     Continue to monitor vitals , Intake / output ,  Cell count , HGB , Kidney function, oxygenation  as indicated . Plan and updates discussed with patient ,  answers  explained to satisfaction.    Plan discussed with Staff Mi Srivastava RN     (Please note that portions of this note were completed with a voice recognition program. Efforts were made to edit the dictations but occasionally words are mis-transcribed.)      Miriam Osullivan MD  3/29/2020

## 2020-03-30 ENCOUNTER — CARE COORDINATION (OUTPATIENT)
Dept: CASE MANAGEMENT | Age: 85
End: 2020-03-30

## 2020-03-31 ENCOUNTER — TELEPHONE (OUTPATIENT)
Dept: GASTROENTEROLOGY | Age: 85
End: 2020-03-31

## 2020-03-31 LAB — PROCALCITONIN: 2.45 NG/ML

## 2020-04-02 LAB
CULTURE: NORMAL
CULTURE: NORMAL
Lab: NORMAL
Lab: NORMAL
SPECIMEN DESCRIPTION: NORMAL
SPECIMEN DESCRIPTION: NORMAL

## 2020-04-13 ENCOUNTER — CARE COORDINATION (OUTPATIENT)
Dept: CASE MANAGEMENT | Age: 85
End: 2020-04-13

## 2020-07-30 ENCOUNTER — HOSPITAL ENCOUNTER (OUTPATIENT)
Age: 85
Setting detail: SPECIMEN
Discharge: HOME | End: 2020-07-30
Payer: MEDICARE

## 2020-07-30 LAB
ALBUMIN SERPL-MCNC: 3.9 G/DL (ref 3.5–5.2)
ALBUMIN/GLOBULIN RATIO: 1.6 (ref 1–2.5)
ALP BLD-CCNC: 55 U/L (ref 35–104)
ALT SERPL-CCNC: 8 U/L (ref 5–33)
ANION GAP SERPL CALCULATED.3IONS-SCNC: 15 MMOL/L (ref 9–17)
AST SERPL-CCNC: 16 U/L
BILIRUB SERPL-MCNC: 1.01 MG/DL (ref 0.3–1.2)
BNP INTERPRETATION: ABNORMAL
BUN BLDV-MCNC: 17 MG/DL (ref 8–23)
BUN/CREAT BLD: ABNORMAL (ref 9–20)
CALCIUM SERPL-MCNC: 9.4 MG/DL (ref 8.6–10.4)
CHLORIDE BLD-SCNC: 108 MMOL/L (ref 98–107)
CHOLESTEROL/HDL RATIO: 2
CHOLESTEROL: 126 MG/DL
CO2: 22 MMOL/L (ref 20–31)
CREAT SERPL-MCNC: 0.88 MG/DL (ref 0.5–0.9)
ESTIMATED AVERAGE GLUCOSE: 108 MG/DL
GFR AFRICAN AMERICAN: >60 ML/MIN
GFR NON-AFRICAN AMERICAN: 60 ML/MIN
GFR SERPL CREATININE-BSD FRML MDRD: ABNORMAL ML/MIN/{1.73_M2}
GFR SERPL CREATININE-BSD FRML MDRD: ABNORMAL ML/MIN/{1.73_M2}
GLUCOSE BLD-MCNC: 104 MG/DL (ref 70–99)
HBA1C MFR BLD: 5.4 % (ref 4–6)
HCT VFR BLD CALC: 43.8 % (ref 36.3–47.1)
HDLC SERPL-MCNC: 62 MG/DL
HEMOGLOBIN: 13.2 G/DL (ref 11.9–15.1)
IRON SATURATION: 21 % (ref 20–55)
IRON: 71 UG/DL (ref 37–145)
LDL CHOLESTEROL: 49 MG/DL (ref 0–130)
MCH RBC QN AUTO: 30.5 PG (ref 25.2–33.5)
MCHC RBC AUTO-ENTMCNC: 30.1 G/DL (ref 28.4–34.8)
MCV RBC AUTO: 101.2 FL (ref 82.6–102.9)
NRBC AUTOMATED: 0 PER 100 WBC
PDW BLD-RTO: 13.4 % (ref 11.8–14.4)
PLATELET # BLD: 177 K/UL (ref 138–453)
PMV BLD AUTO: 9.8 FL (ref 8.1–13.5)
POTASSIUM SERPL-SCNC: 4.5 MMOL/L (ref 3.7–5.3)
PRO-BNP: 401 PG/ML
RBC # BLD: 4.33 M/UL (ref 3.95–5.11)
SODIUM BLD-SCNC: 145 MMOL/L (ref 135–144)
THYROXINE, FREE: 1.45 NG/DL (ref 0.93–1.7)
TOTAL CK: 52 U/L (ref 26–192)
TOTAL IRON BINDING CAPACITY: 333 UG/DL (ref 250–450)
TOTAL PROTEIN: 6.4 G/DL (ref 6.4–8.3)
TRIGL SERPL-MCNC: 77 MG/DL
TSH SERPL DL<=0.05 MIU/L-ACNC: 2.48 MIU/L (ref 0.3–5)
UNSATURATED IRON BINDING CAPACITY: 262 UG/DL (ref 112–347)
VITAMIN B-12: 284 PG/ML (ref 232–1245)
VITAMIN D 25-HYDROXY: 27.7 NG/ML (ref 30–100)
VLDLC SERPL CALC-MCNC: NORMAL MG/DL (ref 1–30)
WBC # BLD: 5.1 K/UL (ref 3.5–11.3)

## 2020-08-05 ENCOUNTER — OFFICE VISIT (OUTPATIENT)
Dept: GASTROENTEROLOGY | Age: 85
End: 2020-08-05
Payer: MEDICARE

## 2020-08-05 PROCEDURE — 99214 OFFICE O/P EST MOD 30 MIN: CPT | Performed by: INTERNAL MEDICINE

## 2020-08-05 RX ORDER — HYDROCORTISONE ACETATE 25 MG/1
25 SUPPOSITORY RECTAL EVERY 12 HOURS
Qty: 30 SUPPOSITORY | Refills: 2 | Status: SHIPPED
Start: 2020-08-05 | End: 2020-08-06

## 2020-08-05 ASSESSMENT — ENCOUNTER SYMPTOMS
ABDOMINAL PAIN: 1
CHOKING: 0
ANAL BLEEDING: 0
DIARRHEA: 0
TROUBLE SWALLOWING: 0
CONSTIPATION: 0
COUGH: 0
ABDOMINAL DISTENTION: 0
RECTAL PAIN: 0
WHEEZING: 0
NAUSEA: 0
BLOOD IN STOOL: 0
VOMITING: 0

## 2020-08-05 NOTE — PROGRESS NOTES
GI OFFICE FOLLOW UP    Yahaira Villanueva is a 80 y.o. female evaluated via on 8/5/2020. Consent:  She and/or health care decision maker is aware that that she may receive a bill for this telephone service, depending on her insurance coverage, and has provided verbal consent to proceed: YES      INTERVAL HISTORY:   No referring provider defined for this encounter. Chief Complaint   Patient presents with    GI Problem     hosp f/u saw Dr Christina Cullen, 2019 pt of Dr Marcin Collier, changed to Dr Lorene Rey       1. Diarrhea, unspecified type    2. Abdominal pain, left lower quadrant    3. Rectal pain    4. Other iron deficiency anemia        This patient is seen in my office for the first time    She has been seen by my other partner and has been hospitalized at [facility] AND [facility]    She has some issues with ch diarrhea    She had some colitis like symptoms too    Has some rectal pains    No bleeding    No melena    Patient has been complaining of some abdominal pains, off and on cramping  Also complains of abdominal bloating and gas  Has off and on nausea without any sig vomiting  Has some alternating constipation and diarrhea  Has no weight loss  Has some anxiety issues    HISTORY OF PRESENT ILLNESS: Ms.Joyce Elizabeth Saucedo is a 80 y.o. female with a past history remarkable for , referred for evaluation of   Chief Complaint   Patient presents with    GI Problem     hosp f/u saw Dr Christina Cullen, 2019 pt of Dr Marcin Collier, changed to Dr Lorene Rey   . Past Medical,Family, and Social History reviewed and does contribute to the patient presenting condition. Patient's PMH/PSH,SH,PSYCH Hx, MEDs, ALLERGIES, and ROS were all reviewed and updated in the appropriate sections.     PAST MEDICAL HISTORY:  Past Medical History:   Diagnosis Date    A-fib [facility])     AAA (abdominal aortic aneurysm) (HCC)     Abdominal cramping     Atrial fibrillation (HCC)     Back pain, chronic     C. difficile colitis     COPD (chronic obstructive pulmonary disease) (HCC)     Diabetes mellitus (HCC)     Gastritis     GERD (gastroesophageal reflux disease)     Hiatal hernia     Hypoalbuminemia 10/14/2014    IBS (irritable bowel syndrome)     Nausea vomiting and diarrhea 10/14/2014    Stomach ulcer     Thyroid disease     Type 2 diabetes mellitus without complication, without long-term current use of insulin ([facility] [address] 1/24/2018    UTI (lower urinary tract infection)     Varicose veins        Past Surgical History:   Procedure Laterality Date    CHOLECYSTECTOMY      HERNIA REPAIR      HYSTERECTOMY      partial    RI EGD TRANSORAL BIOPSY SINGLE/MULTIPLE N/A 6/20/2017    EGD BIOPSY performed by Charlie Shields MD at [address] [facility]  06/20/2017    MILD CHRONIC INACTIVE GASTRITIS    VASCULAR SURGERY      varicose veins    VEIN SURGERY         CURRENT MEDICATIONS:    Current Outpatient Medications:     Lactobacillus (ACIDOPHILUS PO), Take by mouth, Disp: , Rfl:     Simethicone (GAS RELIEF) 125 MG CAPS, Take 1 capsule by mouth 2 times daily as needed (bloating ,), Disp: 28 capsule, Rfl:     sucralfate (CARAFATE) 1 GM tablet, Take 1 tablet 4 times daily  90 day supply, Disp: 360 tablet, Rfl: 2    dicyclomine (BENTYL) 10 MG capsule, Take 10 mg by mouth 4 times daily as needed (cramping/spasms), Disp: , Rfl:     levothyroxine (SYNTHROID) 125 MCG tablet, Take 125 mcg by mouth every other day, Disp: , Rfl:     traMADol (ULTRAM) 50 MG tablet, Take 50 mg by mouth every 6 hours as needed for Pain., Disp: , Rfl:     melatonin 3 MG TABS tablet, Take 3 mg by mouth nightly as needed (Sleep) Pt unsure of dose.  Takes OTC, Disp: , Rfl:     Cholecalciferol (VITAMIN D3) 50 MCG (2000 UT) CAPS, Take 2,000 Units by mouth every other day , Disp: , Rfl:     furosemide (LASIX) 20 MG tablet, Take 20 mg by mouth daily , Disp: , Rfl:     potassium chloride (KLOR-CON) 10 MEQ extended release tablet, TAKE ONE TABLET BY MOUTH TWICE A DAY, Disp: 60 tablet, Rfl: 1    pantoprazole (PROTONIX) 40 MG tablet, TAKE ONE TABLET BY MOUTH DAILY, Disp: 30 tablet, Rfl: 6    lisinopril (PRINIVIL;ZESTRIL) 20 MG tablet, Take 1 tablet by mouth daily, Disp: 90 tablet, Rfl: 2    amiodarone (CORDARONE) 200 MG tablet, Take 200 mg by mouth Twice a Week On Monday and Friday, Disp: , Rfl:     OXYGEN, Inhale into the lungs Uses at night and prn, Disp: , Rfl:     ALLERGIES:   Allergies   Allergen Reactions    Penicillins      Has no recollection of what the reaction was       FAMILY HISTORY:       Family history unknown: Yes         SOCIAL HISTORY:   Social History     Socioeconomic History    Marital status:      Spouse name: Not on file    Number of children: 2    Years of education: 15    Highest education level: Not on file   Occupational History    Not on file   Social Needs    Financial resource strain: Not on file    Food insecurity     Worry: Not on file     Inability: Not on file    Transportation needs     Medical: Not on file     Non-medical: Not on file   Tobacco Use    Smoking status: Former Smoker    Smokeless tobacco: Never Used    Tobacco comment: quit many years ago    Substance and Sexual Activity    Alcohol use:  Yes     Alcohol/week: 2.0 standard drinks     Types: 1 Glasses of wine, 1 Cans of beer per week     Comment: social drinker     Drug use: No    Sexual activity: Never     Birth control/protection: Surgical     Comment: hyst   Lifestyle    Physical activity     Days per week: Not on file     Minutes per session: Not on file    Stress: Not on file   Relationships    Social connections     Talks on phone: Not on file     Gets together: Not on file     Attends Muslim service: Not on file     Active member of club or organization: Not on file     Attends meetings of clubs or organizations: Not on file     Relationship status: Not on file    Intimate partner violence     Fear of current or ex partner: Not on file     Emotionally abused: Not on file     Physically abused: Not on file     Forced sexual activity: Not on file   Other Topics Concern    Not on file   Social History Narrative    Not on file         REVIEW OF SYSTEMS:         Review of Systems   Constitutional: Negative for appetite change, fatigue and unexpected weight change. HENT: Negative for trouble swallowing. Eyes: Positive for visual disturbance (glasses). Respiratory: Negative for cough, choking and wheezing. Cardiovascular: Negative for chest pain, palpitations and leg swelling. Gastrointestinal: Positive for abdominal pain (lower with bowel movements). Negative for abdominal distention, anal bleeding, blood in stool, constipation, diarrhea, nausea, rectal pain and vomiting. Genitourinary: Negative for difficulty urinating. Allergic/Immunologic: Negative for environmental allergies and food allergies. Neurological: Negative for dizziness, weakness, light-headedness, numbness and headaches. Hematological: Bruises/bleeds easily. Psychiatric/Behavioral: Negative for sleep disturbance. The patient is nervous/anxious. Reviewed and agree    PHYSICAL EXAMINATION: Vital signs reviewed per the nursing documentation. There were no vitals taken for this visit. There is no height or weight on file to calculate BMI. Physical Exam  Nursing note reviewed. Constitutional:       Appearance: She is well-developed. Comments: Elderly   On walker  Anxious    HENT:      Head: Normocephalic and atraumatic. Eyes:      Conjunctiva/sclera: Conjunctivae normal.      Pupils: Pupils are equal, round, and reactive to light. Neck:      Musculoskeletal: Normal range of motion and neck supple. Cardiovascular:      Heart sounds: Normal heart sounds. Pulmonary:      Effort: Pulmonary effort is normal.      Breath sounds: Rales present.    Abdominal:      General: Bowel sounds are normal.      Palpations: Abdomen is soft. Comments: LLQ  TENDER, NON DISTENTED    BOWEL SOUNDS ARE POSITIVE      Musculoskeletal: Normal range of motion. Skin:     General: Skin is warm. Neurological:      Mental Status: She is alert and oriented to person, place, and time. Psychiatric:         Behavior: Behavior normal.           LABORATORY DATA: Reviewed  Lab Results   Component Value Date    WBC 5.1 07/30/2020    HGB 13.2 07/30/2020    HCT 43.8 07/30/2020    .2 07/30/2020     07/30/2020     (H) 07/30/2020    K 4.5 07/30/2020     (H) 07/30/2020    CO2 22 07/30/2020    BUN 17 07/30/2020    CREATININE 0.88 07/30/2020    LABALBU 3.9 07/30/2020    BILITOT 1.01 07/30/2020    ALKPHOS 55 07/30/2020    AST 16 07/30/2020    ALT 8 07/30/2020    INR 1.1 01/21/2020         Lab Results   Component Value Date    RBC 4.33 07/30/2020    HGB 13.2 07/30/2020    .2 07/30/2020    MCH 30.5 07/30/2020    MCHC 30.1 07/30/2020    RDW 13.4 07/30/2020    MPV 9.8 07/30/2020    BASOPCT 1 03/26/2020    LYMPHSABS 2.64 03/26/2020    MONOSABS 0.68 03/26/2020    NEUTROABS 9.64 (H) 03/26/2020    EOSABS 0.19 03/26/2020    BASOSABS 0.06 03/26/2020         DIAGNOSTIC TESTING:     No results found. Assessment  1. Diarrhea, unspecified type    2. Abdominal pain, left lower quadrant    3. Rectal pain    4.  Other iron deficiency anemia        Plan    The patient was instructed to start taking some OTC Probiotics products   These are available over the counter at the Pharmacy stores and Grocery stores  He was explained about the beneficial effects they have in the GI track  They will help to establish the good bacterial ishmael and will help with the digestion and bowel movements  The patient has verbalized understanding and agreement to this plan    She is not agreeable for any Colonoscopy     Patient was explained about the local care for his hemorrhoids and rectal irritation  Patient was asked to do some sitz baths with aspen salt. Apply ice or cold compresses on the anal area for any relieve of swelling   Can take pain killers Tylenol for temporarily relief of the pain in that area   Use baby / adult wipes or wash after having bowel movements  Avoid straining during defecation   Use ample fiber in the diet  Drink  ample water  Use supplemental fiber like metamucil with ample water  Call for any issues  The patient has verbalized understanding and agreement to this plan      Pt was advised in detail about some life style and dietary modifications. She was advised about avoidance of caffeine, nicotine and chocolate. Pt was also told to stay away from any kind of fast foods, soda pops. She was also advised to avoid lots of spices, grease and fried food etc.     Instructions were also given about trying to arrange the timing, quality and quantity of food. Instructions were given about using ample amount of fiber including dietary and supplemental fiber either metamucil, bennafiber or citrucell etc.  Pt was advised about drinking ample amount of water without any colors or chemicals. Stress was given about regular exercise. Pt has verbalized understanding and agreement to these modifications. RTC 3 months     I communicated with the patient and/or health care decision maker about   Details of this discussion including any medical advice provided:YES      I affirm this is a Patient Initiated Episode with an Established Patient who has not had a related appointment within my department in the past 7 days or scheduled within the next 24 hours. Total Time: minutes: 21-30 minutes    Note: not billable if this call serves to triage the patient into an appointment for the relevant concern      Thank you for allowing me to participate in the care of Ms. Namrata Paredes. For any further questions please do not hesitate to contact me. I have reviewed and agree with the ROS entered by the MA/LPN.          Osmel Du MD, [facility]  Board Certified in Gastroenterology and [facility]  Office #: [phone]

## 2020-08-06 ENCOUNTER — TELEPHONE (OUTPATIENT)
Dept: GASTROENTEROLOGY | Age: 85
End: 2020-08-06

## 2020-08-06 RX ORDER — HYDROCORTISONE 25 MG/G
CREAM TOPICAL
Qty: 1 TUBE | Refills: 0 | OUTPATIENT
Start: 2020-08-06 | End: 2021-03-08

## 2020-08-06 NOTE — TELEPHONE ENCOUNTER
Pt called again and writer confirmed that  had sent in the script and that Hernan Cartwright acknowledged receiving it.

## 2020-08-06 NOTE — TELEPHONE ENCOUNTER
Patient called the office stating that doctor was going to send something to the pharmacy for the rash in the groin area. Would like a call back.

## 2020-08-06 NOTE — TELEPHONE ENCOUNTER
Spoke to [facility], Nila Sood, he states supp not covered at all. He did run Anusol cream thru insurance and is covered for $15.  He will deliver to pt tomorrow. Did call pt and let her know. She verbalizes understanding.

## 2020-09-03 ENCOUNTER — HOSPITAL ENCOUNTER (OUTPATIENT)
Age: 85
Setting detail: SPECIMEN
Discharge: HOME | End: 2020-09-03
Payer: MEDICARE

## 2020-09-03 LAB
-: NORMAL
AMORPHOUS: NORMAL
BACTERIA: NORMAL
BILIRUBIN URINE: NEGATIVE
CASTS UA: NORMAL /LPF (ref 0–8)
COLOR: YELLOW
COMMENT UA: ABNORMAL
CRYSTALS, UA: NORMAL /HPF
EPITHELIAL CELLS UA: NORMAL /HPF (ref 0–5)
GLUCOSE URINE: NEGATIVE
KETONES, URINE: NEGATIVE
LEUKOCYTE ESTERASE, URINE: ABNORMAL
MUCUS: NORMAL
NITRITE, URINE: NEGATIVE
OTHER OBSERVATIONS UA: NORMAL
PH UA: 5 (ref 5–8)
PROTEIN UA: NEGATIVE
RBC UA: NORMAL /HPF (ref 0–4)
RENAL EPITHELIAL, UA: NORMAL /HPF
SPECIFIC GRAVITY UA: 1.01 (ref 1–1.03)
TRICHOMONAS: NORMAL
TURBIDITY: CLEAR
URINE HGB: NEGATIVE
UROBILINOGEN, URINE: NORMAL
WBC UA: NORMAL /HPF (ref 0–5)
YEAST: NORMAL

## 2020-09-06 LAB
CULTURE: NORMAL
Lab: NORMAL
SPECIMEN DESCRIPTION: NORMAL

## 2020-11-04 ENCOUNTER — OFFICE VISIT (OUTPATIENT)
Dept: GASTROENTEROLOGY | Age: 85
End: 2020-11-04
Payer: MEDICARE

## 2020-11-04 VITALS — TEMPERATURE: 97.3 F

## 2020-11-04 PROCEDURE — 99214 OFFICE O/P EST MOD 30 MIN: CPT | Performed by: INTERNAL MEDICINE

## 2020-11-04 ASSESSMENT — ENCOUNTER SYMPTOMS
ANAL BLEEDING: 0
ABDOMINAL DISTENTION: 0
RECTAL PAIN: 0
ABDOMINAL PAIN: 1
COUGH: 0
CHOKING: 0
DIARRHEA: 1
BLOOD IN STOOL: 0
VOMITING: 0
NAUSEA: 0
CONSTIPATION: 0
TROUBLE SWALLOWING: 0
WHEEZING: 0

## 2020-11-04 NOTE — PROGRESS NOTES
GI OFFICE FOLLOW UP    Talisha Pritchard is a 80 y.o. female evaluated via on 11/4/2020. Consent:  She and/or health care decision maker is aware that that she may receive a bill for this telephone service, depending on her insurance coverage, and has provided verbal consent to proceed: YES      INTERVAL HISTORY:   No referring provider defined for this encounter. Chief Complaint   Patient presents with    Diarrhea     Diarrhea has improved with meds, some pain lower left side, bloating       1. Diarrhea, unspecified type    2. Gastroesophageal reflux disease without esophagitis    3. Irritable bowel syndrome with both constipation and diarrhea        This elderly female patient is seen my office as a follow-up  Has history for intermittent diarrhea abdominal pain bloating gas nonspecific lower abdominal discomfort and pains she was given some probiotics and fiber  Last time  It appears that is been helping her she denies having any rectal bleeding melanotic stools    Some mild off-and-on rectal pain she had denied having colonoscopies in the past    Overall seems to be doing okay  Denies any nausea vomiting hematemesis at this time  Appears to have some anxiety issues  HISTORY OF PRESENT ILLNESS: Ms.Joyce Rolly Fox is a 80 y.o. female with a past history remarkable for , referred for evaluation of   Chief Complaint   Patient presents with    Diarrhea     Diarrhea has improved with meds, some pain lower left side, bloating   . Past Medical,Family, and Social History reviewed and does contribute to the patient presenting condition. Patient's PMH/PSH,SH,PSYCH Hx, MEDs, ALLERGIES, and ROS were all reviewed and updated in the appropriate sections.     PAST MEDICAL HISTORY:  Past Medical History:   Diagnosis Date    A-fib [facility])     AAA (abdominal aortic aneurysm) (HCC)     Abdominal cramping     Atrial fibrillation ([facility] [address]     Back pain, chronic     C. difficile colitis     COPD (chronic obstructive pulmonary disease) (HCC)     Diabetes mellitus (HCC)     Gastritis     GERD (gastroesophageal reflux disease)     Hiatal hernia     Hypoalbuminemia 10/14/2014    IBS (irritable bowel syndrome)     Nausea vomiting and diarrhea 10/14/2014    Stomach ulcer     Thyroid disease     Type 2 diabetes mellitus without complication, without long-term current use of insulin ([facility] [address] 1/24/2018    UTI (lower urinary tract infection)     Varicose veins        Past Surgical History:   Procedure Laterality Date    CHOLECYSTECTOMY      HERNIA REPAIR      HYSTERECTOMY      partial    DC EGD TRANSORAL BIOPSY SINGLE/MULTIPLE N/A 6/20/2017    EGD BIOPSY performed by Aditya Hinton MD at [facility]  06/20/2017    MILD CHRONIC INACTIVE GASTRITIS    VASCULAR SURGERY      varicose veins    VEIN SURGERY         CURRENT MEDICATIONS:    Current Outpatient Medications:     hydrocortisone (ANUSOL-HC) 2.5 % CREA rectal cream, USE QID PRN, Disp: 1 Tube, Rfl: 0    Lactobacillus (ACIDOPHILUS PO), Take by mouth, Disp: , Rfl:     Simethicone (GAS RELIEF) 125 MG CAPS, Take 1 capsule by mouth 2 times daily as needed (bloating ,), Disp: 28 capsule, Rfl:     sucralfate (CARAFATE) 1 GM tablet, Take 1 tablet 4 times daily  90 day supply, Disp: 360 tablet, Rfl: 2    dicyclomine (BENTYL) 10 MG capsule, Take 10 mg by mouth 4 times daily as needed (cramping/spasms), Disp: , Rfl:     levothyroxine (SYNTHROID) 125 MCG tablet, Take 125 mcg by mouth every other day, Disp: , Rfl:     traMADol (ULTRAM) 50 MG tablet, Take 50 mg by mouth every 6 hours as needed for Pain., Disp: , Rfl:     melatonin 3 MG TABS tablet, Take 3 mg by mouth nightly as needed (Sleep) Pt unsure of dose.  Takes OTC, Disp: , Rfl:     Cholecalciferol (VITAMIN D3) 50 MCG (2000 UT) CAPS, Take 2,000 Units by mouth every other day , Disp: , Rfl:   furosemide (LASIX) 20 MG tablet, Take 20 mg by mouth daily , Disp: , Rfl:     potassium chloride (KLOR-CON) 10 MEQ extended release tablet, TAKE ONE TABLET BY MOUTH TWICE A DAY, Disp: 60 tablet, Rfl: 1    pantoprazole (PROTONIX) 40 MG tablet, TAKE ONE TABLET BY MOUTH DAILY, Disp: 30 tablet, Rfl: 6    lisinopril (PRINIVIL;ZESTRIL) 20 MG tablet, Take 1 tablet by mouth daily, Disp: 90 tablet, Rfl: 2    amiodarone (CORDARONE) 200 MG tablet, Take 200 mg by mouth Twice a Week On Monday and Friday, Disp: , Rfl:     OXYGEN, Inhale into the lungs Uses at night and prn, Disp: , Rfl:     ALLERGIES:   Allergies   Allergen Reactions    Penicillins      Has no recollection of what the reaction was       FAMILY HISTORY:       Family history unknown: Yes         SOCIAL HISTORY:   Social History     Socioeconomic History    Marital status:      Spouse name: Not on file    Number of children: 2    Years of education: 15    Highest education level: Not on file   Occupational History    Not on file   Social Needs    Financial resource strain: Not on file    Food insecurity     Worry: Not on file     Inability: Not on file    Transportation needs     Medical: Not on file     Non-medical: Not on file   Tobacco Use    Smoking status: Former Smoker    Smokeless tobacco: Never Used    Tobacco comment: quit many years ago    Substance and Sexual Activity    Alcohol use:  Yes     Alcohol/week: 2.0 standard drinks     Types: 1 Glasses of wine, 1 Cans of beer per week     Comment: social drinker     Drug use: No    Sexual activity: Never     Birth control/protection: Surgical     Comment: hyst   Lifestyle    Physical activity     Days per week: Not on file     Minutes per session: Not on file    Stress: Not on file   Relationships    Social connections     Talks on phone: Not on file     Gets together: Not on file     Attends [employer] service: Not on file     Active member of club or organization: Not on file Attends meetings of clubs or organizations: Not on file     Relationship status: Not on file    Intimate partner violence     Fear of current or ex partner: Not on file     Emotionally abused: Not on file     Physically abused: Not on file     Forced sexual activity: Not on file   Other Topics Concern    Not on file   Social History Narrative    Not on file         REVIEW OF SYSTEMS:         Review of Systems   Constitutional: Negative for appetite change, fatigue and unexpected weight change. HENT: Negative for trouble swallowing. Eyes: Positive for visual disturbance (glasses). Respiratory: Negative for cough, choking and wheezing. Cardiovascular: Negative for chest pain, palpitations and leg swelling. Gastrointestinal: Positive for abdominal pain (left lower with bowel movements ) and diarrhea (improving). Negative for abdominal distention, anal bleeding, blood in stool, constipation, nausea, rectal pain and vomiting. Genitourinary: Negative for difficulty urinating. Allergic/Immunologic: Negative for environmental allergies and food allergies. Neurological: Negative for dizziness, weakness, light-headedness, numbness and headaches. Hematological: Bruises/bleeds easily. Psychiatric/Behavioral: Negative for sleep disturbance. The patient is nervous/anxious. PHYSICAL EXAMINATION: Vital signs reviewed per the nursing documentation. Temp 97.3 °F (36.3 °C)   There is no height or weight on file to calculate BMI. Physical Exam  Nursing note reviewed. Constitutional:       Appearance: She is well-developed. Comments: Anxious  Forgetful  On walker     HENT:      Head: Normocephalic and atraumatic. Eyes:      Conjunctiva/sclera: Conjunctivae normal.      Pupils: Pupils are equal, round, and reactive to light. Neck:      Musculoskeletal: Normal range of motion and neck supple. Cardiovascular:      Heart sounds: Normal heart sounds.    Pulmonary:      Effort: Pulmonary effort is normal.      Breath sounds: Rales present. Abdominal:      General: Bowel sounds are normal.      Palpations: Abdomen is soft. Comments: Bloated  Mild lower abd tenderness    Musculoskeletal: Normal range of motion. Skin:     General: Skin is warm. Neurological:      Mental Status: She is alert and oriented to person, place, and time. Psychiatric:         Behavior: Behavior normal.           LABORATORY DATA: Reviewed  Lab Results   Component Value Date    WBC 5.1 07/30/2020    HGB 13.2 07/30/2020    HCT 43.8 07/30/2020    .2 07/30/2020     07/30/2020     (H) 07/30/2020    K 4.5 07/30/2020     (H) 07/30/2020    CO2 22 07/30/2020    BUN 17 07/30/2020    CREATININE 0.88 07/30/2020    LABALBU 3.9 07/30/2020    BILITOT 1.01 07/30/2020    ALKPHOS 55 07/30/2020    AST 16 07/30/2020    ALT 8 07/30/2020    INR 1.1 01/21/2020         Lab Results   Component Value Date    RBC 4.33 07/30/2020    HGB 13.2 07/30/2020    .2 07/30/2020    MCH 30.5 07/30/2020    MCHC 30.1 07/30/2020    RDW 13.4 07/30/2020    MPV 9.8 07/30/2020    BASOPCT 1 03/26/2020    LYMPHSABS 2.64 03/26/2020    MONOSABS 0.68 03/26/2020    NEUTROABS 9.64 (H) 03/26/2020    EOSABS 0.19 03/26/2020    BASOSABS 0.06 03/26/2020         DIAGNOSTIC TESTING:     No results found. Assessment  1. Diarrhea, unspecified type    2. Gastroesophageal reflux disease without esophagitis    3. Irritable bowel syndrome with both constipation and diarrhea        Plan    Continue probiotics  Continue Metamucil  Drink ample water    Stay away from processed food    Pt was advised in detail about some life style and dietary modifications. She was advised about avoidance of caffeine, nicotine and chocolate. Pt was also told to stay away from any kind of fast foods, soda pops.  She was also advised to avoid lots of spices, grease and fried food etc.     Instructions were also given about trying to arrange the timing, quality and quantity of food. Instructions were given about using ample amount of fiber including dietary and supplemental fiber either metamucil, bennafiber or citrucell etc.  Pt was advised about drinking ample amount of water without any colors or chemicals. Stress was given about regular exercise. Pt has verbalized understanding and agreement to these modifications. See her back in 3 to 4 months she was asked to call me if there is any problem or issues  I communicated with the patient and/or health care decision maker about   Details of this discussion including any medical advice provided:YES      I affirm this is a Patient Initiated Episode with an Established Patient who has not had a related appointment within my department in the past 7 days or scheduled within the next 24 hours. Total Time: minutes: 21-30 minutes    Note: not billable if this call serves to triage the patient into an appointment for the relevant concern      Thank you for allowing me to participate in the care of Ms. Stephanie De La Rosa. For any further questions please do not hesitate to contact me. I have reviewed and agree with the ROS entered by the MA/LPN.          Kasie Kern MD,   Board Certified in Gastroenterology and [address] [place] [address] Gastroenterology  Office #: [phone]

## 2020-12-08 ENCOUNTER — HOSPITAL ENCOUNTER (OUTPATIENT)
Age: 85
Setting detail: SPECIMEN
Discharge: HOME | End: 2020-12-08
Payer: MEDICARE

## 2020-12-08 LAB
ALBUMIN SERPL-MCNC: 3.9 G/DL (ref 3.5–5.2)
ALBUMIN/GLOBULIN RATIO: 1.4 (ref 1–2.5)
ALP BLD-CCNC: 62 U/L (ref 35–104)
ALT SERPL-CCNC: 10 U/L (ref 5–33)
ANION GAP SERPL CALCULATED.3IONS-SCNC: 11 MMOL/L (ref 9–17)
AST SERPL-CCNC: 18 U/L
BILIRUB SERPL-MCNC: 0.86 MG/DL (ref 0.3–1.2)
BNP INTERPRETATION: ABNORMAL
BUN BLDV-MCNC: 21 MG/DL (ref 8–23)
BUN/CREAT BLD: ABNORMAL (ref 9–20)
CALCIUM SERPL-MCNC: 9.4 MG/DL (ref 8.6–10.4)
CHLORIDE BLD-SCNC: 106 MMOL/L (ref 98–107)
CHOLESTEROL/HDL RATIO: 1.9
CHOLESTEROL: 137 MG/DL
CO2: 24 MMOL/L (ref 20–31)
CREAT SERPL-MCNC: 0.91 MG/DL (ref 0.5–0.9)
GFR AFRICAN AMERICAN: >60 ML/MIN
GFR NON-AFRICAN AMERICAN: 57 ML/MIN
GFR SERPL CREATININE-BSD FRML MDRD: ABNORMAL ML/MIN/{1.73_M2}
GFR SERPL CREATININE-BSD FRML MDRD: ABNORMAL ML/MIN/{1.73_M2}
GLUCOSE BLD-MCNC: 179 MG/DL (ref 70–99)
HCT VFR BLD CALC: 43.2 % (ref 36.3–47.1)
HDLC SERPL-MCNC: 71 MG/DL
HEMOGLOBIN: 13.5 G/DL (ref 11.9–15.1)
IRON SATURATION: 19 % (ref 20–55)
IRON: 67 UG/DL (ref 37–145)
LDL CHOLESTEROL: 49 MG/DL (ref 0–130)
MCH RBC QN AUTO: 30.4 PG (ref 25.2–33.5)
MCHC RBC AUTO-ENTMCNC: 31.3 G/DL (ref 28.4–34.8)
MCV RBC AUTO: 97.3 FL (ref 82.6–102.9)
NRBC AUTOMATED: 0 PER 100 WBC
PDW BLD-RTO: 14.4 % (ref 11.8–14.4)
PLATELET # BLD: 216 K/UL (ref 138–453)
PMV BLD AUTO: 10.9 FL (ref 8.1–13.5)
POTASSIUM SERPL-SCNC: 4.8 MMOL/L (ref 3.7–5.3)
PRO-BNP: 394 PG/ML
RBC # BLD: 4.44 M/UL (ref 3.95–5.11)
SODIUM BLD-SCNC: 141 MMOL/L (ref 135–144)
THYROXINE, FREE: 1.48 NG/DL (ref 0.93–1.7)
TOTAL IRON BINDING CAPACITY: 360 UG/DL (ref 250–450)
TOTAL PROTEIN: 6.7 G/DL (ref 6.4–8.3)
TRIGL SERPL-MCNC: 87 MG/DL
TSH SERPL DL<=0.05 MIU/L-ACNC: 4.67 MIU/L (ref 0.3–5)
UNSATURATED IRON BINDING CAPACITY: 293 UG/DL (ref 112–347)
VITAMIN B-12: 318 PG/ML (ref 232–1245)
VLDLC SERPL CALC-MCNC: NORMAL MG/DL (ref 1–30)
WBC # BLD: 5.9 K/UL (ref 3.5–11.3)

## 2020-12-09 LAB
ESTIMATED AVERAGE GLUCOSE: 117 MG/DL
HBA1C MFR BLD: 5.7 % (ref 4–6)

## 2021-01-29 ENCOUNTER — APPOINTMENT (OUTPATIENT)
Dept: CT IMAGING | Age: 86
End: 2021-01-29
Payer: MEDICARE

## 2021-01-29 ENCOUNTER — HOSPITAL ENCOUNTER (EMERGENCY)
Age: 86
Discharge: HOME | End: 2021-01-29
Attending: EMERGENCY MEDICINE
Payer: MEDICARE

## 2021-01-29 VITALS
HEIGHT: 63 IN | SYSTOLIC BLOOD PRESSURE: 177 MMHG | BODY MASS INDEX: 31.89 KG/M2 | TEMPERATURE: 97.7 F | OXYGEN SATURATION: 94 % | DIASTOLIC BLOOD PRESSURE: 82 MMHG | WEIGHT: 180 LBS | HEART RATE: 59 BPM | RESPIRATION RATE: 18 BRPM

## 2021-01-29 DIAGNOSIS — R10.84 GENERALIZED ABDOMINAL PAIN: Primary | ICD-10-CM

## 2021-01-29 LAB
-: ABNORMAL
ABSOLUTE EOS #: 0.14 K/UL (ref 0–0.44)
ABSOLUTE IMMATURE GRANULOCYTE: 0.01 K/UL (ref 0–0.3)
ABSOLUTE LYMPH #: 1.37 K/UL (ref 1.1–3.7)
ABSOLUTE MONO #: 0.37 K/UL (ref 0.1–1.2)
ALBUMIN SERPL-MCNC: 3.5 G/DL (ref 3.5–5.2)
ALBUMIN/GLOBULIN RATIO: NORMAL (ref 1–2.5)
ALP BLD-CCNC: 66 U/L (ref 35–104)
ALT SERPL-CCNC: 14 U/L (ref 5–33)
AMORPHOUS: ABNORMAL
AMYLASE: 41 U/L (ref 28–100)
ANION GAP SERPL CALCULATED.3IONS-SCNC: 9 MMOL/L (ref 9–17)
AST SERPL-CCNC: 30 U/L
BACTERIA: ABNORMAL
BASOPHILS # BLD: 0 % (ref 0–2)
BASOPHILS ABSOLUTE: <0.03 K/UL (ref 0–0.2)
BILIRUB SERPL-MCNC: 1 MG/DL (ref 0.3–1.2)
BILIRUBIN DIRECT: 0.24 MG/DL
BILIRUBIN URINE: NEGATIVE
BILIRUBIN, INDIRECT: 0.76 MG/DL (ref 0–1)
BUN BLDV-MCNC: 15 MG/DL (ref 8–23)
BUN/CREAT BLD: 18 (ref 9–20)
CALCIUM SERPL-MCNC: 9.4 MG/DL (ref 8.6–10.4)
CASTS UA: ABNORMAL /LPF
CHLORIDE BLD-SCNC: 106 MMOL/L (ref 98–107)
CO2: 25 MMOL/L (ref 20–31)
COLOR: YELLOW
COMMENT UA: ABNORMAL
CREAT SERPL-MCNC: 0.84 MG/DL (ref 0.5–0.9)
CRYSTALS, UA: ABNORMAL /HPF
DIFFERENTIAL TYPE: ABNORMAL
EOSINOPHILS RELATIVE PERCENT: 3 % (ref 1–4)
EPITHELIAL CELLS UA: ABNORMAL /HPF (ref 0–5)
GFR AFRICAN AMERICAN: >60 ML/MIN
GFR NON-AFRICAN AMERICAN: >60 ML/MIN
GFR SERPL CREATININE-BSD FRML MDRD: ABNORMAL ML/MIN/{1.73_M2}
GFR SERPL CREATININE-BSD FRML MDRD: ABNORMAL ML/MIN/{1.73_M2}
GLOBULIN: NORMAL G/DL (ref 1.5–3.8)
GLUCOSE BLD-MCNC: 117 MG/DL (ref 70–99)
GLUCOSE URINE: NEGATIVE
HCT VFR BLD CALC: 39.1 % (ref 36.3–47.1)
HEMOGLOBIN: 12.4 G/DL (ref 11.9–15.1)
IMMATURE GRANULOCYTES: 0 %
KETONES, URINE: NEGATIVE
LEUKOCYTE ESTERASE, URINE: NEGATIVE
LIPASE: 5 U/L (ref 13–60)
LYMPHOCYTES # BLD: 28 % (ref 24–43)
MCH RBC QN AUTO: 30.3 PG (ref 25.2–33.5)
MCHC RBC AUTO-ENTMCNC: 31.7 G/DL (ref 28.4–34.8)
MCV RBC AUTO: 95.6 FL (ref 82.6–102.9)
MONOCYTES # BLD: 8 % (ref 3–12)
MUCUS: ABNORMAL
NITRITE, URINE: NEGATIVE
NRBC AUTOMATED: 0 PER 100 WBC
OTHER OBSERVATIONS UA: ABNORMAL
PDW BLD-RTO: 14 % (ref 11.8–14.4)
PH UA: 7 (ref 5–8)
PLATELET # BLD: 136 K/UL (ref 138–453)
PLATELET ESTIMATE: ABNORMAL
PMV BLD AUTO: 9.3 FL (ref 8.1–13.5)
POTASSIUM SERPL-SCNC: 4.3 MMOL/L (ref 3.7–5.3)
PROTEIN UA: NEGATIVE
RBC # BLD: 4.09 M/UL (ref 3.95–5.11)
RBC # BLD: ABNORMAL 10*6/UL
RBC UA: ABNORMAL /HPF (ref 0–2)
RENAL EPITHELIAL, UA: ABNORMAL /HPF
SEG NEUTROPHILS: 61 % (ref 36–65)
SEGMENTED NEUTROPHILS ABSOLUTE COUNT: 3.05 K/UL (ref 1.5–8.1)
SODIUM BLD-SCNC: 140 MMOL/L (ref 135–144)
SPECIFIC GRAVITY UA: 1.01 (ref 1–1.03)
TOTAL PROTEIN: 6.4 G/DL (ref 6.4–8.3)
TRICHOMONAS: ABNORMAL
TURBIDITY: CLEAR
URINE HGB: ABNORMAL
UROBILINOGEN, URINE: NORMAL
WBC # BLD: 5 K/UL (ref 3.5–11.3)
WBC # BLD: ABNORMAL 10*3/UL
WBC UA: ABNORMAL /HPF (ref 0–5)
YEAST: ABNORMAL

## 2021-01-29 PROCEDURE — 99283 EMERGENCY DEPT VISIT LOW MDM: CPT

## 2021-01-29 PROCEDURE — 81001 URINALYSIS AUTO W/SCOPE: CPT

## 2021-01-29 PROCEDURE — 85025 COMPLETE CBC W/AUTO DIFF WBC: CPT

## 2021-01-29 PROCEDURE — 2580000003 HC RX 258: Performed by: NURSE PRACTITIONER

## 2021-01-29 PROCEDURE — 74177 CT ABD & PELVIS W/CONTRAST: CPT

## 2021-01-29 PROCEDURE — 83690 ASSAY OF LIPASE: CPT

## 2021-01-29 PROCEDURE — 82150 ASSAY OF AMYLASE: CPT

## 2021-01-29 PROCEDURE — 80076 HEPATIC FUNCTION PANEL: CPT

## 2021-01-29 PROCEDURE — 80048 BASIC METABOLIC PNL TOTAL CA: CPT

## 2021-01-29 PROCEDURE — 6360000004 HC RX CONTRAST MEDICATION: Performed by: NURSE PRACTITIONER

## 2021-01-29 RX ORDER — DICYCLOMINE HYDROCHLORIDE 10 MG/1
10 CAPSULE ORAL EVERY 6 HOURS PRN
Qty: 20 CAPSULE | Refills: 0 | Status: ON HOLD | OUTPATIENT
Start: 2021-01-29 | End: 2022-08-14 | Stop reason: SDUPTHER

## 2021-01-29 RX ORDER — SODIUM CHLORIDE 9 MG/ML
INJECTION, SOLUTION INTRAVENOUS CONTINUOUS
Status: DISCONTINUED | OUTPATIENT
Start: 2021-01-29 | End: 2021-01-29 | Stop reason: HOSPADM

## 2021-01-29 RX ORDER — SODIUM CHLORIDE 0.9 % (FLUSH) 0.9 %
10 SYRINGE (ML) INJECTION PRN
Status: DISCONTINUED | OUTPATIENT
Start: 2021-01-29 | End: 2021-01-29 | Stop reason: HOSPADM

## 2021-01-29 RX ORDER — 0.9 % SODIUM CHLORIDE 0.9 %
80 INTRAVENOUS SOLUTION INTRAVENOUS ONCE
Status: COMPLETED | OUTPATIENT
Start: 2021-01-29 | End: 2021-01-29

## 2021-01-29 RX ADMIN — IOPAMIDOL 75 ML: 755 INJECTION, SOLUTION INTRAVENOUS at 11:05

## 2021-01-29 RX ADMIN — Medication 10 ML: at 11:05

## 2021-01-29 RX ADMIN — SODIUM CHLORIDE: 9 INJECTION, SOLUTION INTRAVENOUS at 10:35

## 2021-01-29 RX ADMIN — SODIUM CHLORIDE 80 ML: 9 INJECTION, SOLUTION INTRAVENOUS at 11:05

## 2021-01-29 ASSESSMENT — ENCOUNTER SYMPTOMS
DIARRHEA: 0
ABDOMINAL PAIN: 1
CONSTIPATION: 0
VOMITING: 0
SORE THROAT: 0
COLOR CHANGE: 0
WHEEZING: 0
COUGH: 0
RHINORRHEA: 0
SHORTNESS OF BREATH: 0
SINUS PRESSURE: 0
NAUSEA: 0

## 2021-01-29 NOTE — ED NOTES
Bed: 27  Expected date: 1/29/21  Expected time: 9:24 AM  Means of arrival: [address]  Comments:  [facility] 25     Charles Rojas Favorite, [facility]  01/29/21 [id number]

## 2021-01-29 NOTE — ED NOTES
Pt assisted with bedpan x2. Pt states she usually walks to bathroom with walker. Pt is from Assisted living.       Nat Peng RN  01/29/21 [id number]

## 2021-01-29 NOTE — ED PROVIDER NOTES
[address] [place] [address] ED  eMERGENCY dEPARTMENT eNCOUnter      Pt Name: Italia Montes  MRN: [id number]  [place] 8/2/1925  Date of evaluation: 1/29/2021  Provider: Trisha Villarreal NP, [facility] - [address]       Chief Complaint   Patient presents with    Abdominal Pain     hernia         HISTORY OF PRESENT ILLNESS  (Location/Symptom, Timing/Onset, Context/Setting, Quality, Duration, Modifying Factors, Severity.)   Italia Montes is a 80 y.o. female who presents to the emergency department by private vehicle for evaluation of abdominal pain. Patient states that she has abdominal pain and to the epigastric and left side. She states that the left sided abdominal pain has been going on for years. She states is worse over the last couple of days. She rates the pain an 8 on a 0-to-10 scale. She denies any nausea, vomiting, or diarrhea. She denies any fevers or chills. She has not taken anything over-the-counter for her symptoms prior to arrival      Nursing Notes were reviewed. ALLERGIES     Penicillins    CURRENT MEDICATIONS       Previous Medications    AMIODARONE (CORDARONE) 200 MG TABLET    Take 200 mg by mouth Twice a Week On Monday and Friday    CHOLECALCIFEROL (VITAMIN D3) 50 MCG (2000 UT) CAPS    Take 2,000 Units by mouth every other day     FUROSEMIDE (LASIX) 20 MG TABLET    Take 20 mg by mouth daily     HYDROCORTISONE (ANUSOL-HC) 2.5 % CREA RECTAL CREAM    USE QID PRN    LACTOBACILLUS (ACIDOPHILUS PO)    Take by mouth    LEVOTHYROXINE (SYNTHROID) 125 MCG TABLET    Take 125 mcg by mouth every other day    LISINOPRIL (PRINIVIL;ZESTRIL) 20 MG TABLET    Take 1 tablet by mouth daily    MELATONIN 3 MG TABS TABLET    Take 3 mg by mouth nightly as needed (Sleep) Pt unsure of dose.  Takes OTC    OXYGEN    Inhale into the lungs Uses at night and prn    PANTOPRAZOLE (PROTONIX) 40 MG TABLET    TAKE ONE TABLET BY MOUTH DAILY    POTASSIUM CHLORIDE (KLOR-CON) 10 MEQ EXTENDED RELEASE TABLET    TAKE ONE TABLET BY MOUTH TWICE A DAY    SIMETHICONE (GAS RELIEF) 125 MG CAPS    Take 1 capsule by mouth 2 times daily as needed (bloating ,)    SUCRALFATE (CARAFATE) 1 GM TABLET    Take 1 tablet 4 times daily  90 day supply    TRAMADOL (ULTRAM) 50 MG TABLET    Take 50 mg by mouth every 6 hours as needed for Pain. PAST MEDICAL HISTORY         Diagnosis Date    A-fib [facility])     AAA (abdominal aortic aneurysm) (HCC)     Abdominal cramping     Atrial fibrillation (HCC)     Back pain, chronic     C. difficile colitis     COPD (chronic obstructive pulmonary disease) (HCC)     Diabetes mellitus (HCC)     Gastritis     GERD (gastroesophageal reflux disease)     Hiatal hernia     Hypoalbuminemia 10/14/2014    IBS (irritable bowel syndrome)     Nausea vomiting and diarrhea 10/14/2014    Stomach ulcer     Thyroid disease     Type 2 diabetes mellitus without complication, without long-term current use of insulin ([facility] [address] 2018    UTI (lower urinary tract infection)     Varicose veins        SURGICAL HISTORY           Procedure Laterality Date    CHOLECYSTECTOMY      HERNIA REPAIR      HYSTERECTOMY      partial    PA EGD TRANSORAL BIOPSY SINGLE/MULTIPLE N/A 2017    EGD BIOPSY performed by Lorraine Selby MD at [address] [place] [address] [address] ENDOSCOPY  2017    MILD CHRONIC INACTIVE GASTRITIS    VASCULAR SURGERY      varicose veins    VEIN SURGERY           FAMILY HISTORY           Family history unknown: Yes     Family Status   Relation Name Status    Mother      Father          SOCIAL HISTORY      reports that she has quit smoking. She has never used smokeless tobacco. She reports current alcohol use of about 2.0 standard drinks of alcohol per week. She reports that she does not use drugs. REVIEW OF SYSTEMS    (2-9 systems for level 4, 10 or more for level 5)     Review of Systems   Constitutional: Negative for chills, fever and unexpected weight change.    HENT: Negative for congestion, rhinorrhea, sinus pressure and sore throat. Respiratory: Negative for cough, shortness of breath and wheezing. Cardiovascular: Negative for chest pain and palpitations. Gastrointestinal: Positive for abdominal pain. Negative for constipation, diarrhea, nausea and vomiting. Genitourinary: Negative for dysuria and hematuria. Musculoskeletal: Negative for arthralgias and myalgias. Skin: Negative for color change and rash. Neurological: Negative for dizziness, weakness and headaches. Hematological: Negative for adenopathy. All other systems reviewed and are negative. Except as noted above the remainder of the review of systems was reviewed and negative. PHYSICAL EXAM    (up to 7 for level 4, 8 or more for level 5)     ED Triage Vitals [01/29/21 0934]   BP Temp Temp Source Pulse Resp SpO2 Height Weight   (!) 177/82 97.7 °F (36.5 °C) Oral 59 18 94 % 5' 3\" (1.6 m) 180 lb (81.6 kg)       Physical Exam  Vitals signs reviewed. Constitutional:       Appearance: She is well-developed. HENT:      Head: Normocephalic and atraumatic. Eyes:      Conjunctiva/sclera: Conjunctivae normal.      Pupils: Pupils are equal, round, and reactive to light. Neck:      Musculoskeletal: Normal range of motion and neck supple. Cardiovascular:      Rate and Rhythm: Normal rate and regular rhythm. Pulmonary:      Effort: Pulmonary effort is normal. No respiratory distress. Breath sounds: Normal breath sounds. No stridor. Abdominal:      General: Bowel sounds are normal.      Palpations: Abdomen is soft. Tenderness: There is abdominal tenderness in the left upper quadrant. Musculoskeletal: Normal range of motion. Lymphadenopathy:      Cervical: No cervical adenopathy. Skin:     General: Skin is warm and dry. Findings: No rash. Neurological:      Mental Status: She is alert and oriented to person, place, and time.            RADIOLOGY:   Non-plain film images such as CT, Ultrasound and MRI are read by the radiologist. Deon Tomlin radiographic images are visualized and preliminarily interpreted by the emergency physician with the below findings:    Ct Abdomen Pelvis W Iv Contrast    Result Date: 1/29/2021  EXAMINATION: CT OF THE ABDOMEN AND PELVIS WITH CONTRAST 1/29/2021 10:59 am TECHNIQUE: CT of the abdomen and pelvis was performed with the administration of intravenous contrast. Multiplanar reformatted images are provided for review. Dose modulation, iterative reconstruction, and/or weight based adjustment of the mA/kV was utilized to reduce the radiation dose to as low as reasonably achievable. COMPARISON: None. HISTORY: ORDERING SYSTEM PROVIDED HISTORY: Pain TECHNOLOGIST PROVIDED HISTORY: IV Only Contrast Pain Reason for Exam: Patient states that she has abdominal pain and to the epigastric and left side. She states that the left sided abdominal pain has been going on for years. She states is worse over the last couple of days. Acuity: Acute Type of Exam: Ongoing Relevant Medical/Surgical History: Hx of cholecystectomy, hernia repair, diabetes, AAA, hysterectomy FINDINGS: Lower Chest: Atelectasis, septal thickening and scarring is present in both lower lobes with stable area of consolidation left lower lobe and evidence of lower lobe bronchiectasis. No effusion is present. Heart is mildly enlarged. Organs: Gallbladder is surgically absent. Mild central biliary ductal dilatation is noted likely secondary to cholecystectomy. Pancreas, spleen, adrenals, and kidneys demonstrate no acute abnormality. Adrenal thickening, stable, is noted. Mild scar is present in the right kidney. Left perirenal cystic lesions are again noted although there has been interval enlargement of one of the areas from 4.3 to 4.5 cm and enlargement of the 2nd cystic area from 5.8 to 6.0 cm. GI/Bowel: No free fluid, free air, bowel obstruction or bowel wall thickening is noted. Increased colonic stool is evident.  Pelvis: No evidence of appendicitis. Bladder is unremarkable. No free pelvic fluid, pelvic or inguinal adenopathy is noted. Peritoneum/Retroperitoneum: Scattered mild-to-moderate calcified plaque is noted in the aorta. No aneurysm. Bones/Soft Tissues: Degenerative changes in the hips, SI joints and lower lumbar spine as well as spondylosis. Osteopenia without evidence of acute abnormality. 1.  No acute abdominopelvic process to account for patient's complaints. 2.  Stable pulmonary lower lobe atelectasis and bronchiectasis. 3.  Stable mild central biliary ductal dilatation likely secondary to cholecystectomy. 4.  Left perirenal cysts with minimal interval enlargement. 5.  Atherosclerotic disease. No aortic aneurysm. No bowel obstruction, appendicitis or urinary calculi. Interpretation per the Radiologist below, if available at the time of this note:    CT ABDOMEN PELVIS W IV CONTRAST   Preliminary Result   1. No acute abdominopelvic process to account for patient's complaints. 2.  Stable pulmonary lower lobe atelectasis and bronchiectasis. 3.  Stable mild central biliary ductal dilatation likely secondary to   cholecystectomy. 4.  Left perirenal cysts with minimal interval enlargement. 5.  Atherosclerotic disease. No aortic aneurysm. No bowel obstruction,   appendicitis or urinary calculi.                  LABS:  Labs Reviewed   CBC WITH AUTO DIFFERENTIAL - Abnormal; Notable for the following components:       Result Value    Platelets 551 (*)     All other components within normal limits   BASIC METABOLIC PANEL - Abnormal; Notable for the following components:    Glucose 117 (*)     All other components within normal limits   LIPASE - Abnormal; Notable for the following components:    Lipase 5 (*)     All other components within normal limits   URINE RT REFLEX TO CULTURE - Abnormal; Notable for the following components:    Urine Hgb TRACE (*)     All other components within normal limits MICROSCOPIC URINALYSIS - Abnormal; Notable for the following components:    Amorphous, UA 1+ (*)     All other components within normal limits   AMYLASE   HEPATIC FUNCTION PANEL       All other labs were within normal range or not returned as of this dictation. EMERGENCY DEPARTMENT COURSE and DIFFERENTIAL DIAGNOSIS/MDM:   Vitals:    Vitals:    01/29/21 0934   BP: (!) 177/82   Pulse: 59   Resp: 18   Temp: 97.7 °F (36.5 °C)   TempSrc: Oral   SpO2: 94%   Weight: 180 lb (81.6 kg)   Height: 5' 3\" (1.6 m)       Medical Decision Making: Patient's laboratory studies and imaging are negative. The patient is stable and able to be discharged home. Follow up with  primary care physician for recheck and reevaluation. Return for any worsening symptoms or concerns  Medications   0.9 % sodium chloride infusion ( Intravenous Stopped 1/29/21 1218)   sodium chloride flush 0.9 % injection 10 mL (10 mLs Intravenous Given 1/29/21 1105)   0.9 % sodium chloride bolus (0 mLs Intravenous Stopped 1/29/21 1109)   iopamidol (ISOVUE-370) 76 % injection 75 mL (75 mLs Intravenous Given 1/29/21 1105)     FINAL IMPRESSION      1.  Generalized abdominal pain          DISPOSITION/PLAN   DISPOSITION Decision To Discharge 01/29/2021 12:06:50 PM      PATIENT REFERRED TO:   Julissa Kenyon MD  [place] [address], [address] [place] [address]    Schedule an appointment as soon as possible for a visit       [facility] [place] [facility]  [address] [facility]  [phone]    If symptoms worsen      DISCHARGE MEDICATIONS:     New Prescriptions    DICYCLOMINE (BENTYL) 10 MG CAPSULE    Take 1 capsule by mouth every 6 hours as needed (cramps)           (Please note that portions of this note were completed with a voice recognition program.  Efforts were made to edit the dictations but occasionally words are mis-transcribed.)    Felisha Pérez NP, APRN - [place]  Certified Nurse Practitioner          [facility] -

## 2021-01-29 NOTE — ED NOTES
Pt admitted w/c/o stomach pain radiating across the upper abdomen and down the left side.  Says \"feels like I'm going to burst.\"     Grzegorz Fermin  01/29/21 1012

## 2021-01-29 NOTE — ED PROVIDER NOTES
The patient was seen and examined by me in conjunction with the mid-level provider. I agree with his/her assessment and treatment plan.     Patient's work-up including CAT scan of the abdomen is negative and she does not require admission to the hospital.     Nadia Castellanos MD  01/29/21 2044

## 2021-02-08 ENCOUNTER — OFFICE VISIT (OUTPATIENT)
Dept: GASTROENTEROLOGY | Age: 86
End: 2021-02-08
Payer: MEDICARE

## 2021-02-08 VITALS — HEART RATE: 79 BPM | DIASTOLIC BLOOD PRESSURE: 72 MMHG | TEMPERATURE: 97.9 F | SYSTOLIC BLOOD PRESSURE: 139 MMHG

## 2021-02-08 DIAGNOSIS — G89.29 CHRONIC ABDOMINAL PAIN: ICD-10-CM

## 2021-02-08 DIAGNOSIS — R10.9 CHRONIC ABDOMINAL PAIN: ICD-10-CM

## 2021-02-08 DIAGNOSIS — K52.9 CHRONIC DIARRHEA: Primary | ICD-10-CM

## 2021-02-08 PROCEDURE — 99213 OFFICE O/P EST LOW 20 MIN: CPT | Performed by: INTERNAL MEDICINE

## 2021-02-08 RX ORDER — POLYETHYLENE GLYCOL 3350 17 G/17G
17 POWDER, FOR SOLUTION ORAL DAILY
COMMUNITY
End: 2021-06-08 | Stop reason: SDUPTHER

## 2021-02-08 ASSESSMENT — ENCOUNTER SYMPTOMS
BLOOD IN STOOL: 0
CONSTIPATION: 0
COUGH: 0
NAUSEA: 0
ABDOMINAL PAIN: 1
WHEEZING: 0
RECTAL PAIN: 0
ANAL BLEEDING: 0
CHOKING: 0
VOMITING: 0
TROUBLE SWALLOWING: 0
ABDOMINAL DISTENTION: 1
DIARRHEA: 1

## 2021-02-08 NOTE — PROGRESS NOTES
GI CLINIC FOLLOW UP    INTERVAL HISTORY:   No referring provider defined for this encounter. Chief Complaint   Patient presents with   Margo Abernathy     Recently in the hospital with abdominal pain and bloating. Dr Catherine De La Torre says IBS with Diarrhea. She says right now she is very blaoted and gained so much and she feels like it is fluid as it is hard as a rock. HISTORY OF PRESENT ILLNESS: Ms.Joyce Vishal George is a 80 y.o. female with chronic diarrhea and abdominal pain. She has had issues with this for a while. She was recently in the hospital.  CT scan did not show any pathology. Dr Jesse Suarez recommended colonoscopy. The patient was not interested in doing procedures. She switched services. She reports cramping abdominal pain. Bloating. No clear triggers. Past Medical,Family, and Social History reviewed and does not contribute to the patient presentingcondition. Patient's PMH/PSH,SH,PSYCH Hx, MEDs, ALLERGIES, and ROS were all reviewed and updated in the appropriate sections.     PAST MEDICAL HISTORY:  Past Medical History:   Diagnosis Date    A-fib [facility])     AAA (abdominal aortic aneurysm) (HCC)     Abdominal cramping     Atrial fibrillation (HCC)     Back pain, chronic     C. difficile colitis     COPD (chronic obstructive pulmonary disease) (HCC)     Diabetes mellitus (HCC)     Gastritis     GERD (gastroesophageal reflux disease)     Hiatal hernia     Hypoalbuminemia 10/14/2014    IBS (irritable bowel syndrome)     Nausea vomiting and diarrhea 10/14/2014    Stomach ulcer     Thyroid disease     Type 2 diabetes mellitus without complication, without long-term current use of insulin ([facility] [address] 1/24/2018    UTI (lower urinary tract infection)     Varicose veins        Past Surgical History:   Procedure Laterality Date    CHOLECYSTECTOMY      HERNIA REPAIR      HYSTERECTOMY      partial    RI EGD TRANSORAL BIOPSY SINGLE/MULTIPLE N/A 6/20/2017    EGD BIOPSY performed by Cheri Rivas MD at [address] [facility] [address]  06/20/2017    MILD CHRONIC INACTIVE GASTRITIS    VASCULAR SURGERY      varicose veins    VEIN SURGERY         CURRENT MEDICATIONS:    Current Outpatient Medications:     dicyclomine (BENTYL) 10 MG capsule, Take 1 capsule by mouth every 6 hours as needed (cramps), Disp: 20 capsule, Rfl: 0    hydrocortisone (ANUSOL-HC) 2.5 % CREA rectal cream, USE QID PRN, Disp: 1 Tube, Rfl: 0    Lactobacillus (ACIDOPHILUS PO), Take by mouth, Disp: , Rfl:     Simethicone (GAS RELIEF) 125 MG CAPS, Take 1 capsule by mouth 2 times daily as needed (bloating ,), Disp: 28 capsule, Rfl:     sucralfate (CARAFATE) 1 GM tablet, Take 1 tablet 4 times daily  90 day supply, Disp: 360 tablet, Rfl: 2    levothyroxine (SYNTHROID) 125 MCG tablet, Take 125 mcg by mouth every other day, Disp: , Rfl:     traMADol (ULTRAM) 50 MG tablet, Take 50 mg by mouth every 6 hours as needed for Pain., Disp: , Rfl:     melatonin 3 MG TABS tablet, Take 3 mg by mouth nightly as needed (Sleep) Pt unsure of dose.  Takes OTC, Disp: , Rfl:     Cholecalciferol (VITAMIN D3) 50 MCG (2000 UT) CAPS, Take 2,000 Units by mouth every other day , Disp: , Rfl:     furosemide (LASIX) 20 MG tablet, Take 20 mg by mouth daily , Disp: , Rfl:     potassium chloride (KLOR-CON) 10 MEQ extended release tablet, TAKE ONE TABLET BY MOUTH TWICE A DAY, Disp: 60 tablet, Rfl: 1    pantoprazole (PROTONIX) 40 MG tablet, TAKE ONE TABLET BY MOUTH DAILY, Disp: 30 tablet, Rfl: 6    lisinopril (PRINIVIL;ZESTRIL) 20 MG tablet, Take 1 tablet by mouth daily, Disp: 90 tablet, Rfl: 2    amiodarone (CORDARONE) 200 MG tablet, Take 200 mg by mouth Twice a Week On Monday and Friday, Disp: , Rfl:     OXYGEN, Inhale into the lungs Uses at night and prn, Disp: , Rfl:     ALLERGIES:   Allergies   Allergen Reactions    Penicillins      Has no recollection of what the reaction was       FAMILY HISTORY:       Family history unknown: Yes         SOCIAL HISTORY:   Social History     Socioeconomic History    Marital status:      Spouse name: Not on file    Number of children: 2    Years of education: 15    Highest education level: Not on file   Occupational History    Not on file   Social Needs    Financial resource strain: Not on file    Food insecurity     Worry: Not on file     Inability: Not on file    Transportation needs     Medical: Not on file     Non-medical: Not on file   Tobacco Use    Smoking status: Former Smoker    Smokeless tobacco: Never Used    Tobacco comment: quit many years ago    Substance and Sexual Activity    Alcohol use: Yes     Alcohol/week: 2.0 standard drinks     Types: 1 Glasses of wine, 1 Cans of beer per week     Comment: social drinker     Drug use: No    Sexual activity: Never     Birth control/protection: Surgical     Comment: hyst   Lifestyle    Physical activity     Days per week: Not on file     Minutes per session: Not on file    Stress: Not on file   Relationships    Social connections     Talks on phone: Not on file     Gets together: Not on file     Attends Mandaeism service: Not on file     Active member of club or organization: Not on file     Attends meetings of clubs or organizations: Not on file     Relationship status: Not on file    Intimate partner violence     Fear of current or ex partner: Not on file     Emotionally abused: Not on file     Physically abused: Not on file     Forced sexual activity: Not on file   Other Topics Concern    Not on file   Social History Narrative    Not on file       REVIEW OF SYSTEMS: A 12-point review of systemswas obtained and pertinent positives and negatives were enumerated above in the history of present illness. All other reviewed systems / symptoms were negative. Review of Systems   Constitutional: Negative for appetite change, fatigue and unexpected weight change. HENT: Negative for trouble swallowing.     Eyes: Positive for visual disturbance (glasses). Respiratory: Negative for cough, choking and wheezing. Cardiovascular: Negative for chest pain, palpitations and leg swelling. Gastrointestinal: Positive for abdominal distention, abdominal pain (left lower with bowel movements ) and diarrhea (improving). Negative for anal bleeding, blood in stool, constipation, nausea, rectal pain and vomiting. Genitourinary: Negative for difficulty urinating. Allergic/Immunologic: Negative for environmental allergies and food allergies. Neurological: Negative for dizziness, weakness, light-headedness, numbness and headaches. Hematological: Bruises/bleeds easily. Psychiatric/Behavioral: Negative for sleep disturbance. The patient is nervous/anxious. LABORATORY DATA: Reviewed  Lab Results   Component Value Date    WBC 5.0 01/29/2021    HGB 12.4 01/29/2021    HCT 39.1 01/29/2021    MCV 95.6 01/29/2021     (L) 01/29/2021     01/29/2021    K 4.3 01/29/2021     01/29/2021    CO2 25 01/29/2021    BUN 15 01/29/2021    CREATININE 0.84 01/29/2021    LABALBU 3.5 01/29/2021    BILITOT 1.00 01/29/2021    ALKPHOS 66 01/29/2021    AST 30 01/29/2021    ALT 14 01/29/2021    INR 1.1 01/21/2020         Lab Results   Component Value Date    RBC 4.09 01/29/2021    HGB 12.4 01/29/2021    MCV 95.6 01/29/2021    MCH 30.3 01/29/2021    MCHC 31.7 01/29/2021    RDW 14.0 01/29/2021    MPV 9.3 01/29/2021    BASOPCT 0 01/29/2021    LYMPHSABS 1.37 01/29/2021    MONOSABS 0.37 01/29/2021    NEUTROABS 3.05 01/29/2021    EOSABS 0.14 01/29/2021    BASOSABS <0.03 01/29/2021         DIAGNOSTIC TESTING:     Ct Abdomen Pelvis W Iv Contrast    Result Date: 1/29/2021  EXAMINATION: CT OF THE ABDOMEN AND PELVIS WITH CONTRAST 1/29/2021 10:59 am TECHNIQUE: CT of the abdomen and pelvis was performed with the administration of intravenous contrast. Multiplanar reformatted images are provided for review.  Dose modulation, iterative reconstruction, and/or weight based adjustment of the mA/kV was utilized to reduce the radiation dose to as low as reasonably achievable. COMPARISON: None. HISTORY: ORDERING SYSTEM PROVIDED HISTORY: Pain TECHNOLOGIST PROVIDED HISTORY: IV Only Contrast Pain Reason for Exam: Patient states that she has abdominal pain and to the epigastric and left side. She states that the left sided abdominal pain has been going on for years. She states is worse over the last couple of days. Acuity: Acute Type of Exam: Ongoing Relevant Medical/Surgical History: Hx of cholecystectomy, hernia repair, diabetes, AAA, hysterectomy FINDINGS: Lower Chest: Atelectasis, septal thickening and scarring is present in both lower lobes with stable area of consolidation left lower lobe and evidence of lower lobe bronchiectasis. No effusion is present. Heart is mildly enlarged. Organs: Gallbladder is surgically absent. Mild central biliary ductal dilatation is noted likely secondary to cholecystectomy. Pancreas, spleen, adrenals, and kidneys demonstrate no acute abnormality. Adrenal thickening, stable, is noted. Mild scar is present in the right kidney. Left perirenal cystic lesions are again noted although there has been interval enlargement of one of the areas from 4.3 to 4.5 cm and enlargement of the 2nd cystic area from 5.8 to 6.0 cm. GI/Bowel: No free fluid, free air, bowel obstruction or bowel wall thickening is noted. Increased colonic stool is evident. Pelvis: No evidence of appendicitis. Bladder is unremarkable. No free pelvic fluid, pelvic or inguinal adenopathy is noted. Peritoneum/Retroperitoneum: Scattered mild-to-moderate calcified plaque is noted in the aorta. No aneurysm. Bones/Soft Tissues: Degenerative changes in the hips, SI joints and lower lumbar spine as well as spondylosis. Osteopenia without evidence of acute abnormality. 1.  No acute abdominopelvic process to account for patient's complaints.  2.  Stable pulmonary lower lobe atelectasis and bronchiectasis. 3.  Stable mild central biliary ductal dilatation likely secondary to cholecystectomy. 4.  Left perirenal cysts with minimal interval enlargement. 5.  Atherosclerotic disease. No aortic aneurysm. No bowel obstruction, appendicitis or urinary calculi. PHYSICAL EXAMINATION: Vital signs reviewed per the nursing documentation. There were no vitals taken for this visit. There is no height or weight on file to calculate BMI. Physical Exam      I personally reviewed the nurse's notes and documentation and I agree with her notes. General: alert, appears stated age and cooperative Psych: Normal. and Alert and oriented, appropriate affect. . Normal affect. Mentation normal  HEENT: PERRLA. Clear conjunctivae and sclerae. Moist oral mucosae, no lesions or ulcers. The neck is supple, without lymphadenopathy or jugular venous distension. No masses. Normal thyroid. Cardiovascular: S1 S2 RRR no rubs or murmurs. Pulmonary: clear BL. No accessory muscle usage. Abdominal Exam: Soft, NT ND, no hepato or spleno megaly, +BS, no ascites. IMPRESSION: Ms. Vitor Flynn is a 80 y.o. female with chronic diarrhea. Abdominal pain. Very likely functional.  She is not interested in endoscopic evaluation. We will try [facility]gard. Follow-up in 3 weeks. Thank you for allowing me to participate in the care of Ms. Vitor Flynn. For any further questions please do not hesitate to contact me. I have reviewed and agree with the ROS entered by the MA/LPN. Note is dictated utilizing voice recognition software. Unfortunately this leads to occasional typographical errors. Please contact our office if you have any questions.     Leon Anderson MD  [facility]  O: #[phone]

## 2021-03-08 ENCOUNTER — OFFICE VISIT (OUTPATIENT)
Dept: GASTROENTEROLOGY | Age: 86
End: 2021-03-08
Payer: MEDICARE

## 2021-03-08 VITALS — HEART RATE: 88 BPM | SYSTOLIC BLOOD PRESSURE: 134 MMHG | DIASTOLIC BLOOD PRESSURE: 72 MMHG | TEMPERATURE: 97.5 F

## 2021-03-08 DIAGNOSIS — R19.4 ALTERED BOWEL HABITS: Primary | ICD-10-CM

## 2021-03-08 PROCEDURE — 99213 OFFICE O/P EST LOW 20 MIN: CPT | Performed by: INTERNAL MEDICINE

## 2021-03-08 RX ORDER — DOCUSATE SODIUM 100 MG/1
100 CAPSULE, LIQUID FILLED ORAL 2 TIMES DAILY
Qty: 60 CAPSULE | Refills: 0 | Status: SHIPPED | OUTPATIENT
Start: 2021-03-08 | End: 2021-04-07

## 2021-03-08 ASSESSMENT — ENCOUNTER SYMPTOMS
ABDOMINAL PAIN: 1
WHEEZING: 0
ABDOMINAL DISTENTION: 1
CHOKING: 0
DIARRHEA: 1
TROUBLE SWALLOWING: 0
NAUSEA: 0
COUGH: 0
BLOOD IN STOOL: 0
CONSTIPATION: 0
RECTAL PAIN: 0
VOMITING: 0
ANAL BLEEDING: 0

## 2021-03-08 NOTE — PROGRESS NOTES
GI CLINIC FOLLOW UP    INTERVAL HISTORY:   No referring provider defined for this encounter. Chief Complaint   Patient presents with    Diarrhea     She got the IB GUard and it didn't work . She got a powder from her PCP and that helps. HISTORY OF PRESENT ILLNESS: Ms.Joyce Antonina Peto Marshal Boxer is a 80 y.o. female altered bowel habits. She reports no bowel movements for 3 to 4 days. Subsequently she has soft bowel movements. She takes MiraLAX daily. She reports bloating. Intermittent abdominal cramping. She tried Bentyl. This helps. She takes as needed. She refused endoscopic evaluation in the hospital.    Past Medical,Family, and Social History reviewed and does not contribute to the patient presentingcondition. Patient's PMH/PSH,SH,PSYCH Hx, MEDs, ALLERGIES, and ROS were all reviewed and updated in the appropriate sections.     PAST MEDICAL HISTORY:  Past Medical History:   Diagnosis Date    A-fib [facility])     AAA (abdominal aortic aneurysm) (HCC)     Abdominal cramping     Atrial fibrillation (HCC)     Back pain, chronic     C. difficile colitis     COPD (chronic obstructive pulmonary disease) (HCC)     Diabetes mellitus (HCC)     Gastritis     GERD (gastroesophageal reflux disease)     Hiatal hernia     Hypoalbuminemia 10/14/2014    IBS (irritable bowel syndrome)     Nausea vomiting and diarrhea 10/14/2014    Stomach ulcer     Thyroid disease     Type 2 diabetes mellitus without complication, without long-term current use of insulin ([facility] [address] 1/24/2018    UTI (lower urinary tract infection)     Varicose veins        Past Surgical History:   Procedure Laterality Date    CHOLECYSTECTOMY      HERNIA REPAIR      HYSTERECTOMY      partial    PA EGD TRANSORAL BIOPSY SINGLE/MULTIPLE N/A 6/20/2017    EGD BIOPSY performed by Ramy Perez MD at [address]  06/20/2017    MILD CHRONIC INACTIVE GASTRITIS    VASCULAR SURGERY      varicose veins    VEIN SURGERY         CURRENT MEDICATIONS:    Current Outpatient Medications:     polyethylene glycol (GLYCOLAX) 17 GM/SCOOP powder, Take 17 g by mouth daily, Disp: , Rfl:     dicyclomine (BENTYL) 10 MG capsule, Take 1 capsule by mouth every 6 hours as needed (cramps), Disp: 20 capsule, Rfl: 0    hydrocortisone (ANUSOL-HC) 2.5 % CREA rectal cream, USE QID PRN (Patient not taking: Reported on 2/8/2021), Disp: 1 Tube, Rfl: 0    Simethicone (GAS RELIEF) 125 MG CAPS, Take 1 capsule by mouth 2 times daily as needed (bloating ,), Disp: 28 capsule, Rfl:     sucralfate (CARAFATE) 1 GM tablet, Take 1 tablet 4 times daily  90 day supply, Disp: 360 tablet, Rfl: 2    levothyroxine (SYNTHROID) 125 MCG tablet, Take 125 mcg by mouth every other day, Disp: , Rfl:     traMADol (ULTRAM) 50 MG tablet, Take 50 mg by mouth every 6 hours as needed for Pain., Disp: , Rfl:     melatonin 3 MG TABS tablet, Take 3 mg by mouth nightly as needed (Sleep) Pt unsure of dose.  Takes OTC, Disp: , Rfl:     Cholecalciferol (VITAMIN D3) 50 MCG (2000 UT) CAPS, Take 2,000 Units by mouth every other day , Disp: , Rfl:     furosemide (LASIX) 20 MG tablet, Take 20 mg by mouth daily , Disp: , Rfl:     potassium chloride (KLOR-CON) 10 MEQ extended release tablet, TAKE ONE TABLET BY MOUTH TWICE A DAY, Disp: 60 tablet, Rfl: 1    pantoprazole (PROTONIX) 40 MG tablet, TAKE ONE TABLET BY MOUTH DAILY, Disp: 30 tablet, Rfl: 6    lisinopril (PRINIVIL;ZESTRIL) 20 MG tablet, Take 1 tablet by mouth daily, Disp: 90 tablet, Rfl: 2    amiodarone (CORDARONE) 200 MG tablet, Take 200 mg by mouth Twice a Week On Monday and Friday, Disp: , Rfl:     OXYGEN, Inhale into the lungs Uses at night and prn, Disp: , Rfl:     ALLERGIES:   Allergies   Allergen Reactions    Penicillins      Has no recollection of what the reaction was       FAMILY HISTORY:       Family history unknown: Yes         SOCIAL HISTORY:   Social History     Socioeconomic History    Marital status:      Spouse name: Not on file    Number of children: 2    Years of education: 15    Highest education level: Not on file   Occupational History    Not on file   Social Needs    Financial resource strain: Not on file    Food insecurity     Worry: Not on file     Inability: Not on file    Transportation needs     Medical: Not on file     Non-medical: Not on file   Tobacco Use    Smoking status: Former Smoker    Smokeless tobacco: Never Used    Tobacco comment: quit many years ago    Substance and Sexual Activity    Alcohol use: Yes     Alcohol/week: 2.0 standard drinks     Types: 1 Glasses of wine, 1 Cans of beer per week     Comment: social drinker     Drug use: No    Sexual activity: Never     Birth control/protection: Surgical     Comment: hyst   Lifestyle    Physical activity     Days per week: Not on file     Minutes per session: Not on file    Stress: Not on file   Relationships    Social connections     Talks on phone: Not on file     Gets together: Not on file     Attends Orthodoxy service: Not on file     Active member of club or organization: Not on file     Attends meetings of clubs or organizations: Not on file     Relationship status: Not on file    Intimate partner violence     Fear of current or ex partner: Not on file     Emotionally abused: Not on file     Physically abused: Not on file     Forced sexual activity: Not on file   Other Topics Concern    Not on file   Social History Narrative    Not on file       REVIEW OF SYSTEMS: A 12-point review of systemswas obtained and pertinent positives and negatives were enumerated above in the history of present illness. All other reviewed systems / symptoms were negative. Review of Systems   Constitutional: Negative for appetite change, fatigue and unexpected weight change. HENT: Negative for trouble swallowing. Eyes: Positive for visual disturbance (glasses). Respiratory: Negative for cough, choking and wheezing. Cardiovascular: Negative for chest pain, palpitations and leg swelling. Gastrointestinal: Positive for abdominal distention, abdominal pain (left lower with bowel movements ) and diarrhea (improving). Negative for anal bleeding, blood in stool, constipation, nausea, rectal pain and vomiting. Genitourinary: Negative for difficulty urinating. Allergic/Immunologic: Negative for environmental allergies and food allergies. Neurological: Negative for dizziness, weakness, light-headedness, numbness and headaches. Hematological: Bruises/bleeds easily. Psychiatric/Behavioral: Negative for sleep disturbance. The patient is nervous/anxious. LABORATORY DATA: Reviewed  Lab Results   Component Value Date    WBC 5.0 01/29/2021    HGB 12.4 01/29/2021    HCT 39.1 01/29/2021    MCV 95.6 01/29/2021     (L) 01/29/2021     01/29/2021    K 4.3 01/29/2021     01/29/2021    CO2 25 01/29/2021    BUN 15 01/29/2021    CREATININE 0.84 01/29/2021    LABALBU 3.5 01/29/2021    BILITOT 1.00 01/29/2021    ALKPHOS 66 01/29/2021    AST 30 01/29/2021    ALT 14 01/29/2021    INR 1.1 01/21/2020         Lab Results   Component Value Date    RBC 4.09 01/29/2021    HGB 12.4 01/29/2021    MCV 95.6 01/29/2021    MCH 30.3 01/29/2021    MCHC 31.7 01/29/2021    RDW 14.0 01/29/2021    MPV 9.3 01/29/2021    BASOPCT 0 01/29/2021    LYMPHSABS 1.37 01/29/2021    MONOSABS 0.37 01/29/2021    NEUTROABS 3.05 01/29/2021    EOSABS 0.14 01/29/2021    BASOSABS <0.03 01/29/2021         DIAGNOSTIC TESTING:     No results found. PHYSICAL EXAMINATION: Vital signs reviewed per the nursing documentation. There were no vitals taken for this visit. There is no height or weight on file to calculate BMI. Physical Exam    I personally reviewed the nurse's notes and documentation and I agree with her notes. General: alert, appears stated age and cooperative Psych: Normal. and Alert and oriented, appropriate affect. . Normal affect. Mentation normal  HEENT: PERRLA. Clear conjunctivae and sclerae. Moist oral mucosae, no lesions or ulcers. The neck is supple, without lymphadenopathy or jugular venous distension. No masses. Normal thyroid. Cardiovascular: S1 S2 RRR no rubs or murmurs. Pulmonary: clear BL. No accessory muscle usage. Abdominal Exam: Soft, NT ND, no hepato or spleno megaly, +BS, no ascites. IMPRESSION: Ms. Keaton Marquez is a 80 y.o. female with altered bowel habits. Very likely functional.  Abdominal pain. Trial of Bentyl twice a day. IBgard did not help. Trial of Colace daily in addition to MiraLAX. Follow-up in 3 months. Thank you for allowing me to participate in the care of Ms. Keaton Marquez. For any further questions please do not hesitate to contact me. I have reviewed and agree with the ROS entered by the MA/LPN. Note is dictated utilizing voice recognition software. Unfortunately this leads to occasional typographical errors. Please contact our office if you have any questions.     Mercedes Joseph MD  [facility]  O: #[phone]

## 2021-03-15 RX ORDER — SUCRALFATE 1 G/1
TABLET ORAL
Qty: 120 TABLET | Refills: 1 | OUTPATIENT
Start: 2021-03-15

## 2021-05-04 ENCOUNTER — HOSPITAL ENCOUNTER (OUTPATIENT)
Age: 86
Setting detail: SPECIMEN
Discharge: HOME | End: 2021-05-04
Payer: MEDICARE

## 2021-05-04 LAB
ALBUMIN SERPL-MCNC: 3.9 G/DL (ref 3.5–5.2)
ALBUMIN/GLOBULIN RATIO: 1.3 (ref 1–2.5)
ALP BLD-CCNC: 78 U/L (ref 35–104)
ALT SERPL-CCNC: 11 U/L (ref 5–33)
ANION GAP SERPL CALCULATED.3IONS-SCNC: 13 MMOL/L (ref 9–17)
AST SERPL-CCNC: 16 U/L
BILIRUB SERPL-MCNC: 0.72 MG/DL (ref 0.3–1.2)
BUN BLDV-MCNC: 22 MG/DL (ref 8–23)
BUN/CREAT BLD: ABNORMAL (ref 9–20)
CALCIUM SERPL-MCNC: 9.4 MG/DL (ref 8.6–10.4)
CHLORIDE BLD-SCNC: 108 MMOL/L (ref 98–107)
CO2: 22 MMOL/L (ref 20–31)
CREAT SERPL-MCNC: 1.08 MG/DL (ref 0.5–0.9)
GFR AFRICAN AMERICAN: 57 ML/MIN
GFR NON-AFRICAN AMERICAN: 47 ML/MIN
GFR SERPL CREATININE-BSD FRML MDRD: ABNORMAL ML/MIN/{1.73_M2}
GFR SERPL CREATININE-BSD FRML MDRD: ABNORMAL ML/MIN/{1.73_M2}
GLUCOSE BLD-MCNC: 106 MG/DL (ref 70–99)
HCT VFR BLD CALC: 39.5 % (ref 36.3–47.1)
HEMOGLOBIN: 12.4 G/DL (ref 11.9–15.1)
IRON SATURATION: 13 % (ref 20–55)
IRON: 43 UG/DL (ref 37–145)
MCH RBC QN AUTO: 30.5 PG (ref 25.2–33.5)
MCHC RBC AUTO-ENTMCNC: 31.4 G/DL (ref 28.4–34.8)
MCV RBC AUTO: 97.1 FL (ref 82.6–102.9)
NRBC AUTOMATED: 0 PER 100 WBC
PDW BLD-RTO: 14.3 % (ref 11.8–14.4)
PLATELET # BLD: 200 K/UL (ref 138–453)
PMV BLD AUTO: 11.5 FL (ref 8.1–13.5)
POTASSIUM SERPL-SCNC: 4.3 MMOL/L (ref 3.7–5.3)
RBC # BLD: 4.07 M/UL (ref 3.95–5.11)
SODIUM BLD-SCNC: 143 MMOL/L (ref 135–144)
THYROXINE, FREE: 1.36 NG/DL (ref 0.93–1.7)
TOTAL IRON BINDING CAPACITY: 329 UG/DL (ref 250–450)
TOTAL PROTEIN: 6.8 G/DL (ref 6.4–8.3)
TSH SERPL DL<=0.05 MIU/L-ACNC: 8.97 MIU/L (ref 0.3–5)
UNSATURATED IRON BINDING CAPACITY: 286 UG/DL (ref 112–347)
VITAMIN B-12: 281 PG/ML (ref 232–1245)
WBC # BLD: 5.5 K/UL (ref 3.5–11.3)

## 2021-05-18 ENCOUNTER — APPOINTMENT (OUTPATIENT)
Dept: GENERAL RADIOLOGY | Age: 86
End: 2021-05-18
Payer: MEDICARE

## 2021-05-18 ENCOUNTER — HOSPITAL ENCOUNTER (EMERGENCY)
Age: 86
Discharge: HOME | End: 2021-05-19
Attending: EMERGENCY MEDICINE
Payer: MEDICARE

## 2021-05-18 DIAGNOSIS — R19.7 DIARRHEA, UNSPECIFIED TYPE: ICD-10-CM

## 2021-05-18 DIAGNOSIS — E86.0 DEHYDRATION: Primary | ICD-10-CM

## 2021-05-18 LAB
ABSOLUTE EOS #: 0.11 K/UL (ref 0–0.44)
ABSOLUTE IMMATURE GRANULOCYTE: 0.03 K/UL (ref 0–0.3)
ABSOLUTE LYMPH #: 0.76 K/UL (ref 1.1–3.7)
ABSOLUTE MONO #: 0.36 K/UL (ref 0.1–1.2)
ALBUMIN SERPL-MCNC: 3.3 G/DL (ref 3.5–5.2)
ALBUMIN/GLOBULIN RATIO: ABNORMAL (ref 1–2.5)
ALP BLD-CCNC: 116 U/L (ref 35–104)
ALT SERPL-CCNC: 9 U/L (ref 5–33)
ANION GAP SERPL CALCULATED.3IONS-SCNC: 15 MMOL/L (ref 9–17)
AST SERPL-CCNC: 17 U/L
BASOPHILS # BLD: 1 % (ref 0–2)
BASOPHILS ABSOLUTE: 0.03 K/UL (ref 0–0.2)
BILIRUB SERPL-MCNC: 0.67 MG/DL (ref 0.3–1.2)
BILIRUBIN DIRECT: 0.19 MG/DL
BILIRUBIN, INDIRECT: 0.48 MG/DL (ref 0–1)
BUN BLDV-MCNC: 24 MG/DL (ref 8–23)
BUN/CREAT BLD: 21 (ref 9–20)
CALCIUM SERPL-MCNC: 8.1 MG/DL (ref 8.6–10.4)
CHLORIDE BLD-SCNC: 110 MMOL/L (ref 98–107)
CO2: 19 MMOL/L (ref 20–31)
CREAT SERPL-MCNC: 1.15 MG/DL (ref 0.5–0.9)
DIFFERENTIAL TYPE: ABNORMAL
EOSINOPHILS RELATIVE PERCENT: 2 % (ref 1–4)
GFR AFRICAN AMERICAN: 53 ML/MIN
GFR NON-AFRICAN AMERICAN: 44 ML/MIN
GFR SERPL CREATININE-BSD FRML MDRD: ABNORMAL ML/MIN/{1.73_M2}
GFR SERPL CREATININE-BSD FRML MDRD: ABNORMAL ML/MIN/{1.73_M2}
GLOBULIN: ABNORMAL G/DL (ref 1.5–3.8)
GLUCOSE BLD-MCNC: 175 MG/DL (ref 70–99)
HCT VFR BLD CALC: 38.1 % (ref 36.3–47.1)
HEMOGLOBIN: 11.9 G/DL (ref 11.9–15.1)
IMMATURE GRANULOCYTES: 1 %
LACTIC ACID: 1.2 MMOL/L (ref 0.5–2.2)
LYMPHOCYTES # BLD: 12 % (ref 24–43)
MAGNESIUM: 1.8 MG/DL (ref 1.6–2.6)
MCH RBC QN AUTO: 30.7 PG (ref 25.2–33.5)
MCHC RBC AUTO-ENTMCNC: 31.2 G/DL (ref 28.4–34.8)
MCV RBC AUTO: 98.4 FL (ref 82.6–102.9)
MONOCYTES # BLD: 6 % (ref 3–12)
NRBC AUTOMATED: 0 PER 100 WBC
PDW BLD-RTO: 14.3 % (ref 11.8–14.4)
PLATELET # BLD: 144 K/UL (ref 138–453)
PLATELET ESTIMATE: ABNORMAL
PMV BLD AUTO: 9.2 FL (ref 8.1–13.5)
POTASSIUM SERPL-SCNC: 3.5 MMOL/L (ref 3.7–5.3)
RBC # BLD: 3.87 M/UL (ref 3.95–5.11)
RBC # BLD: ABNORMAL 10*6/UL
SEG NEUTROPHILS: 78 % (ref 36–65)
SEGMENTED NEUTROPHILS ABSOLUTE COUNT: 4.84 K/UL (ref 1.5–8.1)
SODIUM BLD-SCNC: 144 MMOL/L (ref 135–144)
TOTAL PROTEIN: 5.8 G/DL (ref 6.4–8.3)
WBC # BLD: 6.1 K/UL (ref 3.5–11.3)
WBC # BLD: ABNORMAL 10*3/UL

## 2021-05-18 PROCEDURE — 2580000003 HC RX 258: Performed by: EMERGENCY MEDICINE

## 2021-05-18 PROCEDURE — 83735 ASSAY OF MAGNESIUM: CPT

## 2021-05-18 PROCEDURE — 83605 ASSAY OF LACTIC ACID: CPT

## 2021-05-18 PROCEDURE — 85025 COMPLETE CBC W/AUTO DIFF WBC: CPT

## 2021-05-18 PROCEDURE — 96360 HYDRATION IV INFUSION INIT: CPT

## 2021-05-18 PROCEDURE — 71045 X-RAY EXAM CHEST 1 VIEW: CPT

## 2021-05-18 PROCEDURE — 93005 ELECTROCARDIOGRAM TRACING: CPT | Performed by: EMERGENCY MEDICINE

## 2021-05-18 PROCEDURE — 80076 HEPATIC FUNCTION PANEL: CPT

## 2021-05-18 PROCEDURE — 96361 HYDRATE IV INFUSION ADD-ON: CPT

## 2021-05-18 PROCEDURE — 99283 EMERGENCY DEPT VISIT LOW MDM: CPT

## 2021-05-18 PROCEDURE — 80048 BASIC METABOLIC PNL TOTAL CA: CPT

## 2021-05-18 RX ORDER — 0.9 % SODIUM CHLORIDE 0.9 %
1000 INTRAVENOUS SOLUTION INTRAVENOUS ONCE
Status: COMPLETED | OUTPATIENT
Start: 2021-05-18 | End: 2021-05-18

## 2021-05-18 RX ADMIN — SODIUM CHLORIDE 1000 ML: 9 INJECTION, SOLUTION INTRAVENOUS at 19:29

## 2021-05-18 ASSESSMENT — ENCOUNTER SYMPTOMS
APNEA: 0
DIARRHEA: 1
VOMITING: 1
SHORTNESS OF BREATH: 0
COLOR CHANGE: 0
NAUSEA: 1
CHEST TIGHTNESS: 0
SINUS PAIN: 0
CONSTIPATION: 0
ABDOMINAL DISTENTION: 0
EYES NEGATIVE: 1

## 2021-05-18 NOTE — ED PROVIDER NOTES
EMERGENCY DEPARTMENT ENCOUNTER    Pt Name: Joellen Puri  MRN: [id number]  [place] 8/2/1925  Date of evaluation: 5/18/21  CHIEF COMPLAINT       Chief Complaint   Patient presents with    Hypotension     n/v/d     HISTORY OF PRESENT ILLNESS   49-year-old female presenting to the emergency room with low blood pressure after nausea vomiting diarrhea. Patient reports she has episodes where she gets multiple bowel movements in a day. She also does have history of C. difficile. Patient is from a nursing facility and had low blood pressure after these episodes today. Patient does not have any significant pain here in the emergency room. Patient was noted to be bradycardic as well as hypotensive by squad. Heart rate was in the 40s. Half milligram of atropine was given with improvement of the heart rate into the 60s. Patient also received 1 L normal saline per squad. REVIEW OF SYSTEMS     Review of Systems   Constitutional: Negative for activity change, chills and diaphoresis. HENT: Negative for congestion, sinus pain and tinnitus. Eyes: Negative. Respiratory: Negative for apnea, chest tightness and shortness of breath. Gastrointestinal: Positive for diarrhea, nausea and vomiting. Negative for abdominal distention and constipation. Genitourinary: Negative for difficulty urinating and frequency. Musculoskeletal: Negative for arthralgias and myalgias. Skin: Negative for color change and rash. Neurological: Negative for dizziness. Hematological: Negative. Psychiatric/Behavioral: Negative.         PASTMEDICAL HISTORY     Past Medical History:   Diagnosis Date    A-fib [facility])     AAA (abdominal aortic aneurysm) (HCC)     Abdominal cramping     Atrial fibrillation (HCC)     Back pain, chronic     C. difficile colitis     COPD (chronic obstructive pulmonary disease) (HCC)     Diabetes mellitus (HCC)     Gastritis     GERD (gastroesophageal reflux disease)     Hiatal hernia     Hypoalbuminemia 10/14/2014    IBS (irritable bowel syndrome)     Nausea vomiting and diarrhea 10/14/2014    Stomach ulcer     Thyroid disease     Type 2 diabetes mellitus without complication, without long-term current use of insulin ([facility] [address] 1/24/2018    UTI (lower urinary tract infection)     Varicose veins      Past Problem List  Patient Active Problem List   Diagnosis Code    Atrial fibrillation ([facility]) I48.91    COPD (chronic obstructive pulmonary disease) ([facility]) J44.9    Abdominal cramping R10.9    Back pain, chronic M54.9, G89.29    Diabetes mellitus ([facility] [address] E11.9    C. difficile colitis A04.72    Nausea vomiting and diarrhea R11.2, R19.7    Hypoalbuminemia E88.09    Gastroenteritis/enteritis K52.9    Supratherapeutic INR R79.1    Gastroesophageal reflux disease without esophagitis K21.9    Irritable bowel syndrome with both constipation and diarrhea K58.2    Irritable bowel syndrome with diarrhea K58.0    Left lower quadrant pain R10.32    PND (paroxysmal nocturnal dyspnea) R06.00    Acute right ankle pain M25.571    Constipation K59.00    Abdominal pain, epigastric R10.13    Anemia D64.9    Iron deficiency anemia D50.9    Type 2 diabetes mellitus with hyperglycemia, without long-term current use of insulin ([facility]) E11.65    Gastritis K29.70    Diarrhea of presumed infectious origin R19.7    Colitis K52.9    Thyroid disease E07.9    STACEY (acute kidney injury) ([facility]) N17.9    Congestive heart failure ([facility]) I50.9    Acute cystitis with hematuria N30.01    H/O Clostridium difficile infection Z86.19    Lactic acid increased E87.2     SURGICAL HISTORY       Past Surgical History:   Procedure Laterality Date    CHOLECYSTECTOMY      HERNIA REPAIR      HYSTERECTOMY      partial    SC EGD TRANSORAL BIOPSY SINGLE/MULTIPLE N/A 6/20/2017    EGD BIOPSY performed by Andria Gomez MD at [address]  06/20/2017    MILD CHRONIC INACTIVE GASTRITIS    VASCULAR SURGERY      varicose veins    VEIN SURGERY       CURRENT MEDICATIONS       Discharge Medication List as of 2021 10:06 PM      CONTINUE these medications which have NOT CHANGED    Details   polyethylene glycol (GLYCOLAX) 17 GM/SCOOP powder Take 17 g by mouth dailyHistorical Med      dicyclomine (BENTYL) 10 MG capsule Take 1 capsule by mouth every 6 hours as needed (cramps), Disp-20 capsule, R-0Normal      sucralfate (CARAFATE) 1 GM tablet Take 1 tablet 4 times daily  90 day supply, Disp-360 tablet, R-2Normal      levothyroxine (SYNTHROID) 125 MCG tablet Take 125 mcg by mouth every other dayHistorical Med      traMADol (ULTRAM) 50 MG tablet Take 50 mg by mouth every 6 hours as needed for Pain. Historical Med      melatonin 3 MG TABS tablet Take 3 mg by mouth nightly as needed (Sleep) Pt unsure of dose. Takes OTCHistorical Med      Cholecalciferol (VITAMIN D3) 50 MCG ( UT) CAPS Take 2,000 Units by mouth every other day Historical Med      furosemide (LASIX) 20 MG tablet Take 20 mg by mouth daily Historical Med      potassium chloride (KLOR-CON) 10 MEQ extended release tablet TAKE ONE TABLET BY MOUTH TWICE A DAY, Disp-60 tablet, R-1Print      pantoprazole (PROTONIX) 40 MG tablet TAKE ONE TABLET BY MOUTH DAILY, Disp-30 tablet, R-6Normal      amiodarone (CORDARONE) 200 MG tablet Take 200 mg by mouth Twice a Week On Monday and FridayHistorical Med      OXYGEN Inhale into the lungs Uses at night and prn           ALLERGIES     is allergic to penicillins. FAMILY HISTORY     She indicated that her mother is . She indicated that her father is . SOCIAL HISTORY       Social History     Tobacco Use    Smoking status: Former Smoker    Smokeless tobacco: Never Used    Tobacco comment: quit many years ago    Vaping Use    Vaping Use: Never used   Substance Use Topics    Alcohol use:  Yes     Alcohol/week: 2.0 standard drinks     Types: 1 Glasses of wine, 1 Cans of beer per week     Comment: social drinker     Drug use: No     PHYSICAL EXAM     INITIAL VITALS: /65   Pulse 55   Resp 17   Ht 5' 3\" (1.6 m)   Wt 190 lb (86.2 kg)   SpO2 95%   BMI 33.66 kg/m²    Physical Exam  Constitutional:       General: She is not in acute distress. Appearance: She is well-developed. HENT:      Head: Normocephalic. Eyes:      Pupils: Pupils are equal, round, and reactive to light. Cardiovascular:      Rate and Rhythm: Normal rate and regular rhythm. Heart sounds: Normal heart sounds. Pulmonary:      Effort: Pulmonary effort is normal. No respiratory distress. Breath sounds: Normal breath sounds. Abdominal:      General: Bowel sounds are normal.      Palpations: Abdomen is soft. Tenderness: There is no abdominal tenderness. Musculoskeletal:         General: Normal range of motion. Skin:     General: Skin is warm and dry. Neurological:      Mental Status: She is alert and oriented to person, place, and time. MEDICAL DECISION MAKIN-year-old female presenting to the emergency room with low heart rate and blood pressure with nausea vomiting diarrhea earlier today. Patient's vitals improved here with a heart rate in the 50s and blood pressure 120s 130s over 60s. Patient is in no distress here in the emergency room. Vitals are within normal limits at time of discharge. Patient has mild STACEY and dehydration based on physical exam and blood work but was given 2 L normal saline squad and here in the emergency room. Patient did not have any further vomiting or diarrhea. patient discharged back to nursing facility.     CRITICAL CARE:       PROCEDURES:    Procedures    DIAGNOSTIC RESULTS   EKG:All EKG's are interpreted by the Emergency Department Physician who either signs or Co-signs this chart in the absence of a cardiologist.    EKG-sinus rhythm first-degree AV block rate 67    Left anterior fascicular block  Nonspecific T wave flattening multiple leads  No significant ST changes  QTc 528 prolonged QT    RADIOLOGY:All plain film, CT, MRI, and formal ultrasound images (except ED bedside ultrasound) are read by the radiologist, see reports below, unless otherwisenoted in MDM or here. XR CHEST PORTABLE   Final Result   Minimal bibasilar atelectasis or scarring, unchanged. Otherwise, clear   lungs. Cardiomegaly. LABS: All lab results were reviewed by myself, and all abnormals are listed below.   Labs Reviewed   CBC WITH AUTO DIFFERENTIAL - Abnormal; Notable for the following components:       Result Value    RBC 3.87 (*)     Seg Neutrophils 78 (*)     Lymphocytes 12 (*)     Immature Granulocytes 1 (*)     Absolute Lymph # 0.76 (*)     All other components within normal limits   BASIC METABOLIC PANEL W/ REFLEX TO MG FOR LOW K - Abnormal; Notable for the following components:    Glucose 175 (*)     BUN 24 (*)     CREATININE 1.15 (*)     Bun/Cre Ratio 21 (*)     Calcium 8.1 (*)     Potassium 3.5 (*)     Chloride 110 (*)     CO2 19 (*)     GFR Non- 44 (*)     GFR  53 (*)     All other components within normal limits   HEPATIC FUNCTION PANEL - Abnormal; Notable for the following components:    Albumin 3.3 (*)     Alkaline Phosphatase 116 (*)     Total Protein 5.8 (*)     All other components within normal limits   LACTIC ACID   MAGNESIUM       EMERGENCY DEPARTMENTCOURSE:         Vitals:    Vitals:    05/18/21 2115 05/19/21 0021 05/19/21 0029 05/19/21 0030   BP: (!) 128/90 (!) 145/65  135/65   Pulse: 54 61  55   Resp: 17      SpO2: 91%  95%    Weight:       Height:           The patient was given the following medications while in the emergency department:  Orders Placed This Encounter   Medications    0.9 % sodium chloride bolus    ciprofloxacin (CIPRO) 500 MG tablet     Sig: Take 1 tablet by mouth 2 times daily for 7 days     Dispense:  14 tablet     Refill:  0    metroNIDAZOLE (FLAGYL) 500 MG tablet     Sig: Take 1 tablet by mouth 3 times daily for 7 days     Dispense:  21 tablet     Refill:  0     CONSULTS:  None    FINAL IMPRESSION      1. Dehydration    2. Diarrhea, unspecified type          DISPOSITION/PLAN   DISPOSITION        PATIENT REFERRED TO:  No follow-up provider specified.   DISCHARGE MEDICATIONS:  Discharge Medication List as of 5/18/2021 10:06 PM        Víctor Viera MD  Attending Emergency Physician                  Pauly Echols MD  05/19/21 1500

## 2021-05-19 VITALS
OXYGEN SATURATION: 95 % | BODY MASS INDEX: 33.66 KG/M2 | RESPIRATION RATE: 17 BRPM | HEART RATE: 55 BPM | WEIGHT: 190 LBS | HEIGHT: 63 IN | DIASTOLIC BLOOD PRESSURE: 65 MMHG | SYSTOLIC BLOOD PRESSURE: 135 MMHG

## 2021-05-19 LAB
EKG ATRIAL RATE: 67 BPM
EKG P AXIS: 74 DEGREES
EKG P-R INTERVAL: 210 MS
EKG Q-T INTERVAL: 500 MS
EKG QRS DURATION: 86 MS
EKG QTC CALCULATION (BAZETT): 528 MS
EKG R AXIS: -46 DEGREES
EKG T AXIS: -24 DEGREES
EKG VENTRICULAR RATE: 67 BPM

## 2021-05-19 PROCEDURE — 93010 ELECTROCARDIOGRAM REPORT: CPT | Performed by: INTERNAL MEDICINE

## 2021-05-19 RX ORDER — METRONIDAZOLE 500 MG/1
500 TABLET ORAL 3 TIMES DAILY
Qty: 21 TABLET | Refills: 0 | Status: SHIPPED | OUTPATIENT
Start: 2021-05-19 | End: 2021-05-26

## 2021-05-19 RX ORDER — CIPROFLOXACIN 500 MG/1
500 TABLET, FILM COATED ORAL 2 TIMES DAILY
Qty: 14 TABLET | Refills: 0 | Status: SHIPPED | OUTPATIENT
Start: 2021-05-19 | End: 2021-05-26

## 2021-06-08 ENCOUNTER — OFFICE VISIT (OUTPATIENT)
Dept: GASTROENTEROLOGY | Age: 86
End: 2021-06-08
Payer: MEDICARE

## 2021-06-08 DIAGNOSIS — K59.00 CONSTIPATION, UNSPECIFIED CONSTIPATION TYPE: Primary | ICD-10-CM

## 2021-06-08 DIAGNOSIS — R10.9 CHRONIC ABDOMINAL PAIN: ICD-10-CM

## 2021-06-08 DIAGNOSIS — G89.29 CHRONIC ABDOMINAL PAIN: ICD-10-CM

## 2021-06-08 PROCEDURE — 99213 OFFICE O/P EST LOW 20 MIN: CPT | Performed by: INTERNAL MEDICINE

## 2021-06-08 RX ORDER — DOCUSATE SODIUM 100 MG/1
100 CAPSULE, LIQUID FILLED ORAL 2 TIMES DAILY
COMMUNITY
End: 2021-06-08

## 2021-06-08 RX ORDER — POLYETHYLENE GLYCOL 3350 17 G/17G
17 POWDER, FOR SOLUTION ORAL 2 TIMES DAILY
Qty: 1020 G | Refills: 3 | Status: SHIPPED | OUTPATIENT
Start: 2021-06-08 | End: 2021-07-08

## 2021-06-08 RX ORDER — AMOXICILLIN 250 MG
2 CAPSULE ORAL DAILY
Qty: 60 TABLET | Refills: 5 | Status: SHIPPED | OUTPATIENT
Start: 2021-06-08 | End: 2021-07-08

## 2021-06-08 ASSESSMENT — ENCOUNTER SYMPTOMS
ANAL BLEEDING: 0
VOMITING: 0
COUGH: 0
BLOOD IN STOOL: 0
DIARRHEA: 0
CONSTIPATION: 1
ABDOMINAL PAIN: 1
TROUBLE SWALLOWING: 0
WHEEZING: 0
CHOKING: 0
RECTAL PAIN: 0
NAUSEA: 0
ABDOMINAL DISTENTION: 1

## 2021-06-08 NOTE — PROGRESS NOTES
06/20/2017    MILD CHRONIC INACTIVE GASTRITIS    VASCULAR SURGERY      varicose veins    VEIN SURGERY         CURRENT MEDICATIONS:    Current Outpatient Medications:     polyethylene glycol (GLYCOLAX) 17 GM/SCOOP powder, Take 17 g by mouth daily, Disp: , Rfl:     dicyclomine (BENTYL) 10 MG capsule, Take 1 capsule by mouth every 6 hours as needed (cramps), Disp: 20 capsule, Rfl: 0    sucralfate (CARAFATE) 1 GM tablet, Take 1 tablet 4 times daily  90 day supply, Disp: 360 tablet, Rfl: 2    levothyroxine (SYNTHROID) 125 MCG tablet, Take 125 mcg by mouth every other day, Disp: , Rfl:     traMADol (ULTRAM) 50 MG tablet, Take 50 mg by mouth every 6 hours as needed for Pain., Disp: , Rfl:     melatonin 3 MG TABS tablet, Take 3 mg by mouth nightly as needed (Sleep) Pt unsure of dose. Takes OTC, Disp: , Rfl:     Cholecalciferol (VITAMIN D3) 50 MCG (2000 UT) CAPS, Take 2,000 Units by mouth every other day , Disp: , Rfl:     furosemide (LASIX) 20 MG tablet, Take 20 mg by mouth daily , Disp: , Rfl:     potassium chloride (KLOR-CON) 10 MEQ extended release tablet, TAKE ONE TABLET BY MOUTH TWICE A DAY, Disp: 60 tablet, Rfl: 1    pantoprazole (PROTONIX) 40 MG tablet, TAKE ONE TABLET BY MOUTH DAILY, Disp: 30 tablet, Rfl: 6    amiodarone (CORDARONE) 200 MG tablet, Take 200 mg by mouth Twice a Week On Monday and Friday, Disp: , Rfl:     OXYGEN, Inhale into the lungs Uses at night and prn, Disp: , Rfl:     ALLERGIES:   Allergies   Allergen Reactions    Penicillins      Has no recollection of what the reaction was       FAMILY HISTORY:       Family history unknown: Yes         SOCIAL HISTORY:   Social History     Socioeconomic History    Marital status:       Spouse name: Not on file    Number of children: 2    Years of education: 15    Highest education level: Not on file   Occupational History    Not on file   Tobacco Use    Smoking status: Former Smoker    Smokeless tobacco: Never Used    Tobacco comment: quit many years ago    Vaping Use    Vaping Use: Never used   Substance and Sexual Activity    Alcohol use: Yes     Alcohol/week: 2.0 standard drinks     Types: 1 Glasses of wine, 1 Cans of beer per week     Comment: social drinker     Drug use: No    Sexual activity: Never     Birth control/protection: Surgical     Comment: hyst   Other Topics Concern    Not on file   Social History Narrative    Not on file     Social Determinants of Health     Financial Resource Strain:     Difficulty of Paying Living Expenses:    Food Insecurity:     Worried About Running Out of Food in the Last Year:     Ran Out of Food in the Last Year:    Transportation Needs:     Lack of Transportation (Medical):  Lack of Transportation (Non-Medical):    Physical Activity:     Days of Exercise per Week:     Minutes of Exercise per Session:    Stress:     Feeling of Stress :    Social Connections:     Frequency of Communication with Friends and Family:     Frequency of Social Gatherings with Friends and Family:     Attends [employer] Services:     Active Member of Clubs or Organizations:     Attends Club or Organization Meetings:     Marital Status:    Intimate Partner Violence:     Fear of Current or Ex-Partner:     Emotionally Abused:     Physically Abused:     Sexually Abused:        REVIEW OF SYSTEMS: A 12-point review of systemswas obtained and pertinent positives and negatives were enumerated above in the history of present illness. All other reviewed systems / symptoms were negative. Review of Systems   Constitutional: Negative for appetite change, fatigue and unexpected weight change. HENT: Negative for trouble swallowing. Eyes: Positive for visual disturbance (glasses). Respiratory: Negative for cough, choking and wheezing. Cardiovascular: Negative for chest pain, palpitations and leg swelling.    Gastrointestinal: Positive for abdominal distention, abdominal pain (left lower with bowel There is no focal consolidation or pneumothorax. The pulmonary vascular pattern is within normal limits. No significant thoracic osseous abnormality. Minimal bibasilar atelectasis or scarring, unchanged. Otherwise, clear lungs. Cardiomegaly. PHYSICAL EXAMINATION: Vital signs reviewed per the nursing documentation. There were no vitals taken for this visit. There is no height or weight on file to calculate BMI. Physical Exam      I personally reviewed the nurse's notes and documentation and I agree with her notes. General: alert, appears stated age and cooperative Psych: Normal. and Alert and oriented, appropriate affect. . Normal affect. Mentation normal  HEENT: PERRLA. Clear conjunctivae and sclerae. Moist oral mucosae, no lesions or ulcers. The neck is supple, without lymphadenopathy or jugular venous distension. No masses. Normal thyroid. Cardiovascular: S1 S2 RRR no rubs or murmurs. Pulmonary: clear BL. No accessory muscle usage. Abdominal Exam: Soft, NT ND, no hepato or spleno megaly, +BS, no ascites. IMPRESSION: Ms. Jong Rodriguez is a 80 y.o. female with constipation. Abdominal pain. Increased dose of MiraLAX to 2 doses per day. Switch from Colace to Leonor-Colace. Follow-up in 3 to 4 weeks. Thank you for allowing me to participate in the care of Ms. Jong Rodriguez. For any further questions please do not hesitate to contact me. I have reviewed and agree with the ROS entered by the MA/LPN. Note is dictated utilizing voice recognition software. Unfortunately this leads to occasional typographical errors. Please contact our office if you have any questions.     Hiwot Rosen MD  [facility]  O: #[phone]

## 2021-11-30 ENCOUNTER — HOSPITAL ENCOUNTER (OUTPATIENT)
Age: 86
Setting detail: SPECIMEN
Discharge: HOME | End: 2021-11-30

## 2021-11-30 LAB
ALBUMIN SERPL-MCNC: 3.7 G/DL (ref 3.5–5.2)
ALBUMIN/GLOBULIN RATIO: 1.4 (ref 1–2.5)
ALP BLD-CCNC: 72 U/L (ref 35–104)
ALT SERPL-CCNC: 8 U/L (ref 5–33)
ANION GAP SERPL CALCULATED.3IONS-SCNC: 12 MMOL/L (ref 9–17)
AST SERPL-CCNC: 14 U/L
BILIRUB SERPL-MCNC: 0.75 MG/DL (ref 0.3–1.2)
BUN BLDV-MCNC: 21 MG/DL (ref 8–23)
BUN/CREAT BLD: ABNORMAL (ref 9–20)
CALCIUM SERPL-MCNC: 9 MG/DL (ref 8.6–10.4)
CHLORIDE BLD-SCNC: 109 MMOL/L (ref 98–107)
CO2: 22 MMOL/L (ref 20–31)
CREAT SERPL-MCNC: 1.11 MG/DL (ref 0.5–0.9)
GFR AFRICAN AMERICAN: 55 ML/MIN
GFR NON-AFRICAN AMERICAN: 46 ML/MIN
GFR SERPL CREATININE-BSD FRML MDRD: ABNORMAL ML/MIN/{1.73_M2}
GFR SERPL CREATININE-BSD FRML MDRD: ABNORMAL ML/MIN/{1.73_M2}
GLUCOSE BLD-MCNC: 100 MG/DL (ref 70–99)
HCT VFR BLD CALC: 39.1 % (ref 36.3–47.1)
HEMOGLOBIN: 12.4 G/DL (ref 11.9–15.1)
MCH RBC QN AUTO: 30.5 PG (ref 25.2–33.5)
MCHC RBC AUTO-ENTMCNC: 31.7 G/DL (ref 28.4–34.8)
MCV RBC AUTO: 96.1 FL (ref 82.6–102.9)
NRBC AUTOMATED: 0 PER 100 WBC
PDW BLD-RTO: 14.8 % (ref 11.8–14.4)
PLATELET # BLD: 199 K/UL (ref 138–453)
PMV BLD AUTO: 9.7 FL (ref 8.1–13.5)
POTASSIUM SERPL-SCNC: 4.2 MMOL/L (ref 3.7–5.3)
RBC # BLD: 4.07 M/UL (ref 3.95–5.11)
SODIUM BLD-SCNC: 143 MMOL/L (ref 135–144)
THYROXINE, FREE: 1.13 NG/DL (ref 0.93–1.7)
TOTAL PROTEIN: 6.3 G/DL (ref 6.4–8.3)
TSH SERPL DL<=0.05 MIU/L-ACNC: 7.59 MIU/L (ref 0.3–5)
WBC # BLD: 5.7 K/UL (ref 3.5–11.3)

## 2022-01-21 ENCOUNTER — TELEPHONE (OUTPATIENT)
Dept: GASTROENTEROLOGY | Age: 87
End: 2022-01-21

## 2022-01-21 NOTE — TELEPHONE ENCOUNTER
Pt granddaughter lvm wanting to schedule pt with an appt, states Mon, Tues & Thurs work best, returned call no answer, lvm for a call back to get pt scheduled.

## 2022-01-21 NOTE — TELEPHONE ENCOUNTER
Received a call from Richard Jaeger to make an appointment for the patient. Informed that the patient would need to call the office due to no hippa/ POA on file. Fax number given to Richard Jaeger of [phone] to send over POA forms. Writer thanked and call ended.

## 2022-01-29 ENCOUNTER — HOSPITAL ENCOUNTER (EMERGENCY)
Age: 87
Discharge: HOME | End: 2022-01-29
Attending: EMERGENCY MEDICINE
Payer: MEDICARE

## 2022-01-29 VITALS
RESPIRATION RATE: 18 BRPM | DIASTOLIC BLOOD PRESSURE: 72 MMHG | HEART RATE: 58 BPM | HEIGHT: 63 IN | OXYGEN SATURATION: 94 % | BODY MASS INDEX: 33.66 KG/M2 | TEMPERATURE: 98.1 F | SYSTOLIC BLOOD PRESSURE: 168 MMHG | WEIGHT: 190 LBS

## 2022-01-29 DIAGNOSIS — B37.0 ORAL THRUSH: Primary | ICD-10-CM

## 2022-01-29 PROCEDURE — 99283 EMERGENCY DEPT VISIT LOW MDM: CPT

## 2022-01-29 ASSESSMENT — PAIN SCALES - GENERAL: PAINLEVEL_OUTOF10: 10

## 2022-01-29 NOTE — ED PROVIDER NOTES
[address] [place] [address] ED  EMERGENCY DEPARTMENT ENCOUNTER      Pt Name: Mike Mcgowan  MRN: [id number]  [place] 8/2/1925  Date of evaluation: 1/29/2022  Provider: Torres Davidson MD    CHIEF COMPLAINT       Chief Complaint   Patient presents with    Other     tongue swelling and throat    Otalgia     left         HISTORY OF PRESENT ILLNESS  (Location/Symptom, Timing/Onset, Context/Setting, Quality, Duration, Modifying Factors, Severity.)   Mike Mcgowan is a 80 y.o. female who presents to the emergency department for mouth pain. She states that she had a tooth pulled in the left lower dentition about 6 weeks ago. Subsequently she saw two different dentist and they both told her there is nothing wrong with her mouth. She was noted to have a white coating on her tongue at triage. Her left ear also hurts a bit. No fever cough or shortness of breath      Nursing Notes were reviewed. ALLERGIES     Penicillins    CURRENT MEDICATIONS       Previous Medications    AMIODARONE (CORDARONE) 200 MG TABLET    Take 200 mg by mouth Twice a Week On Monday and Friday    CHOLECALCIFEROL (VITAMIN D3) 50 MCG (2000 UT) CAPS    Take 2,000 Units by mouth every other day     DICYCLOMINE (BENTYL) 10 MG CAPSULE    Take 1 capsule by mouth every 6 hours as needed (cramps)    FUROSEMIDE (LASIX) 20 MG TABLET    Take 20 mg by mouth daily     LEVOTHYROXINE (SYNTHROID) 125 MCG TABLET    Take 125 mcg by mouth every other day    MELATONIN 3 MG TABS TABLET    Take 3 mg by mouth nightly as needed (Sleep) Pt unsure of dose. Takes OTC    OXYGEN    Inhale into the lungs Uses at night and prn    PANTOPRAZOLE (PROTONIX) 40 MG TABLET    TAKE ONE TABLET BY MOUTH DAILY    POTASSIUM CHLORIDE (KLOR-CON) 10 MEQ EXTENDED RELEASE TABLET    TAKE ONE TABLET BY MOUTH TWICE A DAY    SUCRALFATE (CARAFATE) 1 GM TABLET    Take 1 tablet 4 times daily  90 day supply    TRAMADOL (ULTRAM) 50 MG TABLET    Take 50 mg by mouth every 6 hours as needed for Pain.        PAST MEDICAL HISTORY         Diagnosis Date    A-fib [facility])     AAA (abdominal aortic aneurysm) (HCC)     Abdominal cramping     Atrial fibrillation (HCC)     Back pain, chronic     C. difficile colitis     COPD (chronic obstructive pulmonary disease) (HCC)     Diabetes mellitus (HCC)     Gastritis     GERD (gastroesophageal reflux disease)     Hiatal hernia     Hypoalbuminemia 10/14/2014    IBS (irritable bowel syndrome)     Nausea vomiting and diarrhea 10/14/2014    Stomach ulcer     Thyroid disease     Type 2 diabetes mellitus without complication, without long-term current use of insulin ([address] 2018    UTI (lower urinary tract infection)     Varicose veins        SURGICAL HISTORY           Procedure Laterality Date    CHOLECYSTECTOMY      HERNIA REPAIR      HYSTERECTOMY      partial    RI EGD TRANSORAL BIOPSY SINGLE/MULTIPLE N/A 2017    EGD BIOPSY performed by Johnny Willoughby MD at [address] [place] [address] ENDOSCOPY  2017    MILD CHRONIC INACTIVE GASTRITIS    VASCULAR SURGERY      varicose veins    VEIN SURGERY           FAMILY HISTORY           Family history unknown: Yes     Family Status   Relation Name Status    Mother      Father          SOCIAL HISTORY      reports that she has quit smoking. She has never used smokeless tobacco. She reports current alcohol use of about 2.0 standard drinks of alcohol per week. She reports that she does not use drugs. REVIEW OF SYSTEMS    (2-9 systems for level 4, 10 or more for level 5)     Review of Systems   Unable to perform ROS: Age        Except as noted above the remainder of the review of systems was reviewed and negative.      PHYSICAL EXAM    (up to 7 for level 4, 8 or more for level 5)     Vitals:    22 1023   BP: (!) 168/72   Pulse: 58   Resp: 18   Temp: 98.1 °F (36.7 °C)   TempSrc: Oral   SpO2: 94%   Weight: 190 lb (86.2 kg)   Height: 5' 3\" (1.6 m)       Physical Exam  Constitutional:       General: She is not in acute distress. Appearance: She is well-developed. She is not diaphoretic. HENT:      Head: Normocephalic and atraumatic. Left Ear: Tympanic membrane and ear canal normal.      Mouth/Throat:      Comments: Tongue has a white coating. There is none on the buccal mucosa. No pharyngeal exudate or swelling. She is handling her oral secretions well. No gingival swelling or erythema  Eyes:      General: No scleral icterus. Right eye: No discharge. Left eye: No discharge. Cardiovascular:      Rate and Rhythm: Normal rate and regular rhythm. Pulmonary:      Effort: Pulmonary effort is normal. No respiratory distress. Breath sounds: Normal breath sounds. No stridor. No wheezing or rales. Abdominal:      General: There is no distension. Palpations: Abdomen is soft. Tenderness: There is no abdominal tenderness. Musculoskeletal:         General: Normal range of motion. Cervical back: Neck supple. Lymphadenopathy:      Cervical: No cervical adenopathy. Skin:     General: Skin is warm and dry. Findings: No erythema or rash. Neurological:      Mental Status: She is alert. Comments: Awake and alert   Psychiatric:         Behavior: Behavior normal.             DIAGNOSTIC RESULTS     EKG: All EKG's are interpreted by the Emergency Department Physician who either signs or Co-signs this chart in the absence of a cardiologist.    Not indicated    RADIOLOGY:   Non-plain film images such as CT, Ultrasound and MRI are read by the radiologist. Plain radiographic images are visualized and preliminarily interpreted by the emergency physician with the below findings:    Not indicated    Interpretation per the Radiologist below, if available at the time of this note:        LABS:  Labs Reviewed - No data to display    All other labs were within normal range or not returned as of this dictation.     EMERGENCY DEPARTMENT COURSE and DIFFERENTIAL DIAGNOSIS/MDM:

## 2022-02-03 ENCOUNTER — HOSPITAL ENCOUNTER (INPATIENT)
Age: 87
LOS: 2 days | Discharge: HOME HEALTH | DRG: 392 | End: 2022-02-06
Attending: EMERGENCY MEDICINE | Admitting: INTERNAL MEDICINE
Payer: MEDICARE

## 2022-02-03 DIAGNOSIS — K52.9 COLITIS: Primary | ICD-10-CM

## 2022-02-03 LAB
ABSOLUTE EOS #: 0.21 K/UL (ref 0–0.44)
ABSOLUTE IMMATURE GRANULOCYTE: 0.02 K/UL (ref 0–0.3)
ABSOLUTE LYMPH #: 1.16 K/UL (ref 1.1–3.7)
ABSOLUTE MONO #: 0.35 K/UL (ref 0.1–1.2)
ANION GAP SERPL CALCULATED.3IONS-SCNC: 12 MMOL/L (ref 9–17)
BASOPHILS # BLD: 0 % (ref 0–2)
BASOPHILS ABSOLUTE: <0.03 K/UL (ref 0–0.2)
BUN BLDV-MCNC: 16 MG/DL (ref 8–23)
BUN/CREAT BLD: 15 (ref 9–20)
CALCIUM SERPL-MCNC: 8.9 MG/DL (ref 8.6–10.4)
CHLORIDE BLD-SCNC: 102 MMOL/L (ref 98–107)
CO2: 27 MMOL/L (ref 20–31)
CREAT SERPL-MCNC: 1.08 MG/DL (ref 0.5–0.9)
DIFFERENTIAL TYPE: ABNORMAL
EOSINOPHILS RELATIVE PERCENT: 3 % (ref 1–4)
GFR AFRICAN AMERICAN: 57 ML/MIN
GFR NON-AFRICAN AMERICAN: 47 ML/MIN
GFR SERPL CREATININE-BSD FRML MDRD: ABNORMAL ML/MIN/{1.73_M2}
GFR SERPL CREATININE-BSD FRML MDRD: ABNORMAL ML/MIN/{1.73_M2}
GLUCOSE BLD-MCNC: 154 MG/DL (ref 70–99)
HCT VFR BLD CALC: 39.2 % (ref 36.3–47.1)
HEMOGLOBIN: 12.3 G/DL (ref 11.9–15.1)
IMMATURE GRANULOCYTES: 0 %
LYMPHOCYTES # BLD: 18 % (ref 24–43)
MAGNESIUM: 1.9 MG/DL (ref 1.6–2.6)
MCH RBC QN AUTO: 29.9 PG (ref 25.2–33.5)
MCHC RBC AUTO-ENTMCNC: 31.4 G/DL (ref 28.4–34.8)
MCV RBC AUTO: 95.4 FL (ref 82.6–102.9)
MONOCYTES # BLD: 5 % (ref 3–12)
NRBC AUTOMATED: 0 PER 100 WBC
PDW BLD-RTO: 14.4 % (ref 11.8–14.4)
PLATELET # BLD: 153 K/UL (ref 138–453)
PLATELET ESTIMATE: ABNORMAL
PMV BLD AUTO: 9.5 FL (ref 8.1–13.5)
POTASSIUM SERPL-SCNC: 3.1 MMOL/L (ref 3.7–5.3)
RBC # BLD: 4.11 M/UL (ref 3.95–5.11)
RBC # BLD: ABNORMAL 10*6/UL
SEG NEUTROPHILS: 74 % (ref 36–65)
SEGMENTED NEUTROPHILS ABSOLUTE COUNT: 4.74 K/UL (ref 1.5–8.1)
SODIUM BLD-SCNC: 141 MMOL/L (ref 135–144)
WBC # BLD: 6.5 K/UL (ref 3.5–11.3)
WBC # BLD: ABNORMAL 10*3/UL

## 2022-02-03 PROCEDURE — 6370000000 HC RX 637 (ALT 250 FOR IP): Performed by: EMERGENCY MEDICINE

## 2022-02-03 PROCEDURE — 83735 ASSAY OF MAGNESIUM: CPT

## 2022-02-03 PROCEDURE — 85025 COMPLETE CBC W/AUTO DIFF WBC: CPT

## 2022-02-03 PROCEDURE — 80048 BASIC METABOLIC PNL TOTAL CA: CPT

## 2022-02-03 PROCEDURE — 99285 EMERGENCY DEPT VISIT HI MDM: CPT

## 2022-02-03 RX ORDER — LIDOCAINE 4 G/G
1 PATCH TOPICAL ONCE
Status: COMPLETED | OUTPATIENT
Start: 2022-02-03 | End: 2022-02-04

## 2022-02-03 RX ORDER — DICYCLOMINE HYDROCHLORIDE 10 MG/ML
20 INJECTION INTRAMUSCULAR ONCE
Status: COMPLETED | OUTPATIENT
Start: 2022-02-03 | End: 2022-02-04

## 2022-02-03 RX ADMIN — POTASSIUM BICARBONATE 20 MEQ: 782 TABLET, EFFERVESCENT ORAL at 21:12

## 2022-02-03 ASSESSMENT — ENCOUNTER SYMPTOMS
SINUS PAIN: 0
CONSTIPATION: 0
NAUSEA: 1
CHEST TIGHTNESS: 0
COLOR CHANGE: 0
APNEA: 0
SHORTNESS OF BREATH: 0
EYES NEGATIVE: 1
ABDOMINAL DISTENTION: 0
DIARRHEA: 0
ABDOMINAL PAIN: 1
VOMITING: 1

## 2022-02-03 ASSESSMENT — PAIN SCALES - GENERAL: PAINLEVEL_OUTOF10: 3

## 2022-02-04 ENCOUNTER — APPOINTMENT (OUTPATIENT)
Dept: CT IMAGING | Age: 87
DRG: 392 | End: 2022-02-04
Payer: MEDICARE

## 2022-02-04 PROBLEM — Z87.898 HX OF PROLONGED Q-T INTERVAL ON ECG: Status: ACTIVE | Noted: 2022-02-04

## 2022-02-04 PROBLEM — K57.92 DIVERTICULITIS: Status: ACTIVE | Noted: 2020-01-20

## 2022-02-04 PROBLEM — I50.32 CHRONIC DIASTOLIC CHF (CONGESTIVE HEART FAILURE) (HCC): Status: ACTIVE | Noted: 2022-02-04

## 2022-02-04 PROBLEM — R01.1 MURMUR, CARDIAC: Status: ACTIVE | Noted: 2022-02-04

## 2022-02-04 LAB
ALBUMIN SERPL-MCNC: 3.3 G/DL (ref 3.5–5.2)
ALBUMIN/GLOBULIN RATIO: ABNORMAL (ref 1–2.5)
ALP BLD-CCNC: 61 U/L (ref 35–104)
ALT SERPL-CCNC: 5 U/L (ref 5–33)
ANION GAP SERPL CALCULATED.3IONS-SCNC: 12 MMOL/L (ref 9–17)
AST SERPL-CCNC: 17 U/L
BILIRUB SERPL-MCNC: 1.1 MG/DL (ref 0.3–1.2)
BUN BLDV-MCNC: 15 MG/DL (ref 8–23)
BUN/CREAT BLD: 14 (ref 9–20)
CALCIUM SERPL-MCNC: 8.8 MG/DL (ref 8.6–10.4)
CHLORIDE BLD-SCNC: 102 MMOL/L (ref 98–107)
CO2: 26 MMOL/L (ref 20–31)
CREAT SERPL-MCNC: 1.09 MG/DL (ref 0.5–0.9)
GFR AFRICAN AMERICAN: 56 ML/MIN
GFR NON-AFRICAN AMERICAN: 47 ML/MIN
GFR SERPL CREATININE-BSD FRML MDRD: ABNORMAL ML/MIN/{1.73_M2}
GFR SERPL CREATININE-BSD FRML MDRD: ABNORMAL ML/MIN/{1.73_M2}
GLUCOSE BLD-MCNC: 119 MG/DL (ref 65–105)
GLUCOSE BLD-MCNC: 129 MG/DL (ref 65–105)
GLUCOSE BLD-MCNC: 178 MG/DL (ref 70–99)
HCT VFR BLD CALC: 38.3 % (ref 36.3–47.1)
HEMOGLOBIN: 12.1 G/DL (ref 11.9–15.1)
MAGNESIUM: 1.9 MG/DL (ref 1.6–2.6)
MAGNESIUM: 1.9 MG/DL (ref 1.6–2.6)
MCH RBC QN AUTO: 30.2 PG (ref 25.2–33.5)
MCHC RBC AUTO-ENTMCNC: 31.6 G/DL (ref 28.4–34.8)
MCV RBC AUTO: 95.5 FL (ref 82.6–102.9)
NRBC AUTOMATED: 0 PER 100 WBC
PDW BLD-RTO: 14.3 % (ref 11.8–14.4)
PLATELET # BLD: 147 K/UL (ref 138–453)
PMV BLD AUTO: 9.7 FL (ref 8.1–13.5)
POTASSIUM SERPL-SCNC: 3.4 MMOL/L (ref 3.7–5.3)
RBC # BLD: 4.01 M/UL (ref 3.95–5.11)
SODIUM BLD-SCNC: 140 MMOL/L (ref 135–144)
TOTAL PROTEIN: 6.2 G/DL (ref 6.4–8.3)
WBC # BLD: 7.6 K/UL (ref 3.5–11.3)

## 2022-02-04 PROCEDURE — 2500000003 HC RX 250 WO HCPCS: Performed by: EMERGENCY MEDICINE

## 2022-02-04 PROCEDURE — 6370000000 HC RX 637 (ALT 250 FOR IP): Performed by: NURSE PRACTITIONER

## 2022-02-04 PROCEDURE — 6360000002 HC RX W HCPCS: Performed by: EMERGENCY MEDICINE

## 2022-02-04 PROCEDURE — 74176 CT ABD & PELVIS W/O CONTRAST: CPT

## 2022-02-04 PROCEDURE — 2580000003 HC RX 258: Performed by: NURSE PRACTITIONER

## 2022-02-04 PROCEDURE — 1200000000 HC SEMI PRIVATE

## 2022-02-04 PROCEDURE — 85027 COMPLETE CBC AUTOMATED: CPT

## 2022-02-04 PROCEDURE — 99223 1ST HOSP IP/OBS HIGH 75: CPT | Performed by: NURSE PRACTITIONER

## 2022-02-04 PROCEDURE — C9113 INJ PANTOPRAZOLE SODIUM, VIA: HCPCS | Performed by: NURSE PRACTITIONER

## 2022-02-04 PROCEDURE — 83735 ASSAY OF MAGNESIUM: CPT

## 2022-02-04 PROCEDURE — 82947 ASSAY GLUCOSE BLOOD QUANT: CPT

## 2022-02-04 PROCEDURE — 36415 COLL VENOUS BLD VENIPUNCTURE: CPT

## 2022-02-04 PROCEDURE — 80053 COMPREHEN METABOLIC PANEL: CPT

## 2022-02-04 PROCEDURE — 2500000003 HC RX 250 WO HCPCS: Performed by: NURSE PRACTITIONER

## 2022-02-04 PROCEDURE — 6360000002 HC RX W HCPCS: Performed by: NURSE PRACTITIONER

## 2022-02-04 RX ORDER — CIPROFLOXACIN 2 MG/ML
400 INJECTION, SOLUTION INTRAVENOUS EVERY 12 HOURS
Status: DISCONTINUED | OUTPATIENT
Start: 2022-02-04 | End: 2022-02-04 | Stop reason: DRUGHIGH

## 2022-02-04 RX ORDER — DICYCLOMINE HYDROCHLORIDE 10 MG/1
20 CAPSULE ORAL 4 TIMES DAILY
Status: DISCONTINUED | OUTPATIENT
Start: 2022-02-04 | End: 2022-02-05

## 2022-02-04 RX ORDER — ACETAMINOPHEN 650 MG/1
650 SUPPOSITORY RECTAL EVERY 6 HOURS PRN
Status: DISCONTINUED | OUTPATIENT
Start: 2022-02-04 | End: 2022-02-06 | Stop reason: HOSPADM

## 2022-02-04 RX ORDER — SODIUM CHLORIDE 9 MG/ML
INJECTION, SOLUTION INTRAVENOUS CONTINUOUS
Status: DISCONTINUED | OUTPATIENT
Start: 2022-02-04 | End: 2022-02-04

## 2022-02-04 RX ORDER — PANTOPRAZOLE SODIUM 40 MG/10ML
40 INJECTION, POWDER, LYOPHILIZED, FOR SOLUTION INTRAVENOUS DAILY
Status: DISCONTINUED | OUTPATIENT
Start: 2022-02-04 | End: 2022-02-06

## 2022-02-04 RX ORDER — CIPROFLOXACIN 2 MG/ML
400 INJECTION, SOLUTION INTRAVENOUS EVERY 24 HOURS
Status: DISCONTINUED | OUTPATIENT
Start: 2022-02-05 | End: 2022-02-06

## 2022-02-04 RX ORDER — SODIUM CHLORIDE 9 MG/ML
25 INJECTION, SOLUTION INTRAVENOUS PRN
Status: DISCONTINUED | OUTPATIENT
Start: 2022-02-04 | End: 2022-02-06 | Stop reason: HOSPADM

## 2022-02-04 RX ORDER — AMIODARONE HYDROCHLORIDE 200 MG/1
200 TABLET ORAL
Status: DISCONTINUED | OUTPATIENT
Start: 2022-02-04 | End: 2022-02-06 | Stop reason: HOSPADM

## 2022-02-04 RX ORDER — POTASSIUM CHLORIDE 750 MG/1
10 CAPSULE, EXTENDED RELEASE ORAL 2 TIMES DAILY
Status: DISCONTINUED | OUTPATIENT
Start: 2022-02-04 | End: 2022-02-06 | Stop reason: HOSPADM

## 2022-02-04 RX ORDER — SODIUM CHLORIDE 0.9 % (FLUSH) 0.9 %
10 SYRINGE (ML) INJECTION PRN
Status: DISCONTINUED | OUTPATIENT
Start: 2022-02-04 | End: 2022-02-06 | Stop reason: HOSPADM

## 2022-02-04 RX ORDER — FUROSEMIDE 20 MG/1
20 TABLET ORAL DAILY
Status: DISCONTINUED | OUTPATIENT
Start: 2022-02-04 | End: 2022-02-06 | Stop reason: HOSPADM

## 2022-02-04 RX ORDER — ONDANSETRON 2 MG/ML
4 INJECTION INTRAMUSCULAR; INTRAVENOUS EVERY 6 HOURS PRN
Status: DISCONTINUED | OUTPATIENT
Start: 2022-02-04 | End: 2022-02-06 | Stop reason: HOSPADM

## 2022-02-04 RX ORDER — ONDANSETRON 4 MG/1
4 TABLET, ORALLY DISINTEGRATING ORAL EVERY 8 HOURS PRN
Status: DISCONTINUED | OUTPATIENT
Start: 2022-02-04 | End: 2022-02-06 | Stop reason: HOSPADM

## 2022-02-04 RX ORDER — CIPROFLOXACIN 2 MG/ML
400 INJECTION, SOLUTION INTRAVENOUS ONCE
Status: COMPLETED | OUTPATIENT
Start: 2022-02-04 | End: 2022-02-04

## 2022-02-04 RX ORDER — SODIUM CHLORIDE 9 MG/ML
10 INJECTION INTRAVENOUS DAILY
Status: DISCONTINUED | OUTPATIENT
Start: 2022-02-04 | End: 2022-02-06 | Stop reason: HOSPADM

## 2022-02-04 RX ORDER — SODIUM CHLORIDE 0.9 % (FLUSH) 0.9 %
5-40 SYRINGE (ML) INJECTION EVERY 12 HOURS SCHEDULED
Status: DISCONTINUED | OUTPATIENT
Start: 2022-02-04 | End: 2022-02-06 | Stop reason: HOSPADM

## 2022-02-04 RX ORDER — DICYCLOMINE HYDROCHLORIDE 10 MG/ML
20 INJECTION INTRAMUSCULAR 4 TIMES DAILY
Status: DISCONTINUED | OUTPATIENT
Start: 2022-02-04 | End: 2022-02-04

## 2022-02-04 RX ORDER — DICYCLOMINE HYDROCHLORIDE 10 MG/1
10 CAPSULE ORAL EVERY 6 HOURS PRN
Status: CANCELLED | OUTPATIENT
Start: 2022-02-04

## 2022-02-04 RX ORDER — ACETAMINOPHEN 325 MG/1
650 TABLET ORAL EVERY 6 HOURS PRN
Status: DISCONTINUED | OUTPATIENT
Start: 2022-02-04 | End: 2022-02-06 | Stop reason: HOSPADM

## 2022-02-04 RX ORDER — MAGNESIUM SULFATE 1 G/100ML
1000 INJECTION INTRAVENOUS PRN
Status: DISCONTINUED | OUTPATIENT
Start: 2022-02-04 | End: 2022-02-04

## 2022-02-04 RX ORDER — LEVOTHYROXINE SODIUM 0.12 MG/1
125 TABLET ORAL EVERY OTHER DAY
Status: DISCONTINUED | OUTPATIENT
Start: 2022-02-04 | End: 2022-02-06 | Stop reason: HOSPADM

## 2022-02-04 RX ORDER — POTASSIUM CHLORIDE 20 MEQ/1
40 TABLET, EXTENDED RELEASE ORAL PRN
Status: DISCONTINUED | OUTPATIENT
Start: 2022-02-04 | End: 2022-02-04

## 2022-02-04 RX ORDER — POTASSIUM CHLORIDE 7.45 MG/ML
10 INJECTION INTRAVENOUS PRN
Status: DISCONTINUED | OUTPATIENT
Start: 2022-02-04 | End: 2022-02-04

## 2022-02-04 RX ADMIN — DICYCLOMINE HYDROCHLORIDE 20 MG: 20 INJECTION, SOLUTION INTRAMUSCULAR at 10:30

## 2022-02-04 RX ADMIN — METRONIDAZOLE 500 MG: 500 INJECTION, SOLUTION INTRAVENOUS at 15:31

## 2022-02-04 RX ADMIN — DICYCLOMINE HYDROCHLORIDE 20 MG: 10 CAPSULE ORAL at 18:04

## 2022-02-04 RX ADMIN — SODIUM CHLORIDE, PRESERVATIVE FREE 10 ML: 5 INJECTION INTRAVENOUS at 15:25

## 2022-02-04 RX ADMIN — METRONIDAZOLE 500 MG: 500 INJECTION, SOLUTION INTRAVENOUS at 22:26

## 2022-02-04 RX ADMIN — FUROSEMIDE 20 MG: 20 TABLET ORAL at 15:26

## 2022-02-04 RX ADMIN — CIPROFLOXACIN 400 MG: 2 INJECTION, SOLUTION INTRAVENOUS at 12:40

## 2022-02-04 RX ADMIN — SODIUM CHLORIDE, PRESERVATIVE FREE 10 ML: 5 INJECTION INTRAVENOUS at 22:26

## 2022-02-04 RX ADMIN — POTASSIUM CHLORIDE 10 MEQ: 10 CAPSULE, COATED, EXTENDED RELEASE ORAL at 22:26

## 2022-02-04 RX ADMIN — PANTOPRAZOLE SODIUM 40 MG: 40 INJECTION, POWDER, FOR SOLUTION INTRAVENOUS at 15:25

## 2022-02-04 RX ADMIN — AMIODARONE HYDROCHLORIDE 200 MG: 200 TABLET ORAL at 15:26

## 2022-02-04 RX ADMIN — DICYCLOMINE HYDROCHLORIDE 20 MG: 10 CAPSULE ORAL at 22:26

## 2022-02-04 RX ADMIN — POTASSIUM CHLORIDE 10 MEQ: 10 CAPSULE, COATED, EXTENDED RELEASE ORAL at 15:26

## 2022-02-04 ASSESSMENT — PAIN SCALES - GENERAL: PAINLEVEL_OUTOF10: 3

## 2022-02-04 NOTE — ED NOTES
Daughter, Cristhian Bates called at [phone] for ride home at this time. NO answer.  Message left for return call     Onel Kilpatrick RN  02/03/22 [id number]

## 2022-02-04 NOTE — CARE COORDINATION
Case Management Initial Discharge Plan  Tharon Six,         Readmission Risk              Risk of Unplanned Readmission:  0             Met with:family member patient and granddaughter to discuss discharge plans. Information verified: address, contacts, phone number, , insurance Yes  PCP: Emil Ruggiero MD  Date of last visit: last week    Insurance Provider: [address] [place] [address]    Discharge Planning  Current Residence:     Living Arrangements:      Home has one stories/none stairs to climb  Support Systems:     Current Services PTA:    Supplier: [employer]  Patient able to perform ADL's:Independent  DME used to aid ambulation prior to admission: walker/during admissionTBD    Potential Assistance Needed:       Pharmacy: [facility] Medications:     Does patient want to participate in local refill/ meds to beds program?       Patient agreeable to home care: TBD  [place] of choice provided:  n/a      Type of Home Care Services:     Patient expects to be discharged to:       Prior SNF/Rehab Placement and Facility: No  Agreeable to SNF/Rehab: TBD  [place] of choice provided: yes   Evaluation: yes    Expected Discharge date: Follow Up Appointment: Best Day/ Time:      Transportation provider: [facility]ANCE  Transportation arrangements needed for discharge: TBD    Discharge Plan: Contacted patient's granddaughter, Dorian Herrera. She states that patient lives at [facility]. She has an aide that checks on her. She has a walker and a pulse ox. She states that patient does well with walker. PT/OT will be ordered. Will contact granddaughter after their evaluation.  [address]        Electronically signed by OLGA LIDIA Boothe on 22 at 2:10 PM EST

## 2022-02-04 NOTE — PROGRESS NOTES
[facility] NOTE:    The electrolyte replacement protocol for potassium/magnesium has been discontinued per P&T guidelines because the patient has reduced renal function (CrCl < 30 mL/min). The patient's most recent potassium & magnesium levels are:  Recent Labs     02/03/22 2023 02/04/22  1252   K 3.1* 3.4*   MG 1.9 1.9     Estimated Creatinine Clearance: 29 mL/min (A) (based on SCr of 1.09 mg/dL (H)). For patients with decreased renal function (below 30ml/min) needing potassium/magnesium supplementation, please order individual bolus doses with appropriate monitoring. Please contact the inpatient pharmacy with any concerns. Thank you.   Adrian Rosado [facility]  2/4/2022 1:38 PM

## 2022-02-04 NOTE — ED NOTES
Bed: 16  Expected date:   Expected time:   Means of arrival:   Comments:  Med [id number] Ornelas RN  02/03/22 7308

## 2022-02-04 NOTE — ED NOTES
[facility] here for  patient refusing to leave, verbalizing belly is cramping and she 'feels nauseated\" Dr Lamin Jessica made aware new orders received      Rosanna Burroughs RN  02/04/22 [id number]

## 2022-02-04 NOTE — ED NOTES
States, \"I was having abdominal pain, but then I had a bowel movement, and it went away. I have colitis, and this happens to me a lot. \"      Claus Coreas RN  02/03/22 2030

## 2022-02-04 NOTE — ED PROVIDER NOTES
EMERGENCY DEPARTMENT ENCOUNTER    Pt Name: Danielle Hewitt  MRN: [id number]  [place] 8/2/1925  Date of evaluation: 2/3/22  CHIEF COMPLAINT       Chief Complaint   Patient presents with    Nausea     took zofran    Emesis     x 1 today     HISTORY OF PRESENT ILLNESS   72-year-old female presents emergency room for multiple chronic complaints. Patient has history of irritable bowel syndrome. She gets occasional nausea and abdominal cramping and did have some this evening. She did take Zofran which she has at home for these episodes with help with her nausea. She is having some abdominal cramping which is a recurrent problem for her. Once she has a bowel movement she reports it does improve. She does not feel the need to pass stool at this moment. She is also having issues with her neck pain. This has been going on for several months. She does use a lidocaine patch which helps with the pain. She reports that she has trouble getting the patch where it needs to be located. The neck pain shoots down her left arm. REVIEW OF SYSTEMS     Review of Systems   Constitutional: Negative for activity change, chills and diaphoresis. HENT: Negative for congestion, sinus pain and tinnitus. Eyes: Negative. Respiratory: Negative for apnea, chest tightness and shortness of breath. Gastrointestinal: Positive for abdominal pain, nausea and vomiting. Negative for abdominal distention, constipation and diarrhea. Genitourinary: Negative for difficulty urinating and frequency. Musculoskeletal: Positive for neck pain. Negative for arthralgias and myalgias. Skin: Negative for color change and rash. Neurological: Negative for dizziness. Hematological: Negative. Psychiatric/Behavioral: Negative.         PASTMEDICAL HISTORY     Past Medical History:   Diagnosis Date    A-fib [facility])     AAA (abdominal aortic aneurysm) (HCC)     Abdominal cramping     Atrial fibrillation (HCC)     Back pain, chronic  C. difficile colitis     COPD (chronic obstructive pulmonary disease) ([facility])     Diabetes mellitus ([facility])     Gastritis     GERD (gastroesophageal reflux disease)     Hiatal hernia     Hypoalbuminemia 10/14/2014    IBS (irritable bowel syndrome)     Nausea vomiting and diarrhea 10/14/2014    Stomach ulcer     Thyroid disease     Type 2 diabetes mellitus without complication, without long-term current use of insulin ([facility] 1/24/2018    UTI (lower urinary tract infection)     Varicose veins      Past Problem List  Patient Active Problem List   Diagnosis Code    Atrial fibrillation ([facility]) I48.91    COPD (chronic obstructive pulmonary disease) ([facility]) J44.9    Abdominal cramping R10.9    Back pain, chronic M54.9, G89.29    Diabetes mellitus ([facility] E11.9    C. difficile colitis A04.72    Nausea vomiting and diarrhea R11.2, R19.7    Hypoalbuminemia E88.09    Gastroenteritis/enteritis K52.9    Supratherapeutic INR R79.1    Gastroesophageal reflux disease without esophagitis K21.9    Irritable bowel syndrome with both constipation and diarrhea K58.2    Irritable bowel syndrome with diarrhea K58.0    Left lower quadrant pain R10.32    PND (paroxysmal nocturnal dyspnea) R06.00    Acute right ankle pain M25.571    Constipation K59.00    Abdominal pain, epigastric R10.13    Anemia D64.9    Iron deficiency anemia D50.9    Type 2 diabetes mellitus without complication, without long-term current use of insulin ([facility]) E11.9    Gastritis K29.70    Diarrhea of presumed infectious origin R19.7    Colitis K52.9    Thyroid disease E07.9    STACEY (acute kidney injury) ([facility] N17.9    Congestive heart failure ([facility]) I50.9    Acute cystitis with hematuria N30.01    H/O Clostridium difficile infection Z86.19    Lactic acid increased E87.2     SURGICAL HISTORY       Past Surgical History:   Procedure Laterality Date    CHOLECYSTECTOMY      HERNIA REPAIR      HYSTERECTOMY      partial    DC EGD TRANSORAL BIOPSY SINGLE/MULTIPLE N/A 2017    EGD BIOPSY performed by Charlotet Valderrama MD at [address]  2017    MILD CHRONIC INACTIVE GASTRITIS    VASCULAR SURGERY      varicose veins    VEIN SURGERY       CURRENT MEDICATIONS       Current Discharge Medication List      CONTINUE these medications which have NOT CHANGED    Details   nystatin (MYCOSTATIN) 536469 UNIT/ML suspension Take 5 mLs by mouth 4 times daily for 10 days Retain in mouth as long as possible  Qty: 200 mL, Refills: 0      dicyclomine (BENTYL) 10 MG capsule Take 1 capsule by mouth every 6 hours as needed (cramps)  Qty: 20 capsule, Refills: 0      sucralfate (CARAFATE) 1 GM tablet Take 1 tablet 4 times daily  90 day supply  Qty: 360 tablet, Refills: 2      levothyroxine (SYNTHROID) 125 MCG tablet Take 125 mcg by mouth every other day      traMADol (ULTRAM) 50 MG tablet Take 50 mg by mouth every 6 hours as needed for Pain.      melatonin 3 MG TABS tablet Take 3 mg by mouth nightly as needed (Sleep) Pt unsure of dose. Takes OTC      Cholecalciferol (VITAMIN D3) 50 MCG (2000 UT) CAPS Take 2,000 Units by mouth every other day       furosemide (LASIX) 20 MG tablet Take 20 mg by mouth daily       potassium chloride (KLOR-CON) 10 MEQ extended release tablet TAKE ONE TABLET BY MOUTH TWICE A DAY  Qty: 60 tablet, Refills: 1      pantoprazole (PROTONIX) 40 MG tablet TAKE ONE TABLET BY MOUTH DAILY  Qty: 30 tablet, Refills: 6      amiodarone (CORDARONE) 200 MG tablet Take 200 mg by mouth Twice a Week On Monday and Friday      OXYGEN Inhale into the lungs Uses at night and prn           ALLERGIES     is allergic to penicillins. FAMILY HISTORY     She indicated that her mother is . She indicated that her father is .      SOCIAL HISTORY       Social History     Tobacco Use    Smoking status: Former Smoker    Smokeless tobacco: Never Used    Tobacco comment: quit many years ago    Vaping Use    Vaping Use: Never used   Substance Use Topics    Alcohol use: Yes     Alcohol/week: 2.0 standard drinks     Types: 1 Glasses of wine, 1 Cans of beer per week     Comment: social drinker     Drug use: No     PHYSICAL EXAM     INITIAL VITALS: BP (!) 128/53   Pulse 57   Temp 98.4 °F (36.9 °C) (Oral)   Resp 16   Ht 5' 2\" (1.575 m)   Wt 175 lb (79.4 kg)   SpO2 100%   BMI 32.01 kg/m²    Physical Exam  Constitutional:       General: She is not in acute distress. Appearance: She is well-developed. HENT:      Head: Normocephalic. Eyes:      Pupils: Pupils are equal, round, and reactive to light. Cardiovascular:      Rate and Rhythm: Normal rate and regular rhythm. Heart sounds: Normal heart sounds. Pulmonary:      Effort: Pulmonary effort is normal. No respiratory distress. Breath sounds: Normal breath sounds. Abdominal:      General: Bowel sounds are normal.      Palpations: Abdomen is soft. Tenderness: There is no abdominal tenderness. Musculoskeletal:         General: Normal range of motion. Skin:     General: Skin is warm and dry. Neurological:      Mental Status: She is alert and oriented to person, place, and time. MEDICAL DECISION MAKIN-year-old female presenting to the emergency room for multiple chronic complaints. Patient reporting nausea with some abdominal discomfort and one episode of vomiting. Symptoms improved here in the ED after patient had taken her oral antiemetics at home. She has soft benign abdominal exam.  Minimal left lower quadrant tenderness. No reported diarrhea at home. She did report normal bowel movement earlier in the day. Given that patient has IBS and recurrent episodes of similar problems I do not feel imaging is necessary at this time. Patient will have to remain in the ED due to inclement weather there is no available transportation to get her home. Neck pain improved after lidocaine patch was placed.     CRITICAL CARE: PROCEDURES:    Procedures    DIAGNOSTIC RESULTS   EKG:All EKG's are interpreted by the Emergency Department Physician who either signs or Co-signs this chart in the absence of a cardiologist.        RADIOLOGY:All plain film, CT, MRI, and formal ultrasound images (except ED bedside ultrasound) are read by the radiologist, see reports below, unless otherwisenoted in MDM or here. CT ABDOMEN PELVIS WO CONTRAST Additional Contrast? None   Preliminary Result   1. No change in the lung bases where there is basilar atelectasis, and   septal thickening. 2.  Cardiomegaly and coronary artery calcification. Atherosclerotic disease. Aorta measures 2.6 cm maximum size. 3.  Wall thickening and haziness in the sigmoid colon consistent with   colitis/diverticulitis, low grade. 4.  Some filling defects in the colon. This may be due to medication. Small   polyps not excluded. 5.  Status post cholecystectomy. Mild ductal dilatation is noted presumably   secondary to cholecystectomy. LABS: All lab results were reviewed by myself, and all abnormals are listed below.   Labs Reviewed   CBC WITH AUTO DIFFERENTIAL - Abnormal; Notable for the following components:       Result Value    Seg Neutrophils 74 (*)     Lymphocytes 18 (*)     All other components within normal limits   BASIC METABOLIC PANEL W/ REFLEX TO MG FOR LOW K - Abnormal; Notable for the following components:    Glucose 154 (*)     CREATININE 1.08 (*)     Potassium 3.1 (*)     GFR Non- 47 (*)     GFR  57 (*)     All other components within normal limits   COMPREHENSIVE METABOLIC PANEL W/ REFLEX TO MG FOR LOW K - Abnormal; Notable for the following components:    Glucose 178 (*)     CREATININE 1.09 (*)     Potassium 3.4 (*)     Total Protein 6.2 (*)     Albumin 3.3 (*)     GFR Non- 47 (*)     GFR  56 (*)     All other components within normal limits   MAGNESIUM   CBC MAGNESIUM   MAGNESIUM       EMERGENCY DEPARTMENTCOURSE:         Vitals:    Vitals:    02/03/22 2212 02/03/22 2229 02/04/22 1246 02/04/22 1325   BP: (!) 136/95  111/67 (!) 128/53   Pulse: 52 57  57   Resp: 15 23  16   Temp:    98.4 °F (36.9 °C)   TempSrc:    Oral   SpO2: 98% 98% 92% 100%   Weight:       Height:           The patient was given the following medications while in the emergency department:  Orders Placed This Encounter   Medications    dicyclomine (BENTYL) injection 20 mg    lidocaine 4 % external patch 1 patch    DISCONTD: potassium bicarb-citric acid (EFFER-K) effervescent tablet 20 mEq    ciprofloxacin (CIPRO) IVPB 400 mg     Order Specific Question:   Antimicrobial Indications     Answer:   Intra-Abdominal Infection    metronidazole (FLAGYL) 500 mg in NaCl 100 mL IVPB premix     Order Specific Question:   Antimicrobial Indications     Answer:   Intra-Abdominal Infection    amiodarone (CORDARONE) tablet 200 mg    levothyroxine (SYNTHROID) tablet 125 mcg    furosemide (LASIX) tablet 20 mg    potassium chloride (MICRO-K) extended release capsule 10 mEq    sodium chloride flush 0.9 % injection 5-40 mL    sodium chloride flush 0.9 % injection 10 mL    0.9 % sodium chloride infusion    DISCONTD: potassium chloride (KLOR-CON M) extended release tablet 40 mEq    DISCONTD: potassium bicarb-citric acid (EFFER-K) effervescent tablet 40 mEq    DISCONTD: potassium chloride 10 mEq/100 mL IVPB (Peripheral Line)    DISCONTD: magnesium sulfate 1000 mg in dextrose 5% 100 mL IVPB    enoxaparin (LOVENOX) injection 30 mg    OR Linked Order Group     ondansetron (ZOFRAN-ODT) disintegrating tablet 4 mg     ondansetron (ZOFRAN) injection 4 mg    OR Linked Order Group     acetaminophen (TYLENOL) tablet 650 mg     acetaminophen (TYLENOL) suppository 650 mg    DISCONTD: 0.9 % sodium chloride infusion    AND Linked Order Group     pantoprazole (PROTONIX) injection 40 mg     sodium chloride (PF) 0.9 % injection 10 mL    dicyclomine (BENTYL) injection 20 mg    DISCONTD: ciprofloxacin (CIPRO) IVPB 400 mg     Order Specific Question:   Antimicrobial Indications     Answer:   Intra-Abdominal Infection    metronidazole (FLAGYL) 500 mg in NaCl 100 mL IVPB premix     Order Specific Question:   Antimicrobial Indications     Answer:   Intra-Abdominal Infection    ciprofloxacin (CIPRO) IVPB 400 mg     Order Specific Question:   Antimicrobial Indications     Answer:   Intra-Abdominal Infection     CONSULTS:  IP CONSULT TO HOSPITALIST    FINAL IMPRESSION      1. Colitis          DISPOSITION/PLAN   DISPOSITION        PATIENT REFERRED TO:  Evan Shelby MD  [address]    Schedule an appointment as soon as possible for a visit in 1 week      DISCHARGE MEDICATIONS:  Current Discharge Medication List        Vaishnavi Lyon MD  Attending Emergency Physician      Care during this encounter was due to an unprecedented national emergency due to COVID-19.            Raheem Dixon MD  02/04/22 [id number]

## 2022-02-04 NOTE — H&P
[facility]  Office: [address], DO, Sera Jacquelyn, DO, Joshuanorm Jackson, DO, Adolfo Machuca Emmett, DO, Svia Dorantes MD, Murray Singleton MD, Miriam Shah MD, Miah Finn MD, Lisandra Brennan MD, Tala Mckeon MD, Alison Weeks MD, Naina Harding, DO, Abhisehk Wisek, DO, Sasha Grant MD,  Liliana Jacob DO, David Gray MD, Armin Emanuel MD, Jesi Tanner MD, Lexx Jaimes MD, Nel Sutton MD, Pricilla Fothergill, [facility], [facility] Sh[facility]la, [facility], Dana Tinoco, CNP, Eleni Winters, CNS, Ziggy Otero, CNP, Ho Cordoba, CNP, Natahca Tillman, CNP, Timur Salazar, CNP, Carli Mckinnon, CNP, Zackary Reeves PA-C, Brandy Mendoza, [facility], Vidya Hoff [facility], Chris Mijares, CNP, Berna Jimenes, CNP, Luther Simon, CNP, Christine Ucon, CNP, Makeda Soto, CNP, Ana Laura Woodard, [address]    HISTORY AND PHYSICAL EXAMINATION            Date:   2/4/2022  Patient name:  Alicja Adams  Date of admission:  2/3/2022  7:17 PM  MRN:   [id number]  Account:  [de-identified]  YOB: 1925  PCP:    Diana Gregg MD  Room:   2026/2026-01  Code Status:    Full Code    Chief Complaint:     Chief Complaint   Patient presents with    Nausea     took zofran    Emesis     x 1 today       History Obtained From:     patient, electronic medical record    History of Present Illness:     Alicja Adams is a 80 y.o. Non- / non  female who presents from [address] [place] [address] with abdominal cramping, nausea and emesis and is admitted to the hospital for the management of Diverticulitis. Patient is [place] and somewhat of a poor hisotorian. She reports a history of IBS with both constipation and diarrhea and occasionally has abdominal cramping that is usually relieved with bowel movements. Patient's pain initially improved with bowel movement in ED however it returned. CT abdomen consistent with acute diverticulitis without leukocytosis.  She was started on IV Cipro and Flagyl. Past Medical History:     Past Medical History:   Diagnosis Date    A-fib [facility])     AAA (abdominal aortic aneurysm) (HCC)     Abdominal cramping     Aortic insufficiency     Atrial fibrillation (HCC)     Back pain, chronic     C. difficile colitis     Chronic diastolic CHF (congestive heart failure) (HCC)     COPD (chronic obstructive pulmonary disease) (HCC)     Diabetes mellitus (HCC)     Gastritis     GERD (gastroesophageal reflux disease)     Hiatal hernia     Hx of prolonged Q-T interval on ECG     Hypoalbuminemia 10/14/2014    IBS (irritable bowel syndrome)     Murmur, cardiac     Nausea vomiting and diarrhea 10/14/2014    Stomach ulcer     Thyroid disease     Type 2 diabetes mellitus without complication, without long-term current use of insulin ([facility] [address] 01/24/2018    UTI (lower urinary tract infection)     Varicose veins         Past Surgical History:     Past Surgical History:   Procedure Laterality Date    CHOLECYSTECTOMY      HERNIA REPAIR      HYSTERECTOMY      partial    DC EGD TRANSORAL BIOPSY SINGLE/MULTIPLE N/A 6/20/2017    EGD BIOPSY performed by Michelle Patten MD at [place] [address]  06/20/2017    MILD CHRONIC INACTIVE GASTRITIS    VASCULAR SURGERY      varicose veins    VEIN SURGERY          Medications Prior to Admission:     Prior to Admission medications    Medication Sig Start Date End Date Taking?  Authorizing Provider   nystatin (MYCOSTATIN) 053033 UNIT/ML suspension Take 5 mLs by mouth 4 times daily for 10 days Retain in mouth as long as possible 1/29/22 2/8/22  Aman Duque MD   dicyclomine (BENTYL) 10 MG capsule Take 1 capsule by mouth every 6 hours as needed (cramps) 1/29/21   PERCY Ewing - CNP   sucralfate (CARAFATE) 1 GM tablet Take 1 tablet 4 times daily  90 day supply  Patient not taking: Reported on 6/8/2021 3/6/20   Cheri Rivas MD   levothyroxine (SYNTHROID) 125 MCG tablet Take 125 mcg by mouth every other day    Historical Provider, MD   traMADol (ULTRAM) 50 MG tablet Take 50 mg by mouth every 6 hours as needed for Pain. Historical Provider, MD   melatonin 3 MG TABS tablet Take 3 mg by mouth nightly as needed (Sleep) Pt unsure of dose. Takes OTC    Historical Provider, MD   Cholecalciferol (VITAMIN D3) 50 MCG (2000 UT) CAPS Take 2,000 Units by mouth every other day     Historical Provider, MD   furosemide (LASIX) 20 MG tablet Take 20 mg by mouth daily     Historical Provider, MD   potassium chloride (KLOR-CON) 10 MEQ extended release tablet TAKE ONE TABLET BY MOUTH TWICE A DAY 9/28/18 Janine A JAYCE Perez   pantoprazole (PROTONIX) 40 MG tablet TAKE ONE TABLET BY MOUTH DAILY 4/18/18 Janine A JAYCE Perez   amiodarone (CORDARONE) 200 MG tablet Take 200 mg by mouth Twice a Week On Monday and Friday 9/12/16   Historical Provider, MD   OXYGEN Inhale into the lungs Uses at night and prn    Historical Provider, MD        Allergies:     Penicillins    Social History:     Tobacco:    reports that she has quit smoking. Her smoking use included cigarettes. She has a 20.00 pack-year smoking history. She has never used smokeless tobacco.  Alcohol:      reports current alcohol use of about 2.0 standard drinks of alcohol per week. Drug Use:  reports no history of drug use. Family History:     Family History   Problem Relation Age of Onset    Stroke Mother     Heart Disease Mother        Review of Systems:     Positive and Negative as described in HPI. CONSTITUTIONAL:  negative for fevers, chills, sweats, fatigue, weight loss  HEENT:  + [place], negative for vision, hearing changes, runny nose, throat pain  RESPIRATORY:  negative for shortness of breath, cough, congestion, wheezing  CARDIOVASCULAR:  negative for chest pain, palpitations  GASTROINTESTINAL: positive for nausea, vomiting (resolved),abdominal pain.  negative for diarrhea, constipation, change in bowel habits,  GENITOURINARY:  negative for difficulty of urination, burning with urination, frequency   INTEGUMENT:  negative for rash, skin lesions, easy bruising   HEMATOLOGIC/LYMPHATIC: positive for swelling/edema   ALLERGIC/IMMUNOLOGIC:  negative for urticaria , itching  ENDOCRINE:  negative increase in drinking, increase in urination, hot or cold intolerance  MUSCULOSKELETAL:  negative joint pains, muscle aches, swelling of joints  NEUROLOGICAL:  negative for headaches, dizziness, lightheadedness, numbness, pain, tingling extremities  BEHAVIOR/PSYCH:  negative for depression, anxiety    Physical Exam:   BP (!) 120/54   Pulse 57   Temp 97.5 °F (36.4 °C) (Oral)   Resp 16   Ht 5' 2\" (1.575 m)   Wt 175 lb (79.4 kg)   SpO2 97%   BMI 32.01 kg/m²   Temp (24hrs), Av °F (36.7 °C), Min:97.5 °F (36.4 °C), Max:98.4 °F (36.9 °C)    No results for input(s): POCGLU in the last 72 hours. No intake or output data in the 24 hours ending 22 1551    General Appearance:  alert, well appearing, and in no acute distress  Mental status: oriented to person, place, and time  Head:  normocephalic, atraumatic  Eye: no icterus, redness, pupils equal and reactive, extraocular eye movements intact, conjunctiva clear  Ear: normal external ear, no discharge, hearing intact  Nose:  no drainage noted  Mouth: mucous membranes moist  Neck: supple, no carotid bruits, thyroid not palpable  Lungs: Bilateral equal air entry, moderate air exchange, slightly diminished to auscultation, no wheezing, rales or rhonchi, normal effort  Cardiovascular: normal rate, regular rhythm, + murmur, gallop, rub.   Abdomen: Soft,mild RUQ/RMQ tenderness, no guarding or rebound, nondistended, normal bowel sounds, no hepatomegaly or splenomegaly   Neurologic: There are no new focal motor or sensory deficits, normal muscle tone and bulk, no abnormal sensation, normal speech, cranial nerves II through XII grossly intact  Skin: No gross lesions, rashes, bruising or bleeding on exposed skin area  Extremities: peripheral pulses palpable, no calf pain with palpation +BLE edema   Psych: flat affect     Investigations:      Laboratory Testing:  Recent Results (from the past 24 hour(s))   CBC Auto Differential    Collection Time: 02/03/22  8:23 PM   Result Value Ref Range    WBC 6.5 3.5 - 11.3 k/uL    RBC 4.11 3.95 - 5.11 m/uL    Hemoglobin 12.3 11.9 - 15.1 g/dL    Hematocrit 39.2 36.3 - 47.1 %    MCV 95.4 82.6 - 102.9 fL    MCH 29.9 25.2 - 33.5 pg    MCHC 31.4 28.4 - 34.8 g/dL    RDW 14.4 11.8 - 14.4 %    Platelets 561 455 - 368 k/uL    MPV 9.5 8.1 - 13.5 fL    NRBC Automated 0.0 0.0 per 100 WBC    Differential Type NOT REPORTED     Seg Neutrophils 74 (H) 36 - 65 %    Lymphocytes 18 (L) 24 - 43 %    Monocytes 5 3 - 12 %    Eosinophils % 3 1 - 4 %    Basophils 0 0 - 2 %    Immature Granulocytes 0 0 %    Segs Absolute 4.74 1.50 - 8.10 k/uL    Absolute Lymph # 1.16 1.10 - 3.70 k/uL    Absolute Mono # 0.35 0.10 - 1.20 k/uL    Absolute Eos # 0.21 0.00 - 0.44 k/uL    Basophils Absolute <0.03 0.00 - 0.20 k/uL    Absolute Immature Granulocyte 0.02 0.00 - 0.30 k/uL    WBC Morphology NOT REPORTED     RBC Morphology NOT REPORTED     Platelet Estimate NOT REPORTED    Basic Metabolic Panel w/ Reflex to MG    Collection Time: 02/03/22  8:23 PM   Result Value Ref Range    Glucose 154 (H) 70 - 99 mg/dL    BUN 16 8 - 23 mg/dL    CREATININE 1.08 (H) 0.50 - 0.90 mg/dL    Bun/Cre Ratio 15 9 - 20    Calcium 8.9 8.6 - 10.4 mg/dL    Sodium 141 135 - 144 mmol/L    Potassium 3.1 (L) 3.7 - 5.3 mmol/L    Chloride 102 98 - 107 mmol/L    CO2 27 20 - 31 mmol/L    Anion Gap 12 9 - 17 mmol/L    GFR Non-African American 47 (L) >60 mL/min    GFR  57 (L) >60 mL/min    GFR Comment          GFR Staging NOT REPORTED    Magnesium    Collection Time: 02/03/22  8:23 PM   Result Value Ref Range    Magnesium 1.9 1.6 - 2.6 mg/dL   Comprehensive Metabolic Panel w/ Reflex to MG    Collection Time: 02/04/22 12:52 PM   Result Value Ref Range    Glucose 178 Hospital Problems           Last Modified POA    * (Principal) Diverticulitis 2/4/2022 Yes    COPD (chronic obstructive pulmonary disease) ([facility]) 2/4/2022 Yes    Back pain, chronic 2/4/2022 Yes    Diabetes mellitus ([facility] [address] 2/4/2022 Yes    Gastroesophageal reflux disease without esophagitis 2/4/2022 Yes    Irritable bowel syndrome with both constipation and diarrhea 2/4/2022 Yes    Irritable bowel syndrome with diarrhea 2/4/2022 Yes    Iron deficiency anemia 2/4/2022 Yes    Type 2 diabetes mellitus without complication, without long-term current use of insulin ([facility] [address] 2/4/2022 Yes    Murmur, cardiac 2/4/2022 Yes    Chronic diastolic CHF (congestive heart failure) ([facility] [address] 2/4/2022 Yes          Plan:     Patient status inpatient in the Med/Surge    Resume select home meds  Monitor labs, replace electrolytes as needed  Bentyl 4 times daily for abdominal cramping  Full liquid diet  IV Cipro and Flagyl for diverticulitis  Telemetry monitoring  PT/OT evaluation  Cautious use of IV fluids due to history of chronic diastolic CHF  Monitor and control blood pressure  Antiemetics as needed  GI prophylaxis  DVT prophylaxis  Pain control as needed  Plan discussed with patient and staff    Consultations:   IP CONSULT TO HOSPITALIST     Patient is admitted as inpatient status because of co-morbidities listed above, severity of signs and symptoms as outlined, requirement for current medical therapies and most importantly because of direct risk to patient if care not provided in a hospital setting. Expected length of stay > 48 hours.     PERCY Benito NP  2/4/2022  3:51 PM    Copy sent to Dr. Susanne Hernandez MD

## 2022-02-04 NOTE — ED PROVIDER NOTES
Medical Decision Making: The patient was seen by Dr. Liz Pelletier and was awaiting transport home. She continued to complain of abdominal pain and a CAT scan was ordered by me which shows colitis/diverticulitis. White count is normal but she is uncomfortable due to her age she will be admitted for IV antibiotic. CT ABDOMEN PELVIS WO CONTRAST Additional Contrast? None    Result Date: 2/4/2022  1. No change in the lung bases where there is basilar atelectasis, and septal thickening. 2.  Cardiomegaly and coronary artery calcification. Atherosclerotic disease. Aorta measures 2.6 cm maximum size. 3.  Wall thickening and haziness in the sigmoid colon consistent with colitis/diverticulitis, low grade. 4.  Some filling defects in the colon. This may be due to medication. Small polyps not excluded. 5.  Status post cholecystectomy. Mild ductal dilatation is noted presumably secondary to cholecystectomy. CONSULTS:  None    PROCEDURES:  None    FINAL IMPRESSION      1.  Colitis         DISPOSITION/PLAN   DISPOSITION Decision To Admit 02/04/2022 11:41:39 AM       PATIENT REFERRED TO:   Dmitry Durham MD  [address] [place] [address]    Schedule an appointment as soon as possible for a visit in 1 week        DISCHARGE MEDICATIONS:     New Prescriptions    No medications on file         (Please note that portions of this note were completed with a voice recognition program.  Efforts were made to edit the dictations but occasionally words are mis-transcribed.)    Yao Chirinos MD  Attending Emergency Physician         Yao Chirinos MD  02/04/22 0362

## 2022-02-05 LAB
ALBUMIN SERPL-MCNC: 3 G/DL (ref 3.5–5.2)
ALBUMIN/GLOBULIN RATIO: ABNORMAL (ref 1–2.5)
ALP BLD-CCNC: 58 U/L (ref 35–104)
ALT SERPL-CCNC: <5 U/L (ref 5–33)
ANION GAP SERPL CALCULATED.3IONS-SCNC: 9 MMOL/L (ref 9–17)
AST SERPL-CCNC: 13 U/L
BILIRUB SERPL-MCNC: 0.97 MG/DL (ref 0.3–1.2)
BUN BLDV-MCNC: 13 MG/DL (ref 8–23)
BUN/CREAT BLD: 12 (ref 9–20)
CALCIUM SERPL-MCNC: 8.7 MG/DL (ref 8.6–10.4)
CHLORIDE BLD-SCNC: 106 MMOL/L (ref 98–107)
CO2: 27 MMOL/L (ref 20–31)
CREAT SERPL-MCNC: 1.07 MG/DL (ref 0.5–0.9)
GFR AFRICAN AMERICAN: 58 ML/MIN
GFR NON-AFRICAN AMERICAN: 48 ML/MIN
GFR SERPL CREATININE-BSD FRML MDRD: ABNORMAL ML/MIN/{1.73_M2}
GFR SERPL CREATININE-BSD FRML MDRD: ABNORMAL ML/MIN/{1.73_M2}
GLUCOSE BLD-MCNC: 111 MG/DL (ref 65–105)
GLUCOSE BLD-MCNC: 75 MG/DL (ref 65–105)
GLUCOSE BLD-MCNC: 97 MG/DL (ref 70–99)
GLUCOSE BLD-MCNC: 99 MG/DL (ref 65–105)
HCT VFR BLD CALC: 36.7 % (ref 36.3–47.1)
HEMOGLOBIN: 11.5 G/DL (ref 11.9–15.1)
MAGNESIUM: 1.9 MG/DL (ref 1.6–2.6)
MCH RBC QN AUTO: 30 PG (ref 25.2–33.5)
MCHC RBC AUTO-ENTMCNC: 31.3 G/DL (ref 28.4–34.8)
MCV RBC AUTO: 95.8 FL (ref 82.6–102.9)
NRBC AUTOMATED: 0 PER 100 WBC
PDW BLD-RTO: 14.6 % (ref 11.8–14.4)
PLATELET # BLD: 131 K/UL (ref 138–453)
PMV BLD AUTO: 9.3 FL (ref 8.1–13.5)
POTASSIUM SERPL-SCNC: 3.4 MMOL/L (ref 3.7–5.3)
RBC # BLD: 3.83 M/UL (ref 3.95–5.11)
SODIUM BLD-SCNC: 142 MMOL/L (ref 135–144)
TOTAL PROTEIN: 5.5 G/DL (ref 6.4–8.3)
WBC # BLD: 5.5 K/UL (ref 3.5–11.3)

## 2022-02-05 PROCEDURE — C9113 INJ PANTOPRAZOLE SODIUM, VIA: HCPCS | Performed by: NURSE PRACTITIONER

## 2022-02-05 PROCEDURE — 85027 COMPLETE CBC AUTOMATED: CPT

## 2022-02-05 PROCEDURE — 97116 GAIT TRAINING THERAPY: CPT

## 2022-02-05 PROCEDURE — 2500000003 HC RX 250 WO HCPCS: Performed by: NURSE PRACTITIONER

## 2022-02-05 PROCEDURE — 1200000000 HC SEMI PRIVATE

## 2022-02-05 PROCEDURE — 6370000000 HC RX 637 (ALT 250 FOR IP): Performed by: INTERNAL MEDICINE

## 2022-02-05 PROCEDURE — 97167 OT EVAL HIGH COMPLEX 60 MIN: CPT

## 2022-02-05 PROCEDURE — 6360000002 HC RX W HCPCS: Performed by: NURSE PRACTITIONER

## 2022-02-05 PROCEDURE — 36415 COLL VENOUS BLD VENIPUNCTURE: CPT

## 2022-02-05 PROCEDURE — 97162 PT EVAL MOD COMPLEX 30 MIN: CPT

## 2022-02-05 PROCEDURE — 80053 COMPREHEN METABOLIC PANEL: CPT

## 2022-02-05 PROCEDURE — 82947 ASSAY GLUCOSE BLOOD QUANT: CPT

## 2022-02-05 PROCEDURE — 6370000000 HC RX 637 (ALT 250 FOR IP): Performed by: NURSE PRACTITIONER

## 2022-02-05 PROCEDURE — 83735 ASSAY OF MAGNESIUM: CPT

## 2022-02-05 PROCEDURE — 97535 SELF CARE MNGMENT TRAINING: CPT

## 2022-02-05 PROCEDURE — 2580000003 HC RX 258: Performed by: NURSE PRACTITIONER

## 2022-02-05 PROCEDURE — 99232 SBSQ HOSP IP/OBS MODERATE 35: CPT | Performed by: INTERNAL MEDICINE

## 2022-02-05 RX ORDER — MORPHINE SULFATE 2 MG/ML
0.5 INJECTION, SOLUTION INTRAMUSCULAR; INTRAVENOUS EVERY 4 HOURS PRN
Status: DISCONTINUED | OUTPATIENT
Start: 2022-02-05 | End: 2022-02-06 | Stop reason: HOSPADM

## 2022-02-05 RX ADMIN — SODIUM CHLORIDE, PRESERVATIVE FREE 10 ML: 5 INJECTION INTRAVENOUS at 09:41

## 2022-02-05 RX ADMIN — NYSTATIN 500000 UNITS: 100000 SUSPENSION ORAL at 18:57

## 2022-02-05 RX ADMIN — METRONIDAZOLE 500 MG: 500 INJECTION, SOLUTION INTRAVENOUS at 06:31

## 2022-02-05 RX ADMIN — POTASSIUM CHLORIDE 10 MEQ: 10 CAPSULE, COATED, EXTENDED RELEASE ORAL at 09:42

## 2022-02-05 RX ADMIN — ENOXAPARIN SODIUM 30 MG: 30 INJECTION SUBCUTANEOUS at 09:42

## 2022-02-05 RX ADMIN — CIPROFLOXACIN 400 MG: 2 INJECTION, SOLUTION INTRAVENOUS at 13:23

## 2022-02-05 RX ADMIN — METRONIDAZOLE 500 MG: 500 INJECTION, SOLUTION INTRAVENOUS at 15:55

## 2022-02-05 RX ADMIN — NYSTATIN 500000 UNITS: 100000 SUSPENSION ORAL at 22:41

## 2022-02-05 RX ADMIN — NYSTATIN 500000 UNITS: 100000 SUSPENSION ORAL at 13:22

## 2022-02-05 RX ADMIN — FUROSEMIDE 20 MG: 20 TABLET ORAL at 09:42

## 2022-02-05 RX ADMIN — SODIUM CHLORIDE, PRESERVATIVE FREE 10 ML: 5 INJECTION INTRAVENOUS at 22:34

## 2022-02-05 RX ADMIN — POTASSIUM CHLORIDE 10 MEQ: 10 CAPSULE, COATED, EXTENDED RELEASE ORAL at 22:42

## 2022-02-05 RX ADMIN — DICYCLOMINE HYDROCHLORIDE 20 MG: 10 CAPSULE ORAL at 09:41

## 2022-02-05 RX ADMIN — PANTOPRAZOLE SODIUM 40 MG: 40 INJECTION, POWDER, FOR SOLUTION INTRAVENOUS at 09:41

## 2022-02-05 RX ADMIN — METRONIDAZOLE 500 MG: 500 INJECTION, SOLUTION INTRAVENOUS at 22:40

## 2022-02-05 ASSESSMENT — PAIN SCALES - GENERAL: PAINLEVEL_OUTOF10: 0

## 2022-02-05 NOTE — FLOWSHEET NOTE
Patient with aid. Silent prayer shared.    leaves prayer card possible follow up.     02/05/22 1536   Encounter Summary   Services provided to: Patient not available   Referral/Consult From: [facility]   (2-5-22)   Complexity of Encounter Low   Length of Encounter 15 minutes   Routine   Type Initial   Assessment Unable to respond   Intervention Prayer   Outcome Did not respond

## 2022-02-05 NOTE — PROGRESS NOTES
[facility]  Office: [address], DO, Jennifer Carolann, DO, Bon Mckee, DO, Lisa Christine, DO, Gerson Cevallos MD, Mina Maloney MD, Andi Jimenes MD, Jhon Dunn MD, Etta Castorena MD, Sherley Cifuentes MD, Dean Bynum MD, Wong Vidal, DO, Catherine Maurer, DO, Ava Hernandez MD,  Coco Abernathy, DO, Sina Pollard MD, Kaylin Rodriguez MD, Bryanna Davidson MD, Kristen Schmitz MD, Isidro Arzate MD, Samantha Sanchez, [facility], [facility] [place], CNP, Kaylee Tian, CNP, Lian Roberts, CNS, Irma Porter, CNP, Radha Newell, CNP, Carissa Ventura, CNP, Todd Pickett, CNP, Kaylin Douglas, CNP, Dallas Meier PA-C, Melania Downs, [facility], Calista Aggarwal, [facility], José Miguel Crandall, CNP, Annalisa Brody, CNP, Heather Canela, CNP, Lindsay Brooks, CNP, Madai Schultz, [facility], Fab Tomas, [facility]    Progress Note    2/5/2022    2:47 PM    Name:   Lico Garza  MRN:     [id number]     Acct:      [de-identified]   Room:   2026/2026-01  IP Day:  1  Admit Date:  2/3/2022  7:17 PM    PCP:   Mely Mejia MD  Code Status:  Full Code    Subjective:     C/C:   Chief Complaint   Patient presents with   [place] Nausea     took zofran    Emesis     x 1 today     Interval History Status:   Patient is getting IV antibiotics for diverticulitis  States left lower part of abdomen is little sore, denies any specific pain or cramps at present  No nausea vomiting, no diarrhea  Complains of coating on tongue  Brief History:   Lico Garza is a 80 y.o. Non- / non  female who presents from [facility] with abdominal cramping, nausea and emesis and is admitted to the hospital for the management of Diverticulitis. Patient is [place] and somewhat of a poor hisotorian. She reports a history of IBS with both constipation and diarrhea and occasionally has abdominal cramping that is usually relieved with bowel movements.  Patient's pain initially improved with bowel movement in ED however it returned. CT abdomen consistent with acute diverticulitis without leukocytosis. She was started on IV Cipro and Flagyl. Review of Systems:     Constitutional:  negative for chills, fevers, sweats  Respiratory:  negative for cough, dyspnea on exertion, shortness of breath, wheezing  Cardiovascular:  negative for chest pain, chest pressure/discomfort, lower extremity edema, palpitations  Gastrointestinal: + Left lower abdominal soreness, negative for abdominal pain, constipation, diarrhea, nausea, vomiting  Neurological:  negative for dizziness, headache    Medications: Allergies:     Allergies   Allergen Reactions    Penicillins      Has no recollection of what the reaction was       Current Meds:   Scheduled Meds:    nystatin  5 mL Oral 4x Daily    amiodarone  200 mg Oral Once per day on Mon Thu    levothyroxine  125 mcg Oral Every Other Day    furosemide  20 mg Oral Daily    potassium chloride  10 mEq Oral BID    sodium chloride flush  5-40 mL IntraVENous 2 times per day    enoxaparin  30 mg SubCUTAneous Daily    pantoprazole  40 mg IntraVENous Daily    And    sodium chloride (PF)  10 mL IntraVENous Daily    metroNIDAZOLE  500 mg IntraVENous Q8H    ciprofloxacin  400 mg IntraVENous Q24H     Continuous Infusions:    sodium chloride       PRN Meds: morphine, sodium chloride flush, sodium chloride, ondansetron **OR** ondansetron, acetaminophen **OR** acetaminophen    Data:     Past Medical History:   has a past medical history of A-fib ([facility] [address] AAA (abdominal aortic aneurysm) ([facility] [address] Abdominal cramping, Aortic insufficiency, Atrial fibrillation ([facility] [address] Back pain, chronic, C. difficile colitis, Chronic diastolic CHF (congestive heart failure) ([facility] [address] COPD (chronic obstructive pulmonary disease) ([facility] [address] Diabetes mellitus ([facility] [address] Gastritis, GERD (gastroesophageal reflux disease), Hiatal hernia, Hx of prolonged Q-T interval on ECG, Hypoalbuminemia, IBS (irritable bowel syndrome), Murmur, cardiac, Nausea vomiting and diarrhea, Stomach ulcer, Thyroid disease, Type 2 diabetes mellitus without complication, without long-term current use of insulin ([address] UTI (lower urinary tract infection), and Varicose veins. Social History:   reports that she has quit smoking. Her smoking use included cigarettes. She has a 20.00 pack-year smoking history. She has never used smokeless tobacco. She reports current alcohol use of about 2.0 standard drinks of alcohol per week. She reports that she does not use drugs. Family History:   Family History   Problem Relation Age of Onset    Stroke Mother     Heart Disease Mother        Vitals:  BP (!) 125/45   Pulse (!) 48   Temp 97.3 °F (36.3 °C) (Oral)   Resp 16   Ht 5' 2\" (1.575 m)   Wt 177 lb 8 oz (80.5 kg)   SpO2 95%   BMI 32.47 kg/m²   Temp (24hrs), Av.3 °F (36.3 °C), Min:97.2 °F (36.2 °C), Max:97.5 °F (36.4 °C)    Recent Labs     22  16422  1133   POCGLU 119* 129* 99       I/O (24Hr): Intake/Output Summary (Last 24 hours) at 2022 1447  Last data filed at 2022 0658  Gross per 24 hour   Intake --   Output 600 ml   Net -600 ml       Labs:  Hematology:  Recent Labs     22  1252 22  0626   WBC 6.5 7.6 5.5   RBC 4.11 4.01 3.83*   HGB 12.3 12.1 11.5*   HCT 39.2 38.3 36.7   MCV 95.4 95.5 95.8   MCH 29.9 30.2 30.0   MCHC 31.4 31.6 31.3   RDW 14.4 14.3 14.6*    147 131*   MPV 9.5 9.7 9.3     Chemistry:  Recent Labs     22  1252 222 22  0626     --  140  --  142   K 3.1*  --  3.4*  --  3.4*     --  102  --  106   CO2 27  --  26  --  27   GLUCOSE 154*  --  178*  --  97   BUN 16  --  15  --  13   CREATININE 1.08*  --  1.09*  --  1.07*   MG 1.9   < > 1.9 1.9 1.9   ANIONGAP 12  --  12  --  9   LABGLOM 47*  --  47*  --  48*   GFRAA 57*  --  56*  --  58*   CALCIUM 8.9  --  8.8  --  8.7    < > = values in this interval not displayed. Recent Labs     02/04/22  1252 02/04/22  1649 02/04/22  2031 02/05/22  0626 02/05/22  1133   PROT 6.2*  --   --  5.5*  --    LABALBU 3.3*  --   --  3.0*  --    AST 17  --   --  13  --    ALT 5  --   --  <5*  --    ALKPHOS 61  --   --  58  --    BILITOT 1.10  --   --  0.97  --    POCGLU  --  119* 129*  --  99     ABG:No results found for: POCPH, PHART, PH, POCPCO2, NZJ9QVR, PCO2, POCPO2, PO2ART, PO2, POCHCO3, ZLH6LJB, HCO3, NBEA, PBEA, BEART, BE, THGBART, THB, IZT0LXF, VTRM6JKZ, C3NGFPVP, O2SAT, FIO2  Lab Results   Component Value Date/Time    SPECIAL NOT REPORTED 09/03/2020 03:56 PM     Lab Results   Component Value Date/Time    CULTURE  09/03/2020 03:56 PM     Oral ishmael, negative for Group A Strep and other beta hemolytic streptococci       Radiology:  CT ABDOMEN PELVIS WO CONTRAST Additional Contrast? None    Result Date: 2/4/2022  1. No change in the lung bases where there is basilar atelectasis, and septal thickening. 2.  Cardiomegaly and coronary artery calcification. Atherosclerotic disease. Aorta measures 2.6 cm maximum size. 3.  Wall thickening and haziness in the sigmoid colon consistent with colitis/diverticulitis, low grade. 4.  Some filling defects in the colon. This may be due to medication. Small polyps not excluded. 5.  Status post cholecystectomy. Mild ductal dilatation is noted presumably secondary to cholecystectomy.        Physical Examination:        General appearance:  alert, cooperative and no distress, tongue is coated  Mental Status:  oriented to person, place and time and normal affect  Lungs:  clear to auscultation bilaterally, normal effort  Heart:  regular rate and rhythm, no murmur  Abdomen:  soft, + mild discomfort in left lower quadrant, no guarding, nondistended,  bowel sounds are sluggish, no masses, hepatomegaly, splenomegaly  Extremities:  no edema, redness, tenderness in the calves  Skin:  no gross lesions, rashes, induration    Assessment:        Hospital Problems Last Modified POA    * (Principal) Diverticulitis 2/4/2022 Yes    COPD (chronic obstructive pulmonary disease) ([facility] [address] 2/4/2022 Yes    Back pain, chronic 2/4/2022 Yes    Diabetes mellitus ([facility] [address] 2/4/2022 Yes    Gastroesophageal reflux disease without esophagitis 2/4/2022 Yes    Irritable bowel syndrome with both constipation and diarrhea 2/4/2022 Yes    Irritable bowel syndrome with diarrhea 2/4/2022 Yes    Iron deficiency anemia 2/4/2022 Yes    Type 2 diabetes mellitus without complication, without long-term current use of insulin ([facility] [address] 2/4/2022 Yes    Murmur, cardiac 2/4/2022 Yes    Chronic diastolic CHF (congestive heart failure) ([facility] [address] 2/4/2022 Yes      Oral thrush    Plan:        1. Continue IV Cipro and Flagyl  2. Mycostatin swish and swallow  3. Morphine as needed for pain/soreness  4. Continue full liquid diet  5.  We will consult surgery if symptoms do not improve    Oxana Brady MD  2/5/2022  2:47 PM

## 2022-02-05 NOTE — PROGRESS NOTES
Occupational Therapy   Occupational Therapy Initial Assessment  Date: 2022   Patient Name: Brandt Radford  MRN: [id number]     : 1925    Date of Service: 2022    Discharge Recommendations:  Patient would benefit from continued therapy after discharge    Due to recent hospitalization and medical condition, pt would benefit from additional therapy at time of discharge to ensure safety. Please refer to the AM-PAC score for current functional status. HPI: (Per ER Note) 59-year-old female presents emergency room for multiple chronic complaints.  Patient has history of irritable bowel syndrome.  She gets occasional nausea and abdominal cramping and did have some this evening.  She did take Zofran which she has at home for these episodes with help with her nausea. Sheba Wright is having some abdominal cramping which is a recurrent problem for her. Mariia Arvizu she has a bowel movement she reports it does improve.  She does not feel the need to pass stool at this moment.    She is also having issues with her neck pain.  This has been going on for several months.  She does use a lidocaine patch which helps with the pain.  She reports that she has trouble getting the patch where it needs to be located.  The neck pain shoots down her left arm. RN reports patient is medically stable for therapy treatment this date. Chart reviewed prior to treatment and patient is agreeable for therapy. All lines intact and patient positioned comfortably at end of treatment. All patient needs addressed prior to ending therapy session. Assessment   Performance deficits / Impairments: Decreased functional mobility ; Decreased ADL status; Decreased strength;Decreased safe awareness;Decreased endurance;Decreased posture;Decreased balance;Decreased cognition  Assessment: . Prognosis: Good  Decision Making: High Complexity  OT Education: OT Role;Plan of Care;Home Exercise Program;Precautions; ADL Adaptive Strategies;Transfer Training;Energy Conservation;Equipment; Family Education  Patient Education: use of call light, RW safety, fall prevention  REQUIRES OT FOLLOW UP: Yes  Activity Tolerance  Activity Tolerance: Patient Tolerated treatment well  Safety Devices  Safety Devices in place: Yes  Type of devices: Call light within reach;Nurse notified; Left in bed;Gait belt;Patient at risk for falls; Bed alarm in place           Patient Diagnosis(es): The encounter diagnosis was Colitis. has a past medical history of A-fib ([facility] AAA (abdominal aortic aneurysm) ([facility] Abdominal cramping, Aortic insufficiency, Atrial fibrillation ([facility] Back pain, chronic, C. difficile colitis, Chronic diastolic CHF (congestive heart failure) ([facility] COPD (chronic obstructive pulmonary disease) ([facility] Diabetes mellitus ([facility] Gastritis, GERD (gastroesophageal reflux disease), Hiatal hernia, Hx of prolonged Q-T interval on ECG, Hypoalbuminemia, IBS (irritable bowel syndrome), Murmur, cardiac, Nausea vomiting and diarrhea, Stomach ulcer, Thyroid disease, Type 2 diabetes mellitus without complication, without long-term current use of insulin ([facility] UTI (lower urinary tract infection), and Varicose veins. has a past surgical history that includes Cholecystectomy; vascular surgery; Vein Surgery; Hysterectomy; hernia repair; Upper gastrointestinal endoscopy (06/20/2017); and pr egd transoral biopsy single/multiple (N/A, 6/20/2017).            Restrictions  Restrictions/Precautions  Restrictions/Precautions: General Precautions,Fall Risk  Position Activity Restriction  Other position/activity restrictions: Left UE IV    Subjective   General  Chart Reviewed: Yes  Patient assessed for rehabilitation services?: Yes  Family / Caregiver Present: No  Patient Currently in Pain: Yes  Vital Signs  Temp: 97.3 °F (36.3 °C)  Temp Source: Oral  Pulse: (!) 48  Heart Rate Source: Monitor  Resp: 16  BP: (!) 125/45  BP Location: Right Arm  MAP (mmHg): (!) 64  Patient Currently in Pain: Yes  Oxygen Therapy  SpO2: 95 %  O2 Device: None (Room air)  Social/Functional History  Social/Functional History  Lives With: Significant other  Type of Home: Apartment [facility])  Home Layout: One level  Home Access: Elevator  Bathroom Shower/Tub: Tub/Shower unit (cut out tub)  Bathroom Toilet: Handicap height  Bathroom Equipment: Grab bars in shower,Grab bars around toilet,Shower [employer]  Home Equipment: [address] (Rollator that she uses outside of apartment, RW was her 's and she uses in apartment (needs a new one). )  Receives Help From: Home health (for TLC 2x/wk)  ADL Assistance: Needs assistance (needs assist with bathing)  Homemaking Assistance: Needs assistance (Makes own breakfast)  Ambulation Assistance: Independent (Rollator, walks to dining thakkar)  Transfer Assistance: Independent  Active : No  Patient's  Info: Uses [employer] bus to go to doctor / store  Leisure & Hobbies: Sleep  Additional Comments: No recent history of falls. Objective   Vision: Impaired  Vision Exceptions: Wears glasses at all times  Hearing: Exceptions to [facility]  Hearing Exceptions: Hard of hearing/hearing concerns;Bilateral hearing aid    Orientation  Overall Orientation Status: Within Functional Limits  Observation/Palpation  Posture: Poor  Observation: Patient very [address]. Assisted with putting in hearing aides. Agreeable to session  Balance  Sitting Balance: Stand by assistance  Standing Balance: Minimal assistance  Standing Balance  Time: ~4-5 min with mob to/from bathroom and standing at sink for hand washing following toileting  Functional Mobility  Functional - Mobility Device: Rolling Walker  Activity: To/from bathroom  Assist Level: Moderate assistance (min-mod A for mob to/from bathroom. Assist to guide walker at times as pt pushes device too far forward. Pt needing more assist during turns and navigating door way.  Pt is likely used to 4ww which has swiveling wheels and can be pushed ahead more.)  Toilet Transfers  Equipment Used: Standard toilet  Toilet Transfer: Contact guard assistance;Minimal assistance  ADL  Feeding: Setup;Supervision  Grooming: Contact guard assistance;Verbal cueing;Setup  UE Bathing: Minimal assistance  LE Bathing: Moderate assistance  UE Dressing: Minimal assistance  LE Dressing: Moderate assistance;Maximum assistance  Toileting: Moderate assistance;Maximum assistance (brief management)  Additional Comments: pt needing cues for safety with RW in bathroom for toileting/grooming. Pt with tendency to push device too far out in front, very kyphotic posture as well is limiting. Frequent cues to hold head up. Tone RUE  RUE Tone: Normotonic  Tone LUE  LUE Tone: Normotonic  Coordination  Movements Are Fluid And Coordinated: Yes     Bed mobility  Rolling to Right: Stand by assistance  Supine to Sit: Stand by assistance  Sit to Supine: Stand by assistance  Scooting: Stand by assistance  Comment: verbal cues for tech and use of bed rails  Transfers  Sit to stand: Contact guard assistance  Stand to sit: Contact guard assistance  Transfer Comments: cues for hand placement, RW safety     Cognition  Overall Cognitive Status: Exceptions  Following Commands: Follows one step commands with repetition; Follows multistep commands with repitition  Memory: Decreased recall of biographical Information  Safety Judgement: Decreased awareness of need for assistance;Decreased awareness of need for safety  Problem Solving: Assistance required to generate solutions;Assistance required to implement solutions;Assistance required to correct errors made;Decreased awareness of errors  Insights: Decreased awareness of deficits  Initiation: Requires cues for some  Sequencing: Requires cues for some  Perception  Overall Perceptual Status: WFL     Sensation  Overall Sensation Status: WFL        LUE AROM (degrees)  LUE AROM : WFL  RUE AROM (degrees)  RUE AROM : WFL  LUE Strength  Gross LUE Strength: WFL  LUE Strength Comment: ROB UE's 4-/5  RUE Strength  Gross RUE Strength: WFL                   Plan   Plan  Times per week: 4-5x/week, 1-2x/day  Current Treatment Recommendations: Strengthening,Balance Training,Functional Mobility Training,Endurance Training,Safety Education & Training,Self-Care / ADL,Patient/Caregiver Education & Training,Equipment Evaluation, Education, & procurement,Positioning,Cognitive/Perceptual Training    AM-PAC Inpatient Daily Activity Raw Score: 16 (02/05/22 1126)  AM-PAC Inpatient ADL T-Scale Score : 35.96 (02/05/22 1126)  ADL Inpatient CMS 0-100% Score: 53.32 (02/05/22 1126)  ADL Inpatient CMS G-Code Modifier : CK (02/05/22 1126)    Goals  Short term goals  Time Frame for Short term goals: by discharge, pt will  Short term goal 1: demo SBA with ADL transfers and functional mob in room distances for ADL completion with approp AD/DME and min cues for safety  Short term goal 2: demo SBA with toileting routine with min cues for safety and DME as needed  Short term goal 3: demo SBA with UB AdLs and min A LB ADLs with DME as needed and min cues for safety/initiation  Short term goal 4: demo and verb good (-) understanding of fall prevention techs, EC/WS techs, equip needs, B UE HEP, and d/c recommendations  Patient Goals   Patient goals : not statted       Therapy Time   Individual Concurrent Group Co-treatment   Time In 0802         Time Out 0835         Minutes 33          treatment min: [address], OT

## 2022-02-05 NOTE — PROGRESS NOTES
Physical Therapy    Facility/Department: [facility]  Initial Assessment    NAME: Jose De Jesus Austin  : 1925  MRN: [id number]    Date of Service: 2022    Discharge Recommendations:  Patient would benefit from continued therapy after discharge     Due to recent hospitalization and medical condition, pt would benefit from additional therapy at time of discharge to ensure safety. Please refer to the AM-PAC score for current functional status. HPI: (Per ER Note) 77-year-old female presents emergency room for multiple chronic complaints. Patient has history of irritable bowel syndrome. She gets occasional nausea and abdominal cramping and did have some this evening. She did take Zofran which she has at home for these episodes with help with her nausea. She is having some abdominal cramping which is a recurrent problem for her. Once she has a bowel movement she reports it does improve. She does not feel the need to pass stool at this moment. She is also having issues with her neck pain. This has been going on for several months. She does use a lidocaine patch which helps with the pain. She reports that she has trouble getting the patch where it needs to be located. The neck pain shoots down her left arm. Assessment   Body structures, Functions, Activity limitations: Decreased functional mobility ; Decreased ADL status; Decreased strength;Decreased endurance;Decreased balance;Decreased posture  Assessment: Patient demo high fall risk due to LE weakness and deccreased endurance affecting gait, balnace, and transfers. Patient reports she is functioning close to her baseline. Patient will benefit from continued skilled PT to improve gait, transfers, balance, and strength.   Prognosis: Good  Decision Making: Medium Complexity  PT Education: Goals;PT Role;Plan of Care;General Safety;Gait Training;Transfer Training  Patient Education: Patient educated to general safety with gait and transfers and to improved posture, with poor retention. REQUIRES PT FOLLOW UP: Yes  Activity Tolerance  Activity Tolerance: Patient limited by endurance       Patient Diagnosis(es): The encounter diagnosis was Colitis. has a past medical history of A-fib ([facility] AAA (abdominal aortic aneurysm) ([facility] Abdominal cramping, Aortic insufficiency, Atrial fibrillation ([facility] Back pain, chronic, C. difficile colitis, Chronic diastolic CHF (congestive heart failure) ([facility] COPD (chronic obstructive pulmonary disease) ([facility] Diabetes mellitus ([facility] Gastritis, GERD (gastroesophageal reflux disease), Hiatal hernia, Hx of prolonged Q-T interval on ECG, Hypoalbuminemia, IBS (irritable bowel syndrome), Murmur, cardiac, Nausea vomiting and diarrhea, Stomach ulcer, Thyroid disease, Type 2 diabetes mellitus without complication, without long-term current use of insulin ([facility] UTI (lower urinary tract infection), and Varicose veins. has a past surgical history that includes Cholecystectomy; vascular surgery; Vein Surgery; Hysterectomy; hernia repair; Upper gastrointestinal endoscopy (06/20/2017); and pr egd transoral biopsy single/multiple (N/A, 6/20/2017). Restrictions  Restrictions/Precautions  Restrictions/Precautions: General Precautions,Fall Risk  Position Activity Restriction  Other position/activity restrictions: Left UE IV  Vision/Hearing  Vision: Impaired  Vision Exceptions: Wears glasses at all times  Hearing: Exceptions to [facility]  Hearing Exceptions: Hard of hearing/hearing concerns;Bilateral hearing aid     Subjective  General  Chart Reviewed: Yes  Patient assessed for rehabilitation services?: Yes  Additional Pertinent Hx: DM, Irritable bowel, chronic neck pain  Family / Caregiver Present: No  Diagnosis: Colitis  Follows Commands: Within Functional Limits  General Comment  Comments: Isiah Hunter, RN reports patient medically appropriate for PT. Subjective  Subjective: Patient pleasant and agreeable to PT.   Pain Screening  Patient Currently in Pain: Yes     Pre Treatment Pain Screening  Intervention List: Patient able to continue with treatment    Orientation  Orientation  Overall Orientation Status: Within Normal Limits  Social/Functional History  Social/Functional History  Lives With: Significant other  Type of Home: Apartment [facility])  Home Layout: One level  Home Access: Elevator  Bathroom Shower/Tub: Tub/Shower unit (cut out tub)  Bathroom Toilet: Handicap height  Bathroom Equipment: Grab bars in shower,Grab bars around [id number] [facility]: [address] (Rollator that she uses outside of apartment, RW was her 's and she uses in apartment (needs a new one). )  Receives Help From: Home health (for TLC 2x/wk)  ADL Assistance: Needs assistance (needs assist with bathing)  Homemaking Assistance: Needs assistance (Makes own breakfast)  Ambulation Assistance: Independent (Rollator, walks to dining thakkar)  Transfer Assistance: Independent  Active : No  Patient's  Info: Uses [facility] bus to go to doctor / store  Leisure & Hobbies: Sleep  Additional Comments: No recent history of falls. Cognition   Cognition  Overall Cognitive Status: Exceptions  Following Commands: Follows one step commands with repetition; Follows multistep commands with repitition  Memory: Decreased recall of biographical Information  Safety Judgement: Decreased awareness of need for assistance;Decreased awareness of need for safety  Problem Solving: Assistance required to generate solutions;Assistance required to implement solutions;Assistance required to correct errors made;Decreased awareness of errors  Insights: Decreased awareness of deficits  Initiation: Requires cues for some  Sequencing: Requires cues for some    Objective     Observation/Palpation  Posture: Poor (Vrey flexed posture (able to briefly correct with verbal cues))  Observation: Patient very [place]    AROM RLE (degrees)  RLE AROM: WFL  AROM LLE (degrees)  LLE AROM : WFL  Strength RLE  Comment: hip 3+/5, knee 4-/5, ankle 4/5  Strength LLE  Comment: hip 3+/5, knee 3+/5, ankle 4/5  Tone RLE  RLE Tone: Normotonic  Tone LLE  LLE Tone: Normotonic  Motor Control  Gross Motor?: Exceptions  Comments: Slowed motor planning and slowed motor response. Sensation  Overall Sensation Status: WFL  Bed mobility  Rolling to Left: Stand by assistance  Rolling to Right: Stand by assistance  Supine to Sit: Stand by assistance  Sit to Supine: Stand by assistance  Scooting: Stand by assistance  Comment: Vrebal cues for technique and for use of bed rails  Transfers  Sit to Stand: Stand by assistance  Stand to sit: Stand by assistance  Bed to Chair: Stand by assistance  Stand Pivot Transfers: Stand by assistance  Comment: RW for transfers, verbal cues to push up from bed  Ambulation  Ambulation?: Yes  Ambulation 1  Surface: level tile  Device: Rolling Walker  Assistance: Minimal assistance  Quality of Gait: Patient with very flexed posture, stands too far behind walker. Able to briefly correct posture and position in RW with verbal cues, but quickly back or original posture and position. Slow shuffling gait, fatigues very quickly. Gait Deviations: Slow Tavia;Decreased step length;Decreased step height;Shuffles;Decreased head and trunk rotation  Distance: 15 feet x 2     Balance  Sitting - Static: Good;-  Sitting - Dynamic: Fair;+  Standing - Static: Fair (RW)  Standing - Dynamic: Fair;- (RW)        Plan   Plan  Times per week: 1-2x/d, 5-6 d/wk  Current Treatment Recommendations: Strengthening,Balance Training,Functional Mobility Training,Transfer Training,Gait  Exercise Program,Safety Education & Training,Patient/Caregiver Education & Training  Safety Devices  Type of devices:  All fall risk precautions in place,Gait belt,Nurse notified,Call light within reach,Left in bed,Bed alarm in place,Patient at risk for falls    OutComes Score    AM-PAC Score  AM-[facility] Inpatient Mobility Raw Score : 16 (02/05/22 1122)  AM-PAC Inpatient T-Scale Score : 40.78 (02/05/22 1122)  Mobility Inpatient CMS 0-100% Score: 54.16 (02/05/22 1122)  Mobility Inpatient CMS G-Code Modifier : CK (02/05/22 1122)          Goals  Short term goals  Time Frame for Short term goals: 12 visits  Short term goal 1: Patient will be indep with bed mobility and transfers. Short term goal 2: Patient will amb 50 feet with RW and supervision. Short term goal 3: Patient will tolerate 30 minutes of ther-ex and ther-act. Short term goal 4: Patient will have fair dynamic standing balance with RW.   Patient Goals   Patient goals : [facility] Time   Individual Concurrent Group Co-treatment   Time In 0842         Time Out 0913         Minutes 31         Timed Code Treatment Minutes: [address], PT

## 2022-02-06 VITALS
BODY MASS INDEX: 31.98 KG/M2 | TEMPERATURE: 97.7 F | HEART RATE: 51 BPM | HEIGHT: 62 IN | OXYGEN SATURATION: 98 % | WEIGHT: 173.8 LBS | RESPIRATION RATE: 18 BRPM | DIASTOLIC BLOOD PRESSURE: 55 MMHG | SYSTOLIC BLOOD PRESSURE: 151 MMHG

## 2022-02-06 LAB
ALBUMIN SERPL-MCNC: 3 G/DL (ref 3.5–5.2)
ALBUMIN/GLOBULIN RATIO: ABNORMAL (ref 1–2.5)
ALP BLD-CCNC: 53 U/L (ref 35–104)
ALT SERPL-CCNC: 5 U/L (ref 5–33)
ANION GAP SERPL CALCULATED.3IONS-SCNC: 9 MMOL/L (ref 9–17)
AST SERPL-CCNC: 11 U/L
BILIRUB SERPL-MCNC: 0.8 MG/DL (ref 0.3–1.2)
BUN BLDV-MCNC: 10 MG/DL (ref 8–23)
BUN/CREAT BLD: 11 (ref 9–20)
CALCIUM SERPL-MCNC: 8.6 MG/DL (ref 8.6–10.4)
CHLORIDE BLD-SCNC: 106 MMOL/L (ref 98–107)
CO2: 25 MMOL/L (ref 20–31)
CREAT SERPL-MCNC: 0.88 MG/DL (ref 0.5–0.9)
GFR AFRICAN AMERICAN: >60 ML/MIN
GFR NON-AFRICAN AMERICAN: 60 ML/MIN
GFR SERPL CREATININE-BSD FRML MDRD: ABNORMAL ML/MIN/{1.73_M2}
GFR SERPL CREATININE-BSD FRML MDRD: ABNORMAL ML/MIN/{1.73_M2}
GLUCOSE BLD-MCNC: 107 MG/DL (ref 65–105)
GLUCOSE BLD-MCNC: 108 MG/DL (ref 70–99)
GLUCOSE BLD-MCNC: 109 MG/DL (ref 65–105)
HCT VFR BLD CALC: 35.5 % (ref 36.3–47.1)
HEMOGLOBIN: 11.3 G/DL (ref 11.9–15.1)
MAGNESIUM: 1.8 MG/DL (ref 1.6–2.6)
MCH RBC QN AUTO: 30.3 PG (ref 25.2–33.5)
MCHC RBC AUTO-ENTMCNC: 31.8 G/DL (ref 28.4–34.8)
MCV RBC AUTO: 95.2 FL (ref 82.6–102.9)
NRBC AUTOMATED: 0 PER 100 WBC
PDW BLD-RTO: 14.3 % (ref 11.8–14.4)
PLATELET # BLD: 129 K/UL (ref 138–453)
PMV BLD AUTO: 9.5 FL (ref 8.1–13.5)
POTASSIUM SERPL-SCNC: 3.4 MMOL/L (ref 3.7–5.3)
RBC # BLD: 3.73 M/UL (ref 3.95–5.11)
SODIUM BLD-SCNC: 140 MMOL/L (ref 135–144)
TOTAL PROTEIN: 5.5 G/DL (ref 6.4–8.3)
WBC # BLD: 4.3 K/UL (ref 3.5–11.3)

## 2022-02-06 PROCEDURE — 80053 COMPREHEN METABOLIC PANEL: CPT

## 2022-02-06 PROCEDURE — 2500000003 HC RX 250 WO HCPCS: Performed by: NURSE PRACTITIONER

## 2022-02-06 PROCEDURE — C9113 INJ PANTOPRAZOLE SODIUM, VIA: HCPCS | Performed by: NURSE PRACTITIONER

## 2022-02-06 PROCEDURE — 85027 COMPLETE CBC AUTOMATED: CPT

## 2022-02-06 PROCEDURE — 6370000000 HC RX 637 (ALT 250 FOR IP): Performed by: INTERNAL MEDICINE

## 2022-02-06 PROCEDURE — 83735 ASSAY OF MAGNESIUM: CPT

## 2022-02-06 PROCEDURE — 36415 COLL VENOUS BLD VENIPUNCTURE: CPT

## 2022-02-06 PROCEDURE — 6360000002 HC RX W HCPCS: Performed by: NURSE PRACTITIONER

## 2022-02-06 PROCEDURE — 82947 ASSAY GLUCOSE BLOOD QUANT: CPT

## 2022-02-06 PROCEDURE — 99239 HOSP IP/OBS DSCHRG MGMT >30: CPT | Performed by: INTERNAL MEDICINE

## 2022-02-06 PROCEDURE — 6370000000 HC RX 637 (ALT 250 FOR IP): Performed by: NURSE PRACTITIONER

## 2022-02-06 RX ORDER — METRONIDAZOLE 500 MG/1
500 TABLET ORAL EVERY 8 HOURS SCHEDULED
Status: DISCONTINUED | OUTPATIENT
Start: 2022-02-06 | End: 2022-02-06 | Stop reason: HOSPADM

## 2022-02-06 RX ORDER — DICYCLOMINE HYDROCHLORIDE 10 MG/1
10 CAPSULE ORAL 3 TIMES DAILY PRN
Status: DISCONTINUED | OUTPATIENT
Start: 2022-02-06 | End: 2022-02-06 | Stop reason: HOSPADM

## 2022-02-06 RX ORDER — PANTOPRAZOLE SODIUM 40 MG/1
40 TABLET, DELAYED RELEASE ORAL
Status: DISCONTINUED | OUTPATIENT
Start: 2022-02-06 | End: 2022-02-06 | Stop reason: HOSPADM

## 2022-02-06 RX ORDER — METRONIDAZOLE 500 MG/1
500 TABLET ORAL EVERY 8 HOURS SCHEDULED
Qty: 21 TABLET | Refills: 0 | Status: SHIPPED | OUTPATIENT
Start: 2022-02-06 | End: 2022-02-13

## 2022-02-06 RX ORDER — CIPROFLOXACIN 500 MG/1
500 TABLET, FILM COATED ORAL EVERY 12 HOURS SCHEDULED
Qty: 14 TABLET | Refills: 0 | Status: SHIPPED | OUTPATIENT
Start: 2022-02-06 | End: 2022-02-13

## 2022-02-06 RX ORDER — CIPROFLOXACIN 500 MG/1
500 TABLET, FILM COATED ORAL EVERY 12 HOURS SCHEDULED
Status: DISCONTINUED | OUTPATIENT
Start: 2022-02-06 | End: 2022-02-06 | Stop reason: HOSPADM

## 2022-02-06 RX ADMIN — METRONIDAZOLE 500 MG: 500 INJECTION, SOLUTION INTRAVENOUS at 06:02

## 2022-02-06 RX ADMIN — FUROSEMIDE 20 MG: 20 TABLET ORAL at 11:50

## 2022-02-06 RX ADMIN — ENOXAPARIN SODIUM 30 MG: 30 INJECTION SUBCUTANEOUS at 11:51

## 2022-02-06 RX ADMIN — PANTOPRAZOLE SODIUM 40 MG: 40 INJECTION, POWDER, FOR SOLUTION INTRAVENOUS at 11:50

## 2022-02-06 RX ADMIN — POTASSIUM CHLORIDE 10 MEQ: 10 CAPSULE, COATED, EXTENDED RELEASE ORAL at 11:53

## 2022-02-06 RX ADMIN — NYSTATIN 500000 UNITS: 100000 SUSPENSION ORAL at 11:53

## 2022-02-06 RX ADMIN — LEVOTHYROXINE SODIUM 125 MCG: 0.12 TABLET ORAL at 06:02

## 2022-02-06 NOTE — PLAN OF CARE
Problem: Falls - Risk of:  Goal: Will remain free from falls  Description: Will remain free from falls  2/6/2022 0450 by Elian Robins RN  Outcome: Ongoing     Problem: Pain:  Goal: Patient's pain/discomfort is manageable  Description: Patient's pain/discomfort is manageable  Outcome: Ongoing     Problem: Safety:  Goal: Free from accidental physical injury  Description: Free from accidental physical injury  Outcome: Ongoing     Problem: Daily Care:  Goal: Daily care needs are met  Description: Daily care needs are met  Outcome: Ongoing

## 2022-02-06 NOTE — CARE COORDINATION
Spoke to Ran with [facility] ambulance and pt will be picked up at 2:30pm by ambulance back to [address] and granddaughter Michelle Macdonald informed of D/C today. Sade Alas with TLC at [facility] informed to resume HHA services. Referral to Latasha Costa with [facility] for SN, PT/OT and can accept. Pt had services with [facility] in the past.  Will start services in next 24-48 hours. MARCO ANTONIO updated and faxed to TLC.

## 2022-02-06 NOTE — DISCHARGE INSTR - COC
Continuity of Care Form    Patient Name: Uche King   :  1925  MRN:  [id number]    Admit date:  2/3/2022  Discharge date:  22    Code Status Order: Full Code   Advance Directives:      Admitting Physician:  Eliud Cuadra MD  PCP: Hilda Haile MD    Discharging Nurse: [facility] Unit/Room#:   Discharging Unit Phone Number: [phone]    Emergency Contact:   Extended Emergency Contact Information  Primary Emergency Contact: Erendiranorm NavasMerced [address] Phone: [phone]  Relation: Grandchild    Past Surgical History:  Past Surgical History:   Procedure Laterality Date    CHOLECYSTECTOMY      HERNIA REPAIR      HYSTERECTOMY      partial    DC EGD TRANSORAL BIOPSY SINGLE/MULTIPLE N/A 2017    EGD BIOPSY performed by Marlen Irving MD at [facility]. [facility]  2017    MILD CHRONIC INACTIVE GASTRITIS    VASCULAR SURGERY      varicose veins    VEIN SURGERY         Immunization History:   Immunization History   Administered Date(s) Administered    COVID-19, Pfizer Purple top, DILUTE for use, 12+ yrs, 30mcg/0.3mL dose 2021, 2021    Influenza, High Dose (Fluzone 65 yrs and older) 2017, 10/15/2019    Pneumococcal Conjugate 13-valent ([place]) 2017       Active Problems:  Patient Active Problem List   Diagnosis Code    Atrial fibrillation ([facility] I48.91    COPD (chronic obstructive pulmonary disease) ([facility] J44.9    Abdominal cramping R10.9    Back pain, chronic M54.9, G89.29    Diabetes mellitus ([facility] E11.9    C. difficile colitis A04.72    Nausea vomiting and diarrhea R11.2, R19.7    Hypoalbuminemia E88.09    Gastroenteritis/enteritis K52.9    Supratherapeutic INR R79.1    Gastroesophageal reflux disease without esophagitis K21.9    Irritable bowel syndrome with both constipation and diarrhea K58.2    Irritable bowel syndrome with diarrhea K58.0    Left lower quadrant pain R10.32    PND (paroxysmal nocturnal dyspnea) R06.00    Acute right ankle pain M25.571    Constipation K59.00    Abdominal pain, epigastric R10.13    Anemia D64.9    Iron deficiency anemia D50.9    Type 2 diabetes mellitus without complication, without long-term current use of insulin (HCC) E11.9    Gastritis K29.70    Diarrhea of presumed infectious origin R19.7    Diverticulitis K57.92    Thyroid disease E07.9    STACEY (acute kidney injury) ([facility] N17.9    Congestive heart failure (HCC) I50.9    Acute cystitis with hematuria N30.01    H/O Clostridium difficile infection Z86.19    Lactic acid increased E87.2    Murmur, cardiac R01.1    Chronic diastolic CHF (congestive heart failure) ([facility]) I50.32    Hx of prolonged Q-T interval on ECG Z87.898       Isolation/Infection:   Isolation            No Isolation          Patient Infection Status       Infection Onset Added Last Indicated Last Indicated By Review Planned Expiration Resolved Resolved By    None active    Resolved    C-diff Rule Out 03/26/20 03/26/20 03/26/20 [facility]. difficile [facility] [place] - ONLY (Ordered)   03/27/20 Rule-Out Test Resulted            Nurse Assessment:  Last Vital Signs: BP (!) 151/55   Pulse 51   Temp 97.7 °F (36.5 °C) (Oral)   Resp 18   Ht 5' 2\" (1.575 m)   Wt 173 lb 12.8 oz (78.8 kg)   SpO2 98%   BMI 31.79 kg/m²     Last documented pain score (0-10 scale): Pain Level: 0  Last Weight:   Wt Readings from Last 1 Encounters:   02/06/22 173 lb 12.8 oz (78.8 kg)     Mental Status:  alert    IV Access:  - None    Nursing Mobility/ADLs:  Walking   Assisted  Transfer  Assisted  Bathing  Assisted  Dressing  Assisted  Toileting  Assisted  Feeding  Assisted  Med Admin  Assisted  Med Delivery   whole    Wound Care Documentation and Therapy:        Elimination:  Continence:    Bowel: No  Bladder: No  Urinary Catheter: None   Colostomy/Ileostomy/Ileal Conduit: No       Date of Last BM: ***    Intake/Output Summary (Last 24 hours) at

## 2022-02-06 NOTE — DISCHARGE SUMMARY
[facility]  Office: [address], DO, Justina Leyva, DO, Karie Bran, DO, Edna Christine, DO, Brain Dancer, MD, Rob Saldana MD, Teri Nunez MD, Yash Sutton MD, Jude Schmitz MD, Burgess William MD, Vernell Medina MD, Brittanie Epstein, DO, Sheba Alcantara DO, Reina Metz MD,  Alexey Valdivia DO, Whitney Case MD, Cindy Hines MD, Maricruz Hinkle MD, Rony Wadsworth MD, Rachael Galvan MD, Live Rivera, [facility], [facility], [facility], Foreign Garcia, CNP, Stoney Yeung, CNS, Demarco Costa, CNP, Khris Fierro, CNP, Yosvany Burrell, CNP, Zeinab Gao, CNP, Author Bernadine, [facility], Angela Jenkins PA-C, Gill Nassar, [facility], Morris Avendano, [facility], Pallavi Robertson, CNP, Jeannette Unger, CNP, Shreya Morgan, CNP, Chandni Carvajal, CNP, Nette Gee, CNP, Li Hernandez, [facility]    Discharge Summary     Patient ID: Agustin Nguyen  :  1925   MRN: [id number]     ACCOUNT:  [de-identified]   Patient's PCP: Parth Osborne MD  Admit Date: 2/3/2022   Discharge Date: 2022   Length of Stay: 2  Code Status:  Full Code  Admitting Physician: David Marie MD  Discharge Physician: David Marie MD     Active Discharge Diagnoses:     Hospital Problem Lists:  Principal Problem:    Diverticulitis  Active Problems:    COPD (chronic obstructive pulmonary disease) ([facility])    Back pain, chronic    Diabetes mellitus ([facility]    Gastroesophageal reflux disease without esophagitis    Irritable bowel syndrome with both constipation and diarrhea    Irritable bowel syndrome with diarrhea    Iron deficiency anemia    Type 2 diabetes mellitus without complication, without long-term current use of insulin ([facility])    Murmur, cardiac    Chronic diastolic CHF (congestive heart failure) ([facility]  Resolved Problems:    * No resolved hospital problems.  *      Admission Condition:  poor     Discharged Condition: stable    Hospital Stay:   Admitting history:  Raymond zheng 80 y.o. Non- / non  female who presents from [facility] cramping, nausea and emesis and is admitted to the hospital for the management of Diverticulitis. Patient is [place] and somewhat of a poor hisotorian. She reports a history of IBS with both constipation and diarrhea and occasionally has abdominal cramping that is usually relieved with bowel movements. Patient's pain initially improved with bowel movement in ED however it returned. CT abdomen consistent with acute diverticulitis without leukocytosis.  She was started on IV Cipro and Flagyl      Hospital Course:     Still states left lower part of abdomen is little sore, denies any specific pain or cramps at present  No nausea vomiting,  States she had 1 small loose stool in morning-color is greenish per nurse  Patient complaining of some abdominal cramps  Tolerating full liquid diet, requesting to advance diet  Complains of coating on tongue  Wants to go back to assisted living place today  Plan:         Switch to oral Cipro and Flagyl  Mycostatin swish and swallow  Morphine as needed for pain/soreness while in hospital  Start oral dicyclomine as needed  Switch to soft diet   Replace electrolytes as per protocol  Discharge back to assisted living with home care if tolerates diet       Significant therapeutic interventions: See above notes    Significant Diagnostic Studies:   Labs / Micro:  CBC:   Lab Results   Component Value Date    WBC 4.3 02/06/2022    RBC 3.73 02/06/2022    HGB 11.3 02/06/2022    HCT 35.5 02/06/2022    MCV 95.2 02/06/2022    MCH 30.3 02/06/2022    MCHC 31.8 02/06/2022    RDW 14.3 02/06/2022     02/06/2022     BMP:    Lab Results   Component Value Date    GLUCOSE 108 02/06/2022     02/06/2022    K 3.4 02/06/2022     02/06/2022    CO2 25 02/06/2022    ANIONGAP 9 02/06/2022    BUN 10 02/06/2022    CREATININE 0.88 02/06/2022    BUNCRER 11 02/06/2022    CALCIUM 8.6 02/06/2022    LABGLOM 60 02/06/2022    GFRAA >60 02/06/2022    GFR      02/06/2022    GFR NOT REPORTED 02/06/2022     HFP:    Lab Results   Component Value Date    PROT 5.5 02/06/2022     CMP:    Lab Results   Component Value Date    GLUCOSE 108 02/06/2022     02/06/2022    K 3.4 02/06/2022     02/06/2022    CO2 25 02/06/2022    BUN 10 02/06/2022    CREATININE 0.88 02/06/2022    ANIONGAP 9 02/06/2022    ALKPHOS 53 02/06/2022    ALT 5 02/06/2022    AST 11 02/06/2022    BILITOT 0.80 02/06/2022    LABALBU 3.0 02/06/2022    ALBUMIN NOT REPORTED 02/06/2022    LABGLOM 60 02/06/2022    GFRAA >60 02/06/2022    GFR      02/06/2022    GFR NOT REPORTED 02/06/2022    PROT 5.5 02/06/2022    CALCIUM 8.6 02/06/2022     PT/INR:    Lab Results   Component Value Date    PROTIME 11.7 01/21/2020    PROTIME 27.3 10/09/2014    INR 1.1 01/21/2020     PTT:   Lab Results   Component Value Date    APTT 28.5 08/19/2016     FLP:    Lab Results   Component Value Date    CHOL 137 12/08/2020    TRIG 87 12/08/2020    HDL 71 12/08/2020     U/A:    Lab Results   Component Value Date    COLORU YELLOW 01/29/2021    TURBIDITY CLEAR 01/29/2021    SPECGRAV 1.015 01/29/2021    HGBUR TRACE 01/29/2021    PHUR 7.0 01/29/2021    PROTEINU NEGATIVE 01/29/2021    GLUCOSEU NEGATIVE 01/29/2021    KETUA NEGATIVE 01/29/2021    BILIRUBINUR NEGATIVE 01/29/2021    BILIRUBINUR small 1 plus 08/31/2019    UROBILINOGEN Normal 01/29/2021    NITRU NEGATIVE 01/29/2021    LEUKOCYTESUR NEGATIVE 01/29/2021     TSH:    Lab Results   Component Value Date    TSH 7.59 11/30/2021        Radiology:  CT ABDOMEN PELVIS WO CONTRAST Additional Contrast? None    Result Date: 2/4/2022  1. No change in the lung bases where there is basilar atelectasis, and septal thickening. 2.  Cardiomegaly and coronary artery calcification. Atherosclerotic disease. Aorta measures 2.6 cm maximum size. 3.  Wall thickening and haziness in the sigmoid colon consistent with colitis/diverticulitis, low grade.  4.  Some filling defects in the colon. This may be due to medication. Small polyps not excluded. 5.  Status post cholecystectomy. Mild ductal dilatation is noted presumably secondary to cholecystectomy. Consultations:    Consults:     Final Specialist Recommendations/Findings:   IP CONSULT TO HOSPITALIST      The patient was seen and examined on day of discharge and this discharge summary is in conjunction with any daily progress note from day of discharge. Discharge plan:     Disposition: Home/assisted living with home care    Physician Follow Up:     [facility] [address] [place] [address]  [phone]    Schedule an appointment as soon as possible for a visit in 1 week         Requiring Further Evaluation/Follow Up POST HOSPITALIZATION/Incidental Findings:  Follow-up with PCP within 1 week after finishing antibiotics    Diet: cardiac diet and diabetic diet    Activity: As tolerated    Instructions to Patient: PT OT    Discharge Medications:      Medication List      START taking these medications    ciprofloxacin 500 MG tablet  Commonly known as: CIPRO  Take 1 tablet by mouth every 12 hours for 7 days     metroNIDAZOLE 500 MG tablet  Commonly known as: FLAGYL  Take 1 tablet by mouth every 8 hours for 7 days        CONTINUE taking these medications    amiodarone 200 MG tablet  Commonly known as: CORDARONE     dicyclomine 10 MG capsule  Commonly known as: Bentyl  Take 1 capsule by mouth every 6 hours as needed (cramps)     furosemide 20 MG tablet  Commonly known as: LASIX     levothyroxine 125 MCG tablet  Commonly known as: SYNTHROID     nystatin [id number] UNIT/ML suspension  Commonly known as: MYCOSTATIN  Take 5 mLs by mouth 4 times daily for 10 days Retain in mouth as long as possible     OXYGEN     pantoprazole 40 MG tablet  Commonly known as: PROTONIX  TAKE ONE TABLET BY MOUTH DAILY     potassium chloride 10 MEQ extended release tablet  Commonly known as: KLOR-CON  TAKE ONE TABLET BY MOUTH TWICE A DAY     traMADol 50 MG tablet  Commonly known as: ULTRAM     Vitamin D3 50 MCG (2000 UT) Caps        STOP taking these medications    melatonin 3 MG Tabs tablet     sucralfate 1 GM tablet  Commonly known as: CARAFATE           Where to Get Your Medications      These medications were sent to [facility], [address]  [facility]  [facility], [address] [place] [id number]    Phone: [phone]   ciprofloxacin 500 MG tablet  metroNIDAZOLE 500 MG tablet         No discharge procedures on file. Time Spent on discharge is  35 mins in patient examination, evaluation, counseling as well as medication reconciliation, prescriptions for required medications, discharge plan and follow up. Electronically signed by   Shannon Butterfield MD  2/6/2022  1:07 PM      Thank you Dr. Darrell Piña MD for the opportunity to be involved in this patient's care.

## 2022-02-06 NOTE — PROGRESS NOTES
[facility]  Office: [address], DO, Randy Morel, DO, Bala Miller, DO, Devan Crossparminder Christine, DO, Bernadene Ahumada, MD, Radames Irene MD, Abdulkadir Cruz MD, Danitza Comer MD, Mamie Burch MD, Efraín White MD, Mary Olivarez MD, Dahiana Marc, DO, Romeo Melissa DO, Mel Woodard MD,  Ct Boone, DO, Mario Granda MD, Laila Ingram MD, Susan Connelly MD, Federico Skinner MD, Kasandra Linares MD, Nic Gomez [facility], [facility], [facility], Feliciano Forbes, [facility], Jesus Palacios, [facility], Magalys Mueller, CNP, Denise Cuevas, CNP, Twylla Boeck, CNP, Victoria Lancaster, CNP, Jack Linares, CNP, Aletha Mckeon PA-C, Mahesh Anderson, DNP, Librado Clark, [facility], Pineda Wright, CNP, Deidra White, CNP, Donovan Aguero, CNP, Dionne Fregoso, CNP, Marcelino Ortiz, [facility], Radha Soto, [facility]    Progress Note    2/6/2022    8:12 AM    Name:   Mike Mcgowan  MRN:     [id number]     [place]:      [de-identified]   Room:   2026/202601   Day:  2  Admit Date:  2/3/2022  7:17 PM    PCP:   Ebenezer Tellez MD  Code Status:  Full Code    Subjective:     C/C:   Chief Complaint   Patient presents with   [place] Nausea     took zofran    Emesis     x 1 today     Interval History Status:   Patient is getting IV antibiotics for diverticulitis  Still states left lower part of abdomen is little sore, denies any specific pain or cramps at present  No nausea vomiting,  States she had 1 loose stool in morning-color is greenish per nurse  Patient complaining of some abdominal cramps  Tolerating full liquid diet, requesting to advance diet  Complains of coating on tongue  Wants to go back to assisted living place today  Brief History:   Mike Mcgowan is a 80 y.o. Non- / non  female who presents from [address] [place] [address] with abdominal cramping, nausea and emesis and is admitted to the hospital for the management of Diverticulitis. Patient is [place] and somewhat of a poor hisotorian. She reports a history of IBS with both constipation and diarrhea and occasionally has abdominal cramping that is usually relieved with bowel movements. Patient's pain initially improved with bowel movement in ED however it returned. CT abdomen consistent with acute diverticulitis without leukocytosis. She was started on IV Cipro and Flagyl. Review of Systems:     Constitutional:  negative for chills, fevers, sweats  Respiratory:  negative for cough, dyspnea on exertion, shortness of breath, wheezing  Cardiovascular:  negative for chest pain, chest pressure/discomfort, lower extremity edema, palpitations  Gastrointestinal: + Left lower abdominal soreness, negative for abdominal pain, constipation, diarrhea, nausea, vomiting  Neurological:  negative for dizziness, headache    Medications: Allergies:     Allergies   Allergen Reactions    Penicillins      Has no recollection of what the reaction was       Current Meds:   Scheduled Meds:    nystatin  5 mL Oral 4x Daily    amiodarone  200 mg Oral Once per day on Mon Thu    levothyroxine  125 mcg Oral Every Other Day    furosemide  20 mg Oral Daily    potassium chloride  10 mEq Oral BID    sodium chloride flush  5-40 mL IntraVENous 2 times per day    enoxaparin  30 mg SubCUTAneous Daily    pantoprazole  40 mg IntraVENous Daily    And    sodium chloride (PF)  10 mL IntraVENous Daily    metroNIDAZOLE  500 mg IntraVENous Q8H    ciprofloxacin  400 mg IntraVENous Q24H     Continuous Infusions:    sodium chloride       PRN Meds: morphine, sodium chloride flush, sodium chloride, ondansetron **OR** ondansetron, acetaminophen **OR** acetaminophen    Data:     Past Medical History:   has a past medical history of A-fib ([facility] AAA (abdominal aortic aneurysm) ([facility] Abdominal cramping, Aortic insufficiency, Atrial fibrillation ([facility] Back pain, chronic, C. difficile colitis, Chronic diastolic CHF (congestive heart failure) ([facility] COPD (chronic obstructive pulmonary disease) ([facility] Diabetes mellitus ([facility] Gastritis, GERD (gastroesophageal reflux disease), Hiatal hernia, Hx of prolonged Q-T interval on ECG, Hypoalbuminemia, IBS (irritable bowel syndrome), Murmur, cardiac, Nausea vomiting and diarrhea, Stomach ulcer, Thyroid disease, Type 2 diabetes mellitus without complication, without long-term current use of insulin ([facility] UTI (lower urinary tract infection), and Varicose veins. Social History:   reports that she has quit smoking. Her smoking use included cigarettes. She has a 20.00 pack-year smoking history. She has never used smokeless tobacco. She reports current alcohol use of about 2.0 standard drinks of alcohol per week. She reports that she does not use drugs. Family History:   Family History   Problem Relation Age of Onset    Stroke Mother     Heart Disease Mother        Vitals:  BP (!) 135/55   Pulse 54   Temp 97.3 °F (36.3 °C) (Oral)   Resp 16   Ht 5' 2\" (1.575 m)   Wt 173 lb 12.8 oz (78.8 kg)   SpO2 93%   BMI 31.79 kg/m²   Temp (24hrs), Av.7 °F (36.5 °C), Min:97.3 °F (36.3 °C), Max:98 °F (36.7 °C)    Recent Labs     22  1133 22  1648 22  0625   POCGLU 99 75 111* 109*       I/O (24Hr):     Intake/Output Summary (Last 24 hours) at 2022 0812  Last data filed at 2022 1513  Gross per 24 hour   Intake --   Output 250 ml   Net -250 ml       Labs:  Hematology:  Recent Labs     22  1252 22  0626 22  0557   WBC 7.6 5.5 4.3   RBC 4.01 3.83* 3.73*   HGB 12.1 11.5* 11.3*   HCT 38.3 36.7 35.5*   MCV 95.5 95.8 95.2   MCH 30.2 30.0 30.3   MCHC 31.6 31.3 31.8   RDW 14.3 14.6* 14.3    131* 129*   MPV 9.7 9.3 9.5     Chemistry:  Recent Labs     22  1252 22  1252 22  1412 22  0626 22  0557     --   --  142 140   K 3.4*  --   --  3.4* 3.4*     --   --  106 106   CO2 26  --   --  27 25   GLUCOSE 178*  --   --  97 108*   BUN 15  --   --  13 10 CREATININE 1.09*  --   --  1.07* 0.88   MG 1.9   < > 1.9 1.9 1.8   ANIONGAP 12  --   --  9 9   LABGLOM 47*  --   --  48* 60*   GFRAA 56*  --   --  58* >60   CALCIUM 8.8  --   --  8.7 8.6    < > = values in this interval not displayed. Recent Labs     02/04/22  1252 02/04/22  1649 02/04/22 2031 02/05/22  0626 02/05/22  1133 02/05/22  1648 02/05/22 2002 02/06/22  0557 02/06/22  0625   PROT 6.2*  --   --  5.5*  --   --   --  5.5*  --    LABALBU 3.3*  --   --  3.0*  --   --   --  3.0*  --    AST 17  --   --  13  --   --   --  11  --    ALT 5  --   --  <5*  --   --   --  5  --    ALKPHOS 61  --   --  58  --   --   --  53  --    BILITOT 1.10  --   --  0.97  --   --   --  0.80  --    POCGLU  --  119* 129*  --  99 75 111*  --  109*     ABG:No results found for: POCPH, PHART, PH, POCPCO2, JNS9XNP, PCO2, POCPO2, PO2ART, PO2, POCHCO3, CSP1JHD, HCO3, NBEA, PBEA, BEART, BE, THGBART, THB, ABJ3ZEH, MUSE6NQD, H9UFMPLD, O2SAT, FIO2  Lab Results   Component Value Date/Time    SPECIAL NOT REPORTED 09/03/2020 03:56 PM     Lab Results   Component Value Date/Time    CULTURE  09/03/2020 03:56 PM     Oral ishmael, negative for Group A Strep and other beta hemolytic streptococci       Radiology:  CT ABDOMEN PELVIS WO CONTRAST Additional Contrast? None    Result Date: 2/4/2022  1. No change in the lung bases where there is basilar atelectasis, and septal thickening. 2.  Cardiomegaly and coronary artery calcification. Atherosclerotic disease. Aorta measures 2.6 cm maximum size. 3.  Wall thickening and haziness in the sigmoid colon consistent with colitis/diverticulitis, low grade. 4.  Some filling defects in the colon. This may be due to medication. Small polyps not excluded. 5.  Status post cholecystectomy. Mild ductal dilatation is noted presumably secondary to cholecystectomy.        Physical Examination:        General appearance:  alert, cooperative and no distress, tongue is coated  Mental Status:  oriented to person, place and time and normal affect  Lungs:  clear to auscultation bilaterally, normal effort  Heart:  regular rate and rhythm, no murmur  Abdomen:  soft, + mild discomfort in left lower quadrant, no guarding, nondistended,  bowel sounds are sluggish, no masses, hepatomegaly, splenomegaly  Extremities:  no edema, redness, tenderness in the calves  Skin:  no gross lesions, rashes, induration    Assessment:        Hospital Problems           Last Modified POA    * (Principal) Diverticulitis 2/4/2022 Yes    COPD (chronic obstructive pulmonary disease) ([address] 2/4/2022 Yes    Back pain, chronic 2/4/2022 Yes    Diabetes mellitus ([address] 2/4/2022 Yes    Gastroesophageal reflux disease without esophagitis 2/4/2022 Yes    Irritable bowel syndrome with both constipation and diarrhea 2/4/2022 Yes    Irritable bowel syndrome with diarrhea 2/4/2022 Yes    Iron deficiency anemia 2/4/2022 Yes    Type 2 diabetes mellitus without complication, without long-term current use of insulin ([address] 2/4/2022 Yes    Murmur, cardiac 2/4/2022 Yes    Chronic diastolic CHF (congestive heart failure) ([address] 2/4/2022 Yes      Oral thrush    Plan:        1. Switch to oral Cipro and Flagyl  2. Mycostatin swish and swallow  3. Morphine as needed for pain/soreness while in hospital  4. Start oral dicyclomine as needed  5. Switch to soft diet   6. Replace electrolytes as per protocol  7.  Discharge back to assisted living with home care if tolerates diet    Geni Bar MD  2/6/2022  8:12 AM

## 2022-02-06 NOTE — PLAN OF CARE
Problem: Falls - Risk of:  Goal: Will remain free from falls  Description: Will remain free from falls  2/6/2022 1748 by Venkat Mercedes RN  Outcome: Completed  2/6/2022 0450 by Bob Workman RN  Outcome: Ongoing  Goal: Absence of physical injury  Description: Absence of physical injury  Outcome: Completed     Problem: Pain:  Description: Pain management should include both nonpharmacologic and pharmacologic interventions.   Goal: Pain level will decrease  Description: Pain level will decrease  Outcome: Completed  Goal: Control of acute pain  Description: Control of acute pain  Outcome: Completed  Goal: Control of chronic pain  Description: Control of chronic pain  Outcome: Completed  Goal: Patient's pain/discomfort is manageable  Description: Patient's pain/discomfort is manageable  2/6/2022 1748 by Venakt Mercedes RN  Outcome: Completed  2/6/2022 0450 by Bob Workman RN  Outcome: Ongoing     Problem: Infection:  Goal: Will remain free from infection  Description: Will remain free from infection  Outcome: Completed     Problem: Safety:  Goal: Free from accidental physical injury  Description: Free from accidental physical injury  2/6/2022 1748 by Venkat Mercedes RN  Outcome: Completed  2/6/2022 0450 by Bob Workman RN  Outcome: Ongoing     Problem: Daily Care:  Goal: Daily care needs are met  Description: Daily care needs are met  2/6/2022 1748 by Venkat Mercedes RN  Outcome: Completed  2/6/2022 0450 by Bob Workman RN  Outcome: Ongoing     Problem: Skin Integrity:  Goal: Skin integrity will stabilize  Description: Skin integrity will stabilize  Outcome: Completed     Problem: Discharge Planning:  Goal: Patients continuum of care needs are met  Description: Patients continuum of care needs are met  Outcome: Completed

## 2022-03-15 ENCOUNTER — OFFICE VISIT (OUTPATIENT)
Dept: GASTROENTEROLOGY | Age: 87
End: 2022-03-15
Payer: MEDICARE

## 2022-03-15 VITALS — WEIGHT: 173 LBS | DIASTOLIC BLOOD PRESSURE: 77 MMHG | SYSTOLIC BLOOD PRESSURE: 124 MMHG | BODY MASS INDEX: 31.64 KG/M2

## 2022-03-15 DIAGNOSIS — R10.9 CHRONIC ABDOMINAL PAIN: Primary | ICD-10-CM

## 2022-03-15 DIAGNOSIS — G89.29 CHRONIC ABDOMINAL PAIN: Primary | ICD-10-CM

## 2022-03-15 PROCEDURE — 99213 OFFICE O/P EST LOW 20 MIN: CPT | Performed by: INTERNAL MEDICINE

## 2022-03-15 RX ORDER — FAMOTIDINE 40 MG/1
40 TABLET, FILM COATED ORAL EVERY EVENING
Qty: 30 TABLET | Refills: 3 | Status: SHIPPED | OUTPATIENT
Start: 2022-03-15

## 2022-03-15 RX ORDER — PANTOPRAZOLE SODIUM 40 MG/1
40 TABLET, DELAYED RELEASE ORAL
Qty: 30 TABLET | Refills: 5 | Status: SHIPPED | OUTPATIENT
Start: 2022-03-15

## 2022-03-15 ASSESSMENT — ENCOUNTER SYMPTOMS
GASTROINTESTINAL NEGATIVE: 1
ALLERGIC/IMMUNOLOGIC NEGATIVE: 1
RESPIRATORY NEGATIVE: 1

## 2022-04-12 ENCOUNTER — APPOINTMENT (OUTPATIENT)
Dept: GENERAL RADIOLOGY | Age: 87
End: 2022-04-12
Payer: MEDICARE

## 2022-04-12 ENCOUNTER — HOSPITAL ENCOUNTER (OUTPATIENT)
Age: 87
Setting detail: OBSERVATION
Discharge: NURSING, REHAB OR LONG-TERM CARE | End: 2022-04-15
Attending: EMERGENCY MEDICINE | Admitting: STUDENT IN AN ORGANIZED HEALTH CARE EDUCATION/TRAINING PROGRAM
Payer: MEDICARE

## 2022-04-12 DIAGNOSIS — M53.3 PAIN IN THE COCCYX: ICD-10-CM
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
## 2022-04-12 PROCEDURE — 73562 X-RAY EXAM OF KNEE 3: CPT

## 2022-04-13 ENCOUNTER — APPOINTMENT (OUTPATIENT)
Dept: MRI IMAGING | Age: 87
End: 2022-04-13
Payer: MEDICARE

## 2022-04-13 ENCOUNTER — APPOINTMENT (OUTPATIENT)
Dept: GENERAL RADIOLOGY | Age: 87
End: 2022-04-13
Payer: MEDICARE

## 2022-04-13 PROBLEM — S32.2XXA CLOSED FRACTURE OF COCCYX (HCC): Status: ACTIVE | Noted: 2022-04-13

## 2022-04-13 PROBLEM — N18.30 CKD (CHRONIC KIDNEY DISEASE), STAGE III (HCC): Status: ACTIVE | Noted: 2022-04-13

## 2022-04-13 PROBLEM — E87.6 HYPOKALEMIA: Status: ACTIVE | Noted: 2022-04-13

## 2022-04-13 PROBLEM — N28.9 RENAL INSUFFICIENCY: Status: ACTIVE | Noted: 2022-04-13

## 2022-04-13 PROBLEM — R29.6 RECURRENT FALLS: Status: ACTIVE | Noted: 2022-04-13

## 2022-04-13 LAB
-: ABNORMAL
ABSOLUTE EOS #: 0.24 K/UL (ref 0–0.44)
ABSOLUTE IMMATURE GRANULOCYTE: 0.01 K/UL (ref 0–0.3)
ABSOLUTE LYMPH #: 1.59 K/UL (ref 1.1–3.7)
ABSOLUTE MONO #: 0.43 K/UL (ref 0.1–1.2)
ALBUMIN SERPL-MCNC: 3.5 G/DL (ref 3.5–5.2)
ALP BLD-CCNC: 65 U/L (ref 35–104)
ALT SERPL-CCNC: 8 U/L (ref 5–33)
AMORPHOUS: ABNORMAL
ANION GAP SERPL CALCULATED.3IONS-SCNC: 10 MMOL/L (ref 9–17)
AST SERPL-CCNC: 13 U/L
BACTERIA: ABNORMAL
BASOPHILS # BLD: 0 % (ref 0–2)
BASOPHILS ABSOLUTE: <0.03 K/UL (ref 0–0.2)
BILIRUB SERPL-MCNC: 0.71 MG/DL (ref 0.3–1.2)
BILIRUBIN URINE: NEGATIVE
BUN BLDV-MCNC: 21 MG/DL (ref 8–23)
BUN/CREAT BLD: 17 (ref 9–20)
CALCIUM SERPL-MCNC: 9 MG/DL (ref 8.6–10.4)
CASTS UA: ABNORMAL /LPF
CASTS UA: ABNORMAL /LPF
CHLORIDE BLD-SCNC: 102 MMOL/L (ref 98–107)
CO2: 28 MMOL/L (ref 20–31)
COLOR: YELLOW
CREAT SERPL-MCNC: 1.23 MG/DL (ref 0.5–0.9)
EOSINOPHILS RELATIVE PERCENT: 5 % (ref 1–4)
EPITHELIAL CELLS UA: ABNORMAL /HPF (ref 0–5)
FOLATE: 10.5 NG/ML
GFR AFRICAN AMERICAN: 49 ML/MIN
GFR NON-AFRICAN AMERICAN: 40 ML/MIN
GFR SERPL CREATININE-BSD FRML MDRD: ABNORMAL ML/MIN/{1.73_M2}
GLUCOSE BLD-MCNC: 106 MG/DL (ref 65–105)
GLUCOSE BLD-MCNC: 117 MG/DL (ref 70–99)
GLUCOSE BLD-MCNC: 131 MG/DL (ref 65–105)
GLUCOSE BLD-MCNC: 163 MG/DL (ref 65–105)
GLUCOSE URINE: NEGATIVE
HCT VFR BLD CALC: 37.7 % (ref 36.3–47.1)
HEMOGLOBIN: 12.1 G/DL (ref 11.9–15.1)
IMMATURE GRANULOCYTES: 0 %
KETONES, URINE: NEGATIVE
LEUKOCYTE ESTERASE, URINE: NEGATIVE
LYMPHOCYTES # BLD: 31 % (ref 24–43)
MAGNESIUM: 2 MG/DL (ref 1.6–2.6)
MCH RBC QN AUTO: 31.1 PG (ref 25.2–33.5)
MCHC RBC AUTO-ENTMCNC: 32.1 G/DL (ref 28.4–34.8)
MCV RBC AUTO: 96.9 FL (ref 82.6–102.9)
MONOCYTES # BLD: 8 % (ref 3–12)
NITRITE, URINE: NEGATIVE
NRBC AUTOMATED: 0 PER 100 WBC
PDW BLD-RTO: 15.4 % (ref 11.8–14.4)
PH UA: 6 (ref 5–8)
PLATELET # BLD: 155 K/UL (ref 138–453)
PMV BLD AUTO: 9.6 FL (ref 8.1–13.5)
POTASSIUM SERPL-SCNC: 3.5 MMOL/L (ref 3.7–5.3)
PROTEIN UA: NEGATIVE
RBC # BLD: 3.89 M/UL (ref 3.95–5.11)
RBC # BLD: ABNORMAL 10*6/UL
RBC UA: ABNORMAL /HPF (ref 0–2)
SEG NEUTROPHILS: 56 % (ref 36–65)
SEGMENTED NEUTROPHILS ABSOLUTE COUNT: 2.89 K/UL (ref 1.5–8.1)
SODIUM BLD-SCNC: 140 MMOL/L (ref 135–144)
SPECIFIC GRAVITY UA: 1.01 (ref 1–1.03)
TOTAL PROTEIN: 6 G/DL (ref 6.4–8.3)
TURBIDITY: ABNORMAL
URINE HGB: NEGATIVE
UROBILINOGEN, URINE: NORMAL
VITAMIN B-12: 300 PG/ML (ref 232–1245)
VITAMIN D 25-HYDROXY: 20.4 NG/ML
WBC # BLD: 5.2 K/UL (ref 3.5–11.3)
WBC UA: ABNORMAL /HPF (ref 0–5)

## 2022-04-13 PROCEDURE — 82306 VITAMIN D 25 HYDROXY: CPT

## 2022-04-13 PROCEDURE — 85025 COMPLETE CBC W/AUTO DIFF WBC: CPT

## 2022-04-13 PROCEDURE — 1200000000 HC SEMI PRIVATE

## 2022-04-13 PROCEDURE — 82947 ASSAY GLUCOSE BLOOD QUANT: CPT

## 2022-04-13 PROCEDURE — APPSS45 APP SPLIT SHARED TIME 31-45 MINUTES: Performed by: NURSE PRACTITIONER

## 2022-04-13 PROCEDURE — G0378 HOSPITAL OBSERVATION PER HR: HCPCS

## 2022-04-13 PROCEDURE — 6370000000 HC RX 637 (ALT 250 FOR IP): Performed by: NURSE PRACTITIONER

## 2022-04-13 PROCEDURE — 82607 VITAMIN B-12: CPT

## 2022-04-13 PROCEDURE — 81001 URINALYSIS AUTO W/SCOPE: CPT

## 2022-04-13 PROCEDURE — 72220 X-RAY EXAM SACRUM TAILBONE: CPT

## 2022-04-13 PROCEDURE — 99221 1ST HOSP IP/OBS SF/LOW 40: CPT | Performed by: ORTHOPAEDIC SURGERY

## 2022-04-13 PROCEDURE — 83735 ASSAY OF MAGNESIUM: CPT

## 2022-04-13 PROCEDURE — 36415 COLL VENOUS BLD VENIPUNCTURE: CPT

## 2022-04-13 PROCEDURE — 72100 X-RAY EXAM L-S SPINE 2/3 VWS: CPT

## 2022-04-13 PROCEDURE — 80053 COMPREHEN METABOLIC PANEL: CPT

## 2022-04-13 PROCEDURE — 2580000003 HC RX 258: Performed by: NURSE PRACTITIONER

## 2022-04-13 PROCEDURE — 6360000002 HC RX W HCPCS: Performed by: NURSE PRACTITIONER

## 2022-04-13 PROCEDURE — 96372 THER/PROPH/DIAG INJ SC/IM: CPT

## 2022-04-13 PROCEDURE — 72195 MRI PELVIS W/O DYE: CPT

## 2022-04-13 PROCEDURE — 99285 EMERGENCY DEPT VISIT HI MDM: CPT

## 2022-04-13 PROCEDURE — 99220 PR INITIAL OBSERVATION CARE/DAY 70 MINUTES: CPT | Performed by: STUDENT IN AN ORGANIZED HEALTH CARE EDUCATION/TRAINING PROGRAM

## 2022-04-13 PROCEDURE — 82746 ASSAY OF FOLIC ACID SERUM: CPT

## 2022-04-13 RX ORDER — LEVOTHYROXINE SODIUM 0.12 MG/1
125 TABLET ORAL EVERY OTHER DAY
Status: DISCONTINUED | OUTPATIENT
Start: 2022-04-13 | End: 2022-04-15 | Stop reason: HOSPADM

## 2022-04-13 RX ORDER — HYDROCODONE BITARTRATE AND ACETAMINOPHEN 5; 325 MG/1; MG/1
1 TABLET ORAL EVERY 4 HOURS PRN
Status: DISCONTINUED | OUTPATIENT
Start: 2022-04-13 | End: 2022-04-13

## 2022-04-13 RX ORDER — ONDANSETRON 4 MG/1
4 TABLET, ORALLY DISINTEGRATING ORAL EVERY 8 HOURS PRN
Status: DISCONTINUED | OUTPATIENT
Start: 2022-04-13 | End: 2022-04-15 | Stop reason: HOSPADM

## 2022-04-13 RX ORDER — POTASSIUM CHLORIDE 750 MG/1
1 TABLET, FILM COATED, EXTENDED RELEASE ORAL 2 TIMES DAILY
Status: DISCONTINUED | OUTPATIENT
Start: 2022-04-13 | End: 2022-04-13 | Stop reason: SINTOL

## 2022-04-13 RX ORDER — NICOTINE POLACRILEX 4 MG
15 LOZENGE BUCCAL PRN
Status: DISCONTINUED | OUTPATIENT
Start: 2022-04-13 | End: 2022-04-13 | Stop reason: ALTCHOICE

## 2022-04-13 RX ORDER — ACETAMINOPHEN 325 MG/1
650 TABLET ORAL EVERY 6 HOURS PRN
Status: DISCONTINUED | OUTPATIENT
Start: 2022-04-13 | End: 2022-04-15 | Stop reason: HOSPADM

## 2022-04-13 RX ORDER — TRAMADOL HYDROCHLORIDE 50 MG/1
50 TABLET ORAL EVERY 6 HOURS PRN
Status: DISCONTINUED | OUTPATIENT
Start: 2022-04-13 | End: 2022-04-14

## 2022-04-13 RX ORDER — MAGNESIUM SULFATE 1 G/100ML
1000 INJECTION INTRAVENOUS PRN
Status: DISCONTINUED | OUTPATIENT
Start: 2022-04-13 | End: 2022-04-13 | Stop reason: SINTOL

## 2022-04-13 RX ORDER — DEXTROSE MONOHYDRATE 50 MG/ML
100 INJECTION, SOLUTION INTRAVENOUS PRN
Status: DISCONTINUED | OUTPATIENT
Start: 2022-04-13 | End: 2022-04-15 | Stop reason: HOSPADM

## 2022-04-13 RX ORDER — VITAMIN B COMPLEX
2000 TABLET ORAL EVERY OTHER DAY
Status: DISCONTINUED | OUTPATIENT
Start: 2022-04-13 | End: 2022-04-15 | Stop reason: HOSPADM

## 2022-04-13 RX ORDER — PANTOPRAZOLE SODIUM 40 MG/1
1 TABLET, DELAYED RELEASE ORAL DAILY
Status: DISCONTINUED | OUTPATIENT
Start: 2022-04-13 | End: 2022-04-13 | Stop reason: SDUPTHER

## 2022-04-13 RX ORDER — POLYETHYLENE GLYCOL 3350 17 G/17G
17 POWDER, FOR SOLUTION ORAL DAILY PRN
Status: DISCONTINUED | OUTPATIENT
Start: 2022-04-13 | End: 2022-04-15 | Stop reason: HOSPADM

## 2022-04-13 RX ORDER — SODIUM CHLORIDE 0.9 % (FLUSH) 0.9 %
10 SYRINGE (ML) INJECTION PRN
Status: DISCONTINUED | OUTPATIENT
Start: 2022-04-13 | End: 2022-04-15 | Stop reason: HOSPADM

## 2022-04-13 RX ORDER — SODIUM CHLORIDE 0.9 % (FLUSH) 0.9 %
5-40 SYRINGE (ML) INJECTION EVERY 12 HOURS SCHEDULED
Status: DISCONTINUED | OUTPATIENT
Start: 2022-04-13 | End: 2022-04-15 | Stop reason: HOSPADM

## 2022-04-13 RX ORDER — PANTOPRAZOLE SODIUM 40 MG/1
40 TABLET, DELAYED RELEASE ORAL
Status: DISCONTINUED | OUTPATIENT
Start: 2022-04-13 | End: 2022-04-15 | Stop reason: HOSPADM

## 2022-04-13 RX ORDER — POTASSIUM CHLORIDE 20 MEQ/1
40 TABLET, EXTENDED RELEASE ORAL PRN
Status: DISCONTINUED | OUTPATIENT
Start: 2022-04-13 | End: 2022-04-13 | Stop reason: SINTOL

## 2022-04-13 RX ORDER — MORPHINE SULFATE 4 MG/ML
4 INJECTION, SOLUTION INTRAMUSCULAR; INTRAVENOUS EVERY 4 HOURS PRN
Status: DISCONTINUED | OUTPATIENT
Start: 2022-04-13 | End: 2022-04-15 | Stop reason: HOSPADM

## 2022-04-13 RX ORDER — HYDROCODONE BITARTRATE AND ACETAMINOPHEN 5; 325 MG/1; MG/1
2 TABLET ORAL EVERY 4 HOURS PRN
Status: DISCONTINUED | OUTPATIENT
Start: 2022-04-13 | End: 2022-04-13

## 2022-04-13 RX ORDER — POTASSIUM CHLORIDE 20 MEQ/1
40 TABLET, EXTENDED RELEASE ORAL ONCE
Status: COMPLETED | OUTPATIENT
Start: 2022-04-13 | End: 2022-04-13

## 2022-04-13 RX ORDER — SODIUM CHLORIDE 9 MG/ML
INJECTION, SOLUTION INTRAVENOUS PRN
Status: DISCONTINUED | OUTPATIENT
Start: 2022-04-13 | End: 2022-04-15 | Stop reason: HOSPADM

## 2022-04-13 RX ORDER — FUROSEMIDE 20 MG/1
20 TABLET ORAL DAILY
Status: DISCONTINUED | OUTPATIENT
Start: 2022-04-13 | End: 2022-04-14

## 2022-04-13 RX ORDER — ONDANSETRON 2 MG/ML
4 INJECTION INTRAMUSCULAR; INTRAVENOUS EVERY 6 HOURS PRN
Status: DISCONTINUED | OUTPATIENT
Start: 2022-04-13 | End: 2022-04-15 | Stop reason: HOSPADM

## 2022-04-13 RX ORDER — AMIODARONE HYDROCHLORIDE 200 MG/1
200 TABLET ORAL
Status: DISCONTINUED | OUTPATIENT
Start: 2022-04-14 | End: 2022-04-14

## 2022-04-13 RX ORDER — MORPHINE SULFATE 2 MG/ML
2 INJECTION, SOLUTION INTRAMUSCULAR; INTRAVENOUS EVERY 4 HOURS PRN
Status: DISCONTINUED | OUTPATIENT
Start: 2022-04-13 | End: 2022-04-15 | Stop reason: HOSPADM

## 2022-04-13 RX ORDER — POTASSIUM CHLORIDE 7.45 MG/ML
10 INJECTION INTRAVENOUS PRN
Status: DISCONTINUED | OUTPATIENT
Start: 2022-04-13 | End: 2022-04-13 | Stop reason: SINTOL

## 2022-04-13 RX ORDER — DEXTROSE MONOHYDRATE 25 G/50ML
12.5 INJECTION, SOLUTION INTRAVENOUS PRN
Status: DISCONTINUED | OUTPATIENT
Start: 2022-04-13 | End: 2022-04-13 | Stop reason: ALTCHOICE

## 2022-04-13 RX ORDER — DICYCLOMINE HYDROCHLORIDE 10 MG/1
10 CAPSULE ORAL EVERY 6 HOURS PRN
Status: DISCONTINUED | OUTPATIENT
Start: 2022-04-13 | End: 2022-04-15 | Stop reason: HOSPADM

## 2022-04-13 RX ORDER — ACETAMINOPHEN 650 MG/1
650 SUPPOSITORY RECTAL EVERY 6 HOURS PRN
Status: DISCONTINUED | OUTPATIENT
Start: 2022-04-13 | End: 2022-04-15 | Stop reason: HOSPADM

## 2022-04-13 RX ADMIN — SODIUM CHLORIDE, PRESERVATIVE FREE 10 ML: 5 INJECTION INTRAVENOUS at 10:11

## 2022-04-13 RX ADMIN — ENOXAPARIN SODIUM 30 MG: 100 INJECTION SUBCUTANEOUS at 10:10

## 2022-04-13 RX ADMIN — SODIUM CHLORIDE, PRESERVATIVE FREE 10 ML: 5 INJECTION INTRAVENOUS at 20:53

## 2022-04-13 RX ADMIN — POTASSIUM CHLORIDE 40 MEQ: 20 TABLET, EXTENDED RELEASE ORAL at 06:23

## 2022-04-13 RX ADMIN — PANTOPRAZOLE SODIUM 40 MG: 40 TABLET, DELAYED RELEASE ORAL at 06:23

## 2022-04-13 RX ADMIN — INSULIN LISPRO 1 UNITS: 100 INJECTION, SOLUTION INTRAVENOUS; SUBCUTANEOUS at 20:50

## 2022-04-13 RX ADMIN — TRAMADOL HYDROCHLORIDE 50 MG: 50 TABLET, COATED ORAL at 20:50

## 2022-04-13 RX ADMIN — Medication 2000 UNITS: at 10:10

## 2022-04-13 RX ADMIN — LEVOTHYROXINE SODIUM 125 MCG: 0.12 TABLET ORAL at 06:23

## 2022-04-13 ASSESSMENT — PAIN DESCRIPTION - PAIN TYPE: TYPE: ACUTE PAIN

## 2022-04-13 ASSESSMENT — PAIN SCALES - GENERAL
PAINLEVEL_OUTOF10: 0
PAINLEVEL_OUTOF10: 0
PAINLEVEL_OUTOF10: 5
PAINLEVEL_OUTOF10: 4

## 2022-04-13 ASSESSMENT — PAIN DESCRIPTION - LOCATION: LOCATION: HIP

## 2022-04-13 ASSESSMENT — ENCOUNTER SYMPTOMS
GASTROINTESTINAL NEGATIVE: 1
RESPIRATORY NEGATIVE: 1
EYES NEGATIVE: 1

## 2022-04-13 ASSESSMENT — PAIN DESCRIPTION - ORIENTATION: ORIENTATION: LEFT

## 2022-04-13 ASSESSMENT — PAIN DESCRIPTION - PROGRESSION: CLINICAL_PROGRESSION: NOT CHANGED

## 2022-04-13 ASSESSMENT — PAIN DESCRIPTION - DESCRIPTORS: DESCRIPTORS: ACHING;DISCOMFORT

## 2022-04-13 ASSESSMENT — PAIN DESCRIPTION - ONSET: ONSET: ON-GOING

## 2022-04-13 ASSESSMENT — PAIN DESCRIPTION - FREQUENCY: FREQUENCY: CONTINUOUS

## 2022-04-13 NOTE — PROGRESS NOTES
The potassium/magnesium sliding scale has been automatically discontinued per [facility] approved policy because the patient has decreased renal function (CrCl<30 ml/min). The patient's current K/Mag levels are currently:    Recent Labs     04/13/22  0157   K 3.5*   MG 2.0       Estimated Creatinine Clearance: 27 mL/min (A) (based on SCr of 1.23 mg/dL (H)). For patients with decreased renal function (below 30ml/min) needing potassium/magnesium supplementation, please order individual bolus doses with appropriate monitoring. Please contact the inpatient pharmacy at [phone] with any concerns. Thank you.     Frederik Schilder, [facility]  4/13/2022  4:32 AM

## 2022-04-13 NOTE — PROGRESS NOTES
Physical Therapy  DATE: 2022    NAME: Stella Morrow  MRN: [id number]   : 1925    Patient not seen this date for Physical Therapy due to:      [x] Cancel by RN due to: pt has new ortho consult due to concern for pelvic fx and will await okay for activity &/or any restrictions; continue to follow     [] Hemodialysis    [] Critical Lab Value Level     [] Blood transfusion in progress    [] Acute or unstable cardiovascular status   _MAP < 55 or more than >115  _HR < 40 or > 130    [] Acute or unstable pulmonary status   -FiO2 > 60%   _RR < 5 or >40    _O2 sats < 85%    [] Strict Bedrest    [] Off Unit for surgery or procedure    [] Off Unit for testing       [] Pending imaging to R/O fracture    [] Refusal by Patient      [] Other      [] PT being discontinued at this time. Patient independent. No further needs. [] PT being discontinued at this time as the patient has been transferred to hospice care. No further needs.       [address], PT

## 2022-04-13 NOTE — ED PROVIDER NOTES
EMERGENCY DEPARTMENT ENCOUNTER    Pt Name: Erwin Garza  MRN: [id number]  [place] 8/2/1925  Date of evaluation: 4/13/22  CHIEF COMPLAINT       Chief Complaint   Patient presents with    Fall     Today, 2200    Knee Pain     Reports bilateral knee weakness and pain. HISTORY OF PRESENT ILLNESS   Patient is a 63-year-old female who was brought in by EMS for evaluation of back pain and knee pain after falling at home today. Patient states her legs just gave out and she fell forward onto her knees. No head strike, no LOC. She states she had similar fall 2 days ago falling backwards on her buttocks which is still very painful. No other injuries reported. No other issues at this time. ROS:  No fevers, cough, shortness of breath, chest pain, abdominal pain, nausea, vomiting, changes in urine or stool. REVIEW OF SYSTEMS     Review of Systems   All other systems reviewed and are negative.     PASTMEDICAL HISTORY     Past Medical History:   Diagnosis Date    A-fib [facility])     AAA (abdominal aortic aneurysm) (HCC)     Abdominal cramping     Aortic insufficiency     Atrial fibrillation (HCC)     Back pain, chronic     C. difficile colitis     Chronic diastolic CHF (congestive heart failure) (HCC)     COPD (chronic obstructive pulmonary disease) (HCC)     Diabetes mellitus (HCC)     Gastritis     GERD (gastroesophageal reflux disease)     Hiatal hernia     Hx of prolonged Q-T interval on ECG     Hypoalbuminemia 10/14/2014    IBS (irritable bowel syndrome)     Murmur, cardiac     Nausea vomiting and diarrhea 10/14/2014    Stomach ulcer     Thyroid disease     Type 2 diabetes mellitus without complication, without long-term current use of insulin ([facility] [address] 01/24/2018    UTI (lower urinary tract infection)     Varicose veins      SURGICAL HISTORY       Past Surgical History:   Procedure Laterality Date    CHOLECYSTECTOMY      HERNIA REPAIR      HYSTERECTOMY      partial    [place] EGD TRANSORAL BIOPSY SINGLE/MULTIPLE N/A 2017    EGD BIOPSY performed by Pj Richards MD at [address]  2017    MILD CHRONIC INACTIVE GASTRITIS    VASCULAR SURGERY      varicose veins    VEIN SURGERY       CURRENT MEDICATIONS       Previous Medications    AMIODARONE (CORDARONE) 200 MG TABLET    Take 200 mg by mouth Twice a Week On Monday and Friday    CHOLECALCIFEROL (VITAMIN D3) 50 MCG (2000 UT) CAPS    Take 2,000 Units by mouth every other day     DICYCLOMINE (BENTYL) 10 MG CAPSULE    Take 1 capsule by mouth every 6 hours as needed (cramps)    FAMOTIDINE (PEPCID) 40 MG TABLET    Take 1 tablet by mouth every evening    FUROSEMIDE (LASIX) 20 MG TABLET    Take 20 mg by mouth daily     LEVOTHYROXINE (SYNTHROID) 125 MCG TABLET    Take 125 mcg by mouth every other day    OXYGEN    Inhale into the lungs Uses at night and prn    PANTOPRAZOLE (PROTONIX) 40 MG TABLET    TAKE ONE TABLET BY MOUTH DAILY    PANTOPRAZOLE (PROTONIX) 40 MG TABLET    Take 1 tablet by mouth every morning (before breakfast)    POTASSIUM CHLORIDE (KLOR-CON) 10 MEQ EXTENDED RELEASE TABLET    TAKE ONE TABLET BY MOUTH TWICE A DAY    TRAMADOL (ULTRAM) 50 MG TABLET    Take 50 mg by mouth every 6 hours as needed for Pain. ALLERGIES     is allergic to penicillins. FAMILY HISTORY     She indicated that her mother is . She indicated that her father is . SOCIAL HISTORY       Social History     Tobacco Use    Smoking status: Former Smoker     Packs/day: 1.00     Years: 20.00     Pack years: 20.00     Types: Cigarettes    Smokeless tobacco: Never Used    Tobacco comment: quit many years ago    Vaping Use    Vaping Use: Never used   Substance Use Topics    Alcohol use:  Yes     Alcohol/week: 2.0 standard drinks     Types: 1 Glasses of wine, 1 Cans of beer per week     Comment: social drinker     Drug use: No     PHYSICAL EXAM     INITIAL VITALS: /81   Pulse 77   Temp 97.2 °F (36.2 °C) (Oral) Resp 11   Ht 5' 3\" (1.6 m)   Wt 170 lb (77.1 kg)   SpO2 92%   BMI 30.11 kg/m²    Physical Exam  HENT:      Head: Normocephalic. Right Ear: External ear normal.      Left Ear: External ear normal.      Nose: Nose normal.   Eyes:      Conjunctiva/sclera: Conjunctivae normal.   Cardiovascular:      Rate and Rhythm: Normal rate. Pulmonary:      Effort: Pulmonary effort is normal.   Abdominal:      General: Abdomen is flat. Musculoskeletal:      Comments: Generalized swelling knees bilaterally. No erythema, signs of trauma. Significant pain over coccyx area. Skin:     General: Skin is dry. Neurological:      Mental Status: She is alert. Mental status is at baseline. Psychiatric:         Mood and Affect: Mood normal.         Behavior: Behavior normal.         MEDICAL DECISION MAKING:   The patient is hemodynamically stable, afebrile, nontoxic-appearing. Physical exam notable for tenderness knees bilaterally, severe tenderness coccyx area. Based on history and exam concerning for fracture, also consider contusion. ED plan for basic labs, UA, x-ray knees bilaterally, x-ray coccyx reassess. DIAGNOSTIC RESULTS   EKG:All EKG's are interpreted by the Emergency Department Physician who either signs or Co-signs this chart in the absence of a cardiologist.        RADIOLOGY:All plain film, CT, MRI, and formal ultrasound images (except ED bedside ultrasound) are read by the radiologist, see reports below, unless otherwisenoted in MDM or here. XR SACRUM COCCYX (MIN 2 VIEWS)   Final Result   1. New or more prominent posterior tilt of the distal coccyx may represent   acute fracture in the appropriate clinical setting. 2.  No acute osseous abnormality identified in the lumbar spine. 3.  Decreased bone mineralization limits this exam.  If there remains concern   for an occult pelvic fracture, consider MRI imaging. XR LUMBAR SPINE (2-3 VIEWS)   Final Result   1.   New or more prominent posterior tilt of the distal coccyx may represent   acute fracture in the appropriate clinical setting. 2.  No acute osseous abnormality identified in the lumbar spine. 3.  Decreased bone mineralization limits this exam.  If there remains concern   for an occult pelvic fracture, consider MRI imaging. XR KNEE LEFT (3 VIEWS)   Final Result   No acute osseous abnormality identified in the left knee. XR KNEE RIGHT (3 VIEWS)   Final Result   No acute osseous abnormality identified in the right knee. MRI PELVIS WO CONTRAST    (Results Pending)     LABS: All lab results were reviewed by myself, and all abnormals are listed below. Labs Reviewed   CBC WITH AUTO DIFFERENTIAL - Abnormal; Notable for the following components:       Result Value    RBC 3.89 (*)     RDW 15.4 (*)     Eosinophils % 5 (*)     All other components within normal limits   COMPREHENSIVE METABOLIC PANEL W/ REFLEX TO MG FOR LOW K - Abnormal; Notable for the following components:    Glucose 117 (*)     CREATININE 1.23 (*)     Potassium 3.5 (*)     Total Protein 6.0 (*)     GFR Non- 40 (*)     GFR  49 (*)     All other components within normal limits   URINALYSIS - Abnormal; Notable for the following components:    Turbidity UA SLIGHTLY CLOUDY (*)     All other components within normal limits   MICROSCOPIC URINALYSIS - Abnormal; Notable for the following components:    Bacteria, UA RARE (*)     Amorphous, UA 1+ (*)     All other components within normal limits   MAGNESIUM       EMERGENCY DEPARTMENTCOURSE:   Patient remained stable in the ED. No definitive fracture seen on x-rays. Patient has frequent falls, significant pain of her coccyx likely occult fracture. Will admit to hospital for pain control and further imaging.     Discussed case with Truong Dickerson, who accepts patient for admission to hospital.    Vitals:    Vitals:    04/12/22 2307 04/13/22 0136 04/13/22 0200 04/13/22 0300 BP: 117/72 (!) 140/84 (!) 150/87 136/81   Pulse: 100 80 76 77   Resp: 18 16 12 11   Temp: 97.2 °F (36.2 °C)      TempSrc: Oral      SpO2: 94% 99% 100% 92%   Weight: 170 lb (77.1 kg)      Height: 5' 3\" (1.6 m)          The patient was given the following medications while in the emergency department:  No orders of the defined types were placed in this encounter. CONSULTS:  None    FINAL IMPRESSION      1. Fall, initial encounter    2. Pain in the coccyx          DISPOSITION/PLAN   DISPOSITION Admitted 04/13/2022 03:22:33 AM      PATIENT REFERRED TO:  No follow-up provider specified.   DISCHARGE MEDICATIONS:  New Prescriptions    No medications on file     Wesley Jon MD  Attending Emergency Physician                    Dmitry Nugent MD  04/13/22 0322

## 2022-04-13 NOTE — ED NOTES
Pt presents to ED with bilateral knee weakness and pain. Pt was brought by EMS. EMS reports pt had fall at approximately 2200 tonight. Pt needed assistance getting up but wanted to be seen at ED for injuries from a prior fall. Pt reported she had pain in the buttocks area from a prior fall. Pt is able to follow commands and answer questions appropriately. Pt is oriented x4 but only responds when directly talked to. Pt has difficulty hearing.       Marco Brown RN  04/12/22 [id number]

## 2022-04-13 NOTE — PLAN OF CARE
Problem: Falls - Risk of:  Goal: Will remain free from falls  Outcome: Ongoing     Problem: Falls - Risk of:  Goal: Absence of physical injury  Outcome: Ongoing     Problem: Skin Integrity:  Goal: Will show no infection signs and symptoms  Outcome: Ongoing     Problem: Skin Integrity:  Goal: Absence of new skin breakdown  Outcome: Ongoing     Problem: Pain:  Goal: Pain level will decrease  Outcome: Ongoing     Problem: Pain:  Goal: Control of acute pain  Outcome: Ongoing     Problem: DAILY CARE  Goal: Daily care needs are met  Outcome: Ongoing     Problem: PAIN  Goal: Patient's pain/discomfort is manageable  Outcome: Ongoing     Problem: SKIN INTEGRITY  Goal: Skin integrity is maintained or improved  Outcome: Ongoing     Problem: KNOWLEDGE DEFICIT  Goal: Patient/S.O. demonstrates understanding of disease process, treatment plan, medications, and discharge instructions.   Outcome: Ongoing

## 2022-04-13 NOTE — H&P
[facility]  Office: [address], DO, Gurmeet Soto, DO, Mark Landin, DO, Marie Christine, DO, Iris Pleitez MD, Karli Berrios MD, Clay Smith MD, Kath Saleh MD, Martina Montalvo MD, Odalys Banks MD, Charan Cullen MD, Garrett Camara, DO, Julianna Sensor, DO, Aidan Albert MD,  Isabella Nix DO, Yazan Yoon MD, Norbert Braden MD, Anastasiia Strong MD, Cindi Braden DO, Alta Castillo MD, Kiara Pena MD, Felix Samayoa [facility] Shayla, CNP, Lauren Ibanez, CNP, Eri Ramires, CNP, Valery Jha, CNP, Ness Barriga, CNP, Krysten Henry PA-C, Kristina Mcelroy, [facility], Elvie Melara, CNP, Kylah Toledo, CNP, Luis Mujica, CNP, Amy Horvath, CNS, Leigh Wallis, [facility], Renetta Bhatt, CNP, Ray Mosley, CNP, Wilfrid Tubbs, [facility]    HISTORY AND PHYSICAL EXAMINATION            Date:   4/13/2022  Patient name:  Carolina Noe  Date of admission:  4/12/2022 11:04 PM  MRN:   [id number]  Account:  [de-identified]  YOB: 1925  PCP:    Semaj Petit MD  Room:   2011/2011-02  Code Status:    Full Code    Chief Complaint:     Chief Complaint   Patient presents with    Fall     Today, 2200    Knee Pain     Reports bilateral knee weakness and pain. History Obtained From:     patient    History of Present Illness:     Carolina Noe is a 80 y.o. Non- / non  female who presents with Fall (Today, 2200) and Knee Pain (Reports bilateral knee weakness and pain. )   and is admitted to the hospital for the management of Recurrent falls. Patient lives at [facility]. She says she usually uses a walker. Yesterday while hanging up clothes, she says her legs 'gave out\" and she fell backwards onto her buttocks. She denies hitting her head, denies syncope, dizziness prior to her legs going out.  She has a known history of falls, atrial fib, DM, CHF, COPD with home O2 use at night (3L) and GERD. While in the ED, she was noted to be slightly hypokalemic. Xray of the sacrum/coccyx revealed a possible an acute fracture. Her creat was also noted to be elevated. She is in atrial fib, but this is not new. Her rate is controlled. She is to have an MRI of the pelvis today to confirm fracture. She is being admitted for further management of suspected coccyx fracture and frequent falls. Past Medical History:     Past Medical History:   Diagnosis Date    A-fib [facility])     AAA (abdominal aortic aneurysm) (HCC)     Abdominal cramping     Aortic insufficiency     Atrial fibrillation (HCC)     Back pain, chronic     C. difficile colitis     Chronic diastolic CHF (congestive heart failure) (HCC)     COPD (chronic obstructive pulmonary disease) (HCC)     Diabetes mellitus (HCC)     Gastritis     GERD (gastroesophageal reflux disease)     Hiatal hernia     Hx of prolonged Q-T interval on ECG     Hypoalbuminemia 10/14/2014    IBS (irritable bowel syndrome)     Murmur, cardiac     Nausea vomiting and diarrhea 10/14/2014    Stomach ulcer     Thyroid disease     Type 2 diabetes mellitus without complication, without long-term current use of insulin ([facility] [address] 01/24/2018    UTI (lower urinary tract infection)     Varicose veins         Past Surgical History:     Past Surgical History:   Procedure Laterality Date    CHOLECYSTECTOMY      HERNIA REPAIR      HYSTERECTOMY      partial    [place] EGD TRANSORAL BIOPSY SINGLE/MULTIPLE N/A 6/20/2017    EGD BIOPSY performed by Miriam Dubois MD at [place] [address]  06/20/2017    MILD CHRONIC INACTIVE GASTRITIS    VASCULAR SURGERY      varicose veins    VEIN SURGERY          Medications Prior to Admission:     Prior to Admission medications    Medication Sig Start Date End Date Taking?  Authorizing Provider   famotidine (PEPCID) 40 MG tablet Take 1 tablet by mouth every evening 3/15/22   Bette Cui MD pantoprazole (PROTONIX) 40 MG tablet Take 1 tablet by mouth every morning (before breakfast) 3/15/22   Naomie Kam MD   dicyclomine (BENTYL) 10 MG capsule Take 1 capsule by mouth every 6 hours as needed (cramps) 1/29/21   Amada Alas APRN - CNP   levothyroxine (SYNTHROID) 125 MCG tablet Take 125 mcg by mouth every other day    Historical Provider, MD   traMADol (ULTRAM) 50 MG tablet Take 50 mg by mouth every 6 hours as needed for Pain. Historical Provider, MD   Cholecalciferol (VITAMIN D3) 50 MCG (2000 UT) CAPS Take 2,000 Units by mouth every other day     Historical Provider, MD   furosemide (LASIX) 20 MG tablet Take 20 mg by mouth daily     Historical Provider, MD   potassium chloride (KLOR-CON) 10 MEQ extended release tablet TAKE ONE TABLET BY MOUTH TWICE A DAY 9/28/18   Melodie Perez PA-C   pantoprazole (PROTONIX) 40 MG tablet TAKE ONE TABLET BY MOUTH DAILY 4/18/18   Melodie Perez PA-C   amiodarone (CORDARONE) 200 MG tablet Take 200 mg by mouth Twice a Week On Monday and Friday 9/12/16   Historical Provider, MD   OXYGEN Inhale into the lungs Uses at night and prn    Historical Provider, MD        Allergies:     Penicillins    Social History:     Tobacco:    reports that she has quit smoking. Her smoking use included cigarettes. She has a 20.00 pack-year smoking history. She has never used smokeless tobacco.  Alcohol:      reports current alcohol use of about 2.0 standard drinks of alcohol per week. Drug Use:  reports no history of drug use. Family History:     Family History   Problem Relation Age of Onset    Stroke Mother     Heart Disease Mother        Review of Systems:     Positive and Negative as described in HPI. Review of Systems   Constitutional: Negative. HENT: Negative. Eyes: Negative. Respiratory: Negative. Cardiovascular: Positive for leg swelling. Negative for chest pain and palpitations. Gastrointestinal: Negative. Genitourinary: Negative. Musculoskeletal: Positive for arthralgias and gait problem. Negative for neck pain. Skin: Negative. Neurological: Positive for weakness. Negative for dizziness, tremors, syncope, light-headedness and headaches. Psychiatric/Behavioral: Negative. Physical Exam:   /73   Pulse 80   Temp 98.1 °F (36.7 °C) (Oral)   Resp 16   Ht 5' 3\" (1.6 m)   Wt 175 lb (79.4 kg)   SpO2 93%   BMI 31.00 kg/m²   Temp (24hrs), Av.7 °F (36.5 °C), Min:97.2 °F (36.2 °C), Max:98.1 °F (36.7 °C)          Physical Exam  Vitals and nursing note reviewed. Constitutional:       General: She is not in acute distress. Appearance: She is not ill-appearing, toxic-appearing or diaphoretic. HENT:      Head: Normocephalic and atraumatic. Right Ear: External ear normal.      Left Ear: External ear normal.      Nose: Nose normal.      Mouth/Throat:      Mouth: Mucous membranes are moist.   Eyes:      General: No scleral icterus. Right eye: No discharge. Left eye: No discharge. Extraocular Movements: Extraocular movements intact. Conjunctiva/sclera: Conjunctivae normal.      Pupils: Pupils are equal, round, and reactive to light. Neck:      Comments: No JVD  Cardiovascular:      Rate and Rhythm: Normal rate. Rhythm irregular. Pulses: Normal pulses. Heart sounds: Murmur heard. No friction rub. No gallop. Pulmonary:      Effort: Pulmonary effort is normal. No respiratory distress. Breath sounds: No wheezing, rhonchi or rales. Comments: Lungs clear, dim throughout all lung fields  Abdominal:      General: Bowel sounds are normal. There is no distension. Palpations: Abdomen is soft. Tenderness: There is no abdominal tenderness. There is no guarding. Hernia: No hernia is present. Musculoskeletal:      Cervical back: Normal range of motion and neck supple. Right lower leg: Edema present. Left lower leg: Edema present.       Comments: 3+ edema BLE   Skin:     General: Skin is warm. Coloration: Skin is pale. Skin is not jaundiced. Findings: No bruising, lesion or rash. Neurological:      General: No focal deficit present. Mental Status: She is alert and oriented to person, place, and time. Psychiatric:         Mood and Affect: Mood normal.         Behavior: Behavior normal.         Thought Content:  Thought content normal.         Judgment: Judgment normal.         Investigations:      Laboratory Testing:  Recent Results (from the past 24 hour(s))   CBC with Auto Differential    Collection Time: 04/13/22  1:57 AM   Result Value Ref Range    WBC 5.2 3.5 - 11.3 k/uL    RBC 3.89 (L) 3.95 - 5.11 m/uL    Hemoglobin 12.1 11.9 - 15.1 g/dL    Hematocrit 37.7 36.3 - 47.1 %    MCV 96.9 82.6 - 102.9 fL    MCH 31.1 25.2 - 33.5 pg    MCHC 32.1 28.4 - 34.8 g/dL    RDW 15.4 (H) 11.8 - 14.4 %    Platelets 355 272 - 148 k/uL    MPV 9.6 8.1 - 13.5 fL    NRBC Automated 0.0 0.0 per 100 WBC    Seg Neutrophils 56 36 - 65 %    Lymphocytes 31 24 - 43 %    Monocytes 8 3 - 12 %    Eosinophils % 5 (H) 1 - 4 %    Basophils 0 0 - 2 %    Immature Granulocytes 0 0 %    Segs Absolute 2.89 1.50 - 8.10 k/uL    Absolute Lymph # 1.59 1.10 - 3.70 k/uL    Absolute Mono # 0.43 0.10 - 1.20 k/uL    Absolute Eos # 0.24 0.00 - 0.44 k/uL    Basophils Absolute <0.03 0.00 - 0.20 k/uL    Absolute Immature Granulocyte 0.01 0.00 - 0.30 k/uL    RBC Morphology ANISOCYTOSIS PRESENT    Comprehensive Metabolic Panel w/ Reflex to MG    Collection Time: 04/13/22  1:57 AM   Result Value Ref Range    Glucose 117 (H) 70 - 99 mg/dL    BUN 21 8 - 23 mg/dL    CREATININE 1.23 (H) 0.50 - 0.90 mg/dL    Bun/Cre Ratio 17 9 - 20    Calcium 9.0 8.6 - 10.4 mg/dL    Sodium 140 135 - 144 mmol/L    Potassium 3.5 (L) 3.7 - 5.3 mmol/L    Chloride 102 98 - 107 mmol/L    CO2 28 20 - 31 mmol/L    Anion Gap 10 9 - 17 mmol/L    Alkaline Phosphatase 65 35 - 104 U/L    ALT 8 5 - 33 U/L    AST 13 <32 U/L    Total Bilirubin 0.71 0.3 - 1.2 mg/dL    Total Protein 6.0 (L) 6.4 - 8.3 g/dL    Albumin 3.5 3.5 - 5.2 g/dL    GFR Non- 40 (L) >60 mL/min    GFR  49 (L) >60 mL/min    GFR Comment         Magnesium    Collection Time: 04/13/22  1:57 AM   Result Value Ref Range    Magnesium 2.0 1.6 - 2.6 mg/dL   Urinalysis    Collection Time: 04/13/22  2:14 AM   Result Value Ref Range    Color, UA Yellow Yellow    Turbidity UA SLIGHTLY CLOUDY (A) Clear    Glucose, Ur NEGATIVE NEGATIVE    Bilirubin Urine NEGATIVE NEGATIVE    Ketones, Urine NEGATIVE NEGATIVE    Specific Gravity, UA 1.015 1.005 - 1.030    Urine Hgb NEGATIVE NEGATIVE    pH, UA 6.0 5.0 - 8.0    Protein, UA NEGATIVE NEGATIVE    Urobilinogen, Urine Normal Normal    Nitrite, Urine NEGATIVE NEGATIVE    Leukocyte Esterase, Urine NEGATIVE NEGATIVE   Microscopic Urinalysis    Collection Time: 04/13/22  2:14 AM   Result Value Ref Range    -          WBC, UA 0 TO 2 0 - 5 /HPF    RBC, UA 0 TO 2 0 - 2 /HPF    Casts UA 5 TO 10 /LPF    Casts UA HYALINE /LPF    Epithelial Cells UA 5 TO 10 0 - 5 /HPF    Bacteria, UA RARE (A) None    Amorphous, UA 1+ (A) None       Imaging/Diagnostics:  XR LUMBAR SPINE (2-3 VIEWS)    Result Date: 4/13/2022  1. New or more prominent posterior tilt of the distal coccyx may represent acute fracture in the appropriate clinical setting. 2.  No acute osseous abnormality identified in the lumbar spine. 3.  Decreased bone mineralization limits this exam.  If there remains concern for an occult pelvic fracture, consider MRI imaging. XR SACRUM COCCYX (MIN 2 VIEWS)    Result Date: 4/13/2022  1. New or more prominent posterior tilt of the distal coccyx may represent acute fracture in the appropriate clinical setting. 2.  No acute osseous abnormality identified in the lumbar spine. 3.  Decreased bone mineralization limits this exam.  If there remains concern for an occult pelvic fracture, consider MRI imaging.      XR KNEE LEFT (3 VIEWS)    Result Date: 4/12/2022  No acute osseous abnormality identified in the left knee. XR KNEE RIGHT (3 VIEWS)    Result Date: 4/12/2022  No acute osseous abnormality identified in the right knee. Assessment :      Hospital Problems           Last Modified POA    * (Principal) Recurrent falls 4/13/2022 Yes    Atrial fibrillation ([address] 4/13/2022 Yes    COPD (chronic obstructive pulmonary disease) ([address] 4/13/2022 Yes    Diabetes mellitus ([address] 4/13/2022 Yes    Gastroesophageal reflux disease without esophagitis 4/13/2022 Yes    Type 2 diabetes mellitus without complication, without long-term current use of insulin ([address] 4/13/2022 Yes    Closed fracture of coccyx ([address] 4/13/2022 Yes    Renal insufficiency 4/13/2022 Yes    Hypokalemia 4/13/2022 Yes          Plan:     Patient status inpatient in the  Med/Surge    1. Recurrent falls: Fall precautions. PT/OT eval and treat as appropriate once MRI completed to rule out any occult pelvic fracture. Check Vit B12, D, folate. 2. Atrial fib: Continue telemetry. Continue amiodarone. Suspect patient not a good candidate for chronic AC 2/2 advanced age and hx of falls  3. COPD: Nocturnal O2 at 3L/min. Resp meds as ordered. IS.   4. DM II: Monitor BG AC & HS. Low correction SSI. Carb controlled diet  5. GERD: Protonix daily  6. Coccyx fx: Obtain pelvis MRI. Pain control. PT/OT as appropriate pending MRI findings  7. Renal insufficiency: Avoid nephrotoxic agents. Hold Lasix for now. Trend renal function. Daily BMP. Replace lytes as needed. 8. Hypokalemia: Daily BMP. Replace K+ as needed. 9. Lovenox renal dosing for DVT prophylaxis    Consultations:   None     Patient is admitted as inpatient status because of co-morbidities listed above, severity of signs and symptoms as outlined, requirement for current medical therapies and most importantly because of direct risk to patient if care not provided in a hospital setting. Expected length of stay > 48 hours.     Total 40 mins spent in completion of H&P    PERCY Rousseau NP  4/13/2022  5:45 AM    Copy sent to Dr. Faisal Andrew MD

## 2022-04-13 NOTE — PROGRESS NOTES
Writer called report to Bernie Gonzalez MS. Writer left message for family regarding room placement.  Patient transferred to [address] [place] [address]

## 2022-04-13 NOTE — PROGRESS NOTES
Patient admitted to room 2011 from ER. Oriented to room and call light/tv controls. Bed in lowest position, wheels locked, 2/4 side rails up  Call light in reach, room free of clutter, adequate lighting provided.

## 2022-04-13 NOTE — DISCHARGE INSTR - COC
Continuity of Care Form    Patient Name: Barbara Baeza   :  1925  MRN:  [id number]    Admit date:  2022  Discharge date:  04/15/2022    Code Status Order: Full Code   Advance Directives:      Admitting Physician:  Manju Craig MD  PCP: Anjum Ye MD    Discharging Nurse:  [place]  Discharging Hospital Unit/Room#:   Discharging Unit Phone Number: [phone]    Emergency Contact:   Extended Emergency Contact Information  Primary Emergency Contact: Sue Govea   [address] Phone: [phone]  Relation: Grandchild    Past Surgical History:  Past Surgical History:   Procedure Laterality Date    CHOLECYSTECTOMY      HERNIA REPAIR      HYSTERECTOMY      partial    [place] EGD TRANSORAL BIOPSY SINGLE/MULTIPLE N/A 2017    EGD BIOPSY performed by Aysha Chaudhary MD at [place] [address]  2017    MILD CHRONIC INACTIVE GASTRITIS    VASCULAR SURGERY      varicose veins    VEIN SURGERY         Immunization History:   Immunization History   Administered Date(s) Administered    COVID-19, Pfizer Purple top, DILUTE for use, 12+ yrs, 30mcg/0.3mL dose 2021, 2021    Influenza, High Dose (Fluzone 65 yrs and older) 2017, 10/15/2019    Pneumococcal Conjugate 13-valent (Romeo Na) 2017       Active Problems:  Patient Active Problem List   Diagnosis Code    Atrial fibrillation ([facility]) I48.91    COPD (chronic obstructive pulmonary disease) ([facility]) J44.9    Abdominal cramping R10.9    Back pain, chronic M54.9, G89.29    Diabetes mellitus ([facility]) E11.9    C. difficile colitis A04.72    Nausea vomiting and diarrhea R11.2, R19.7    Hypoalbuminemia E88.09    Gastroenteritis/enteritis K52.9    Supratherapeutic INR R79.1    Gastroesophageal reflux disease without esophagitis K21.9    Irritable bowel syndrome with both constipation and diarrhea K58.2    Irritable bowel syndrome with diarrhea K58.0    Left lower quadrant pain R10.32    PND Independent  Med Admin  Assisted  Med Delivery   whole    Wound Care Documentation and Therapy:        Elimination:  Continence: Bowel: Yes  Bladder: No  Urinary Catheter: None   Colostomy/Ileostomy/Ileal Conduit: No       Date of Last BM: 04/13/2022  No intake or output data in the 24 hours ending 04/13/22 1532  No intake/output data recorded. Safety Concerns:     History of Falls (last 30 days)    Impairments/Disabilities:      Vision and Hearing    Nutrition Therapy:  Current Nutrition Therapy:   - Oral Diet:  Carb Control 4 carbs/meal (1800kcals/day) and Low Sodium (3-4gm)    Routes of Feeding: Oral  Liquids: No Restrictions  Daily Fluid Restriction: no  Last Modified Barium Swallow with Video (Video Swallowing Test): not done    Treatments at the Time of Hospital Discharge:   Respiratory Treatments: N/A  Oxygen Therapy:  is on oxygen at 3 L/min per nasal cannula.  At HS PRN  Ventilator:    - No ventilator support    Rehab Therapies: Physical Therapy and Occupational Therapy  Weight Bearing Status/Restrictions: No weight bearing restrictions  Other Medical Equipment (for information only, NOT a DME order):  walker  Other Treatments: ***    Patient's personal belongings (please select all that are sent with patient):  Glasses, Hearing Aides right    RN SIGNATURE:  Electronically signed by Nandini Porter RN on 4/15/22 at 1:00 PM EDT    CASE MANAGEMENT/SOCIAL WORK SECTION    Inpatient Status Date: ***    Readmission Risk Assessment Score:  Readmission Risk              Risk of Unplanned Readmission:  16           Discharging to Facility/ Agency   Name: [facility]  Address:  Phone:[phone]  Fax:[phone]    Dialysis Facility (if applicable)   Name:  Address:  Dialysis Schedule:  Phone:  Fax:    / signature: Electronically signed by OLGA LIDIA Hernandez on 4/15/22 at 11:02 AM EDT    PHYSICIAN SECTION    Prognosis: Fair    Condition at Discharge: Stable    Rehab Potential (if transferring to Rehab): Guarded    Recommended Labs or Other Treatments After Discharge: change demadex to prn as needed  Pt/ot    Physician Certification: I certify the above information and transfer of Stella Morrow  is necessary for the continuing treatment of the diagnosis listed and that she requires Acute Rehab for less 30 days.      Update Admission H&P: Changes in H&P as follows - discharge summary to follow    PHYSICIAN SIGNATURE:  Electronically signed by Sharyn Zayas MD on 4/14/22 at 2:45 PM EDT

## 2022-04-13 NOTE — PLAN OF CARE
Problem: Falls - Risk of:  Goal: Will remain free from falls  Description: Will remain free from falls  4/13/2022 1235 by Therman Records, RN  Outcome: Ongoing  Note: Patient remains free from falls at this time. Bed in lowest position with wheels locked. Tray table and call light within reach. Hourly rounding. Problem: Falls - Risk of:  Goal: Absence of physical injury  Description: Absence of physical injury  4/13/2022 1235 by Therman Records, RN  Outcome: Ongoing     Problem: Skin Integrity:  Goal: Will show no infection signs and symptoms  Description: Will show no infection signs and symptoms  4/13/2022 1235 by Therman Records, RN  Outcome: Ongoing     Problem: Skin Integrity:  Goal: Absence of new skin breakdown  Description: Absence of new skin breakdown  4/13/2022 1235 by Therman Records, RN  Outcome: Ongoing  Note: No new skin breakdown noted throughout the shift. Will continue to monitor      Problem: Pain:  Goal: Pain level will decrease  Description: Pain level will decrease  4/13/2022 1235 by Therman Records, RN  Outcome: Ongoing  Note: Pt medicated with pain medication prn. Assessed all pain characteristics including level, type, location, frequency, and onset. Non-pharmacologic interventions offered to pt as well. Pt states pain is tolerable at this time.  Will continue to monitor      Problem: Pain:  Goal: Control of acute pain  Description: Control of acute pain  4/13/2022 1235 by Therman Irena, RN  Outcome: Ongoing     Problem: Pain:  Goal: Control of chronic pain  Description: Control of chronic pain  Outcome: Ongoing     Problem: SAFETY  Goal: Free from accidental physical injury  Outcome: Ongoing     Problem: SAFETY  Goal: Free from intentional harm  Outcome: Ongoing     Problem: DAILY CARE  Goal: Daily care needs are met  4/13/2022 1235 by Thermdaryl Osborn, RN  Outcome: Ongoing  Note: Patient able to verbalize needs and calls out appropriately for assist. Patient assists with personal daily care needs as tolerated. OT continues to work with patient. Problem: PAIN  Goal: Patient's pain/discomfort is manageable  4/13/2022 1235 by Tom Tirado RN  Outcome: Ongoing  Note: Pt medicated with pain medication prn. Assessed all pain characteristics including level, type, location, frequency, and onset. Non-pharmacologic interventions offered to pt as well. Pt states pain is tolerable at this time. Will continue to monitor      Problem: SKIN INTEGRITY  Goal: Skin integrity is maintained or improved  4/13/2022 1235 by Tom Tirado RN  Outcome: Ongoing  Note: No new skin breakdown noted throughout the shift. Will continue to monitor      Problem: KNOWLEDGE DEFICIT  Goal: Patient/S.O. demonstrates understanding of disease process, treatment plan, medications, and discharge instructions.   4/13/2022 1235 by Tom Tirado RN  Outcome: Ongoing     Problem: DISCHARGE BARRIERS  Goal: Patient's continuum of care needs are met  Outcome: Ongoing  Note:   Identify potential discharge needs/barriers on admission  Involve family/patient/significant other in discharge process  Collaborate with Case Management/ for discharge needs/concerns      Problem: Discharge Planning:  Goal: Discharged to appropriate level of care  Description: Discharged to appropriate level of care  Outcome: Ongoing     Problem: Serum Glucose Level - Abnormal:  Goal: Ability to maintain appropriate glucose levels will improve  Description: Ability to maintain appropriate glucose levels will improve  Outcome: Ongoing     Problem: Sensory Perception - Impaired:  Goal: Ability to maintain a stable neurologic state will improve  Description: Ability to maintain a stable neurologic state will improve  Outcome: Ongoing

## 2022-04-13 NOTE — CONSULTS
[facility]  [address] [address]  Dept: [phone]  Loc: [phone]    Inpatient Orthopedic Consult      CHIEF COMPLAINT:    Pelvic pain      HISTORY OF PRESENT ILLNESS:      The patient is a 80 y.o. female who is being seen for evaluation of pain at the above location, secondary to an injury that occurred secondary to a fall from standing height on 4/10/22. The patient localizes the pain to the above location. Prior to this, the patient used a walker for ambulation. REVIEW OF SYSTEMS:  Constitutional: Negative for fever. HENT: Negative for tinnitus. Eyes: Negative for pain. Respiratory: Negative for shortness of breath. Cardiovascular: Negative for chest pain. Gastrointestinal: Negative for abdominal pain. Genitourinary: Negative for dysuria. Skin: Negative for rash. Neurological: Negative for headaches. Hematological: Does not bruise/bleed easily.    Musculoskeletal: See HPI for pertinent positives     Past Medical History:    Past Medical History:   Diagnosis Date    A-fib [facility])     AAA (abdominal aortic aneurysm) (HCC)     Abdominal cramping     Aortic insufficiency     Atrial fibrillation (HCC)     Back pain, chronic     C. difficile colitis     Chronic diastolic CHF (congestive heart failure) (HCC)     COPD (chronic obstructive pulmonary disease) (HCC)     Diabetes mellitus (HCC)     Gastritis     GERD (gastroesophageal reflux disease)     Hiatal hernia     Hx of prolonged Q-T interval on ECG     Hypoalbuminemia 10/14/2014    IBS (irritable bowel syndrome)     Murmur, cardiac     Nausea vomiting and diarrhea 10/14/2014    Stomach ulcer     Thyroid disease     Type 2 diabetes mellitus without complication, without long-term current use of insulin [facility] 01/24/2018    UTI (lower urinary tract infection)     Varicose veins        Past Surgical History:    Past Surgical History:   Procedure Laterality Date    CHOLECYSTECTOMY      HERNIA REPAIR      HYSTERECTOMY      partial    [place] EGD TRANSORAL BIOPSY SINGLE/MULTIPLE N/A 6/20/2017    EGD BIOPSY performed by Grant Pillai MD at [address]  06/20/2017    MILD CHRONIC INACTIVE GASTRITIS    VASCULAR SURGERY      varicose veins    VEIN SURGERY         Current Medications:   Current Facility-Administered Medications   Medication Dose Route Frequency Provider Last Rate Last Admin    [START ON 4/14/2022] amiodarone (CORDARONE) tablet 200 mg  200 mg Oral Once per day on Mon Thu PERCY Gordon CNP        Vitamin D (CHOLECALCIFEROL) tablet 2,000 Units  2,000 Units Oral Every Other Day [address] LAOTYA ColeyN - CNP   2,000 Units at 04/13/22 1010    [Held by provider] furosemide (LASIX) tablet 20 mg  20 mg Oral Daily PERCY Gordon CNP        levothyroxine (SYNTHROID) tablet 125 mcg  125 mcg Oral Every Other Day [address] PERCY Coley - CNP   125 mcg at 04/13/22 0623    traMADol (ULTRAM) tablet 50 mg  50 mg Oral Q6H PRN Jacqui Coley APRN - CNP        dicyclomine (BENTYL) capsule 10 mg  10 mg Oral Q6H PRN Jacqui Coley APRN - CNP        pantoprazole (PROTONIX) tablet 40 mg  40 mg Oral QAM AC PERCY Gordon - CNP   40 mg at 04/13/22 0623    sodium chloride flush 0.9 % injection 5-40 mL  5-40 mL IntraVENous 2 times per day PERCY Gordon - CNP   10 mL at 04/13/22 1011    sodium chloride flush 0.9 % injection 10 mL  10 mL IntraVENous PRN Jacqui Coley APRN - CNP        0.9 % sodium chloride infusion   IntraVENous PRN Jacqui Coley APRN - CNP        enoxaparin (LOVENOX) injection 30 mg  30 mg SubCUTAneous Daily Jacqui PERCY Glover - CNP   30 mg at 04/13/22 1010    ondansetron (ZOFRAN-ODT) disintegrating tablet 4 mg  4 mg Oral Q8H PRN PERCY Gordon CNP        Or    ondansetron (ZOFRAN) injection 4 mg  4 mg IntraVENous Q6H PRN Jacqui PADILLA Delgrosso, APRN - CNP        polyethylene glycol (GLYCOLAX) packet 17 g  17 g Oral Daily PRN PERCY Gordon CNP        acetaminophen (TYLENOL) tablet 650 mg  650 mg Oral Q6H PRN PERCY Gordon CNP        Or    acetaminophen (TYLENOL) suppository 650 mg  650 mg Rectal Q6H PRN PERCY Gordon CNP        morphine (PF) injection 2 mg  2 mg IntraVENous Q4H PRN PERCY Gordon CNP        Or    morphine sulfate (PF) injection 4 mg  4 mg IntraVENous Q4H PRN PERCY Gordon CNP        insulin lispro (HUMALOG) injection vial 0-6 Units  0-6 Units SubCUTAneous TID WC PERCY Zimmer NP        insulin lispro (HUMALOG) injection vial 0-3 Units  0-3 Units SubCUTAneous Nightly PERCY Zimmer NP        glucagon (rDNA) injection 1 mg  1 mg IntraMUSCular PRN PERCY Zimmer NP        dextrose 5 % solution  100 mL/hr IntraVENous PRN PERCY Zimmer NP        glucose chewable tablet 4 each  4 each Oral PRN PERCY Zimmer NP        dextrose bolus (hypoglycemia) 10% 125 mL  125 mL IntraVENous PRN PERCY Zimmer NP        Or    dextrose bolus (hypoglycemia) 10% 250 mL  250 mL IntraVENous PRN PERCY Zimmer NP           Allergies:    Penicillins    Family History:  Family History   Problem Relation Age of Onset    Stroke Mother     Heart Disease Mother        Social History:   Social History     Socioeconomic History    Marital status:      Spouse name: Not on file    Number of children: 2    Years of education: 15    Highest education level: Not on file   Occupational History    Not on file   Tobacco Use    Smoking status: Former Smoker     Packs/day: 1.00     Years: 20.00     Pack years: 20.00     Types: Cigarettes    Smokeless tobacco: Never Used    Tobacco comment: quit many years ago    Vaping Use    Vaping Use: Never used   Substance and Sexual Activity    Alcohol use:  Yes     Alcohol/week: 2.0 standard drinks     Types: 1 Glasses of wine, 1 Cans of beer per week     Comment: social drinker     Drug use: No    Sexual activity: Never     Birth control/protection: Surgical     Comment: hyst   Other Topics Concern    Not on file   Social History Narrative    Not on file     Social Determinants of Health     Financial Resource Strain:     Difficulty of Paying Living Expenses: Not on file   Food Insecurity:     Worried About Running Out of Food in the Last Year: Not on file    Emperatriz of Food in the Last Year: Not on file   Transportation Needs:     Lack of Transportation (Medical): Not on file    Lack of Transportation (Non-Medical): Not on file   Physical Activity:     Days of Exercise per Week: Not on file    Minutes of Exercise per Session: Not on file   Stress:     Feeling of Stress : Not on file   Social Connections:     Frequency of Communication with Friends and Family: Not on file    Frequency of Social Gatherings with Friends and Family: Not on file    Attends Confucianist Services: Not on file    Active Member of [address] [place] [address] or Organizations: Not on file    Attends Club or Organization Meetings: Not on file    Marital Status: Not on file   Intimate Partner Violence:     Fear of Current or Ex-Partner: Not on file    Emotionally Abused: Not on file    Physically Abused: Not on file    Sexually Abused: Not on file   Housing Stability:     Unable to Pay for Housing in the Last Year: Not on file    Number of [place] in the Last Year: Not on file    Unstable Housing in the Last Year: Not on file          OBJECTIVE:  /66   Pulse 86   Temp 97.5 °F (36.4 °C) (Oral)   Resp 16   Ht 5' 3\" (1.6 m)   Wt 175 lb (79.4 kg)   SpO2 94%   BMI 31.00 kg/m²    Psych: awake, alert, no acute distress and alert and oriented to person, time, and place.    Cardio:  well perfused extremities  Resp:  normal respiratory effort  Skin:  no cyanosis  Hem/lymph:  no lymphedema  Neuro:  sensation to light touch intact throughout all nerve distributions in the foot  Musculoskeletal:    Affected lower extremity (bilateral):    Vascular: The foot is warm and well-perfused, good capillary refill. Skin: Intact over lateral aspect of hip, no erythema/ulcers. No significant swelling. Musculoskeletal: Able to plantarflex/dorsiflex ankle and toes   Range of Motion: Grossly normal and painless knee/hip/ankle/foot range of motion  No focal neurologic deficits otherwise  No gross limb length discrepancy or shortening  No pain with hip log roll test      RADIOLOGY:   Radiographic images reviewed. Questionable sacral fracture. XR LUMBAR SPINE (2-3 VIEWS)    Result Date: 4/13/2022  EXAMINATION: THREE XRAY VIEWS OF THE LUMBAR SPINE; THREE XRAY VIEWS OF THE SACRUM/COCCYX 4/13/2022 12:48 am COMPARISON: CT abdomen and pelvis 02/04/2022 HISTORY: ORDERING SYSTEM PROVIDED HISTORY: fall from standing TECHNOLOGIST PROVIDED HISTORY: fall from standing FINDINGS: Lumbar: Decreased bone mineralization. The vertebral body heights are maintained. No listhesis. Advanced multilevel degenerative disc disease and advanced lower lumbar facet arthropathy. Sacrum: Decreased bone mineralization. Pelvic alignment appears maintained. The distal coccyx has a posterior tilt, which appears new or more prominent since prior CT imaging. 1.  New or more prominent posterior tilt of the distal coccyx may represent acute fracture in the appropriate clinical setting. 2.  No acute osseous abnormality identified in the lumbar spine. 3.  Decreased bone mineralization limits this exam.  If there remains concern for an occult pelvic fracture, consider MRI imaging.      XR SACRUM COCCYX (MIN 2 VIEWS)    Result Date: 4/13/2022  EXAMINATION: THREE XRAY VIEWS OF THE LUMBAR SPINE; THREE XRAY VIEWS OF THE SACRUM/COCCYX 4/13/2022 12:48 am COMPARISON: CT abdomen and pelvis 02/04/2022 HISTORY: ORDERING SYSTEM PROVIDED HISTORY: fall from standing TECHNOLOGIST PROVIDED HISTORY: fall from standing FINDINGS: Lumbar: Decreased bone mineralization. The vertebral body heights are maintained. No listhesis. Advanced multilevel degenerative disc disease and advanced lower lumbar facet arthropathy. Sacrum: Decreased bone mineralization. Pelvic alignment appears maintained. The distal coccyx has a posterior tilt, which appears new or more prominent since prior CT imaging. 1.  New or more prominent posterior tilt of the distal coccyx may represent acute fracture in the appropriate clinical setting. 2.  No acute osseous abnormality identified in the lumbar spine. 3.  Decreased bone mineralization limits this exam.  If there remains concern for an occult pelvic fracture, consider MRI imaging. XR KNEE LEFT (3 VIEWS)    Result Date: 4/12/2022  EXAMINATION: THREE XRAY VIEWS OF THE LEFT KNEE 4/12/2022 11:19 pm COMPARISON: None. HISTORY: ORDERING SYSTEM PROVIDED HISTORY: fall from standing TECHNOLOGIST PROVIDED HISTORY: fall from standing FINDINGS: No fracture or malalignment identified. Mild joint space narrowing with chondrocalcinosis. Mild soft tissue swelling in the lower leg is noted. No evidence for joint effusion. No acute osseous abnormality identified in the left knee. XR KNEE RIGHT (3 VIEWS)    Result Date: 4/12/2022  EXAMINATION: THREE XRAY VIEWS OF THE RIGHT KNEE 4/12/2022 11:19 pm COMPARISON: None. HISTORY: ORDERING SYSTEM PROVIDED HISTORY: fall from standing TECHNOLOGIST PROVIDED HISTORY: fall from standing FINDINGS: No acute fracture or malalignment identified. Mild degenerative change in the knee with chondrocalcinosis. Mild apparent lower leg soft tissue swelling. No evidence for joint effusion. No acute osseous abnormality identified in the right knee.      MRI PELVIS WO CONTRAST    Result Date: 4/13/2022  EXAMINATION: MRI OF THE PELVIS WITHOUT CONTRAST, 4/13/2022 11:41 am TECHNIQUE: Multiplanar multisequence MRI of the pelvis was performed without the administration of intravenous contrast. COMPARISON: CT abdomen pelvis 2/4/2022, radiographs 4/13/2022 HISTORY: ORDERING SYSTEM PROVIDED HISTORY: frequent falls, possible coccyx fracture TECHNOLOGIST PROVIDED HISTORY: Frequent falls, possible coccyx fracture Decision Support Exception - unselect if not a suspected or confirmed emergency medical condition->Emergency Medical Condition (MA) Reason for Exam: Pt to ER yesterday after a fall Additional signs and symptoms: abnormal CT and x-ray, fracture to coccyx Relevant Medical/Surgical History: pain in tailbone FINDINGS: In the distal sacrum at approximately the S5 level, there is bone marrow edema with associated curvilinear T1 hypointense marrow signal, suspicious for a nondisplaced fracture. There is mild presacral fluid/edema. No additional acute fracture is identified in the bony pelvis/proximal femurs. A small fluid collection/cyst is seen at the tip of the coccyx, nonspecific. No evidence of osteonecrosis of the femoral heads. No suspicious marrow replacing osseous lesion is identified. Mild bilateral hip joint degenerative changes. Small volume bilateral hip joint fluid. No significant greater trochanteric bursal distension. Bilateral gluteal muscle atrophy. Mild bilateral proximal hamstring tendinosis. Mild bilateral gluteus medius/minimus tendinosis. Visualized intrapelvic contents demonstrate urinary bladder diverticula and a trabeculated contour of the urinary bladder. Nonspecific prominent veins along the left posterior acetabular wall. Findings are suspicious for a nondisplaced fracture involving the distal sacrum at approximately the S5 level. This could be further assessed with a noncontrast CT. No additional acute fractures identified in the bony pelvis/proximal femurs. Nonspecific small fluid collection/cyst at the tip of the coccyx. Mild bilateral gluteus medius/minimus tendinosis.   Mild bilateral proximal hamstring tendinosis. Mild bilateral hip joint degenerative changes. Urinary bladder diverticula. LABS:   Lab Results   Component Value Date    WBC 5.2 04/13/2022    HGB 12.1 04/13/2022    HCT 37.7 04/13/2022    MCV 96.9 04/13/2022     04/13/2022     Lab Results   Component Value Date     04/13/2022    K 3.5 04/13/2022     04/13/2022    CO2 28 04/13/2022    BUN 21 04/13/2022    CREATININE 1.23 04/13/2022    GLUCOSE 117 04/13/2022    CALCIUM 9.0 04/13/2022            ASSESSMENT AND PLAN:  Body mass index is 31 kg/m². She has a questionable sacral fracture, sustained 4/10/22. Notably, she has the past medical history as above. We had a discussion today about the likely diagnosis and its natural history, physical exam and imaging findings, as well as various treatment options in detail. Surgically, I did not recommend any intervention at this time, and recommended conservative management, and the patient agrees with this plan. -DVT prophylaxis  -pain control  -WB'ing status:  WBAT with walker   -PT for gait training and to help decrease future falls      All questions were answered and the patient agrees with the above plan. I would like to see her in my office in 4 weeks. Ortho to sign off, please page with questions. Mark Shoemaker MD  Orthopedic Surgery        Please excuse any typos/errors, as this note was created with the assistance of voice recognition software. While intending to generate a document that actually reflects the content of the visit, the document can still have some errors including those of syntax and sound-a-like substitutions which may escape proof reading. In such instances, actual meaning can be extrapolated by context.

## 2022-04-13 NOTE — CARE COORDINATION
Case Management Initial Discharge Plan  Derik Montalvo,         Readmission Risk              Risk of Unplanned Readmission:  16         Met with:family member patient and granddaughter to discuss discharge plans. Information verified: address, contacts, phone number, , insurance Yes  PCP: Agustina Clements MD  Date of last visit:     Insurance Provider: MMO    Discharge Planning  Current Residence:    Living Arrangements:  LILY Ibrahim has 1 stories/ stairs to climb  Support Systems:  Family Members  Current Services PTA:  AL Agency: [address] [place] [address]  Patient able to perform ADL's:Assisted  DME in home:  walker  DME used to aid ambulation prior to admission:     DME used during admission:      Potential Assistance Needed:  Other (Comment) (Return to [address] [place] [address])    Pharmacy: [facility] Medications:  No  Does patient want to participate in local refill/ meds to beds program?  Yes    Patient agreeable to home care: No  [place] of choice provided:  n/a      Type of Home Care Services:  [facility] ([address] [place] [address])  Patient expects to be discharged to:       Prior SNF/Rehab Placement and Facility:   Agreeable to SNF/Rehab: tbd  [place] of choice provided: yes   Evaluation: yes    Expected Discharge date:  04/15/22  Follow Up Appointment: Best Day/ Time:      Transportation provider: tbd  Transportation arrangements needed for discharge: tbd    Discharge Plan: Met with pt's granddaughter, Ashley Pineda, in the room (pt off floor). She states that patient lives at [address] [place] [address], has been getting help 2x week with showers. Ashley Pineda talked to [facility] and plan is to increase aide services to twice a day(AM and PM). Patient had [facility] set up after last admission, but pt did not find the therapy beneficial.  Elyssakendra Pineda stated patient is normally independent, gets around with her walker.   Discussed potential need for short term

## 2022-04-13 NOTE — PROGRESS NOTES
Occupational Therapy  DATE: 2022    NAME: Alix Jean  MRN: [id number]   : 1925    Patient not seen this date for Occupational Therapy due to:      [] Cancel by RN or physician due to:    [] Hemodialysis    [] Critical Lab Value Level     [] Blood transfusion in progress    [] Acute or unstable cardiovascular status   _MAP < 55 or more than >115  _HR < 40 or > 130    [] Acute or unstable pulmonary status   -FiO2 > 60%   _RR < 5 or >40    _O2 sats < 85%    [] Strict Bedrest    [] Off Unit for surgery or procedure    [] Off Unit for testing       [] Pending imaging to R/O fracture    [] Refusal by Patient      [x] Other- cx eval as pt has otho consult due to concern for pelvic fx and will await for new orders; continue to follow      [] PT being discontinued at this time. Patient independent. No further needs. [] PT being discontinued at this time as the patient has been transferred to hospice care. No further needs.       Cruz Andre, OT

## 2022-04-14 LAB
ANION GAP SERPL CALCULATED.3IONS-SCNC: 10 MMOL/L (ref 9–17)
BUN BLDV-MCNC: 15 MG/DL (ref 8–23)
BUN/CREAT BLD: 18 (ref 9–20)
CALCIUM SERPL-MCNC: 8.4 MG/DL (ref 8.6–10.4)
CHLORIDE BLD-SCNC: 109 MMOL/L (ref 98–107)
CO2: 24 MMOL/L (ref 20–31)
CREAT SERPL-MCNC: 0.82 MG/DL (ref 0.5–0.9)
GFR AFRICAN AMERICAN: >60 ML/MIN
GFR NON-AFRICAN AMERICAN: >60 ML/MIN
GFR SERPL CREATININE-BSD FRML MDRD: ABNORMAL ML/MIN/{1.73_M2}
GLUCOSE BLD-MCNC: 104 MG/DL (ref 65–105)
GLUCOSE BLD-MCNC: 106 MG/DL (ref 70–99)
GLUCOSE BLD-MCNC: 121 MG/DL (ref 65–105)
GLUCOSE BLD-MCNC: 126 MG/DL (ref 65–105)
GLUCOSE BLD-MCNC: 185 MG/DL (ref 65–105)
INR BLD: 1
POTASSIUM SERPL-SCNC: 3.9 MMOL/L (ref 3.7–5.3)
PROTHROMBIN TIME: 13.6 SEC (ref 11.5–14.2)
SODIUM BLD-SCNC: 143 MMOL/L (ref 135–144)

## 2022-04-14 PROCEDURE — 97535 SELF CARE MNGMENT TRAINING: CPT

## 2022-04-14 PROCEDURE — 97163 PT EVAL HIGH COMPLEX 45 MIN: CPT

## 2022-04-14 PROCEDURE — 96372 THER/PROPH/DIAG INJ SC/IM: CPT

## 2022-04-14 PROCEDURE — 97112 NEUROMUSCULAR REEDUCATION: CPT

## 2022-04-14 PROCEDURE — 2580000003 HC RX 258: Performed by: NURSE PRACTITIONER

## 2022-04-14 PROCEDURE — G0378 HOSPITAL OBSERVATION PER HR: HCPCS

## 2022-04-14 PROCEDURE — 85610 PROTHROMBIN TIME: CPT

## 2022-04-14 PROCEDURE — 82947 ASSAY GLUCOSE BLOOD QUANT: CPT

## 2022-04-14 PROCEDURE — 80048 BASIC METABOLIC PNL TOTAL CA: CPT

## 2022-04-14 PROCEDURE — 36415 COLL VENOUS BLD VENIPUNCTURE: CPT

## 2022-04-14 PROCEDURE — 97530 THERAPEUTIC ACTIVITIES: CPT

## 2022-04-14 PROCEDURE — 99217 PR OBSERVATION CARE DISCHARGE MANAGEMENT: CPT | Performed by: STUDENT IN AN ORGANIZED HEALTH CARE EDUCATION/TRAINING PROGRAM

## 2022-04-14 PROCEDURE — 97167 OT EVAL HIGH COMPLEX 60 MIN: CPT

## 2022-04-14 PROCEDURE — 96374 THER/PROPH/DIAG INJ IV PUSH: CPT

## 2022-04-14 PROCEDURE — 6360000002 HC RX W HCPCS: Performed by: NURSE PRACTITIONER

## 2022-04-14 PROCEDURE — 6370000000 HC RX 637 (ALT 250 FOR IP): Performed by: NURSE PRACTITIONER

## 2022-04-14 PROCEDURE — 97116 GAIT TRAINING THERAPY: CPT

## 2022-04-14 PROCEDURE — 6370000000 HC RX 637 (ALT 250 FOR IP): Performed by: STUDENT IN AN ORGANIZED HEALTH CARE EDUCATION/TRAINING PROGRAM

## 2022-04-14 PROCEDURE — 93005 ELECTROCARDIOGRAM TRACING: CPT | Performed by: STUDENT IN AN ORGANIZED HEALTH CARE EDUCATION/TRAINING PROGRAM

## 2022-04-14 RX ORDER — GABAPENTIN 100 MG/1
100 CAPSULE ORAL 3 TIMES DAILY
COMMUNITY

## 2022-04-14 RX ORDER — CALCIUM CARBONATE 200(500)MG
500 TABLET,CHEWABLE ORAL 3 TIMES DAILY PRN
Status: DISCONTINUED | OUTPATIENT
Start: 2022-04-14 | End: 2022-04-15 | Stop reason: HOSPADM

## 2022-04-14 RX ORDER — TORSEMIDE 20 MG/1
20 TABLET ORAL DAILY
Status: ON HOLD | COMMUNITY
End: 2022-04-14 | Stop reason: SDUPTHER

## 2022-04-14 RX ORDER — FAMOTIDINE 20 MG/1
20 TABLET, FILM COATED ORAL DAILY
Status: DISCONTINUED | OUTPATIENT
Start: 2022-04-14 | End: 2022-04-15 | Stop reason: HOSPADM

## 2022-04-14 RX ORDER — GABAPENTIN 100 MG/1
100 CAPSULE ORAL 3 TIMES DAILY
Status: DISCONTINUED | OUTPATIENT
Start: 2022-04-14 | End: 2022-04-15 | Stop reason: HOSPADM

## 2022-04-14 RX ORDER — POLYETHYLENE GLYCOL 3350 17 G/17G
17 POWDER, FOR SOLUTION ORAL DAILY
COMMUNITY

## 2022-04-14 RX ORDER — TORSEMIDE 20 MG/1
20 TABLET ORAL DAILY PRN
Qty: 30 TABLET | Refills: 3 | Status: ON HOLD | OUTPATIENT
Start: 2022-04-14 | End: 2022-08-14 | Stop reason: SDUPTHER

## 2022-04-14 RX ORDER — AMIODARONE HYDROCHLORIDE 200 MG/1
200 TABLET ORAL DAILY
Status: DISCONTINUED | OUTPATIENT
Start: 2022-04-14 | End: 2022-04-15 | Stop reason: HOSPADM

## 2022-04-14 RX ORDER — FAMOTIDINE 20 MG/1
40 TABLET, FILM COATED ORAL EVERY EVENING
Status: DISCONTINUED | OUTPATIENT
Start: 2022-04-14 | End: 2022-04-14

## 2022-04-14 RX ADMIN — SODIUM CHLORIDE, PRESERVATIVE FREE 10 ML: 5 INJECTION INTRAVENOUS at 08:23

## 2022-04-14 RX ADMIN — INSULIN LISPRO 1 UNITS: 100 INJECTION, SOLUTION INTRAVENOUS; SUBCUTANEOUS at 21:38

## 2022-04-14 RX ADMIN — PANTOPRAZOLE SODIUM 40 MG: 40 TABLET, DELAYED RELEASE ORAL at 06:20

## 2022-04-14 RX ADMIN — AMIODARONE HYDROCHLORIDE 200 MG: 200 TABLET ORAL at 11:38

## 2022-04-14 RX ADMIN — SODIUM CHLORIDE, PRESERVATIVE FREE 10 ML: 5 INJECTION INTRAVENOUS at 21:38

## 2022-04-14 RX ADMIN — POLYETHYLENE GLYCOL 3350 17 G: 17 POWDER, FOR SOLUTION ORAL at 21:39

## 2022-04-14 RX ADMIN — TRAMADOL HYDROCHLORIDE 50 MG: 50 TABLET, COATED ORAL at 06:28

## 2022-04-14 RX ADMIN — GABAPENTIN 100 MG: 100 CAPSULE ORAL at 21:38

## 2022-04-14 RX ADMIN — ENOXAPARIN SODIUM 30 MG: 100 INJECTION SUBCUTANEOUS at 10:10

## 2022-04-14 RX ADMIN — GABAPENTIN 100 MG: 100 CAPSULE ORAL at 15:13

## 2022-04-14 RX ADMIN — FAMOTIDINE 20 MG: 20 TABLET, FILM COATED ORAL at 21:38

## 2022-04-14 RX ADMIN — ONDANSETRON 4 MG: 2 INJECTION INTRAMUSCULAR; INTRAVENOUS at 08:22

## 2022-04-14 ASSESSMENT — PAIN SCALES - GENERAL
PAINLEVEL_OUTOF10: 0
PAINLEVEL_OUTOF10: 4
PAINLEVEL_OUTOF10: 0

## 2022-04-14 NOTE — PROGRESS NOTES
Physical Therapy    Facility/Department: [facility]  Initial Assessment    NAME: Talisha Pritchard  : 1925  MRN: [id number]    Date of Service: 2022    Discharge Recommendations:  Patient would benefit from continued therapy after discharge         Pt presented to ED on 22   for evaluation of back pain and knee pain after falling at home today. Patient states her legs just gave out and she fell forward onto her knees. No head strike, no LOC. She states she had similar fall 2 days ago falling backwards on her buttocks which is still very painful. No other injuries reported. No other issues at this time. Pt admitted to the hospital for the management of Recurrent falls. MRI pelvis on 22 revealed findings are suspicious for a nondisplaced fracture involving the distal sacrum at approximately the S5 level  Pt seen for ortho consult whom recommended conservative management:  -pain control  -WB'ing status:  WBAT with walker   -PT for gait training and to help decrease future falls    RN reports patient is medically stable for therapy treatment this date. Chart reviewed prior to treatment and patient is agreeable for therapy. Assessment   Body structures, Functions, Activity limitations: Decreased functional mobility ; Decreased ROM; Decreased strength;Decreased safe awareness;Decreased endurance;Decreased balance;Decreased posture  Assessment: Pt with deficits of ROM L hip, BLE strength, bed mobility, transfers, ambulation, balance, safety awareness and endurance this session,  & required 2 assist for safe functional mobility, transfers & gait. , & is decline compared to prior level of function. With current deficits, Pt HIGH risk for falls & requires continued PT to maximize independence with functional mobility, balance, safety awareness & activity tolerance to improve overall tolerance of ADL's. Pt is currently functional below baseline and would suggest intense therapy post acute care. Would expect patient to be able to tolerate 3 hours of therapy per day and able to tolerate at least one hour up in chair. Please refer to AM-PAC score for current mobility/adl level. Prognosis: Good  Decision Making: HIGH Complexity  Exam: ROM, MMT, functional mobility, activity tolerance, Balance, & [employer] AM-PAC 6 Clicks Basic Mobility  Clinical Presentation: evolving  PT Education: Goals;PT Role;Plan of Care; Functional Mobility Training;Transfer Training;Pressure Relief;General Safety;Gait Training  Patient Education: Ed pt on functional mobility, safety awareness, importance of being up & OOB to regain strength, & prevention of sedentary complications, circulation ex's,  pressure relief & breathing techniques  REQUIRES PT FOLLOW UP: Yes  Activity Tolerance  Activity Tolerance: Patient limited by pain; Patient limited by fatigue       Patient Diagnosis(es): The primary encounter diagnosis was Fall, initial encounter. A diagnosis of Pain in the coccyx was also pertinent to this visit. has a past medical history of A-fib ([address] AAA (abdominal aortic aneurysm) ([address] Abdominal cramping, Aortic insufficiency, Atrial fibrillation ([address] Back pain, chronic, C. difficile colitis, Chronic diastolic CHF (congestive heart failure) ([address] COPD (chronic obstructive pulmonary disease) ([address] Diabetes mellitus ([address] Gastritis, GERD (gastroesophageal reflux disease), Hiatal hernia, Hx of prolonged Q-T interval on ECG, Hypoalbuminemia, IBS (irritable bowel syndrome), Murmur, cardiac, Nausea vomiting and diarrhea, Stomach ulcer, Thyroid disease, Type 2 diabetes mellitus without complication, without long-term current use of insulin ([address] UTI (lower urinary tract infection), and Varicose veins. has a past surgical history that includes Cholecystectomy; vascular surgery; Vein Surgery; Hysterectomy; hernia repair;  Upper gastrointestinal endoscopy (06/20/2017); and pr egd transoral biopsy single/multiple (N/A, 6/20/2017). Restrictions  Restrictions/Precautions  Restrictions/Precautions: General Precautions,Fall Risk,Weight Bearing  Lower Extremity Weight Bearing Restrictions  Right Lower Extremity Weight Bearing: Weight Bearing As Tolerated  Left Lower Extremity Weight Bearing: Weight Bearing As Tolerated  Position Activity Restriction  Other position/activity restrictions:  Up with assist, Heels off bed at all times, ext catheter, RUE IV,WBAT with walker -PT for gait training and to help decrease future falls  Vision/Hearing  Vision: Impaired  Vision Exceptions: Wears glasses at all times  Hearing: Exceptions to [facility]  Hearing Exceptions: Hard of hearing/hearing concerns;Bilateral hearing aid     Subjective  General  Chart Reviewed: Yes  Patient assessed for rehabilitation services?: Yes  Additional Pertinent Hx: HTN, frequent falls  Response To Previous Treatment: Not applicable  Pain Screening  Patient Currently in Pain: Denies          Orientation  Orientation  Overall Orientation Status: Impaired  Orientation Level: Oriented to time;Disoriented to place;Oriented to person;Oriented to situation (but takes extended time to answer, cannot state name of hospital)  Social/Functional History  Social/Functional History  Lives With: Alone  Type of Home: Assisted living  Home Layout: One level  Home Access: Level entry,Elevator  Bathroom Shower/Tub: Walk-in shower  Bathroom Toilet: Handicap height  Bathroom Equipment: Grab bars in shower,Shower chair,Grab bars around toilet  Home Equipment: Cane,Oxygen,4 wheeled walker (Pt wears 3LO2 @ night only)  ADL Assistance: Needs assistance (pt gets assist showers 2x a week, can dress & toilet herself)  Homemaking Assistance: Needs assistance (pt eats meals in facility, staff cleans room & launders her bed linens; Pt does own personal laundry)  Homemaking Responsibilities: Yes  Ambulation Assistance: Independent (uses R/Walker)  Transfer Assistance: Independent  Active : No  Patient's  Info: Uses [employer] bus to go to doctor / store  Occupation: Retired  Type of occupation: raised her family  [address]: sleep  Additional Comments: Pt denies any falls  Cognition   Cognition  Overall Cognitive Status: Exceptions  Arousal/Alertness: Appropriate responses to stimuli  Following Commands: Follows multistep commands with increased time; Follows multistep commands with repitition  Attention Span: Appears intact  Memory: Appears intact  Safety Judgement: Decreased awareness of need for assistance;Decreased awareness of need for safety  Problem Solving: Decreased awareness of errors;Assistance required to identify errors made;Assistance required to correct errors made  Insights: Decreased awareness of deficits  Initiation: Requires cues for some  Sequencing: Does not require cues    Objective     Observation/Palpation  Posture: Fair  Edema: BLE's    AROM RLE (degrees)  RLE AROM: WFL  AROM LLE (degrees)  LLE AROM : WFL  LLE General AROM: assisted hip flexion to only about 40 & Abd to 20 degrees  AROM RUE (degrees)  RUE General AROM: See OT assessment  AROM LUE (degrees)  LUE General AROM: See OT assessment  Strength RLE  Comment: 4/5  Strength LLE  Comment: 2/5 hip, 3+/5 knee  Strength RUE  Comment: See OT assessment  Strength LUE  Comment: See OT assessment  Tone RLE  RLE Tone: Normotonic  Tone LLE  LLE Tone: Normotonic  Motor Control  Gross Motor?: WFL  Sensation  Overall Sensation Status: WFL  Bed mobility  Supine to Sit: Minimal assistance;2 Person assistance  Scooting: Minimal assistance;2 Person assistance  Comment:  Mod verbal instruction/tactile assist for UE hand placement on rail and proper log rolling tech + proper use of UB to scoot completely out to EOB with B foot placement to establish safe sitting balance, pursed lip breathing, assist with line mgt, with increased time needed all to increase safety & reduce fall risk  Transfers  Sit to Stand: Maximum Assistance;2 Person Assistance  Stand to sit: Moderate Assistance;2 Person Assistance  Bed to Chair: Moderate assistance;2 Person Assistance  Stand Pivot Transfers: Moderate Assistance;2 Person Assistance  Lateral Transfers: Moderate Assistance;2 Person Assistance  Comment: MOD VC + tactile assist on correct use of upper body for safe sit/stand + to back all way back to surface with walker until she feels touch behind legs & to ensure she reaches with UB support to arms of chair  Ambulation  Ambulation?: Yes  Ambulation 1  Surface: level tile  Device: Rolling Walker  Assistance: Moderate assistance;2 Person assistance  Quality of Gait: step to with shortened step length/height of LLE, MOD cues to keep walker close at all times & to amb INSIDE base of walker & upright posture for correction & safety/scanning  Gait Deviations: Decreased step length;Decreased step height;Slow Tavia  Distance: 6ft     Balance  Posture: Fair  Sitting - Static: Good  Sitting - Dynamic: Good;-  Standing - Static: Fair;+ (R/W)  Standing - Dynamic: Fair (R/W)  Single Leg Stance R Le (R/W)  Single Leg Stance L Le (R/W)  Exercises  Comments: circulation ex's, & deep breathing with incentive spirometer including technique, frequency and purpose,WS for  pressure relief, & ex's to move what moves to maintain strength & joint congruency       Pt completed seated ant/post & lateral WS seated & completed sit to stands x 3 to promote mobility and functional pre gait activities & completed static standing weight shifts & picking feet up off floor with UB support at R/walker to improve core strength & stability        All lines intact, call light within reach, and patient positioned comfortably at end of treatment. All patient needs addressed prior to ending therapy session.           Plan   Plan  Times per week: 1-2x/D, 6-7D/week  Current Treatment Recommendations: Strengthening,ROM,Balance Training,Functional Mobility Training,Transfer Training,Endurance Training,Gait Training,Patient/Caregiver Education & Training,Safety Education & Training,Home Exercise Program  Safety Devices  Type of devices: Call light within reach,Chair alarm in place,Gait belt,Patient at risk for falls,Left in chair,Nurse notified    G-Code       OutComes Score                                                  AM-PAC Score  AM-PAC Inpatient Mobility Raw Score : 12 (04/14/22 1153)  AM-PAC Inpatient T-Scale Score : 35.33 (04/14/22 1153)  Mobility Inpatient CMS 0-100% Score: 68.66 (04/14/22 1153)  Mobility Inpatient CMS G-Code Modifier : CL (04/14/22 1153)          Goals  Short term goals  Time Frame for Short term goals: 12 visits  Short term goal 1: Inc bed-mobility & transfers to independent to enable pt to safely get in/OOB & chair  Short term goal 2: Inc gait to amb 350ft or > indep w/ RW to enable pt to return to previous level of independence & able to demonstrate indep/ safe use of RW in functional activities including bending/ reaching, approaching counters and turning to sit. Short term goal 3: Inc AROM L hip & BLE strength to [facility] & standing balance to good with device to facilitate pt independence for performance of ADL's & functional mobility, & reduce fall risk  Short term goal 4: Pt able to tolerate 30-40 min of activity to include 15-20 reps of ex, NMR & functional mobility with device to facilitate activity tolerance to [facility]  Short term goal 5: Ed pt on home ex's, safety & energy principles, fall prevention, & issue written home program       Therapy Time   Individual Concurrent Group Co-treatment   Time In 1110         Time Out 1159         Minutes 49+10=59              Additional 10 minutes for chart review  Treatment time: 43 minutes        Co-treatment with OT warranted secondary to decreased safety and independence requiring 2 skilled therapy professionals to address individual discipline's goals.  PT addressing core control in transitions with bed mobility & transfers, seated/standing posture, standing postural alignment and breathing techniques, safety/scanning, & fall prevention in ambulation & pressure relief techniques.           [address], PT

## 2022-04-14 NOTE — PROGRESS NOTES
Transitions of Care Pharmacy Service   Medication Review    The patient's list of current home medications has been reviewed. Source(s) of information: Patient/ Surescripts/ Dr. Khang Sequeira office    Based on information provided by the above source(s), I have updated the patient's home med list as described below. Please review the ACTION REQUESTED section of this note below for any discrepancies on current hospital orders. I changed or updated the following medications on the patient's home medication list:  Removed Lasix 20mg daily - see torsemide  KCl 10mEq PO BID     Added Tosemide 20mg PO daily  Gabapentin 100mg PO TID     Adjusted   Amiodarone to 200mg PO daily   Other Notes none         PROVIDER ACTION REQUESTED  Medications that need to be addressed by a physician/nurse practitioner:    Medication Action Requested   Torsemide 20mg      Please review and order as appropriate         Please feel free to call me with any questions about this encounter. Thank you. Isreal Abdi, [facility]   Transitions of Care Pharmacy Service  Phone:  [phone]  Fax: [phone]      Electronically signed by Isreal Abdi [facility] on 4/14/2022 at 1:58 PM           Medications Prior to Admission: gabapentin (NEURONTIN) 100 MG capsule, Take 100 mg by mouth 3 times daily. torsemide (DEMADEX) 20 MG tablet, Take 20 mg by mouth daily  polyethylene glycol (MIRALAX) 17 GM/SCOOP powder, Take 17 g by mouth daily  famotidine (PEPCID) 40 MG tablet, Take 1 tablet by mouth every evening  pantoprazole (PROTONIX) 40 MG tablet, Take 1 tablet by mouth every morning (before breakfast)  dicyclomine (BENTYL) 10 MG capsule, Take 1 capsule by mouth every 6 hours as needed (cramps)  levothyroxine (SYNTHROID) 125 MCG tablet, Take 125 mcg by mouth every other day  traMADol (ULTRAM) 50 MG tablet, Take 50 mg by mouth every 6 hours as needed for Pain.   Cholecalciferol (VITAMIN D3) 50 MCG (2000 UT) CAPS, Take 2,000 Units by mouth every other day   [DISCONTINUED] pantoprazole (PROTONIX) 40 MG tablet, TAKE ONE TABLET BY MOUTH DAILY  amiodarone (CORDARONE) 200 MG tablet, Take 200 mg by mouth daily   OXYGEN, Inhale into the lungs Uses at night and prn

## 2022-04-14 NOTE — PROGRESS NOTES
[facility]  Office: [address], , [facility] Dipika, DO, Antony Miranda, DO, Cucaferdinand Christine, DO, Rachel Alva MD, Ronnell Mai MD, Michell Mcdaniels MD, Matthew Uribe MD, Jonh Miller MD, Soledad Miguel MD, Simone Veras MD, Celeste Ramírez, DO, Faith Casas, DO, Meri Fraser MD,  Gemma Alaniz, DO, Jessica Brooks MD, Ariela Gleason MD, David Mccullough MD, Maryuri Palma DO, Constance Saenz MD, Jack Caldwell MD, Herberth De La Torre, [facility], [facility], CNP, Orly Gaspar, CNP, Khalif Perez, CNP, Ronna Jimenes, CNP, Yvonne Segundo, CNP, Salome Kwong PA-C, Denisse Gurrola, [facility], Catherine Almeida, CNP, Mattie Woody, CNP, Korey Moss, CNP, Tali Salgado, CNS, Jaime Matthews, [facility], María Saba, CNP, Paula Wilde, CNP, Tyree Fan, [facility]    Progress Note    4/14/2022    2:39 PM    Name:   Arlene Rose  MRN:     [id number]     Acct:      [de-identified]   Room:   2011/2011-02  IP Day:  1  Admit Date:  4/12/2022 11:04 PM    PCP:   Janene Barber MD  Code Status:  Full Code    Subjective:     C/C:   Chief Complaint   Patient presents with   Ruby Hasting     Today, 2200    Knee Pain     Reports bilateral knee weakness and pain. Interval History Status: improved. Patient is feeling better  Worked with physical therapy was seen to be weak  Agreeable for rehab  Vitals stable  Blood sugar stable  Brief History:     80-year-old female with known medical history of previous falls, A. fib, diabetes, CHF, COPD with home oxygen presents to the hospital after a fall. Patient states that she uses a walker usually and was trying to keep her check in on the site when her legs gave away and she fell down. Patient does not report any dizziness, chest pain. In the ER patient was noted to be hypokalemic. X-ray of the sacrum showed a possible acute fracture. She was in A. fib but controlled by rate.     Review of Systems: Constitutional:  negative for chills, fevers, sweats  Respiratory:  negative for cough, dyspnea on exertion, shortness of breath, wheezing  Cardiovascular:  negative for chest pain, chest pressure/discomfort, lower extremity edema, palpitations  Gastrointestinal:  negative for abdominal pain, constipation, diarrhea, nausea, vomiting  Neurological:  negative for dizziness, headache    Medications: Allergies:     Allergies   Allergen Reactions    Penicillins      Has no recollection of what the reaction was       Current Meds:   Scheduled Meds:    amiodarone  200 mg Oral Daily    gabapentin  100 mg Oral TID    famotidine  20 mg Oral Daily    Vitamin D  2,000 Units Oral Every Other Day    levothyroxine  125 mcg Oral Every Other Day    pantoprazole  40 mg Oral QAM AC    sodium chloride flush  5-40 mL IntraVENous 2 times per day    enoxaparin  30 mg SubCUTAneous Daily    insulin lispro  0-6 Units SubCUTAneous TID WC    insulin lispro  0-3 Units SubCUTAneous Nightly     Continuous Infusions:    sodium chloride      dextrose       PRN Meds: calcium carbonate, dicyclomine, sodium chloride flush, sodium chloride, ondansetron **OR** ondansetron, polyethylene glycol, acetaminophen **OR** acetaminophen, morphine **OR** morphine, glucagon (rDNA), dextrose, glucose, dextrose bolus (hypoglycemia) **OR** dextrose bolus (hypoglycemia)    Data:     Past Medical History:   has a past medical history of A-fib ([facility] AAA (abdominal aortic aneurysm) ([facility] Abdominal cramping, Aortic insufficiency, Atrial fibrillation ([facility] Back pain, chronic, C. difficile colitis, Chronic diastolic CHF (congestive heart failure) ([facility] COPD (chronic obstructive pulmonary disease) ([facility] Diabetes mellitus ([facility] Gastritis, GERD (gastroesophageal reflux disease), Hiatal hernia, Hx of prolonged Q-T interval on ECG, Hypoalbuminemia, IBS (irritable bowel syndrome), Murmur, cardiac, Nausea vomiting and diarrhea, Stomach ulcer, Thyroid disease, Type 2 diabetes mellitus without complication, without long-term current use of insulin ([address] UTI (lower urinary tract infection), and Varicose veins. Social History:   reports that she has quit smoking. Her smoking use included cigarettes. She has a 20.00 pack-year smoking history. She has never used smokeless tobacco. She reports current alcohol use of about 2.0 standard drinks of alcohol per week. She reports that she does not use drugs. Family History:   Family History   Problem Relation Age of Onset    Stroke Mother     Heart Disease Mother        Vitals:  /72   Pulse 105   Temp 98.1 °F (36.7 °C) (Oral)   Resp 18   Ht 5' 3\" (1.6 m)   Wt 193 lb (87.5 kg)   SpO2 95%   BMI 34.19 kg/m²   Temp (24hrs), Av.8 °F (36.6 °C), Min:97.5 °F (36.4 °C), Max:98.1 °F (36.7 °C)    Recent Labs     22  1257 22  0627 22  1148   POCGLU 131* 163* 104 126*       I/O (24Hr):     Intake/Output Summary (Last 24 hours) at 2022 1439  Last data filed at 2022 0932  Gross per 24 hour   Intake 240 ml   Output 650 ml   Net -410 ml       Labs:  Hematology:  Recent Labs     22  0617   WBC 5.2  --    RBC 3.89*  --    HGB 12.1  --    HCT 37.7  --    MCV 96.9  --    MCH 31.1  --    MCHC 32.1  --    RDW 15.4*  --      --    MPV 9.6  --    INR  --  1.0     Chemistry:  Recent Labs     22  0617    143   K 3.5* 3.9    109*   CO2 28 24   GLUCOSE 117* 106*   BUN 21 15   CREATININE 1.23* 0.82   MG 2.0  --    ANIONGAP 10 10   LABGLOM 40* >60   GFRAA 49* >60   CALCIUM 9.0 8.4*     Recent Labs     22  0631 22  1257 22  1148   PROT 6.0*  --   --   --   --   --    LABALBU 3.5  --   --   --   --   --    AST 13  --   --   --   --   --    ALT 8  --   --   --   --   --    ALKPHOS 65  --   --   --   --   --    BILITOT 0.71  --   --   --   --   --    POCGLU  --  106* 131* 163* 104 126*     ABG:No results found for: POCPH, PHART, PH, POCPCO2, PRR7GID, PCO2, POCPO2, PO2ART, PO2, POCHCO3, BAL0WEI, HCO3, NBEA, PBEA, BEART, BE, THGBART, THB, IKV4GSM, AZYY9BNW, A6CUVXXU, O2SAT, FIO2  Lab Results   Component Value Date/Time    SPECIAL NOT REPORTED 09/03/2020 03:56 PM     Lab Results   Component Value Date/Time    CULTURE  09/03/2020 03:56 PM     Oral ishmael, negative for Group A Strep and other beta hemolytic streptococci       Radiology:  XR LUMBAR SPINE (2-3 VIEWS)    Result Date: 4/13/2022  1. New or more prominent posterior tilt of the distal coccyx may represent acute fracture in the appropriate clinical setting. 2.  No acute osseous abnormality identified in the lumbar spine. 3.  Decreased bone mineralization limits this exam.  If there remains concern for an occult pelvic fracture, consider MRI imaging. XR SACRUM COCCYX (MIN 2 VIEWS)    Result Date: 4/13/2022  1. New or more prominent posterior tilt of the distal coccyx may represent acute fracture in the appropriate clinical setting. 2.  No acute osseous abnormality identified in the lumbar spine. 3.  Decreased bone mineralization limits this exam.  If there remains concern for an occult pelvic fracture, consider MRI imaging. XR KNEE LEFT (3 VIEWS)    Result Date: 4/12/2022  No acute osseous abnormality identified in the left knee. XR KNEE RIGHT (3 VIEWS)    Result Date: 4/12/2022  No acute osseous abnormality identified in the right knee. MRI PELVIS WO CONTRAST    Result Date: 4/13/2022  Findings are suspicious for a nondisplaced fracture involving the distal sacrum at approximately the S5 level. This could be further assessed with a noncontrast CT. No additional acute fractures identified in the bony pelvis/proximal femurs. Nonspecific small fluid collection/cyst at the tip of the coccyx. Mild bilateral gluteus medius/minimus tendinosis. Mild bilateral proximal hamstring tendinosis.  Mild bilateral hip joint

## 2022-04-14 NOTE — PROGRESS NOTES
Occupational Therapy   Occupational Therapy Initial Assessment  Date: 2022   Patient Name: Duran Bates  MRN: [id number]     : 1925    Date of Service: 2022    Discharge Recommendations:  Patient would benefit from continued therapy after discharge Pt is currently functional below baseline and would suggest intense therapy post acute care. Would expect patient to be able to tolerate 3 hours of therapy per day and able to tolerate at least one hour up in chair. Please refer to AM-PAC score for current mobility/adl level. OT Equipment Recommendations  Equipment Needed: Yes  Mobility Devices: Leeanna Arce: Rolling (jr)         Patient is a 51-year-old female who was brought in by EMS for evaluation of back pain and knee pain after falling at home today. Patient states her legs just gave out and she fell forward onto her knees. No head strike, no LOC. She states she had similar fall 2 days ago falling backwards on her buttocks which is still very painful. No other injuries reported. No other issues at this time.         RN reports patient is medically stable for therapy treatment this date. Chart reviewed prior to treatment and patient is agreeable for therapy. All lines intact and patient positioned comfortably at end of treatment. All patient needs addressed prior to ending therapy session. Assessment   Performance deficits / Impairments: Decreased functional mobility ; Decreased ADL status; Decreased strength;Decreased safe awareness;Decreased endurance;Decreased posture;Decreased balance;Decreased cognition  Assessment: . Pt with deficits of strength, bed mobility, transfers,  balance, safety awareness and endurance this session,  & required 2 assist for SAFE functional mobility, & transfers. With current deficits, Pt HIGH risk for falls & requires continued OT to maximize independence with functional mobility, balance, safety awareness & activity tolerance.   Prognosis: Good  Decision Making: High Complexity  OT Education: OT Role;Plan of Care;Home Exercise Program;Precautions; ADL Adaptive Strategies;Transfer Training;Energy Conservation;Equipment; Family Education  Patient Education: use of call light, benefits of OOB activity, RW safety, d/c plans/needs  Barriers to Learning: dec. insight  REQUIRES OT FOLLOW UP: Yes  Activity Tolerance  Activity Tolerance: Patient Tolerated treatment well;Patient limited by pain  Safety Devices  Safety Devices in place: Yes  Type of devices: Call light within reach;Nurse notified;Gait belt;Left in chair;Chair alarm in place; Patient at risk for falls           Patient Diagnosis(es): The primary encounter diagnosis was Fall, initial encounter. A diagnosis of Pain in the coccyx was also pertinent to this visit. has a past medical history of A-fib ([facility] AAA (abdominal aortic aneurysm) ([address] Abdominal cramping, Aortic insufficiency, Atrial fibrillation ([address] Back pain, chronic, C. difficile colitis, Chronic diastolic CHF (congestive heart failure) ([address] COPD (chronic obstructive pulmonary disease) ([address] Diabetes mellitus ([address] Gastritis, GERD (gastroesophageal reflux disease), Hiatal hernia, Hx of prolonged Q-T interval on ECG, Hypoalbuminemia, IBS (irritable bowel syndrome), Murmur, cardiac, Nausea vomiting and diarrhea, Stomach ulcer, Thyroid disease, Type 2 diabetes mellitus without complication, without long-term current use of insulin ([address] UTI (lower urinary tract infection), and Varicose veins. has a past surgical history that includes Cholecystectomy; vascular surgery; Vein Surgery; Hysterectomy; hernia repair; Upper gastrointestinal endoscopy (06/20/2017); and pr egd transoral biopsy single/multiple (N/A, 6/20/2017).            Restrictions  Restrictions/Precautions  Restrictions/Precautions: General Precautions,Fall Risk,Weight Bearing  Lower Extremity Weight Bearing Restrictions  Right Lower Extremity Weight Bearing: Weight Bearing As Tolerated  Left Lower Extremity Weight Bearing: Weight Bearing As Tolerated  Position Activity Restriction  Other position/activity restrictions:  Up with assist, Heels off bed at all times, ext catheter, RUE IV,WBAT with walker -PT for gait training and to help decrease future falls    Subjective   General  Chart Reviewed: Yes  Patient assessed for rehabilitation services?: Yes  Family / Caregiver Present: No  General Comment  Comments: pt c/o L sided pelvis/hip pain  Vital Signs  Temp: 98.1 °F (36.7 °C)  Temp Source: Oral  Pulse: 105  Heart Rate Source: Monitor  Resp: 18  BP: 122/72  BP Location: Left Arm  MAP (mmHg): 84  Oxygen Therapy  SpO2: 95 %  O2 Device: None (Room air)  Social/Functional History  Social/Functional History  Lives With: Alone  Type of Home: Assisted living  Home Layout: One level  Home Access: Level entry,Elevator  Bathroom Shower/Tub: Walk-in shower  Bathroom Toilet: Handicap height  Bathroom Equipment: Grab bars in shower,Shower chair,Grab bars around toilet  Home Equipment: Cane,Oxygen,4 wheeled walker (Pt wears 3LO2 @ night only)  ADL Assistance: Needs assistance (pt gets assist showers 2x a week, can dress & toilet herself)  Homemaking Assistance: Needs assistance (pt eats meals in facility, staff cleans room & launders her bed linens; Pt does own personal laundry)  Homemaking Responsibilities: Yes  Ambulation Assistance: Independent (uses R/Walker)  Transfer Assistance: Independent  Active : No  Patient's  Info: Uses P bus to go to doctor / store  Occupation: Retired  Type of occupation: raised her family  Leisure & Hobbies: sleep  Additional Comments: Pt denies any falls       Objective   Vision: Impaired  Vision Exceptions: Wears glasses at all times  Hearing: Exceptions to [facility]  Hearing Exceptions: Hard of hearing/hearing concerns;Bilateral hearing aid    Orientation  Overall Orientation Status: Within Functional Limits  Observation/Palpation  Posture: Fair  Observation: pt lying in bed. Very [place]. Agreeable to session despite pain  Edema: BLE's  Balance  Sitting Balance: Contact guard assistance  Standing Balance: Moderate assistance (x2)  Functional Mobility  Functional - Mobility Device: Rolling Walker  Assist Level: Moderate assistance (x2 with taking ~5-6 forward steps with RW and then turning/backing up to chair to sit. Pt needing assist and guidance of RW throughout and max verbal cues to safely stay upright and inside of RW.)  ADL  Feeding: Setup; Independent  Grooming: Stand by assistance;Setup  UE Bathing: Minimal assistance  LE Bathing: Maximum assistance  UE Dressing: Minimal assistance  LE Dressing: Maximum assistance  Toileting: Maximum assistance  Additional Comments: pt is limited by pain, dec. bending ability. Max A for LB ADLs. Tone RUE  RUE Tone: Normotonic  Tone LUE  LUE Tone: Normotonic  Coordination  Movements Are Fluid And Coordinated: Yes     Bed mobility  Supine to Sit: Minimal assistance;2 Person assistance  Scooting: Minimal assistance;2 Person assistance  Comment: mod verbal instruction/tactile assist for UE hand placement on rail and proper log rolling tech + proper use of UE to scoot completely out to EOB with B foot placement to establish safe sitting balance, pursed lip breathing, assist with line management, with inc. time needed all to inc. safeyt/reduce falls  Transfers  Sit to stand: 2 Person assistance;Maximum assistance  Stand to sit: Maximum assistance;2 Person assistance; Moderate assistance  Transfer Comments: pt needing max verbal cues to scoot fully to edge of bed and to push up with UEs. Pt having difficulty shifting wt forward. Pt also needing mod A x 2 for eccentric control to sit in chair. Cognition  Overall Cognitive Status: Exceptions  Arousal/Alertness: Appropriate responses to stimuli  Following Commands: Follows multistep commands with increased time; Follows multistep commands with repitition  Attention Span: Appears intact  Memory: Appears intact  Safety Judgement: Decreased awareness of need for assistance;Decreased awareness of need for safety  Problem Solving: Decreased awareness of errors;Assistance required to identify errors made;Assistance required to correct errors made  Insights: Decreased awareness of deficits  Initiation: Requires cues for some  Sequencing: Does not require cues  Cognition Comment: pt with dec. insight into deficits currently and need for a significant amount of help with basic ADLs.  MD and therapists explaining benefits of short term rehab to maximize independence and safety with caring [facility] self at [facility]  Perception  Overall Perceptual Status: WFL     Sensation  Overall Sensation Status: WFL        LUE AROM (degrees)  LUE AROM : WFL  RUE AROM (degrees)  RUE AROM : WFL  LUE Strength  Gross LUE Strength: WFL  RUE Strength  Gross RUE Strength: WFL                   Plan   Plan  Times per week: 5-6x/week, 1-2x/day  Current Treatment Recommendations: Strengthening,Balance Training,Functional Mobility Training,Endurance Training,Safety Education & Training,Self-Care / ADL,Patient/Caregiver Education & Training,Equipment Evaluation, Education, & procurement,Positioning       AM-[facility] Inpatient Daily Activity Raw Score: 14 (04/14/22 1246)  AM-PAC Inpatient ADL T-Scale Score : 33.39 (04/14/22 1246)  ADL Inpatient CMS 0-100% Score: 59.67 (04/14/22 1246)  ADL Inpatient CMS G-Code Modifier : CK (04/14/22 1246)    Goals  Short term goals  Time Frame for Short term goals: by discharge, pt will  Short term goal 1: demo min A with ADL transfers with RW and approp DME and good safety  Short term goal 2: demo min A with functional mob short in room distances for ADLs completion with RW and good safety/pacing  Short term goal 3: demo min A with toileting routine with DME as needed and good safety  Short term goal 4: demo I with UB ADLs and min A LB ADLs with AE/DME as needed and good safety/pacing  Short term goal 5: demo and verb good understanding of fall prevention techs, EC/WS techs, equip needs, d/c recommendations, and B  UE HEP  Patient Goals   Patient goals : to go home when able       Therapy Time   Individual Concurrent Group Co-treatment   Time In 1109         Time Out 1157         Minutes 48          treatment min: 45  Co-treatment with PT warranted secondary to decreased safety and independence requiring 2 skilled therapy professionals to address individual discipline's goals. OT addressing preparation for ADL transfer, sitting balance for increased ADL performance, sitting/activity tolerance, functional reaching, environmental safety/scanning, fall prevention, functional mobility for ADL transfers, ability to sequence and follow directions and functional UE strength.        Sol Pretty, OT

## 2022-04-14 NOTE — PROGRESS NOTES
Estimated Creatinine Clearance: 42 mL/min (based on SCr of 0.82 mg/dL). The patient's dose of famotidine was changed from 40 mg nightly to 20 mg nightly based on CrCl 30-60 ml/min.    Recent Labs     04/13/22  0157 04/14/22  0617   CREATININE 1.23* 0.82

## 2022-04-14 NOTE — PLAN OF CARE
Problem: Falls - Risk of:  Goal: Will remain free from falls  Description: Will remain free from falls  Outcome: Ongoing  Note: Patient remains free from falls  Bed in lowest position, call light within reach, side rails x2, hourly rounding, fallen star, non skid slippers, bed/chair alarm

## 2022-04-14 NOTE — DISCHARGE SUMMARY
[facility]  Office: [address], DO, Ricarda Bastrop, DO, Mihir Munoz, DO, Aleja Racine Blood, DO, Jeri Lott MD, Sparkle Merrill MD, Hernan Sotelo MD, Junior Hilario MD, Lucas Riddle MD, Stefano Jimenez MD, Esequiel Dietrich MD, Jayne Rose, DO, Saige Lyon, DO, Kaushik Lainez MD,  Saintclair Lank, DO, Darcy Parra MD, Lloyd Snyder MD, Rosa Will MD, Galen Carter DO, Maya Bailey MD, Logan Mo MD, Daryle Springer, [facility], [facility], CNP, Jennifer Ocasio, CNP, Camden Singh, CNP, Bassam Ribeiro, CNP, Junior Cohen, CNP, Johnathon Berger PA-C, Evan Wong, [facility], Ge Solitario, CNP, Yissel Davison, CNP, Chinyere Navarro, CNP, Vinnie Kendrick, CNS, Joaquin Subramanian, [facility], Abhishek Godinez, CNP, Tiffanie Abernathy, CNP, Faith Gauthier, [facility]    Discharge Summary     Patient ID: Swathi Velasco  :  1925   MRN: [id number]     ACCOUNT:  [de-identified]   Patient's PCP: Miguel Durham MD  Admit Date: 2022   Discharge Date:4/15/22  Length of Stay: 3  Code Status:  Prior  Admitting Physician: Lloyd Snyder MD  Discharge Physician: Lloyd Snyder MD     Active Discharge Diagnoses:     Hospital Problem Lists:  Principal Problem:    Recurrent falls  Active Problems:    Atrial fibrillation (HCC)    COPD (chronic obstructive pulmonary disease) ([facility] [address]    Diabetes mellitus ([facility] [address]    Gastroesophageal reflux disease without esophagitis    Type 2 diabetes mellitus without complication, without long-term current use of insulin (HCC)    Chronic diastolic CHF (congestive heart failure) (HCC)    Closed fracture of coccyx (HCC)    CKD (chronic kidney disease), stage III ([facility] [address]    Hypokalemia    Fall    Pain in the coccyx  Resolved Problems:    * No resolved hospital problems.  *      Admission Condition:  poor     Discharged Condition: fair    Hospital Stay:     Hospital Course:  Swtahi Velasco is a 80 y.o. female who was admitted for the management of  Recurrent falls , presented to ER with Fall (Today, 2200) and Knee Pain (Reports bilateral knee weakness and pain. )    80year-old female with known medical history of previous falls, A. fib, diabetes, CHF, COPD with home oxygen presents to the hospital after a fall.  Patient states that she uses a walker usually and was trying to keep her jacket when her legs gave away and she fell down.  Patient does not report any dizziness, chest pain.  In the ER patient was noted to be hypokalemic.  X-ray of the sacrum, MRI scan showed a possible acute fracture.  She was in A. fib but controlled by rate.     · Fall with nondisplaced fracture involving distal sacrum at S5 level.    Ortho recommends conservative management. · A. fib-continue amiodarone, not on anticoagulation  · Hypothyroid - on Synthyroid  · Vitamin D deficiency replacement ordered  · Copd with chronic resp failure- continue oxygen  · H/o AAA  · CKD - stage 3, creatinine improved after holding diuretic   · Will change demadex to as needed, as hypotension and dehydration can lead to more falls. · Replace electrolytes    Patient worked with therapy and recommended rehab. Patient and family agreeable.     Significant therapeutic interventions: as above    Significant Diagnostic Studies:   Labs / Micro:  CBC:   Lab Results   Component Value Date    WBC 5.2 04/13/2022    RBC 3.89 04/13/2022    HGB 12.1 04/13/2022    HCT 37.7 04/13/2022    MCV 96.9 04/13/2022    MCH 31.1 04/13/2022    MCHC 32.1 04/13/2022    RDW 15.4 04/13/2022     04/13/2022     BMP:    Lab Results   Component Value Date    GLUCOSE 106 04/14/2022     04/14/2022    K 3.9 04/14/2022     04/14/2022    CO2 24 04/14/2022    ANIONGAP 10 04/14/2022    BUN 15 04/14/2022    CREATININE 0.82 04/14/2022    BUNCRER 18 04/14/2022    CALCIUM 8.4 04/14/2022    LABGLOM >60 04/14/2022    GFRAA >60 04/14/2022    GFR      04/14/2022     HFP:    Lab Results   Component Value Date PROT 6.0 04/13/2022     CMP:    Lab Results   Component Value Date    GLUCOSE 106 04/14/2022     04/14/2022    K 3.9 04/14/2022     04/14/2022    CO2 24 04/14/2022    BUN 15 04/14/2022    CREATININE 0.82 04/14/2022    ANIONGAP 10 04/14/2022    ALKPHOS 65 04/13/2022    ALT 8 04/13/2022    AST 13 04/13/2022    BILITOT 0.71 04/13/2022    LABALBU 3.5 04/13/2022    ALBUMIN NOT REPORTED 02/06/2022    LABGLOM >60 04/14/2022    GFRAA >60 04/14/2022    GFR      04/14/2022    PROT 6.0 04/13/2022    CALCIUM 8.4 04/14/2022     PT/INR:    Lab Results   Component Value Date    PROTIME 13.6 04/14/2022    PROTIME 27.3 10/09/2014    INR 1.0 04/14/2022        Radiology:  XR LUMBAR SPINE (2-3 VIEWS)    Result Date: 4/13/2022  1. New or more prominent posterior tilt of the distal coccyx may represent acute fracture in the appropriate clinical setting. 2.  No acute osseous abnormality identified in the lumbar spine. 3.  Decreased bone mineralization limits this exam.  If there remains concern for an occult pelvic fracture, consider MRI imaging. XR SACRUM COCCYX (MIN 2 VIEWS)    Result Date: 4/13/2022  1. New or more prominent posterior tilt of the distal coccyx may represent acute fracture in the appropriate clinical setting. 2.  No acute osseous abnormality identified in the lumbar spine. 3.  Decreased bone mineralization limits this exam.  If there remains concern for an occult pelvic fracture, consider MRI imaging. XR KNEE LEFT (3 VIEWS)    Result Date: 4/12/2022  No acute osseous abnormality identified in the left knee. XR KNEE RIGHT (3 VIEWS)    Result Date: 4/12/2022  No acute osseous abnormality identified in the right knee. MRI PELVIS WO CONTRAST    Result Date: 4/13/2022  Findings are suspicious for a nondisplaced fracture involving the distal sacrum at approximately the S5 level. This could be further assessed with a noncontrast CT.  No additional acute fractures identified in the bony pelvis/proximal femurs. Nonspecific small fluid collection/cyst at the tip of the coccyx. Mild bilateral gluteus medius/minimus tendinosis. Mild bilateral proximal hamstring tendinosis. Mild bilateral hip joint degenerative changes. Urinary bladder diverticula. Consultations:    Consults:     Final Specialist Recommendations/Findings:   IP CONSULT TO ORTHOPEDIC SURGERY      The patient was seen and examined on day of discharge and this discharge summary is in conjunction with any daily progress note from day of discharge. Discharge plan:     Disposition:  To a non-[facility] facility    Physician Follow Up:     [address]  [phone]    Schedule an appointment as soon as possible for a visit in 4 weeks  Call to make an appointment    Emerald Glover MD  [facility], [address] [address] [phone]    In 3 days         Requiring Further Evaluation/Follow Up POST HOSPITALIZATION/Incidental Findings: rehab pt, ot    Diet: regular diet    Activity: As tolerated    Instructions to Patient: take demadex only when needed    Discharge Medications:      Medication List      CHANGE how you take these medications    torsemide 20 MG tablet  Commonly known as: DEMADEX  Take 1 tablet by mouth daily as needed (leg swelling)  What changed:   · when to take this  · reasons to take this        CONTINUE taking these medications    amiodarone 200 MG tablet  Commonly known as: CORDARONE     dicyclomine 10 MG capsule  Commonly known as: Bentyl  Take 1 capsule by mouth every 6 hours as needed (cramps)     famotidine 40 MG tablet  Commonly known as: PEPCID  Take 1 tablet by mouth every evening     gabapentin 100 MG capsule  Commonly known as: NEURONTIN     levothyroxine 125 MCG tablet  Commonly known as: SYNTHROID     MiraLax 17 GM/SCOOP powder  Generic drug: polyethylene glycol     OXYGEN     pantoprazole 40 MG tablet  Commonly known as: PROTONIX  Take 1 tablet by mouth every morning (before breakfast)     traMADol 50 MG tablet  Commonly known as: ULTRAM     Vitamin D3 50 MCG (2000 UT) Caps        STOP taking these medications    furosemide 20 MG tablet  Commonly known as: LASIX     potassium chloride 10 MEQ extended release tablet  Commonly known as: KLOR-CON           Where to Get Your Medications      These medications were sent to [facility], [address]  [address] [facility], [address] [facility]    Phone: [phone]   · torsemide 20 MG tablet         No discharge procedures on file. Time Spent on discharge is  34 mins in patient examination, evaluation, counseling as well as medication reconciliation, prescriptions for required medications, discharge plan and follow up. Electronically signed by   Nohelia Lord MD  4/17/2022  11:33 PM      Thank you Dr. Reba Sierra MD for the opportunity to be involved in this patient's care.

## 2022-04-14 NOTE — CARE COORDINATION
Social Work-Contacted patient's granddaughter to discuss dc options. Discussed rehab at dc. She is agreeable. She would like acute rehab. Offered choice of acute rehab. Her first choice is [facility].  Sent referral. Maggy Farooq

## 2022-04-15 VITALS
HEIGHT: 63 IN | RESPIRATION RATE: 18 BRPM | OXYGEN SATURATION: 98 % | DIASTOLIC BLOOD PRESSURE: 63 MMHG | TEMPERATURE: 97.5 F | HEART RATE: 86 BPM | SYSTOLIC BLOOD PRESSURE: 121 MMHG | BODY MASS INDEX: 34.2 KG/M2 | WEIGHT: 193 LBS

## 2022-04-15 LAB
EKG Q-T INTERVAL: 408 MS
EKG QRS DURATION: 78 MS
EKG QTC CALCULATION (BAZETT): 496 MS
EKG R AXIS: -35 DEGREES
EKG T AXIS: 53 DEGREES
EKG VENTRICULAR RATE: 89 BPM
GLUCOSE BLD-MCNC: 108 MG/DL (ref 65–105)
GLUCOSE BLD-MCNC: 128 MG/DL (ref 65–105)

## 2022-04-15 PROCEDURE — 6360000002 HC RX W HCPCS: Performed by: NURSE PRACTITIONER

## 2022-04-15 PROCEDURE — 82947 ASSAY GLUCOSE BLOOD QUANT: CPT

## 2022-04-15 PROCEDURE — 2580000003 HC RX 258: Performed by: NURSE PRACTITIONER

## 2022-04-15 PROCEDURE — 97530 THERAPEUTIC ACTIVITIES: CPT

## 2022-04-15 PROCEDURE — 96372 THER/PROPH/DIAG INJ SC/IM: CPT

## 2022-04-15 PROCEDURE — 6370000000 HC RX 637 (ALT 250 FOR IP): Performed by: NURSE PRACTITIONER

## 2022-04-15 PROCEDURE — 97116 GAIT TRAINING THERAPY: CPT

## 2022-04-15 PROCEDURE — 93010 ELECTROCARDIOGRAM REPORT: CPT | Performed by: INTERNAL MEDICINE

## 2022-04-15 PROCEDURE — G0378 HOSPITAL OBSERVATION PER HR: HCPCS

## 2022-04-15 PROCEDURE — 99225 PR SBSQ OBSERVATION CARE/DAY 25 MINUTES: CPT | Performed by: FAMILY MEDICINE

## 2022-04-15 PROCEDURE — 6370000000 HC RX 637 (ALT 250 FOR IP): Performed by: STUDENT IN AN ORGANIZED HEALTH CARE EDUCATION/TRAINING PROGRAM

## 2022-04-15 RX ADMIN — PANTOPRAZOLE SODIUM 40 MG: 40 TABLET, DELAYED RELEASE ORAL at 05:47

## 2022-04-15 RX ADMIN — AMIODARONE HYDROCHLORIDE 200 MG: 200 TABLET ORAL at 09:34

## 2022-04-15 RX ADMIN — LEVOTHYROXINE SODIUM 125 MCG: 0.12 TABLET ORAL at 05:47

## 2022-04-15 RX ADMIN — ACETAMINOPHEN 650 MG: 325 TABLET ORAL at 10:13

## 2022-04-15 RX ADMIN — GABAPENTIN 100 MG: 100 CAPSULE ORAL at 09:34

## 2022-04-15 RX ADMIN — GABAPENTIN 100 MG: 100 CAPSULE ORAL at 13:31

## 2022-04-15 RX ADMIN — Medication 2000 UNITS: at 09:33

## 2022-04-15 RX ADMIN — ENOXAPARIN SODIUM 30 MG: 100 INJECTION SUBCUTANEOUS at 09:34

## 2022-04-15 RX ADMIN — SODIUM CHLORIDE, PRESERVATIVE FREE 10 ML: 5 INJECTION INTRAVENOUS at 09:34

## 2022-04-15 ASSESSMENT — ENCOUNTER SYMPTOMS
SHORTNESS OF BREATH: 0
CONSTIPATION: 0
NAUSEA: 0
COUGH: 0
RHINORRHEA: 0
VOMITING: 0
BACK PAIN: 1
ABDOMINAL PAIN: 0
DIARRHEA: 0
CHEST TIGHTNESS: 0
BLOOD IN STOOL: 0
WHEEZING: 0

## 2022-04-15 ASSESSMENT — PAIN DESCRIPTION - PAIN TYPE: TYPE: ACUTE PAIN

## 2022-04-15 ASSESSMENT — PAIN DESCRIPTION - ORIENTATION: ORIENTATION: RIGHT

## 2022-04-15 ASSESSMENT — PAIN DESCRIPTION - LOCATION: LOCATION: KNEE

## 2022-04-15 ASSESSMENT — PAIN DESCRIPTION - PROGRESSION: CLINICAL_PROGRESSION: NOT CHANGED

## 2022-04-15 ASSESSMENT — PAIN DESCRIPTION - DESCRIPTORS: DESCRIPTORS: ACHING;DISCOMFORT

## 2022-04-15 ASSESSMENT — PAIN SCALES - GENERAL
PAINLEVEL_OUTOF10: 8
PAINLEVEL_OUTOF10: 8

## 2022-04-15 ASSESSMENT — PAIN DESCRIPTION - FREQUENCY: FREQUENCY: CONTINUOUS

## 2022-04-15 NOTE — CARE COORDINATION
Social Work-MMO approved patient for [facility]. Requested 2PM transport. Left message for granddaughter.  Rajwinder Grimm

## 2022-04-15 NOTE — CARE COORDINATION
Social Work-[facility] will admit today. [facility] will transport at [facility]. Orders faxed. Nurse to call report to [phone]. Discussed with granddaughter. She and pt are agreeable with dc plans.  Yuliet Holmes

## 2022-04-15 NOTE — PROGRESS NOTES
Physical Therapy  Facility/Department: [facility] MED SURG  Daily Treatment Note  NAME: Franny Faulkner  : 1925  MRN: [id number]    Date of Service: 4/15/2022    Discharge Recommendations:  Patient would benefit from continued therapy after discharge   Pt currently functioning below baseline. Would suggest additional therapy at time of discharge to maximize long term outcomes and prevent re-admission. Please refer to AM-PAC score for current level of function. PT Equipment Recommendations  Equipment Needed: No    Assessment   Body structures, Functions, Activity limitations: Decreased functional mobility ; Decreased ROM; Decreased strength;Decreased safe awareness;Decreased endurance;Decreased balance;Decreased posture  Assessment: Pt with deficits of ROM L hip, BLE strength, bed mobility, transfers, ambulation, balance, safety awareness and endurance this session,  & required 2 assist for safe functional mobility, transfers & gait. , & is decline compared to prior level of function. With current deficits, Pt HIGH risk for falls & requires continued PT to maximize independence with functional mobility, balance, safety awareness & activity tolerance to improve overall tolerance of ADL's. Pt is currently functional below baseline and would suggest intense therapy post acute care. Would expect patient to be able to tolerate 3 hours of therapy per day and able to tolerate at least one hour up in chair. Please refer to AM-PAC score for current mobility/adl level. Prognosis: Good  Decision Making: Medium Complexity  PT Education: Goals;PT Role;Plan of Care; Functional Mobility Training;Transfer Training;Pressure Relief;General Safety;Gait Training  Patient Education: Ed pt on functional mobility, safety awareness, importance of being up & OOB to regain strength, & prevention of sedentary complications, circulation ex's,  pressure relief & breathing techniques  REQUIRES PT FOLLOW UP: Yes  Activity Tolerance  Activity Tolerance: Patient limited by pain; Patient limited by fatigue     Patient Diagnosis(es): The primary encounter diagnosis was Fall, initial encounter. A diagnosis of Pain in the coccyx was also pertinent to this visit. has a past medical history of A-fib ([facility] [address] AAA (abdominal aortic aneurysm) ([facility] [address] Abdominal cramping, Aortic insufficiency, Atrial fibrillation ([facility] [address] Back pain, chronic, C. difficile colitis, Chronic diastolic CHF (congestive heart failure) ([facility] [address] COPD (chronic obstructive pulmonary disease) ([facility] [address] Diabetes mellitus ([facility] [address] Gastritis, GERD (gastroesophageal reflux disease), Hiatal hernia, Hx of prolonged Q-T interval on ECG, Hypoalbuminemia, IBS (irritable bowel syndrome), Murmur, cardiac, Nausea vomiting and diarrhea, Stomach ulcer, Thyroid disease, Type 2 diabetes mellitus without complication, without long-term current use of insulin ([facility] [address] UTI (lower urinary tract infection), and Varicose veins. has a past surgical history that includes Cholecystectomy; vascular surgery; Vein Surgery; Hysterectomy; hernia repair; Upper gastrointestinal endoscopy (06/20/2017); and pr egd transoral biopsy single/multiple (N/A, 6/20/2017). Restrictions  Restrictions/Precautions  Restrictions/Precautions: General Precautions,Fall Risk,Weight Bearing,Up as Tolerated  Required Braces or Orthoses?: No  Lower Extremity Weight Bearing Restrictions  Right Lower Extremity Weight Bearing: Weight Bearing As Tolerated  Left Lower Extremity Weight Bearing: Weight Bearing As Tolerated  Position Activity Restriction  Other position/activity restrictions: Up with assist, Heels off bed at all times, ext catheter, RUE IV,WBAT with walker -PT for gait training and to help decrease future falls  Subjective   General  Chart Reviewed: Yes  Additional Pertinent Hx: HTN, frequent falls  Response To Previous Treatment: Patient reporting fatigue but able to participate.   Subjective  Subjective: Patient agreeable for PT treatment with encouragement. General Comment  Comments: RN, Reported patient medically stable for PT treatment. Pain Screening  Patient Currently in Pain: Denies  Vital Signs  Patient Currently in Pain: Denies       Orientation  Orientation  Overall Orientation Status: Impaired  Orientation Level: Oriented to time;Disoriented to place;Oriented to person;Oriented to situation  Cognition   Cognition  Overall Cognitive Status: Exceptions  Arousal/Alertness: Appropriate responses to stimuli  Following Commands: Follows one step commands with repetition; Follows one step commands with increased time; Inconsistently follows commands  Attention Span: Attends with cues to redirect; Difficulty attending to directions; Difficulty dividing attention  Memory: Decreased short term memory  Safety Judgement: Decreased awareness of need for assistance;Decreased awareness of need for safety  Problem Solving: Assistance required to generate solutions;Assistance required to implement solutions;Assistance required to identify errors made;Assistance required to correct errors made;Decreased awareness of errors  Insights: Decreased awareness of deficits  Initiation: Requires cues for some  Sequencing: Requires cues for some  Cognition Comment: pt with dec. insight into deficits currently and need for a significant amount of help with basic ADLs. MD and therapists explaining benefits of short term rehab to maximize independence and safety with caring for self at [facility]  Objective   Bed mobility  Supine to Sit: Maximum assistance;2 Person assistance  Sit to Supine: Unable to assess  Scooting: Moderate assistance;2 Person assistance  Comment: MOD VCs for hand placement, use of bed rails, intiation of movements, proper log roll tech, use of UB to achieve upright postition and scooting to the EOB to establish safe seated balance.   Transfers  Sit to Stand: Maximum Assistance;2 Person Assistance  Stand to sit: Moderate Assistance;2 Person Assistance  Bed to Chair: Moderate assistance;2 Person Assistance  Stand Pivot Transfers: Moderate Assistance;2 Person Assistance  Lateral Transfers: Moderate Assistance;2 Person Assistance  Comment: MOD VC + tactile assist on correct use of upper body for safe sit/stand + to back all way back to surface with walker until she feels touch behind legs & to ensure she reaches with UB support to arms of chair  Ambulation  Ambulation?: Yes  More Ambulation?: No  Ambulation 1  Surface: level tile  Device: Rolling Walker  Assistance: Moderate assistance;2 Person assistance  Quality of Gait: step to with shortened step length/height of LLE, MOD cues to keep walker close at all times & to amb INSIDE base of walker & upright posture for correction & safety/scanning  Gait Deviations: Decreased step length;Decreased step height;Slow Tavia  Distance: 5' x 2  Comments: Decrease activity tolerance, patient SOB post ambulation. Stairs/Curb  Stairs?: No     Balance  Posture: Fair  Sitting - Static: Good  Sitting - Dynamic: Good;-  Standing - Static: Fair;+  Standing - Dynamic: Fair  Comments: w/ RW for standing balance  Exercises  Comments: Reviewed seated HEP with patient x 10 reps with rest breaks and VCs for self pacing + pursed lip breathing tech, fair carryover. Strength RLE  Comment: 4/5  Strength LLE  Comment: 2/5 hip, 3+/5 knee    AM-PAC Score  AM-PAC Inpatient Mobility Raw Score : 12 (04/15/22 1754)  AM-PAC Inpatient T-Scale Score : 35.33 (04/15/22 1754)  Mobility Inpatient CMS 0-100% Score: 68.66 (04/15/22 1754)  Mobility Inpatient CMS G-Code Modifier : CL (04/15/22 1754)          Goals  Short term goals  Time Frame for Short term goals: 12 visits  Short term goal 1: Inc bed-mobility & transfers to independent to enable pt to safely get in/OOB & chair  Short term goal 2:  Inc gait to amb 350ft or > indep w/ RW to enable pt to return to previous level of independence & able to demonstrate indep/ safe use of RW in functional activities including bending/ reaching, approaching counters and turning to sit. Short term goal 3: Inc AROM L hip & BLE strength to [facility] & standing balance to good with device to facilitate pt independence for performance of ADL's & functional mobility, & reduce fall risk  Short term goal 4: Pt able to tolerate 30-40 min of activity to include 15-20 reps of ex, NMR & functional mobility with device to facilitate activity tolerance to [facility]  Short term goal 5: Ed pt on home ex's, safety & energy principles, fall prevention, & issue written home program    Plan    Plan  Times per week: 1-2x/D, 6-7D/week  Current Treatment Recommendations: Strengthening,ROM,Balance Training,Functional Mobility Training,Transfer Training,Endurance Training,Gait Training,Patient/Caregiver Education & Training,Safety Education & Training,Home Exercise Program  Safety Devices  Type of devices: Call light within reach,Chair alarm in place,Gait belt,Patient at risk for falls,Left in chair,Nurse notified  Restraints  Initially in place: No     Therapy Time   Individual Concurrent Group Co-treatment   Time In       1128   Time Out       1156   Minutes       28        Co-treatment with OT warranted secondary to decreased safety and independence requiring 2 skilled therapy professionals to address individual discipline's goals. PT addressing pre gait trunk strengthening, weight shifting prior to transfers, transfer training and postural control in sitting/standing.     Haley Alexis, PTA

## 2022-04-15 NOTE — PROGRESS NOTES
Occupational Therapy  Facility/Department: [facility] MED SURG  Daily Treatment Note  NAME: Reid Cason  : 1925  MRN: [id number]    Date of Service: 4/15/2022    Discharge Recommendations:  Patient would benefit from continued therapy after discharge   Pt would benefit from continued skilled OT services in an IP Rehab setting to address current deficits of bed mobility, ADL transfers, functional mobility, balance, cognition, safety awareness and endurance in order to ensure safe return home to PLOF and independent level of occupational performance. Pt optimal candidate for the benefits of IP Rehab vs SNF placement. Pt with high PLOF, has the ability to tolerate 3 hours of therapy per day, is highly motivated, and has the need for an interdisciplinary team including physician supervision by a rehabilitation physician to assess the patient medically and functionally and to modify treatment as needed. Assessment   Performance deficits / Impairments: Decreased functional mobility ; Decreased ADL status; Decreased strength;Decreased safe awareness;Decreased endurance;Decreased posture;Decreased balance;Decreased cognition  Assessment: Pt is a high fall risk and currently req's 2 staff assist for all functional tasks. Skilled OT indicated to increase safety and IND with all functional tasks to ensure a safe return to PLOF. Prognosis: Good;Fair  REQUIRES OT FOLLOW UP: Yes  Activity Tolerance  Activity Tolerance: Patient Tolerated treatment well;Patient limited by pain  Safety Devices  Safety Devices in place: Yes  Type of devices: All fall risk precautions in place; Left in chair;Nurse notified;Call light within reach; Chair alarm in place;Gait belt;Patient at risk for falls (pt issued recliner and waffle cushion for chair to decrease coccyx pain/discomfort while up in chair)         Patient Diagnosis(es): The primary encounter diagnosis was Fall, initial encounter.  A diagnosis of Pain in the coccyx was also pertinent to this visit. has a past medical history of A-fib ([facility] [address] AAA (abdominal aortic aneurysm) ([facility] [address] Abdominal cramping, Aortic insufficiency, Atrial fibrillation ([facility] [address] Back pain, chronic, C. difficile colitis, Chronic diastolic CHF (congestive heart failure) ([facility] [address] COPD (chronic obstructive pulmonary disease) ([facility] [address] Diabetes mellitus ([facility] [address] Gastritis, GERD (gastroesophageal reflux disease), Hiatal hernia, Hx of prolonged Q-T interval on ECG, Hypoalbuminemia, IBS (irritable bowel syndrome), Murmur, cardiac, Nausea vomiting and diarrhea, Stomach ulcer, Thyroid disease, Type 2 diabetes mellitus without complication, without long-term current use of insulin ([facility] [address] UTI (lower urinary tract infection), and Varicose veins. has a past surgical history that includes Cholecystectomy; vascular surgery; Vein Surgery; Hysterectomy; hernia repair; Upper gastrointestinal endoscopy (06/20/2017); and pr egd transoral biopsy single/multiple (N/A, 6/20/2017). Restrictions  Restrictions/Precautions  Restrictions/Precautions: General Precautions,Fall Risk,Weight Bearing,Up as Tolerated  Required Braces or Orthoses?: No  Lower Extremity Weight Bearing Restrictions  Right Lower Extremity Weight Bearing: Weight Bearing As Tolerated  Left Lower Extremity Weight Bearing: Weight Bearing As Tolerated  Position Activity Restriction  Other position/activity restrictions: Up with assist, Heels off bed at all times, ext catheter, RUE IV,WBAT with walker -PT for gait training and to help decrease future falls  Subjective   General  Chart Reviewed: Yes  Patient assessed for rehabilitation services?: Yes  Response to previous treatment: Patient with no complaints from previous session  Family / Caregiver Present: No  Subjective  Subjective: Pt in bed upon arrival and agreeable to get up to chair for lunch. General Comment  Comments: RN ok'd pt for therapy.       Orientation  Orientation  Overall Orientation Status: Within Functional Limits  Objective    ADL  LE Dressing: Dependent/Total (to jess slippers)        Balance  Sitting Balance: Stand by assistance  Standing Balance: Maximum assistance (Mod-Max A x2 with walker)  Bed mobility  Supine to Sit: Maximum assistance;2 Person assistance (Max verbal/tactile cues for sequencing movements, use of bed rail and overall safety/technique. )  Transfers  Stand Step Transfers: Moderate assistance;Maximum assistance;2 Person assistance  Sit to stand: Moderate assistance;Maximum assistance;2 Person assistance  Stand to sit: Moderate assistance;Maximum assistance;2 Person assistance  Transfer Comments: Max verbal/tactile cues for hand placement, nose over toes, upright posture, manuevering walker, staying inside walker and overall safety. Pt limited by right knee pain. Cognition  Overall Cognitive Status: Exceptions  Arousal/Alertness: Appropriate responses to stimuli  Following Commands: Follows one step commands with repetition; Follows one step commands with increased time; Inconsistently follows commands  Attention Span: Attends with cues to redirect; Difficulty attending to directions; Difficulty dividing attention  Memory: Decreased short term memory  Safety Judgement: Decreased awareness of need for assistance;Decreased awareness of need for safety  Problem Solving: Assistance required to generate solutions;Assistance required to implement solutions;Assistance required to identify errors made;Assistance required to correct errors made;Decreased awareness of errors  Insights: Decreased awareness of deficits  Initiation: Requires cues for some  Sequencing: Requires cues for some     Perception  Overall Perceptual Status: [facility]                                   Plan   Plan  Times per week: 5-6x/week, 1-2x/day  Current Treatment Recommendations: Strengthening,Balance Training,Functional Mobility Training,Endurance Training,Safety Education & Training,Self-Care / ADL,Patient/Caregiver

## 2022-04-15 NOTE — PROGRESS NOTES
Patient discharged to [facility] with belongings via [facility]. Report given to [facility].

## 2022-04-15 NOTE — PLAN OF CARE
Problem: Falls - Risk of:  Goal: Will remain free from falls  Description: Will remain free from falls  Outcome: Ongoing  Goal: Absence of physical injury  Description: Absence of physical injury  Outcome: Ongoing     Problem: Skin Integrity:  Goal: Will show no infection signs and symptoms  Description: Will show no infection signs and symptoms  Outcome: Ongoing  Goal: Absence of new skin breakdown  Description: Absence of new skin breakdown  Outcome: Ongoing     Problem: Pain:  Goal: Pain level will decrease  Description: Pain level will decrease  Outcome: Ongoing  Goal: Control of acute pain  Description: Control of acute pain  Outcome: Ongoing  Goal: Control of chronic pain  Description: Control of chronic pain  Outcome: Ongoing     Problem: SAFETY  Goal: Free from accidental physical injury  Outcome: Ongoing  Goal: Free from intentional harm  Outcome: Ongoing     Problem: DAILY CARE  Goal: Daily care needs are met  Outcome: Ongoing     Problem: PAIN  Goal: Patient's pain/discomfort is manageable  Outcome: Ongoing     Problem: SKIN INTEGRITY  Goal: Skin integrity is maintained or improved  Outcome: Ongoing     Problem: KNOWLEDGE DEFICIT  Goal: Patient/S.O. demonstrates understanding of disease process, treatment plan, medications, and discharge instructions.   Outcome: Ongoing     Problem: DISCHARGE BARRIERS  Goal: Patient's continuum of care needs are met  Outcome: Ongoing     Problem: Discharge Planning:  Goal: Discharged to appropriate level of care  Description: Discharged to appropriate level of care  Outcome: Ongoing     Problem: Serum Glucose Level - Abnormal:  Goal: Ability to maintain appropriate glucose levels will improve  Description: Ability to maintain appropriate glucose levels will improve  Outcome: Ongoing     Problem: Sensory Perception - Impaired:  Goal: Ability to maintain a stable neurologic state will improve  Description: Ability to maintain a stable neurologic state will improve  Outcome: Ongoing     Problem: Sensory Perception - Impaired:  Goal: Ability to maintain a stable neurologic state will improve  Description: Ability to maintain a stable neurologic state will improve  Outcome: Ongoing

## 2022-04-15 NOTE — PLAN OF CARE
Problem: Falls - Risk of:  Goal: Will remain free from falls  Description: Will remain free from falls  4/15/2022 1320 by Aron Bynum RN  Outcome: Ongoing  4/15/2022 0303 by Solmon Najjar, RN  Outcome: Ongoing  Goal: Absence of physical injury  Description: Absence of physical injury  4/15/2022 1320 by Aron Bynum RN  Outcome: Ongoing  4/15/2022 0303 by Solmon Najjar, RN  Outcome: Ongoing     Problem: Skin Integrity:  Goal: Will show no infection signs and symptoms  Description: Will show no infection signs and symptoms  4/15/2022 1320 by Aron Bynum RN  Outcome: Ongoing  4/15/2022 0303 by Solmon Najjar, RN  Outcome: Ongoing  Goal: Absence of new skin breakdown  Description: Absence of new skin breakdown  4/15/2022 1320 by Aron Bynum RN  Outcome: Ongoing  4/15/2022 0303 by Solmon Najjar, RN  Outcome: Ongoing     Problem: Pain:  Description: Pain management should include both nonpharmacologic and pharmacologic interventions.   Goal: Pain level will decrease  Description: Pain level will decrease  4/15/2022 1320 by Aron Bynum RN  Outcome: Ongoing  4/15/2022 0303 by Solmon Najjar, RN  Outcome: Ongoing  Goal: Control of acute pain  Description: Control of acute pain  4/15/2022 1320 by Aron Bynum RN  Outcome: Ongoing  4/15/2022 0303 by Solmon Najjar, RN  Outcome: Ongoing  Goal: Control of chronic pain  Description: Control of chronic pain  4/15/2022 1320 by Aron Bynum RN  Outcome: Ongoing  4/15/2022 0303 by Solmon Najjar, RN  Outcome: Ongoing     Problem: SAFETY  Goal: Free from accidental physical injury  4/15/2022 1320 by Aron Bynum RN  Outcome: Ongoing  4/15/2022 0303 by Solmon Najjar, RN  Outcome: Ongoing  Goal: Free from intentional harm  4/15/2022 1320 by Aron Bynum RN  Outcome: Ongoing  4/15/2022 0303 by Solmon Najjar, RN  Outcome: Ongoing     Problem: DAILY CARE  Goal: Daily care needs are met  4/15/2022 1320 by Aron Bynum RN  Outcome: Ongoing  4/15/2022 0303 by Mireya Wtaters RN  Outcome: Ongoing     Problem: PAIN  Goal: Patient's pain/discomfort is manageable  4/15/2022 1320 by Danni Rodriges RN  Outcome: Ongoing  4/15/2022 0303 by Mireya Watters RN  Outcome: Ongoing     Problem: SKIN INTEGRITY  Goal: Skin integrity is maintained or improved  4/15/2022 1320 by Danni Rodriges RN  Outcome: Ongoing  4/15/2022 0303 by Mireya Watters RN  Outcome: Ongoing     Problem: KNOWLEDGE DEFICIT  Goal: Patient/S.O. demonstrates understanding of disease process, treatment plan, medications, and discharge instructions.   4/15/2022 1320 by Danni Rodriges RN  Outcome: Ongoing  4/15/2022 0303 by Mireya Watters RN  Outcome: Ongoing     Problem: DISCHARGE BARRIERS  Goal: Patient's continuum of care needs are met  4/15/2022 1320 by Danni Rodriges RN  Outcome: Ongoing  4/15/2022 0303 by Mireya Watters RN  Outcome: Ongoing     Problem: Discharge Planning:  Goal: Discharged to appropriate level of care  Description: Discharged to appropriate level of care  4/15/2022 1320 by Danni Rodriges RN  Outcome: Ongoing  4/15/2022 0303 by Mireya Watters RN  Outcome: Ongoing     Problem: Serum Glucose Level - Abnormal:  Goal: Ability to maintain appropriate glucose levels will improve  Description: Ability to maintain appropriate glucose levels will improve  4/15/2022 1320 by Danni Rodriges RN  Outcome: Ongoing  4/15/2022 0303 by Mireya Watters RN  Outcome: Ongoing     Problem: Sensory Perception - Impaired:  Goal: Ability to maintain a stable neurologic state will improve  Description: Ability to maintain a stable neurologic state will improve  4/15/2022 1320 by Danni Rodriges RN  Outcome: Ongoing  4/15/2022 0303 by Mireya Watters RN  Outcome: Ongoing

## 2022-04-15 NOTE — PROGRESS NOTES
[facility]  Office: [address], DO, Ricarda Lombardiland, DO, Mihir Munoz, DO, Aleja Aluty Blood, DO, Jeri Lott MD, Sparkle Merrill MD, Hernan Sotelo MD, Junior Hilario MD, Lucas Riddle MD, Stefano Jimenez MD, Esequiel Dietrich MD, Jayne Rose, DO, Saige Lyon, DO, Kaushik Lainez MD,  Saintclair Lank, DO, Darcy Parra MD, Lloyd Snyder MD, Rosa Will MD, Galen Carter, DO, Maya Bailey MD, Logan Mo MD, Daryle Springer, [facility], [facility], CNP, Jennifer Ocasio, CNP, Camden Singh, CNP, Bassam Ribeiro, CNP, Junior Cohen, CNP, Johnathon Berger PA-C, Evan Wong, [facility], Ge Solitario, CNP, Yissel Davison, CNP, Chinyere Navarro, CNP, Vinnie Kendrick, CNS, Joaquin Subramanian, [facility], Abhishek Godinez, CNP, Tiffanie Abernathy, CNP, Faith Gauthier, [facility]      Daily Progress Note     Admit Date: 4/12/2022  Bed/Room No.  2011/2011-02  Admitting Physician : Hernan Sotelo MD  Code Status :Full Code  Hospital Day:  LOS: 0 days   Chief Complaint:     Chief Complaint   Patient presents with    Fall     Today, 2200    Knee Pain     Reports bilateral knee weakness and pain. Principal Problem:    Recurrent falls  Active Problems:    Atrial fibrillation ([facility])    COPD (chronic obstructive pulmonary disease) ([facility])    Diabetes mellitus ([facility] [address]    Gastroesophageal reflux disease without esophagitis    Type 2 diabetes mellitus without complication, without long-term current use of insulin (HCC)    Chronic diastolic CHF (congestive heart failure) ([facility])    Closed fracture of coccyx ([facility])    CKD (chronic kidney disease), stage III ([facility])    Hypokalemia    Fall    Pain in the coccyx  Resolved Problems:    * No resolved hospital problems. *    Subjective : Interval History/Significant events :  04/15/22    Patient is alert and oriented. She is breathing on room air. She is having difficulty ambulating. Patient says that pain is controlled.    Vitals - Stable afebrile  Labs - blood sugar 123 . Normal kidney function. Nursing notes , Consults notes reviewed. Overnight events and updates discussed with Nursing staff . Background History:         Crystal Carter is 80 y.o. female  Who was admitted to the hospital on 4/12/2022 for treatment of Recurrent falls. Patient presented to emergency room with fall and was complaining of bilateral knee pain and weakness. She has history of AAA, A. fib, chronic CHF, COPD, diabetes mellitus. X-ray of sacrum showed possible acute fracture. PMH:  Past Medical History:   Diagnosis Date    A-fib [facility])     AAA (abdominal aortic aneurysm) (HCC)     Abdominal cramping     Aortic insufficiency     Atrial fibrillation (HCC)     Back pain, chronic     C. difficile colitis     Chronic diastolic CHF (congestive heart failure) (HCC)     COPD (chronic obstructive pulmonary disease) (HCC)     Diabetes mellitus (HCC)     Gastritis     GERD (gastroesophageal reflux disease)     Hiatal hernia     Hx of prolonged Q-T interval on ECG     Hypoalbuminemia 10/14/2014    IBS (irritable bowel syndrome)     Murmur, cardiac     Nausea vomiting and diarrhea 10/14/2014    Stomach ulcer     Thyroid disease     Type 2 diabetes mellitus without complication, without long-term current use of insulin ([facility] [address] 01/24/2018    UTI (lower urinary tract infection)     Varicose veins       Allergies:    Allergies   Allergen Reactions    Penicillins      Has no recollection of what the reaction was      Medications :  amiodarone, 200 mg, Oral, Daily  gabapentin, 100 mg, Oral, TID  famotidine, 20 mg, Oral, Daily  Vitamin D, 2,000 Units, Oral, Every Other Day  levothyroxine, 125 mcg, Oral, Every Other Day  pantoprazole, 40 mg, Oral, QAM AC  sodium chloride flush, 5-40 mL, IntraVENous, 2 times per day  enoxaparin, 30 mg, SubCUTAneous, Daily  insulin lispro, 0-6 Units, SubCUTAneous, TID WC  insulin lispro, 0-3 Units, SubCUTAneous, Nightly        Review of Systems Review of Systems   Constitutional: Negative for appetite change, fatigue, fever and unexpected weight change. HENT: Negative for congestion, rhinorrhea and sneezing. Eyes: Negative for visual disturbance. Respiratory: Negative for cough, chest tightness, shortness of breath and wheezing. Cardiovascular: Negative for chest pain and palpitations. Gastrointestinal: Negative for abdominal pain, blood in stool, constipation, diarrhea, nausea and vomiting. Genitourinary: Negative for dysuria, enuresis, frequency and hematuria. Musculoskeletal: Positive for back pain. Negative for arthralgias and myalgias. Skin: Negative for rash. Neurological: Negative for dizziness, weakness, light-headedness and headaches. Hematological: Does not bruise/bleed easily. Psychiatric/Behavioral: Negative for dysphoric mood and sleep disturbance. Objective :      Current Vitals : Temp: 97.5 °F (36.4 °C),  Pulse: 86, Resp: 18, BP: 121/63, SpO2: 98 %  Last 24 Hrs Vitals   Patient Vitals for the past 24 hrs:   BP Temp Temp src Pulse Resp SpO2 Weight   04/15/22 0803 121/63 97.5 °F (36.4 °C) Oral 86 18 98 % --   04/15/22 0405 -- -- -- -- -- -- 193 lb (87.5 kg)   04/14/22 2344 (!) 111/58 97.8 °F (36.6 °C) Axillary 85 18 95 % --   04/14/22 2003 (!) 96/47 97.3 °F (36.3 °C) Oral 89 16 90 % --   04/14/22 1543 108/60 97.8 °F (36.6 °C) Oral 90 16 96 % --   04/14/22 1152 122/72 98.1 °F (36.7 °C) Oral 105 18 95 % --     Intake / output   04/13 1901 - 04/15 0700  In: 730 [P.O.:730]  Out: 450 [Urine:450]  Physical Exam:  Physical Exam  Vitals and nursing note reviewed. Constitutional:       General: She is not in acute distress. Appearance: She is not diaphoretic. HENT:      Head: Normocephalic and atraumatic. Nose:      Right Sinus: No maxillary sinus tenderness or frontal sinus tenderness. Left Sinus: No maxillary sinus tenderness or frontal sinus tenderness.       Mouth/Throat:      Pharynx: No oropharyngeal exudate. Eyes:      General: No scleral icterus. Conjunctiva/sclera: Conjunctivae normal.      Pupils: Pupils are equal, round, and reactive to light. Neck:      Thyroid: No thyromegaly. Vascular: No JVD. Cardiovascular:      Rate and Rhythm: Normal rate and regular rhythm. Pulses:           Dorsalis pedis pulses are 2+ on the right side and 2+ on the left side. Heart sounds: Normal heart sounds. No murmur heard. Pulmonary:      Effort: Pulmonary effort is normal.      Breath sounds: Normal breath sounds. No wheezing or rales. Abdominal:      Palpations: Abdomen is soft. There is no mass. Tenderness: There is no abdominal tenderness. Musculoskeletal:      Cervical back: Full passive range of motion without pain and neck supple. Lymphadenopathy:      Head:      Right side of head: No submandibular adenopathy. Left side of head: No submandibular adenopathy. Cervical: No cervical adenopathy. Skin:     General: Skin is warm. Neurological:      Mental Status: She is alert and oriented to person, place, and time. Motor: No tremor. Psychiatric:         Behavior: Behavior is cooperative.            Laboratory findings:    Recent Labs     04/13/22 0157 04/14/22  0617   WBC 5.2  --    HGB 12.1  --    HCT 37.7  --      --    INR  --  1.0     Recent Labs     04/13/22 0157 04/14/22 0617    143   K 3.5* 3.9    109*   CO2 28 24   GLUCOSE 117* 106*   BUN 21 15   CREATININE 1.23* 0.82   MG 2.0  --    CALCIUM 9.0 8.4*     Recent Labs     04/13/22 0157   PROT 6.0*   LABALBU 3.5   AST 13   ALT 8   ALKPHOS 65   BILITOT 0.71          Specific Gravity, UA   Date Value Ref Range Status   04/13/2022 1.015 1.005 - 1.030 Final     Protein, UA   Date Value Ref Range Status   04/13/2022 NEGATIVE NEGATIVE Final     RBC, UA   Date Value Ref Range Status   04/13/2022 0 TO 2 0 - 2 /HPF Final     Blood, UA POC   Date Value Ref Range Status   08/31/2019 moderate Final     Bacteria, UA   Date Value Ref Range Status   04/13/2022 RARE (A) None Final     Nitrite, Urine   Date Value Ref Range Status   04/13/2022 NEGATIVE NEGATIVE Final     WBC, UA   Date Value Ref Range Status   04/13/2022 0 TO 2 0 - 5 /HPF Final     Leukocyte Esterase, Urine   Date Value Ref Range Status   04/13/2022 NEGATIVE NEGATIVE Final       Imaging / Clinical Data :-   XR LUMBAR SPINE (2-3 VIEWS)    Result Date: 4/13/2022  1. New or more prominent posterior tilt of the distal coccyx may represent acute fracture in the appropriate clinical setting. 2.  No acute osseous abnormality identified in the lumbar spine. 3.  Decreased bone mineralization limits this exam.  If there remains concern for an occult pelvic fracture, consider MRI imaging. XR SACRUM COCCYX (MIN 2 VIEWS)    Result Date: 4/13/2022  1. New or more prominent posterior tilt of the distal coccyx may represent acute fracture in the appropriate clinical setting. 2.  No acute osseous abnormality identified in the lumbar spine. 3.  Decreased bone mineralization limits this exam.  If there remains concern for an occult pelvic fracture, consider MRI imaging. XR KNEE LEFT (3 VIEWS)    Result Date: 4/12/2022  No acute osseous abnormality identified in the left knee. XR KNEE RIGHT (3 VIEWS)    Result Date: 4/12/2022  No acute osseous abnormality identified in the right knee. MRI PELVIS WO CONTRAST    Result Date: 4/13/2022  Findings are suspicious for a nondisplaced fracture involving the distal sacrum at approximately the S5 level. This could be further assessed with a noncontrast CT. No additional acute fractures identified in the bony pelvis/proximal femurs. Nonspecific small fluid collection/cyst at the tip of the coccyx. Mild bilateral gluteus medius/minimus tendinosis. Mild bilateral proximal hamstring tendinosis. Mild bilateral hip joint degenerative changes. Urinary bladder diverticula.         Clinical Course : stable  Assessment and Plan  :        1. Recurrent falls - patient was in independent living, needs assistance . will need SNF. 2. Distal sacrum fracture - pain control. 3. Chronic Atrial fibrillation - on amiodarone , not on anticoagulation. 4. Hypothyroidism - synthroid    5. COPD with chronic respiratory failure  6. AAA - BP control . 7. CKD stage III - avoid nephrotoxic agents . Disposition - SNF when arranged. Continue to monitor vitals , Intake / output ,  Cell count , HGB , Kidney function, oxygenation  as indicated . Plan and updates discussed with patient ,  answers  explained to satisfaction.    Plan discussed with Staff Jennifer Velazquez RN     (Please note that portions of this note were completed with a voice recognition program. Efforts were made to edit the dictations but occasionally words are mis-transcribed.)      Joanne Godinez MD  4/15/2022

## 2022-04-19 ENCOUNTER — HOSPITAL ENCOUNTER (OUTPATIENT)
Age: 87
Setting detail: SPECIMEN
Discharge: HOME | End: 2022-04-19

## 2022-04-19 LAB
ANION GAP SERPL CALCULATED.3IONS-SCNC: 9 MMOL/L (ref 9–17)
BUN BLDV-MCNC: 15 MG/DL (ref 8–23)
CALCIUM SERPL-MCNC: 8.9 MG/DL (ref 8.6–10.4)
CHLORIDE BLD-SCNC: 109 MMOL/L (ref 98–107)
CO2: 23 MMOL/L (ref 20–31)
CREAT SERPL-MCNC: 0.71 MG/DL (ref 0.5–0.9)
GFR AFRICAN AMERICAN: >60 ML/MIN
GFR NON-AFRICAN AMERICAN: >60 ML/MIN
GFR SERPL CREATININE-BSD FRML MDRD: ABNORMAL ML/MIN/{1.73_M2}
GLUCOSE BLD-MCNC: 95 MG/DL (ref 70–99)
HCT VFR BLD CALC: 39.2 % (ref 36.3–47.1)
HEMOGLOBIN: 12.7 G/DL (ref 11.9–15.1)
MCH RBC QN AUTO: 31.8 PG (ref 25.2–33.5)
MCHC RBC AUTO-ENTMCNC: 32.4 G/DL (ref 28.4–34.8)
MCV RBC AUTO: 98.2 FL (ref 82.6–102.9)
NRBC AUTOMATED: 0 PER 100 WBC
PDW BLD-RTO: 15.7 % (ref 11.8–14.4)
PLATELET # BLD: 200 K/UL (ref 138–453)
PMV BLD AUTO: 9.8 FL (ref 8.1–13.5)
POTASSIUM SERPL-SCNC: 4.3 MMOL/L (ref 3.7–5.3)
RBC # BLD: 3.99 M/UL (ref 3.95–5.11)
SODIUM BLD-SCNC: 141 MMOL/L (ref 135–144)
WBC # BLD: 6.2 K/UL (ref 3.5–11.3)

## 2022-04-19 PROCEDURE — P9603 ONE-WAY ALLOW PRORATED MILES: HCPCS

## 2022-04-19 PROCEDURE — 80048 BASIC METABOLIC PNL TOTAL CA: CPT

## 2022-04-19 PROCEDURE — 36415 COLL VENOUS BLD VENIPUNCTURE: CPT

## 2022-04-19 PROCEDURE — 85027 COMPLETE CBC AUTOMATED: CPT

## 2022-04-26 ENCOUNTER — HOSPITAL ENCOUNTER (OUTPATIENT)
Age: 87
Setting detail: SPECIMEN
Discharge: HOME | End: 2022-04-26

## 2022-04-26 LAB
ANION GAP SERPL CALCULATED.3IONS-SCNC: 13 MMOL/L (ref 9–17)
BUN BLDV-MCNC: 19 MG/DL (ref 8–23)
CALCIUM SERPL-MCNC: 8.9 MG/DL (ref 8.6–10.4)
CHLORIDE BLD-SCNC: 107 MMOL/L (ref 98–107)
CO2: 24 MMOL/L (ref 20–31)
CREAT SERPL-MCNC: 0.84 MG/DL (ref 0.5–0.9)
GFR AFRICAN AMERICAN: >60 ML/MIN
GFR NON-AFRICAN AMERICAN: >60 ML/MIN
GFR SERPL CREATININE-BSD FRML MDRD: NORMAL ML/MIN/{1.73_M2}
GLUCOSE BLD-MCNC: 90 MG/DL (ref 70–99)
HCT VFR BLD CALC: 36.3 % (ref 36.3–47.1)
HEMOGLOBIN: 11.7 G/DL (ref 11.9–15.1)
MCH RBC QN AUTO: 31.5 PG (ref 25.2–33.5)
MCHC RBC AUTO-ENTMCNC: 32.2 G/DL (ref 28.4–34.8)
MCV RBC AUTO: 97.6 FL (ref 82.6–102.9)
NRBC AUTOMATED: 0 PER 100 WBC
PDW BLD-RTO: 15.8 % (ref 11.8–14.4)
PLATELET # BLD: 184 K/UL (ref 138–453)
PMV BLD AUTO: 9.5 FL (ref 8.1–13.5)
POTASSIUM SERPL-SCNC: 3.8 MMOL/L (ref 3.7–5.3)
RBC # BLD: 3.72 M/UL (ref 3.95–5.11)
SODIUM BLD-SCNC: 144 MMOL/L (ref 135–144)
WBC # BLD: 4.8 K/UL (ref 3.5–11.3)

## 2022-04-26 PROCEDURE — 36415 COLL VENOUS BLD VENIPUNCTURE: CPT

## 2022-04-26 PROCEDURE — 85027 COMPLETE CBC AUTOMATED: CPT

## 2022-04-26 PROCEDURE — P9603 ONE-WAY ALLOW PRORATED MILES: HCPCS

## 2022-04-26 PROCEDURE — 80048 BASIC METABOLIC PNL TOTAL CA: CPT

## 2022-06-20 ENCOUNTER — HOSPITAL ENCOUNTER (OUTPATIENT)
Age: 87
Setting detail: SPECIMEN
Discharge: HOME | End: 2022-06-20

## 2022-06-21 LAB
BILIRUBIN URINE: NEGATIVE
COLOR: YELLOW
COMMENT UA: NORMAL
GLUCOSE URINE: NEGATIVE
KETONES, URINE: NEGATIVE
LEUKOCYTE ESTERASE, URINE: NEGATIVE
NITRITE, URINE: NEGATIVE
PH UA: 6 (ref 5–8)
PROTEIN UA: NEGATIVE
SPECIFIC GRAVITY UA: 1.02 (ref 1–1.03)
TURBIDITY: CLEAR
URINE HGB: NEGATIVE
UROBILINOGEN, URINE: NORMAL

## 2022-08-14 ENCOUNTER — HOSPITAL ENCOUNTER (OUTPATIENT)
Age: 87
Setting detail: OBSERVATION
Discharge: HOSPICE | End: 2022-08-14
Attending: EMERGENCY MEDICINE | Admitting: INTERNAL MEDICINE
Payer: MEDICARE

## 2022-08-14 ENCOUNTER — APPOINTMENT (OUTPATIENT)
Dept: GENERAL RADIOLOGY | Age: 87
End: 2022-08-14
Payer: MEDICARE

## 2022-08-14 VITALS
RESPIRATION RATE: 16 BRPM | HEART RATE: 80 BPM | SYSTOLIC BLOOD PRESSURE: 142 MMHG | OXYGEN SATURATION: 96 % | TEMPERATURE: 97.7 F | DIASTOLIC BLOOD PRESSURE: 88 MMHG

## 2022-08-14 DIAGNOSIS — R53.1 GENERALIZED WEAKNESS: Primary | ICD-10-CM

## 2022-08-14 PROBLEM — I50.33 ACUTE ON CHRONIC DIASTOLIC CONGESTIVE HEART FAILURE (HCC): Status: ACTIVE | Noted: 2022-02-04

## 2022-08-14 PROBLEM — J18.9 PNEUMONIA DUE TO INFECTIOUS ORGANISM: Status: ACTIVE | Noted: 2022-08-14

## 2022-08-14 LAB
ABSOLUTE EOS #: 0.23 K/UL (ref 0–0.44)
ABSOLUTE IMMATURE GRANULOCYTE: 0.01 K/UL (ref 0–0.3)
ABSOLUTE LYMPH #: 1.9 K/UL (ref 1.1–3.7)
ABSOLUTE MONO #: 0.38 K/UL (ref 0.1–1.2)
ANION GAP SERPL CALCULATED.3IONS-SCNC: 10 MMOL/L (ref 9–17)
ANION GAP SERPL CALCULATED.3IONS-SCNC: 10 MMOL/L (ref 9–17)
BACTERIA: ABNORMAL
BASOPHILS # BLD: 1 % (ref 0–2)
BASOPHILS ABSOLUTE: 0.03 K/UL (ref 0–0.2)
BILIRUBIN URINE: NEGATIVE
BUN BLDV-MCNC: 13 MG/DL (ref 8–23)
BUN BLDV-MCNC: 13 MG/DL (ref 8–23)
BUN/CREAT BLD: 12 (ref 9–20)
BUN/CREAT BLD: 13 (ref 9–20)
CALCIUM SERPL-MCNC: 8.8 MG/DL (ref 8.6–10.4)
CALCIUM SERPL-MCNC: 8.8 MG/DL (ref 8.6–10.4)
CHLORIDE BLD-SCNC: 105 MMOL/L (ref 98–107)
CHLORIDE BLD-SCNC: 107 MMOL/L (ref 98–107)
CO2: 26 MMOL/L (ref 20–31)
CO2: 27 MMOL/L (ref 20–31)
COLOR: YELLOW
CREAT SERPL-MCNC: 1.03 MG/DL (ref 0.5–0.9)
CREAT SERPL-MCNC: 1.1 MG/DL (ref 0.5–0.9)
EKG ATRIAL RATE: 312 BPM
EKG Q-T INTERVAL: 310 MS
EKG QRS DURATION: 86 MS
EKG QTC CALCULATION (BAZETT): 366 MS
EKG R AXIS: -34 DEGREES
EKG T AXIS: 108 DEGREES
EKG VENTRICULAR RATE: 84 BPM
EOSINOPHILS RELATIVE PERCENT: 4 % (ref 1–4)
EPITHELIAL CELLS UA: ABNORMAL /HPF (ref 0–5)
GFR AFRICAN AMERICAN: 56 ML/MIN
GFR AFRICAN AMERICAN: >60 ML/MIN
GFR NON-AFRICAN AMERICAN: 46 ML/MIN
GFR NON-AFRICAN AMERICAN: 50 ML/MIN
GFR SERPL CREATININE-BSD FRML MDRD: ABNORMAL ML/MIN/{1.73_M2}
GFR SERPL CREATININE-BSD FRML MDRD: ABNORMAL ML/MIN/{1.73_M2}
GLUCOSE BLD-MCNC: 101 MG/DL (ref 70–99)
GLUCOSE BLD-MCNC: 127 MG/DL (ref 70–99)
GLUCOSE URINE: NEGATIVE
HCT VFR BLD CALC: 37.9 % (ref 36.3–47.1)
HEMOGLOBIN: 11.4 G/DL (ref 11.9–15.1)
IMMATURE GRANULOCYTES: 0 %
KETONES, URINE: NEGATIVE
LEGIONELLA PNEUMOPHILIA AG, URINE: NEGATIVE
LEUKOCYTE ESTERASE, URINE: NEGATIVE
LYMPHOCYTES # BLD: 34 % (ref 24–43)
MCH RBC QN AUTO: 28.4 PG (ref 25.2–33.5)
MCHC RBC AUTO-ENTMCNC: 30.1 G/DL (ref 28.4–34.8)
MCV RBC AUTO: 94.5 FL (ref 82.6–102.9)
MONOCYTES # BLD: 7 % (ref 3–12)
MYOGLOBIN: 169 NG/ML (ref 25–58)
NITRITE, URINE: NEGATIVE
NRBC AUTOMATED: 0 PER 100 WBC
PDW BLD-RTO: 15.4 % (ref 11.8–14.4)
PH UA: 6 (ref 5–8)
PLATELET # BLD: 156 K/UL (ref 138–453)
PMV BLD AUTO: 9 FL (ref 8.1–13.5)
POTASSIUM SERPL-SCNC: 3.2 MMOL/L (ref 3.7–5.3)
POTASSIUM SERPL-SCNC: 3.4 MMOL/L (ref 3.7–5.3)
POTASSIUM SERPL-SCNC: 3.6 MMOL/L (ref 3.7–5.3)
PROCALCITONIN: 0.04 NG/ML
PROTEIN UA: NEGATIVE
RBC # BLD: 4.01 M/UL (ref 3.95–5.11)
RBC # BLD: ABNORMAL 10*6/UL
RBC UA: ABNORMAL /HPF (ref 0–2)
SEG NEUTROPHILS: 54 % (ref 36–65)
SEGMENTED NEUTROPHILS ABSOLUTE COUNT: 3.11 K/UL (ref 1.5–8.1)
SODIUM BLD-SCNC: 142 MMOL/L (ref 135–144)
SODIUM BLD-SCNC: 143 MMOL/L (ref 135–144)
SPECIFIC GRAVITY UA: 1.02 (ref 1–1.03)
TROPONIN, HIGH SENSITIVITY: 43 NG/L (ref 0–14)
TURBIDITY: CLEAR
URINE HGB: ABNORMAL
UROBILINOGEN, URINE: NORMAL
WBC # BLD: 5.7 K/UL (ref 3.5–11.3)
WBC UA: ABNORMAL /HPF (ref 0–5)

## 2022-08-14 PROCEDURE — 84132 ASSAY OF SERUM POTASSIUM: CPT

## 2022-08-14 PROCEDURE — 2580000003 HC RX 258: Performed by: NURSE PRACTITIONER

## 2022-08-14 PROCEDURE — 96375 TX/PRO/DX INJ NEW DRUG ADDON: CPT

## 2022-08-14 PROCEDURE — G0378 HOSPITAL OBSERVATION PER HR: HCPCS

## 2022-08-14 PROCEDURE — APPSS30 APP SPLIT SHARED TIME 16-30 MINUTES: Performed by: NURSE PRACTITIONER

## 2022-08-14 PROCEDURE — 96365 THER/PROPH/DIAG IV INF INIT: CPT

## 2022-08-14 PROCEDURE — 85025 COMPLETE CBC W/AUTO DIFF WBC: CPT

## 2022-08-14 PROCEDURE — 84145 PROCALCITONIN (PCT): CPT

## 2022-08-14 PROCEDURE — 97530 THERAPEUTIC ACTIVITIES: CPT

## 2022-08-14 PROCEDURE — 6360000002 HC RX W HCPCS: Performed by: INTERNAL MEDICINE

## 2022-08-14 PROCEDURE — 6360000002 HC RX W HCPCS: Performed by: EMERGENCY MEDICINE

## 2022-08-14 PROCEDURE — 36415 COLL VENOUS BLD VENIPUNCTURE: CPT

## 2022-08-14 PROCEDURE — 81001 URINALYSIS AUTO W/SCOPE: CPT

## 2022-08-14 PROCEDURE — 84484 ASSAY OF TROPONIN QUANT: CPT

## 2022-08-14 PROCEDURE — 97535 SELF CARE MNGMENT TRAINING: CPT

## 2022-08-14 PROCEDURE — 87040 BLOOD CULTURE FOR BACTERIA: CPT

## 2022-08-14 PROCEDURE — 96366 THER/PROPH/DIAG IV INF ADDON: CPT

## 2022-08-14 PROCEDURE — 99285 EMERGENCY DEPT VISIT HI MDM: CPT

## 2022-08-14 PROCEDURE — 86738 MYCOPLASMA ANTIBODY: CPT

## 2022-08-14 PROCEDURE — 6370000000 HC RX 637 (ALT 250 FOR IP): Performed by: NURSE PRACTITIONER

## 2022-08-14 PROCEDURE — 71045 X-RAY EXAM CHEST 1 VIEW: CPT

## 2022-08-14 PROCEDURE — 93005 ELECTROCARDIOGRAM TRACING: CPT | Performed by: EMERGENCY MEDICINE

## 2022-08-14 PROCEDURE — 99219 PR INITIAL OBSERVATION CARE/DAY 50 MINUTES: CPT | Performed by: INTERNAL MEDICINE

## 2022-08-14 PROCEDURE — 97166 OT EVAL MOD COMPLEX 45 MIN: CPT

## 2022-08-14 PROCEDURE — 87449 NOS EACH ORGANISM AG IA: CPT

## 2022-08-14 PROCEDURE — 83874 ASSAY OF MYOGLOBIN: CPT

## 2022-08-14 PROCEDURE — 80048 BASIC METABOLIC PNL TOTAL CA: CPT

## 2022-08-14 RX ORDER — LEVOTHYROXINE SODIUM 0.12 MG/1
125 TABLET ORAL EVERY OTHER DAY
Status: DISCONTINUED | OUTPATIENT
Start: 2022-08-14 | End: 2022-08-14 | Stop reason: HOSPADM

## 2022-08-14 RX ORDER — DICYCLOMINE HYDROCHLORIDE 10 MG/1
20 CAPSULE ORAL EVERY 6 HOURS PRN
Status: DISCONTINUED | OUTPATIENT
Start: 2022-08-14 | End: 2022-08-14 | Stop reason: HOSPADM

## 2022-08-14 RX ORDER — SODIUM CHLORIDE 0.9 % (FLUSH) 0.9 %
5-40 SYRINGE (ML) INJECTION EVERY 12 HOURS SCHEDULED
Status: DISCONTINUED | OUTPATIENT
Start: 2022-08-14 | End: 2022-08-14 | Stop reason: HOSPADM

## 2022-08-14 RX ORDER — NYSTATIN 100000 [USP'U]/G
100000 POWDER TOPICAL 4 TIMES DAILY
COMMUNITY

## 2022-08-14 RX ORDER — SENNA PLUS 8.6 MG/1
1 TABLET ORAL DAILY PRN
Status: DISCONTINUED | OUTPATIENT
Start: 2022-08-14 | End: 2022-08-14 | Stop reason: HOSPADM

## 2022-08-14 RX ORDER — LEVOFLOXACIN 5 MG/ML
750 INJECTION, SOLUTION INTRAVENOUS ONCE
Status: COMPLETED | OUTPATIENT
Start: 2022-08-14 | End: 2022-08-14

## 2022-08-14 RX ORDER — SENNA PLUS 8.6 MG/1
1 TABLET ORAL DAILY PRN
COMMUNITY

## 2022-08-14 RX ORDER — ACETAMINOPHEN 650 MG/1
650 SUPPOSITORY RECTAL EVERY 6 HOURS PRN
Status: DISCONTINUED | OUTPATIENT
Start: 2022-08-14 | End: 2022-08-14 | Stop reason: HOSPADM

## 2022-08-14 RX ORDER — LORAZEPAM 0.5 MG/1
0.5 TABLET ORAL EVERY 4 HOURS PRN
COMMUNITY

## 2022-08-14 RX ORDER — SODIUM CHLORIDE 9 MG/ML
INJECTION, SOLUTION INTRAVENOUS PRN
Status: DISCONTINUED | OUTPATIENT
Start: 2022-08-14 | End: 2022-08-14 | Stop reason: HOSPADM

## 2022-08-14 RX ORDER — TORSEMIDE 20 MG/1
30 TABLET ORAL DAILY
Qty: 45 TABLET | Refills: 1 | Status: SHIPPED | OUTPATIENT
Start: 2022-08-14

## 2022-08-14 RX ORDER — LEVOFLOXACIN 500 MG/1
500 TABLET, FILM COATED ORAL DAILY
Status: DISCONTINUED | OUTPATIENT
Start: 2022-08-15 | End: 2022-08-14

## 2022-08-14 RX ORDER — SODIUM CHLORIDE 0.9 % (FLUSH) 0.9 %
10 SYRINGE (ML) INJECTION PRN
Status: DISCONTINUED | OUTPATIENT
Start: 2022-08-14 | End: 2022-08-14 | Stop reason: HOSPADM

## 2022-08-14 RX ORDER — DICYCLOMINE HCL 20 MG
TABLET ORAL
COMMUNITY
Start: 2022-08-12 | End: 2022-08-14

## 2022-08-14 RX ORDER — VITAMIN B COMPLEX
2000 TABLET ORAL EVERY OTHER DAY
Status: DISCONTINUED | OUTPATIENT
Start: 2022-08-14 | End: 2022-08-14 | Stop reason: HOSPADM

## 2022-08-14 RX ORDER — ACETAMINOPHEN 325 MG/1
650 TABLET ORAL EVERY 6 HOURS PRN
Status: DISCONTINUED | OUTPATIENT
Start: 2022-08-14 | End: 2022-08-14 | Stop reason: HOSPADM

## 2022-08-14 RX ORDER — DICYCLOMINE HYDROCHLORIDE 10 MG/1
20 CAPSULE ORAL EVERY 6 HOURS PRN
Qty: 120 CAPSULE | Refills: 0 | Status: SHIPPED
Start: 2022-08-14

## 2022-08-14 RX ORDER — LORAZEPAM 0.5 MG/1
0.5 TABLET ORAL EVERY 4 HOURS PRN
Status: DISCONTINUED | OUTPATIENT
Start: 2022-08-14 | End: 2022-08-14 | Stop reason: HOSPADM

## 2022-08-14 RX ORDER — AMIODARONE HYDROCHLORIDE 200 MG/1
200 TABLET ORAL DAILY
Status: DISCONTINUED | OUTPATIENT
Start: 2022-08-14 | End: 2022-08-14 | Stop reason: HOSPADM

## 2022-08-14 RX ORDER — ONDANSETRON 4 MG/1
4 TABLET, ORALLY DISINTEGRATING ORAL EVERY 8 HOURS PRN
COMMUNITY

## 2022-08-14 RX ORDER — PANTOPRAZOLE SODIUM 40 MG/1
40 TABLET, DELAYED RELEASE ORAL
Status: DISCONTINUED | OUTPATIENT
Start: 2022-08-15 | End: 2022-08-14 | Stop reason: HOSPADM

## 2022-08-14 RX ORDER — FUROSEMIDE 10 MG/ML
40 INJECTION INTRAMUSCULAR; INTRAVENOUS ONCE
Status: COMPLETED | OUTPATIENT
Start: 2022-08-14 | End: 2022-08-14

## 2022-08-14 RX ORDER — TRAMADOL HYDROCHLORIDE 50 MG/1
50 TABLET ORAL EVERY 6 HOURS PRN
Status: DISCONTINUED | OUTPATIENT
Start: 2022-08-14 | End: 2022-08-14 | Stop reason: HOSPADM

## 2022-08-14 RX ORDER — ACETAMINOPHEN 325 MG/1
650 TABLET ORAL EVERY 6 HOURS PRN
COMMUNITY

## 2022-08-14 RX ORDER — HYOSCYAMINE SULFATE 0.125 MG
TABLET ORAL
COMMUNITY
Start: 2022-06-28

## 2022-08-14 RX ORDER — FAMOTIDINE 20 MG/1
40 TABLET, FILM COATED ORAL EVERY EVENING
Status: DISCONTINUED | OUTPATIENT
Start: 2022-08-14 | End: 2022-08-14 | Stop reason: HOSPADM

## 2022-08-14 RX ORDER — POLYETHYLENE GLYCOL 3350 17 G/17G
17 POWDER, FOR SOLUTION ORAL DAILY
Status: DISCONTINUED | OUTPATIENT
Start: 2022-08-14 | End: 2022-08-14 | Stop reason: HOSPADM

## 2022-08-14 RX ORDER — GABAPENTIN 100 MG/1
100 CAPSULE ORAL 3 TIMES DAILY
Status: DISCONTINUED | OUTPATIENT
Start: 2022-08-14 | End: 2022-08-14 | Stop reason: HOSPADM

## 2022-08-14 RX ADMIN — POTASSIUM BICARBONATE 40 MEQ: 782 TABLET, EFFERVESCENT ORAL at 04:57

## 2022-08-14 RX ADMIN — SODIUM CHLORIDE, PRESERVATIVE FREE 10 ML: 5 INJECTION INTRAVENOUS at 09:54

## 2022-08-14 RX ADMIN — LEVOFLOXACIN 750 MG: 5 INJECTION, SOLUTION INTRAVENOUS at 04:31

## 2022-08-14 RX ADMIN — FUROSEMIDE 40 MG: 10 INJECTION, SOLUTION INTRAMUSCULAR; INTRAVENOUS at 09:54

## 2022-08-14 ASSESSMENT — ENCOUNTER SYMPTOMS
VOMITING: 0
EYE REDNESS: 0
FACIAL SWELLING: 0
ABDOMINAL PAIN: 0
COUGH: 0
EYE DISCHARGE: 0
DIARRHEA: 0
SHORTNESS OF BREATH: 0
COLOR CHANGE: 0
CONSTIPATION: 0
BACK PAIN: 1

## 2022-08-14 ASSESSMENT — PAIN SCALES - GENERAL: PAINLEVEL_OUTOF10: 0

## 2022-08-14 NOTE — ED PROVIDER NOTES
[address] [place] [address] ED  EMERGENCY DEPARTMENT ENCOUNTER      Pt Name: Juan Luis Marin  MRN: [id number]  [place] 8/2/1925  Date of evaluation: 8/14/2022  Provider: Marie Mo MD    CHIEF COMPLAINT       Chief Complaint   Patient presents with    Fatigue         HISTORY OF PRESENT ILLNESS  (Location/Symptom, Timing/Onset, Context/Setting, Quality, Duration, Modifying Factors, Severity.)   Juan Lusi Marin is a 80 y.o. female who presents to the emergency department because she fell when she was too weak to get to the bathroom. She did not injure herself in any way and did not hit her head. Paramedics brought her in from her home. She does not complain of headache or neck pain or chest pain. She is not short of breath and has no abdominal pain. She tells me that this is happened to her before. Nursing Notes were reviewed. ALLERGIES     Penicillins    CURRENT MEDICATIONS       Previous Medications    AMIODARONE (CORDARONE) 200 MG TABLET    Take 200 mg by mouth daily     CHOLECALCIFEROL (VITAMIN D3) 50 MCG (2000 UT) CAPS    Take 2,000 Units by mouth every other day     DICYCLOMINE (BENTYL) 10 MG CAPSULE    Take 1 capsule by mouth every 6 hours as needed (cramps)    FAMOTIDINE (PEPCID) 40 MG TABLET    Take 1 tablet by mouth every evening    GABAPENTIN (NEURONTIN) 100 MG CAPSULE    Take 100 mg by mouth 3 times daily. HYOSCYAMINE (ANASPAZ;LEVSIN) 125 MCG TABLET        LEVOTHYROXINE (SYNTHROID) 125 MCG TABLET    Take 125 mcg by mouth every other day    OXYGEN    Inhale into the lungs Uses at night and prn    PANTOPRAZOLE (PROTONIX) 40 MG TABLET    Take 1 tablet by mouth every morning (before breakfast)    POLYETHYLENE GLYCOL (MIRALAX) 17 GM/SCOOP POWDER    Take 17 g by mouth daily    TORSEMIDE (DEMADEX) 20 MG TABLET    Take 1 tablet by mouth daily as needed (leg swelling)    TRAMADOL (ULTRAM) 50 MG TABLET    Take 50 mg by mouth every 6 hours as needed for Pain.        PAST MEDICAL HISTORY         Diagnosis Date A-fib (HCC)     AAA (abdominal aortic aneurysm) (HCC)     Abdominal cramping     Aortic insufficiency     Atrial fibrillation (HCC)     Back pain, chronic     C. difficile colitis     Chronic diastolic CHF (congestive heart failure) (HCC)     COPD (chronic obstructive pulmonary disease) (HCC)     Diabetes mellitus (HCC)     Gastritis     GERD (gastroesophageal reflux disease)     Hiatal hernia     Hx of prolonged Q-T interval on ECG     Hypoalbuminemia 10/14/2014    IBS (irritable bowel syndrome)     Murmur, cardiac     Nausea vomiting and diarrhea 10/14/2014    Stomach ulcer     Thyroid disease     Type 2 diabetes mellitus without complication, without long-term current use of insulin ([facility] [address] 2018    UTI (lower urinary tract infection)     Varicose veins        SURGICAL HISTORY           Procedure Laterality Date    CHOLECYSTECTOMY      HERNIA REPAIR      HYSTERECTOMY (CERVIX STATUS UNKNOWN)      partial    [place] EGD TRANSORAL BIOPSY SINGLE/MULTIPLE N/A 2017    EGD BIOPSY performed by Vahid Grier MD at [address] [facility] [address]  2017    MILD CHRONIC INACTIVE GASTRITIS    VASCULAR SURGERY      varicose veins    VEIN SURGERY           FAMILY HISTORY           Problem Relation Age of Onset    Stroke Mother     Heart Disease Mother      Family Status   Relation Name Status    Mother      Father          SOCIAL HISTORY      reports that she has quit smoking. Her smoking use included cigarettes. She has a 20.00 pack-year smoking history. She has never used smokeless tobacco. She reports current alcohol use of about 2.0 standard drinks per week. She reports that she does not use drugs. REVIEW OF SYSTEMS    (2-9 systems for level 4, 10 or more for level 5)     Review of Systems   Constitutional:  Negative for chills, fatigue and fever. HENT:  Negative for congestion, ear discharge and facial swelling. Eyes:  Negative for discharge and redness.    Respiratory:  Negative for cough and shortness of breath. Cardiovascular:  Negative for chest pain. Gastrointestinal:  Negative for abdominal pain, constipation, diarrhea and vomiting. Genitourinary:  Negative for dysuria and hematuria. Musculoskeletal:  Negative for arthralgias. Skin:  Negative for color change and rash. Neurological:  Positive for weakness. Negative for syncope, numbness and headaches. Hematological:  Negative for adenopathy. Psychiatric/Behavioral:  Negative for confusion. The patient is not nervous/anxious. Except as noted above the remainder of the review of systems was reviewed and negative. PHYSICAL EXAM    (up to 7 for level 4, 8 or more for level 5)     Vitals:    08/14/22 0325   BP: (!) 141/93   Pulse: 81   Resp: 16   Temp: 98.8 °F (37.1 °C)   TempSrc: Oral   SpO2: 93%       Physical Exam  Vitals reviewed. Constitutional:       General: She is not in acute distress. Appearance: She is well-developed. She is not diaphoretic. Comments: She is quite hard of hearing   HENT:      Head: Normocephalic and atraumatic. Eyes:      General: No scleral icterus. Right eye: No discharge. Left eye: No discharge. Cardiovascular:      Rate and Rhythm: Normal rate and regular rhythm. Pulmonary:      Effort: Pulmonary effort is normal. No respiratory distress. Breath sounds: Normal breath sounds. No stridor. No wheezing or rales. Abdominal:      General: There is no distension. Palpations: Abdomen is soft. Tenderness: no abdominal tenderness   Musculoskeletal:         General: Normal range of motion. Cervical back: Neck supple. Comments: No palpable tenderness to her extremities including her hips which have good range of motion. Lymphadenopathy:      Cervical: No cervical adenopathy. Skin:     General: Skin is warm and dry. Findings: No erythema or rash.    Neurological:      Mental Status: She is alert and oriented to person, place, and pneumonia could appear similar. LABS:  Labs Reviewed   CBC WITH AUTO DIFFERENTIAL - Abnormal; Notable for the following components:       Result Value    Hemoglobin 11.4 (*)     RDW 15.4 (*)     All other components within normal limits   BASIC METABOLIC PANEL - Abnormal; Notable for the following components:    Glucose 127 (*)     Creatinine 1.10 (*)     Potassium 3.2 (*)     GFR Non- 46 (*)     GFR  56 (*)     All other components within normal limits   URINALYSIS - Abnormal; Notable for the following components:    Urine Hgb TRACE (*)     All other components within normal limits   TROP/MYOGLOBIN - Abnormal; Notable for the following components:    Troponin, High Sensitivity 43 (*)     Myoglobin 169 (*)     All other components within normal limits   MICROSCOPIC URINALYSIS - Abnormal; Notable for the following components:    Bacteria, UA FEW (*)     All other components within normal limits       All other labs were within normal range or not returned as of this dictation. EMERGENCY DEPARTMENT COURSE and DIFFERENTIAL DIAGNOSIS/MDM:   Vitals:    Vitals:    08/14/22 0325   BP: (!) 141/93   Pulse: 81   Resp: 16   Temp: 98.8 °F (37.1 °C)   TempSrc: Oral   SpO2: 93%       Orders Placed This Encounter   Medications    levoFLOXacin (LEVAQUIN) 750 MG/150ML infusion 750 mg     Order Specific Question:   Antimicrobial Indications     Answer:   Pneumonia (CAP)       Medical Decision Making: Chest x-ray per radiologist shows questionable pneumonia. Blood cultures were obtained and she was given IV antibiotic. I have low clinical suspicion for pneumonia. She is weak however and lives by herself and she is being admitted. Treatment diagnosis and disposition were discussed with the patient. CONSULTS:  None    PROCEDURES:  None    FINAL IMPRESSION      1.  Generalized weakness          DISPOSITION/PLAN   DISPOSITION Decision To Admit 08/14/2022 04:08:22 AM      PATIENT REFERRED TO:   No follow-up provider specified. DISCHARGE MEDICATIONS:     New Prescriptions    No medications on file       The care is provided during an unprecedented national emergency due to the novel coronavirus, COVID-19.     (Please note that portions of this note were completed with a voice recognition program.  Efforts were made to edit the dictations but occasionally words are mis-transcribed.)    Cuco Melgar MD  Attending Emergency Physician            Cuco Melgar MD  08/14/22 1548

## 2022-08-14 NOTE — ED NOTES
ED to inpatient nurses report     Chief Complaint   Patient presents with    Fatigue      Present to ED from home. Pt lives at [facility]. Pt was going to the bathroom and legs became weak and gave out on her. Pt stated this has happened to her in the past. Pt denies any LOC. Pt did not hit her head. Pt very hard of hearing without her hearing aids. LOC: alert and orientated to name, place, date  Vital signs   Vitals:    08/14/22 0325   BP: (!) 141/93   Pulse: 81   Resp: 16   Temp: 98.8 °F (37.1 °C)   TempSrc: Oral   SpO2: 93%      Oxygen Baseline: 2-4L at night and PRN per orders    Current needs required: none  LDAs:    Mobility: Requires assistance * 1  Fall Risk: [place] 1 Fall Risk  Presents to emergency department  because of falls (Syncope, seizure, or loss of consciousness): Yes (08/14/22 0321)  Age > 79: Yes (08/14/22 0321)  Altered Mental Status, Intoxication with alcohol or substance confusion (Disorientation, impaired judgment, poor safety awaremess, or inability to follow instructions): No (08/14/22 0321)  Impaired Mobility: Ambulates or transfers with assistive devices or assistance;  Unable to ambulate or transer.: Yes (08/14/22 0321)  Nursing Judgement: Yes (08/14/22 0321)  Pending ED orders: none  Present condition: stable  Code Status: Full  Consults: None  [x]  Hospitalist              Completed  [x] yes [] no Who: Lane Lara  []  Medicine  Completed  [] yes [] No Who:   []  Cardiology  Completed  [] yes [] No Who:   []  GI   Completed  [] yes [] No Who:   []  Neurology  Completed  [] yes [] No Who:   []  Nephrology Completed  [] yes [] No Who:    []  Vascular  Completed  [] yes [] No Who:   []  Ortho  Completed  [] yes [] No Who:     []  Surgery  Completed  [] yes [] No Who:    []  Urology  Completed  [] yes [] No Who:    []  CT Surgery Completed  [] yes [] No Who:   []  Podiatry  Completed  [] yes [] No Who:    []  Other    Completed  [] yes [] No Who:  Interventions: Labs, urine, chest xray, blood cultures, IV levaquin. PO K+    Important Events:  Chest XRAY  Impression   1. Left basilar airspace opacity potentially due to atelectasis, aspiration,   and/or pneumonia. 2. Possible subpleural airspace opacity in the mid right lung that could be   seen with atypical pneumonia. 3. Increased diffuse interstitial prominence likely due pulmonary vascular   congestion and/or chronic changes.  Underlying edema in the setting of   congestive heart failure is not excluded given mild cardiomegaly and possible   trace right pleural effusion.  Interstitial pneumonia could appear similar.              Electronically signed by Josefina Allen RN on 8/14/2022 at 4:20 AM             Josefina Allen, [address] [place] [address]  08/14/22 0364

## 2022-08-14 NOTE — ED NOTES
The following labs labeled with pt sticker and sent to Lab:     [x] Lavender     [] Blue   [x] Green/yellow  [] Drk Green/Grey   [] Pink  [] Red  [] Yellow     [] Blood Cultures     [] COVID-19 swab    [] Rapid   [] Viral Swab  [] Wound Swab    [] Urine Sample  [] Pelvic Cultures               Alyssa Keyes RN  08/14/22 3000

## 2022-08-14 NOTE — ED NOTES
Pt presenting to the ED via EMS. Pt states she felt dizzy and weak earlier tonight when she went to stand up and go to the bathroom. Pt states \"my legs gave out. \" Pt denies any LOC or hitting her head. Pt is A&Ox4.       Joel Rosen RN  08/14/22 1837

## 2022-08-14 NOTE — ED NOTES
The following labs labeled with pt sticker and sent to Lab:     [] Heather Barreto     [] Blue   [] Green/yellow  [] Drk Green/Grey   [] Pink  [] Red  [] Yellow     [] Blood Cultures     [] COVID-19 swab    [] Rapid   [] Viral Swab  [] Wound Swab    [x] Urine Sample  [] Pelvic Cultures               Delilah Ramos RN  08/14/22 1918

## 2022-08-14 NOTE — DISCHARGE SUMMARY
diastolic congestive heart failure ([facility] [address] , presented to ER with Fatigue    Admitted with fatigue after a fall. Though not hypoxic, she was in acute chf, and was diuresed. Initial concern about pneumonia but no infiltrate seen-changes are purely chf; and procalcitonin negative so antibiotics stopped. Patient under care of hospice and granddaughter who is patient's poa, wants her transferred back home and re-enrolled in hospice    Demadex dose increased to help with chf symptoms      Significant therapeutic interventions: see above    Significant Diagnostic Studies:   Labs / Micro:  CBC:   Lab Results   Component Value Date/Time    WBC 5.7 08/14/2022 03:38 AM    RBC 4.01 08/14/2022 03:38 AM    HGB 11.4 08/14/2022 03:38 AM    HCT 37.9 08/14/2022 03:38 AM    MCV 94.5 08/14/2022 03:38 AM    MCH 28.4 08/14/2022 03:38 AM    MCHC 30.1 08/14/2022 03:38 AM    RDW 15.4 08/14/2022 03:38 AM     08/14/2022 03:38 AM     CMP:    Lab Results   Component Value Date/Time    GLUCOSE 101 08/14/2022 08:30 AM     08/14/2022 08:30 AM    K 3.4 08/14/2022 11:06 AM     08/14/2022 08:30 AM    CO2 27 08/14/2022 08:30 AM    BUN 13 08/14/2022 08:30 AM    CREATININE 1.03 08/14/2022 08:30 AM    ANIONGAP 10 08/14/2022 08:30 AM    ALKPHOS 65 04/13/2022 01:57 AM    ALT 8 04/13/2022 01:57 AM    AST 13 04/13/2022 01:57 AM    BILITOT 0.71 04/13/2022 01:57 AM    LABALBU 3.5 04/13/2022 01:57 AM    ALBUMIN NOT REPORTED 02/06/2022 05:57 AM    LABGLOM 50 08/14/2022 08:30 AM    GFRAA >60 08/14/2022 08:30 AM    GFR      08/14/2022 08:30 AM    PROT 6.0 04/13/2022 01:57 AM    CALCIUM 8.8 08/14/2022 08:30 AM        Radiology:  XR CHEST PORTABLE    Result Date: 8/14/2022  1. Left basilar airspace opacity potentially due to atelectasis, aspiration, and/or pneumonia. 2. Possible subpleural airspace opacity in the mid right lung that could be seen with atypical pneumonia.  3. Increased diffuse interstitial prominence likely due pulmonary vascular congestion and/or chronic changes. Underlying edema in the setting of congestive heart failure is not excluded given mild cardiomegaly and possible trace right pleural effusion. Interstitial pneumonia could appear similar. Consultations:    Consults:     Final Specialist Recommendations/Findings:   IP CONSULT TO INTERNAL MEDICINE      The patient was seen and examined on day of discharge and this discharge summary is in conjunction with any daily progress note from day of discharge. Discharge plan:     Disposition: Home    Physician Follow Up:   hopsice  No follow-up provider specified. Requiring Further Evaluation/Follow Up POST HOSPITALIZATION/Incidental Findings: none    Diet: cardiac diet    Activity: As tolerated    Instructions to Patient: take medications as prescribed      Discharge Medications:      Medication List        CHANGE how you take these medications      torsemide 20 MG tablet  Commonly known as: DEMADEX  Take 1.5 tablets by mouth in the morning.   What changed:   how much to take  when to take this  reasons to take this            CONTINUE taking these medications      acetaminophen 325 MG tablet  Commonly known as: TYLENOL     amiodarone 200 MG tablet  Commonly known as: CORDARONE     dicyclomine 10 MG capsule  Commonly known as: Bentyl  Take 2 capsules by mouth every 6 hours as needed (cramps)     famotidine 40 MG tablet  Commonly known as: PEPCID  Take 1 tablet by mouth every evening     gabapentin 100 MG capsule  Commonly known as: NEURONTIN     hyoscyamine 125 MCG tablet  Commonly known as: ANASPAZ;LEVSIN     levothyroxine 125 MCG tablet  Commonly known as: SYNTHROID     LORazepam 0.5 MG tablet  Commonly known as: ATIVAN     MiraLax 17 GM/SCOOP powder  Generic drug: polyethylene glycol     nystatin 291850 UNIT/GM powder  Commonly known as: MYCOSTATIN     ondansetron 4 MG disintegrating tablet  Commonly known as: ZOFRAN-ODT     OXYGEN     pantoprazole 40 MG tablet  Commonly known as: PROTONIX  Take 1 tablet by mouth every morning (before breakfast)     senna 8.6 MG tablet  Commonly known as: SENOKOT     traMADol 50 MG tablet  Commonly known as: ULTRAM     Vitamin D3 50 MCG (2000 UT) Caps               Where to Get Your Medications        These medications were sent to [facility] [id number] Vivi Hartman 12  [address] [facility], [address] [place] [address]      Phone: [phone]   torsemide 20 MG tablet       Information about where to get these medications is not yet available    Ask your nurse or doctor about these medications  dicyclomine 10 MG capsule         No discharge procedures on file. Time Spent on discharge is  20 mins in patient examination, evaluation, counseling as well as medication reconciliation, prescriptions for required medications, discharge plan and follow up. Electronically signed by   Miguel Christine DO  8/14/2022  10:34 AM      Thank you Dr. Ivette Berg MD for the opportunity to be involved in this patient's care.

## 2022-08-14 NOTE — PROGRESS NOTES
Pt admitted to room 1006. Oriented to room and call light. Vitals and assessment completed as charted.

## 2022-08-14 NOTE — DISCHARGE INSTR - COC
Continuity of Care Form    Patient Name: Teresa Rader   :  1925  MRN:  [id number]    Admit date:  2022  Discharge date:  22    Code Status Order: DNR-CCA   Advance Directives:     Admitting Physician:  Viky Christine DO  PCP: Sushila Andrews MD    Discharging Nurse: Irena Mendoza RN  [address] Unit/Room#: 1006/1006-02  Discharging Unit Phone Number: [phone]    Emergency Contact:   Extended Emergency Contact Information  Primary Emergency Contact: Sue Govea   [address] Phone: [phone]  Relation: Grandchild    Past Surgical History:  Past Surgical History:   Procedure Laterality Date    CHOLECYSTECTOMY      HERNIA REPAIR      HYSTERECTOMY (CERVIX STATUS UNKNOWN)      partial    [place] EGD TRANSORAL BIOPSY SINGLE/MULTIPLE N/A 2017    EGD BIOPSY performed by Melody Park MD at [facility] [address]  2017    MILD CHRONIC INACTIVE GASTRITIS    VASCULAR SURGERY      varicose veins    VEIN SURGERY         Immunization History:   Immunization History   Administered Date(s) Administered    COVID-19, PFIZER PURPLE top, DILUTE for use, (age 15 y+), 30mcg/0.3mL 2021, 2021    Influenza, High Dose (Fluzone 65 yrs and older) 2017, 10/15/2019    Influenza, High-dose, Quadv, 65 yrs +, IM (Fluzone) 10/06/2020    Pneumococcal Conjugate 13-valent ([facility]) 2017       Active Problems:  Patient Active Problem List   Diagnosis Code    Atrial fibrillation (HCC) I48.91    COPD (chronic obstructive pulmonary disease) ([facility] [address] J44.9    Abdominal cramping R10.9    Back pain, chronic M54.9, G89.29    Diabetes mellitus ([facility] [address] E11.9    C. difficile colitis A04.72    Nausea vomiting and diarrhea R11.2, R19.7    Hypoalbuminemia E88.09    Gastroenteritis/enteritis K52.9    Supratherapeutic INR R79.1    Gastroesophageal reflux disease without esophagitis K21.9    Irritable bowel syndrome with both constipation and diarrhea K58.2    Irritable Care Documentation and Therapy:        Elimination:  Continence: Bowel: No  Bladder: Yes  Urinary Catheter: None   Colostomy/Ileostomy/Ileal Conduit: No       Date of Last BM: ***  No intake or output data in the 24 hours ending 08/14/22 1226  No intake/output data recorded. Safety Concerns:     None    Impairments/Disabilities:      None    Nutrition Therapy:  Current Nutrition Therapy:   - Oral Diet:  General    Routes of Feeding: Oral  Liquids: No Restrictions  Daily Fluid Restriction: no  Last Modified Barium Swallow with Video (Video Swallowing Test): not done    Treatments at the Time of Hospital Discharge:   Respiratory Treatments: ***  Oxygen Therapy:  is not on home oxygen therapy.   Ventilator:    - No ventilator support    Rehab Therapies: {THERAPEUTIC INTERVENTION:7580123862}  Weight Bearing Status/Restrictions: No weight bearing restrictions  Other Medical Equipment (for information only, NOT a DME order):  walker  Other Treatments: ***    Patient's personal belongings (please select all that are sent with patient):  {[facility] DME Belongings:927444033}    RN SIGNATURE:  Electronically signed by Gabriel Barragan RN on 8/14/22 at 12:30 PM EDT    CASE MANAGEMENT/SOCIAL WORK SECTION    Inpatient Status Date: ***    Readmission Risk Assessment Score:  Readmission Risk              Risk of Unplanned Readmission:  0           Discharging to Facility/ Agency   Name:   Address:  Phone:  Fax:    Dialysis Facility (if applicable)   Name:  Address:  Dialysis Schedule:  Phone:  Fax:    / signature: {Esignature:[id number]}    PHYSICIAN SECTION    Prognosis: {Prognosis:8337005820}    Condition at Discharge: [facility] Patient Condition:252798465}    Rehab Potential (if transferring to Rehab): {Prognosis:9216154563}    Recommended Labs or Other Treatments After Discharge: ***    Physician Certification: I certify the above information and transfer of Yovani Garland  is necessary for the continuing treatment of the diagnosis listed and that she requires {Admit to Appropriate Level of Care:61479} for {GREATER/LESS:232569859} 30 days.      Update Admission H&P: [facility]25[facility]}    PHYSICIAN SIGNATURE:  {Esignature:[id number]}

## 2022-08-14 NOTE — CARE COORDINATION
Social work; spoke to [employer] [phone] and they referred me to [address] transportation [phone]. Advised dispatch that pt needs to be home no later than 4:30 pm today. Dispatch working on the time now. Will keep everyone advised.   Patt obrien

## 2022-08-14 NOTE — PROGRESS NOTES
The pt was discharged back to [facility]. The pts granddaughter and Hospice were updated.   The pt was transported by [employer] per stretcher in stable condition

## 2022-08-14 NOTE — PROGRESS NOTES
The pt is current with [facility].  [phone]  Writer reached out to them, awaitng a fax of her current med list.

## 2022-08-14 NOTE — PROGRESS NOTES
Occupational Therapy  Facility/Department: [facility] PROGRESSIVE CARE  Occupational Therapy Initial Assessment    Name: Yung Melara  : 1925  MRN: [id number]  Date of Service: 2022    Discharge Recommendations:  Patient would benefit from continued therapy after discharge      RN reports patient is medically stable for therapy treatment this date. Chart reviewed prior to treatment and patient is agreeable for therapy. All lines intact and patient positioned comfortably at end of treatment. All patient needs addressed prior to ending therapy session. Patient Diagnosis(es): The encounter diagnosis was Generalized weakness. Past Medical History:  has a past medical history of A-fib ([address] AAA (abdominal aortic aneurysm) ([address] Abdominal cramping, Aortic insufficiency, Atrial fibrillation ([address] Back pain, chronic, C. difficile colitis, Chronic diastolic CHF (congestive heart failure) ([address] COPD (chronic obstructive pulmonary disease) ([address] Diabetes mellitus ([address] Gastritis, GERD (gastroesophageal reflux disease), Hiatal hernia, Hx of prolonged Q-T interval on ECG, Hypoalbuminemia, IBS (irritable bowel syndrome), Murmur, cardiac, Nausea vomiting and diarrhea, Stomach ulcer, Thyroid disease, Type 2 diabetes mellitus without complication, without long-term current use of insulin ([address] UTI (lower urinary tract infection), and Varicose veins. Past Surgical History:  has a past surgical history that includes Cholecystectomy; vascular surgery; Vein Surgery; Hysterectomy; hernia repair; Upper gastrointestinal endoscopy (2017); and pr egd transoral biopsy single/multiple (N/A, 2017). Assessment   Performance deficits / Impairments: Decreased functional mobility ; Decreased ADL status; Decreased safe awareness;Decreased endurance;Decreased balance;Decreased posture  Prognosis: Good  Decision Making: Medium Complexity  Assistance / Modification: SAPPHIRE gay  REQUIRES OT FOLLOW-UP: Yes  Activity Tolerance  Activity Tolerance: Patient limited by pain (Rt knee- RN notified)  Activity Tolerance Comments: Pt unable to tolerate more than 3-4 min ADL with toileting task. Cue to pace task as pt appeared impulsive with movement.         Plan   Plan  Times per Week: 4-5 X per week  Current Treatment Recommendations: Balance training, Functional mobility training, Endurance training, Safety education & training, Patient/Caregiver education & training, Equipment evaluation, education, & procurement, Self-Care / ADL     Restrictions  Restrictions/Precautions  Restrictions/Precautions: General Precautions, Fall Risk, Bed Alarm (up with assist)    Subjective   General  Chart Reviewed: Yes  Patient assessed for rehabilitation services?: Yes  Family / Caregiver Present: No  Subjective  Subjective: Pt supine in bed, agreeable for OT  Pt reports 8/10 Rt knee  Social/Functional History  Social/Functional History  Lives With: Other (comment) ([facility])  Type of Home: Assisted living  Home Layout: One level  Home Access: Elevator  Bathroom Shower/Tub: Walk-in shower  Bathroom Toilet: Handicap height  Bathroom Equipment: Shower chair, Hand-held shower, Grab bars around toilet  Home Equipment: Rollator  Has the patient had two or more falls in the past year or any fall with injury in the past year?: Yes (Recently in bathroom, stood and legs gave out per pt)  Receives Help From: Personal care attendant (facility)  ADL Assistance: Needs assistance  Bath: Moderate assistance (UB min assist/ LB mod assist)  Dressing: Minimal assistance (UB min assist / LB mod assist)  Grooming: Stand by assistance (assist with LB)  Feeding:  (set up)  Toileting: Independent  Homemaking Assistance: Needs assistance  Homemaking Responsibilities: No  Ambulation Assistance: Independent (Pt reports she walked to dining room 1X a day using rollator)  Transfer Assistance: Independent  Active : No  Occupation:  (Homemaker)  Type of Occupation: no hobbies  Leisure & Hobbies: Pt use to golf       Objective   Heart Rate: 80  Heart Rate Source: Monitor  BP: (!) 142/88  BP Location: Left upper arm  Patient Position: Supine  MAP (Calculated): 106  Resp: 16  SpO2: 96 %  O2 Device: None (Room air)          Observation/Palpation  Posture: Fair (forward postue, pt states she is unable to stand up straight with mobility)  Body Mechanics: Moves slow with c/o Rt knee pain- RN notified  Edema: B LE  Safety Devices  Type of Devices: All fall risk precautions in place; Bed alarm in place; Patient at risk for falls; Left in bed;Nurse notified     Toilet Transfers  Toilet Transfer: Minimal assistance  Toilet Transfers Comments: cues for hand and leg placement  AROM: Within functional limits  Strength: Within functional limits  ADL  Feeding: Setup  Grooming: Setup  UE Bathing: Moderate assistance  LE Bathing: Maximum assistance  UE Dressing: Moderate assistance  LE Dressing: Maximum assistance  Toileting: Maximum assistance (Completed toileting using BS commode as pt unable to ambulate to batheroom per Rt knee pain)  Tone RUE  RUE Tone: Normotonic  Tone LUE  LUE Tone: Normotonic  Coordination  Movements Are Fluid And Coordinated: Yes     Bed mobility  Rolling to Left: Minimal assistance  Rolling to Right: Minimal assistance  Supine to Sit: Moderate assistance  Sit to Supine: Moderate assistance  Scooting: Minimal assistance  Transfers  Sit to stand: Minimal assistance (cues for hand placement for safety)  Stand to sit: Minimal assistance (cues for hand placement)  Vision  Vision: Impaired  Vision Exceptions: Wears glasses for reading  Hearing  Hearing: Exceptions to [facility]  Hearing Exceptions: Bilateral hearing aid;Hard of hearing/hearing concerns  Cognition  Overall Cognitive Status: Exceptions  Arousal/Alertness: Appropriate responses to stimuli (hyperfocused on condition)  Following Commands:  Follows one step commands with repetition ([place] concerns)  Attention Span: Appears intact  Memory: Appears intact  Safety Judgement: Good awareness of safety precautions  Insights: Fully aware of deficits  Initiation: Does not require cues  Sequencing: Does not require cues  Patient affect[de-identified] Other (comment) (Anxious)  Orientation  Overall Orientation Status: Within Functional Limits  Orientation Level: Oriented X4        Sensation  Overall Sensation Status: Impaired  Additional Comments: Pt reports chronic numbness Rt fot         Education Given To: Patient  Education Provided: Role of Therapy;Plan of Care;ADL Adaptive Strategies; Fall Prevention Strategies  Education Method: Demonstration;Verbal  Barriers to Learning: Hearing  Education Outcome: Verbalized understanding;Continued education needed           Hand Dominance  Hand Dominance: Right            G-Code     OutComes Score                                                  AM-PAC Score        AM-PAC Inpatient Daily Activity Raw Score: 13 (08/14/22 1213)  AM-PAC Inpatient ADL T-Scale Score : 32.03 (08/14/22 1213)  ADL Inpatient CMS 0-100% Score: 63.03 (08/14/22 1213)  ADL Inpatient CMS G-Code Modifier : CL (08/14/22 1213)    Tinneti Score       Goals  Short Term Goals  Time Frame for Short term goals: For LOS, pt will:  Short Term Goal 1: complete grooming task after set up  Short Term Goal 2: complete UB bathe and dress with min assist  Short Term Goal 3: complete LB bathe and dress with mod assist  Short Term Goal 4: complete toileting task with SBA  Short Term Goal 5: complete functional mobility for ADL with SBA  Patient Goals   Patient goals :  To go back to [address] Time   Individual Concurrent Group Co-treatment   Time In 1100         Time Out 1155         Minutes 55  10 min consult   15 min eval  30 min treat                 Karen Matters, OT

## 2022-08-14 NOTE — FLOWSHEET NOTE
[facility] NOTE    Room # 1808/4156-79   Name: Teresa Rader              Reason for visit: Routine    I visited the patient. Admit Date & Time: 8/14/2022  3:18 AM    Assessment:  Teresa Rader is a 80 y.o. female. Upon entering the room patient states well, states no major needs or prayers. Patient kindly declines Spiritual Care visit.  leaves prayer card for patient for possible follow up as needed. Intervention:   provided a ministry presence. Outcome:  Patient grateful for the visit. Plan:  Chaplains will remain available to offer spiritual and emotional support as needed. Electronically signed by Marco Antonio Fisher.  Chaplain Mj, on 8/14/2022 at 1:37 PM.  Cesar        08/14/22 1329   Encounter Summary   Service Provided For: Patient   Referral/Consult From: [facility]   Support System Unknown   Last Encounter  08/14/22   Complexity of Encounter Low   Begin Time 1209   End Time  1211   Total Time Calculated 2 min   Encounter    Type Initial Screen/Assessment   Assessment/Intervention/Outcome   Assessment Unable to assess   Intervention Sustaining Presence/Ministry of presence   Outcome Refused/Declined

## 2022-08-15 LAB — MYCOPLASMA PNEUMONIAE IGM: 0.1

## 2024-08-22 NOTE — PROGRESS NOTES
805 - called patient - no answer  1030 - called and spoke to patient.  Reviewed genetic testing results and VUS x 2.  Discussed clinically negative results and reclassification by Lane with time.   Sister's results pending.      Has fast MRI scheduled.      We reviewed risk reduction through lifestyle changes including an overall healthy diet, regular exercise and keeping alcohol consumption to a minimum.    We also discussed risk reduction through the use of tamoxifen - will not pursue at this time.     GI CLINIC FOLLOW UP    INTERVAL HISTORY:   No referring provider defined for this encounter. Chief Complaint   Patient presents with    Follow-up     hfu gastritis            HISTORY OF PRESENT ILLNESS: Ms.Joyce Mirian Salazar is a 80 y.o. female. Abdominal pain. Mostly upper abdomen. Seems to be worse with certain body positions. Some bloating. Bowels moving okay otherwise. No blood in the stool or melena. She takes Protonix once a day. Recent EGD showed gastritis. Past Medical,Family, and Social History reviewed and does not contribute to the patient presenting condition. Patient's PMH/PSH,SH,PSYCH Hx, MEDs, ALLERGIES, and ROS were all reviewed and updated in the appropriate sections.     PAST MEDICAL HISTORY:  Past Medical History:   Diagnosis Date    A-fib [facility])     AAA (abdominal aortic aneurysm) (HCC)     Abdominal cramping     Aortic insufficiency     Atrial fibrillation (HCC)     Back pain, chronic     C. difficile colitis     Chronic diastolic CHF (congestive heart failure) (HCC)     COPD (chronic obstructive pulmonary disease) (HCC)     Diabetes mellitus (HCC)     Gastritis     GERD (gastroesophageal reflux disease)     Hiatal hernia     Hx of prolonged Q-T interval on ECG     Hypoalbuminemia 10/14/2014    IBS (irritable bowel syndrome)     Murmur, cardiac     Nausea vomiting and diarrhea 10/14/2014    Stomach ulcer     Thyroid disease     Type 2 diabetes mellitus without complication, without long-term current use of insulin ([facility] 01/24/2018    UTI (lower urinary tract infection)     Varicose veins        Past Surgical History:   Procedure Laterality Date    CHOLECYSTECTOMY      HERNIA REPAIR      HYSTERECTOMY      partial    ID EGD TRANSORAL BIOPSY SINGLE/MULTIPLE N/A 6/20/2017    EGD BIOPSY performed by oNemí Sam MD at [address] [place] [address]  06/20/2017    MILD CHRONIC INACTIVE GASTRITIS    VASCULAR SURGERY      varicose veins    VEIN SURGERY         CURRENT MEDICATIONS:    Current Outpatient Medications:     dicyclomine (BENTYL) 10 MG capsule, Take 1 capsule by mouth every 6 hours as needed (cramps), Disp: 20 capsule, Rfl: 0    levothyroxine (SYNTHROID) 125 MCG tablet, Take 125 mcg by mouth every other day, Disp: , Rfl:     traMADol (ULTRAM) 50 MG tablet, Take 50 mg by mouth every 6 hours as needed for Pain., Disp: , Rfl:     Cholecalciferol (VITAMIN D3) 50 MCG (2000 UT) CAPS, Take 2,000 Units by mouth every other day , Disp: , Rfl:     furosemide (LASIX) 20 MG tablet, Take 20 mg by mouth daily , Disp: , Rfl:     potassium chloride (KLOR-CON) 10 MEQ extended release tablet, TAKE ONE TABLET BY MOUTH TWICE A DAY, Disp: 60 tablet, Rfl: 1    pantoprazole (PROTONIX) 40 MG tablet, TAKE ONE TABLET BY MOUTH DAILY, Disp: 30 tablet, Rfl: 6    amiodarone (CORDARONE) 200 MG tablet, Take 200 mg by mouth Twice a Week On Monday and Friday, Disp: , Rfl:     OXYGEN, Inhale into the lungs Uses at night and prn, Disp: , Rfl:     ALLERGIES:   Allergies   Allergen Reactions    Penicillins      Has no recollection of what the reaction was       FAMILY HISTORY:       Problem Relation Age of Onset    Stroke Mother     Heart Disease Mother          SOCIAL HISTORY:   Social History     Socioeconomic History    Marital status:      Spouse name: Not on file    Number of children: 2    Years of education: 15    Highest education level: Not on file   Occupational History    Not on file   Tobacco Use    Smoking status: Former Smoker     Packs/day: 1.00     Years: 20.00     Pack years: 20.00     Types: Cigarettes    Smokeless tobacco: Never Used    Tobacco comment: quit many years ago    Vaping Use    Vaping Use: Never used   Substance and Sexual Activity    Alcohol use:  Yes     Alcohol/week: 2.0 standard drinks     Types: 1 Glasses of wine, 1 Cans of beer per week     Comment: social drinker     Drug use: No    Sexual activity: Never     Birth control/protection: Surgical     Comment: hyst   Other Topics Concern    Not on file   Social History Narrative    Not on file     Social Determinants of Health     Financial Resource Strain:     Difficulty of Paying Living Expenses: Not on file   Food Insecurity:     Worried About Running Out of Food in the Last Year: Not on file    Emperatriz of Food in the Last Year: Not on file   Transportation Needs:     Lack of Transportation (Medical): Not on file    Lack of Transportation (Non-Medical): Not on file   Physical Activity:     Days of Exercise per Week: Not on file    Minutes of Exercise per Session: Not on file   Stress:     Feeling of Stress : Not on file   Social Connections:     Frequency of Communication with Friends and Family: Not on file    Frequency of Social Gatherings with Friends and Family: Not on file    Attends Yazidi Services: Not on file    Active Member of [address] [place] [address] [employer] or Organizations: Not on file    Attends Club or Organization Meetings: Not on file    Marital Status: Not on file   Intimate Partner Violence:     Fear of Current or Ex-Partner: Not on file    Emotionally Abused: Not on file    Physically Abused: Not on file    Sexually Abused: Not on file   Housing Stability:     Unable to Pay for Housing in the Last Year: Not on file    Number of [place] in the Last Year: Not on file    Unstable Housing in the Last Year: Not on file       REVIEW OF SYSTEMS: A 12-point review of systemswas obtained and pertinent positives and negatives were enumerated above in the history of present illness. All other reviewed systems / symptoms were negative. Review of Systems   Constitutional: Negative. HENT: Negative. Eyes: Positive for visual disturbance (glasses ). Respiratory: Negative. Cardiovascular: Negative. Gastrointestinal: Negative. Endocrine: Negative. Genitourinary: Negative. Musculoskeletal: Negative. Skin: Negative.     Allergic/Immunologic: Negative. Neurological: Negative. Hematological: Negative. Psychiatric/Behavioral: Negative. LABORATORY DATA: Reviewed  Lab Results   Component Value Date    WBC 4.3 02/06/2022    HGB 11.3 (L) 02/06/2022    HCT 35.5 (L) 02/06/2022    MCV 95.2 02/06/2022     (L) 02/06/2022     02/06/2022    K 3.4 (L) 02/06/2022     02/06/2022    CO2 25 02/06/2022    BUN 10 02/06/2022    CREATININE 0.88 02/06/2022    LABALBU 3.0 (L) 02/06/2022    BILITOT 0.80 02/06/2022    ALKPHOS 53 02/06/2022    AST 11 02/06/2022    ALT 5 02/06/2022    INR 1.1 01/21/2020         Lab Results   Component Value Date    RBC 3.73 (L) 02/06/2022    HGB 11.3 (L) 02/06/2022    MCV 95.2 02/06/2022    MCH 30.3 02/06/2022    MCHC 31.8 02/06/2022    RDW 14.3 02/06/2022    MPV 9.5 02/06/2022    BASOPCT 0 02/03/2022    LYMPHSABS 1.16 02/03/2022    MONOSABS 0.35 02/03/2022    NEUTROABS 4.74 02/03/2022    EOSABS 0.21 02/03/2022    BASOSABS <0.03 02/03/2022         DIAGNOSTIC TESTING:     No results found. PHYSICAL EXAMINATION: Vital signs reviewed per the nursing documentation. There were no vitals taken for this visit. There is no height or weight on file to calculate BMI. Physical Exam    I personally reviewed the nurse's notes and documentation and I agree with her notes. General: alert, appears stated age and cooperative Psych: Normal. and Alert and oriented, appropriate affect. . Normal affect. Mentation normal  HEENT: PERRLA. Clear conjunctivae and sclerae. Moist oral mucosae, no lesions or ulcers. The neck is supple, without lymphadenopathy or jugular venous distension. No masses. Normal thyroid. Cardiovascular: S1 S2 RRR no rubs or murmurs. Pulmonary: clear BL. No accessory muscle usage. Abdominal Exam: Soft, NT ND, no hepato or spleno megaly, +BS, no ascites. IMPRESSION: Ms. Kit Keenan is a 80 y.o. female with abdominal pain. Component of gastritis. We will increase Protonix to twice a day.   She has large abdominal hernia. Very likely she is also contributing to her pain. At her age it would be high risk to go for any surgical intervention. We will see her back in 1 month. Thank you for allowing me to participate in the care of Ms. Mariano Sharp. For any further questions please do not hesitate to contact me. I have reviewed and agree with the ROS entered by the MA/LPN. Note is dictated utilizing voice recognition software. Unfortunately this leads to occasional typographical errors. Please contact our office if you have any questions.     Elpidio Linares MD  [facility]  O: #[phone]

## (undated) DEVICE — CUP MED 1OZ CLR POLYPR FEED GRAD W/O LID

## (undated) DEVICE — BLOCK BITE 60FR RUBBER ADLT DENTAL